# Patient Record
Sex: FEMALE | Race: BLACK OR AFRICAN AMERICAN | NOT HISPANIC OR LATINO | Employment: FULL TIME | URBAN - METROPOLITAN AREA
[De-identification: names, ages, dates, MRNs, and addresses within clinical notes are randomized per-mention and may not be internally consistent; named-entity substitution may affect disease eponyms.]

---

## 2017-01-11 RX ORDER — LEVOTHYROXINE SODIUM 137 UG/1
137 TABLET ORAL
Qty: 30 TABLET | Refills: 0 | Status: SHIPPED | OUTPATIENT
Start: 2017-01-11 | End: 2017-02-13 | Stop reason: SDUPTHER

## 2017-02-13 ENCOUNTER — OFFICE VISIT (OUTPATIENT)
Dept: FAMILY MEDICINE | Facility: CLINIC | Age: 57
End: 2017-02-13
Payer: COMMERCIAL

## 2017-02-13 VITALS
BODY MASS INDEX: 51.91 KG/M2 | WEIGHT: 293 LBS | HEART RATE: 80 BPM | HEIGHT: 63 IN | RESPIRATION RATE: 18 BRPM | SYSTOLIC BLOOD PRESSURE: 128 MMHG | TEMPERATURE: 97 F | OXYGEN SATURATION: 95 % | DIASTOLIC BLOOD PRESSURE: 84 MMHG

## 2017-02-13 DIAGNOSIS — Z78.0 POSTMENOPAUSAL: ICD-10-CM

## 2017-02-13 DIAGNOSIS — Z12.31 OTHER SCREENING MAMMOGRAM: ICD-10-CM

## 2017-02-13 DIAGNOSIS — E66.01 MORBID OBESITY WITH BMI OF 50.0-59.9, ADULT: ICD-10-CM

## 2017-02-13 DIAGNOSIS — M17.0 PRIMARY OSTEOARTHRITIS OF BOTH KNEES: ICD-10-CM

## 2017-02-13 DIAGNOSIS — Z00.00 ANNUAL PHYSICAL EXAM: Primary | ICD-10-CM

## 2017-02-13 DIAGNOSIS — E55.9 VITAMIN D DEFICIENCY: ICD-10-CM

## 2017-02-13 DIAGNOSIS — E03.9 HYPOTHYROIDISM, UNSPECIFIED TYPE: ICD-10-CM

## 2017-02-13 PROCEDURE — 99999 PR PBB SHADOW E&M-EST. PATIENT-LVL IV: CPT | Mod: PBBFAC,,, | Performed by: FAMILY MEDICINE

## 2017-02-13 PROCEDURE — 99396 PREV VISIT EST AGE 40-64: CPT | Mod: S$GLB,,, | Performed by: FAMILY MEDICINE

## 2017-02-13 RX ORDER — LEVOTHYROXINE SODIUM 137 UG/1
137 TABLET ORAL
Qty: 30 TABLET | Refills: 5 | Status: SHIPPED | OUTPATIENT
Start: 2017-02-13 | End: 2017-05-10 | Stop reason: DRUGHIGH

## 2017-02-13 RX ORDER — ERGOCALCIFEROL 1.25 MG/1
CAPSULE ORAL
Qty: 8 CAPSULE | Refills: 5 | Status: SHIPPED | OUTPATIENT
Start: 2017-02-13 | End: 2017-11-13

## 2017-02-13 RX ORDER — MELOXICAM 7.5 MG/1
7.5 TABLET ORAL DAILY PRN
Qty: 30 TABLET | Refills: 5 | Status: SHIPPED | OUTPATIENT
Start: 2017-02-13 | End: 2017-06-16

## 2017-02-13 NOTE — MR AVS SNAPSHOT
[facility]  [address] [facility] [address]  Phone: [phone]                  Amelia Mirza   2017 4:00 PM   Office Visit    Description:  Female : 1960   Provider:  Eve Green MD   Department:  [facility]           Reason for Visit     Annual Exam           Diagnoses this Visit        Comments    Annual physical exam    -  Primary     Primary osteoarthritis of both knees         Vitamin D deficiency         Hypothyroidism, unspecified type         Morbid obesity with BMI of 50.0-59.9, adult         Postmenopausal         Other screening mammogram                To Do List           Future Appointments        Provider Department Dept Phone    3/7/2017 8:00 AM [facility] BMD1 [facility] [phone]    3/7/2017 8:30 AM [facility] MAMMO1-SCR [facility] [phone]    3/7/2017 8:45 AM [facility], [facility] [phone]    3/7/2017 8:50 AM SPECIMEN, [facility] [phone]      Goals (5 Years of Data)     None      Follow-Up and Disposition     Return if symptoms worsen or fail to improve.       These Medications        Disp Refills Start End    meloxicam (MOBIC) 7.5 MG tablet 30 tablet 5 2017     Take 1 tablet (7.5 mg total) by mouth daily as needed for Pain. With food - Oral    Pharmacy: [facility]/pharmacy #[id number] - [place] - [address] [facility] Ph #: [phone]       levothyroxine (SYNTHROID) 137 MCG Tab tablet 30 tablet 2017     Take 1 tablet (137 mcg total) by mouth before breakfast. - Oral    Pharmacy: [facility]/pharmacy #[place] - [address] [facility] Ph #: [phone]       ergocalciferol (VITAMIN D2) 50,000 unit Cap 8 capsule 5 2017     Take 1 capsule every Monday and Friday    Pharmacy: [facility]/pharmacy #[id number] - [place] - [address] [facility] Ph #: [phone]         [facility]     [facility]  "Care Line - 24/7 Assistance  Registered nurses in the [facility] provide clinical advisement, health education, appointment booking, and other advisory services.  Call for this free service at [phone].             Medications           Message regarding Medications     Verify the changes and/or additions to your medication regime listed below are the same as discussed with your clinician today.  If any of these changes or additions are incorrect, please notify your healthcare provider.        CHANGE how you are taking these medications     Start Taking Instead of    ergocalciferol (VITAMIN D2) 50,000 unit Cap ergocalciferol (VITAMIN D2) 50,000 unit Cap    Dosage:  Take 1 capsule every Monday and Friday Dosage:  Take 1 capsule (50,000 Units total) by mouth as directed. Take 1 capsule every Monday and Friday    Reason for Change:  Reorder            Verify that the below list of medications is an accurate representation of the medications you are currently taking.  If none reported, the list may be blank. If incorrect, please contact your healthcare provider. Carry this list with you in case of emergency.           Current Medications     ergocalciferol (VITAMIN D2) 50,000 unit Cap Take 1 capsule every Monday and Friday    levothyroxine (SYNTHROID) 137 MCG Tab tablet Take 1 tablet (137 mcg total) by mouth before breakfast.    meloxicam (MOBIC) 7.5 MG tablet Take 1 tablet (7.5 mg total) by mouth daily as needed for Pain. With food           Clinical Reference Information           Your Vitals Were     BP Pulse Temp Resp    128/84 (BP Location: Left arm, Patient Position: Sitting, BP Method: Manual) 80 97.2 °F (36.2 °C) (Tympanic) 18    Height Weight SpO2 BMI    5' 3" (1.6 m) 136.6 kg (301 lb 2.4 oz) 95% 53.35 kg/m2      Blood Pressure          Most Recent Value    BP  128/84      Allergies as of 2/13/2017     No Known Allergies      Immunizations Administered on Date of Encounter - 2/13/2017     None "      Orders Placed During Today's Visit     Future Labs/Procedures Expected by Expires    CBC auto differential  2/13/2017 4/14/2018    Comprehensive metabolic panel  2/13/2017 4/14/2018    DXA Bone Density Spine And Hip_Axial Skeleton  2/13/2017 2/13/2018    Hepatitis C antibody  2/13/2017 4/14/2018    Lipid panel  2/13/2017 4/14/2018    Mammo Digital Screening Bilat with CAD  2/13/2017 4/15/2018    TSH  2/13/2017 4/14/2018    Urinalysis  2/13/2017 4/14/2018    Vitamin D  2/13/2017 4/14/2018      [facility] Sign-Up     Activating your [facility] account is as easy as 1-2-3!     1) Visit [email or web address], select Sign Up Now, enter this activation code and your date of birth, then select Next.  [email or web address]  Expires: 3/30/2017  4:55 PM      2) Create a username and password to use when you visit [facility] in the future and select a security question in case you lose your password and select Next.    3) Enter your e-mail address and click Sign Up!    Additional Information  If you have questions, please e-mail [email or web address] or call [phone] to talk to our [facility] staff. Remember, [facility] is NOT to be used for urgent needs. For medical emergencies, dial 911.         Instructions    Please call Dr. Green for your results/follow up recommendations.     Thanks.       Language Assistance Services     ATTENTION: Language assistance services are available, free of charge. Please call [phone].      ATENCIÓN: Si habla español, tiene a negro disposición servicios gratuitos de asistencia lingüística. Llame al [phone].     CHÚ Ý: N?u b?n nói Ti?ng Vi?t, có các d?ch v? h? tr? ngôn ng? mi?n phí dành cho b?n. G?i s? [phone].         [facility] complies with applicable Federal civil rights laws and does not discriminate on the basis of race, color, national origin, age, disability, or sex.

## 2017-02-13 NOTE — PROGRESS NOTES
CHIEF COMPLAINT: Annual wellness examination.     HISTORY OF PRESENT ILLNESS: Ms. Mirza comes in today non fasting and with taking medication and without acute problems for annual wellness examination.     END OF LIFE DECISION: She has no living will and does not desire life support.    Current Outpatient Prescriptions   Medication Sig    ergocalciferol (VITAMIN D2) 50,000 unit Cap Take 1 capsule (50,000 Units total) by mouth as directed. Take 1 capsule every Monday and Friday    levothyroxine (SYNTHROID) 137 MCG Tab tablet Take 1 tablet (137 mcg total) by mouth before breakfast.    meloxicam (MOBIC) 7.5 MG tablet Take 1 tablet (7.5 mg total) by mouth daily as needed for Pain. With food     SCREENINGS:  Cholesterol: December 31, 2014.  FFS/Colonoscopy: June 11, 2012 - polyps; repeat in 10 years.  Mammogram: December 31, 2014 - benign.  GYN Exam: December 9, 2014 - okay.  Dexa Scan: Never; will get at age 57.  Eye Exam: 2016 with Dr. Hill.  She wears glasses.   PPD: Negative in the past.  Immunizations: Td/Tdap - May 30, 2012.  Gardasil - N./A.  Zostavax - N./A.  Pneumovax - Never.  Seasonal Flu - N./A. Discussed. She declines.     ROS:  GENERAL: Denies fever, chills, fatigue or unusual weight change. Appetite normal. Weight 133.9 kg (295 lb 3.1 oz) at August 30, 2016 visit. Does not exercise due to knee pain. Monitors diet some times.  SKIN: Denies rashes, itching, changes in mole, color or texture of skin or easy bruising.  HEAD: Denies headaches or recent head trauma.  EYES: Denies change in vision, pain, diplopia, redness except watery discharge. Wears glasses.  EARS: Denies ear pain, discharge, vertigo or decreased hearing.  NOSE: Denies loss of smell, epistaxis except rhinitis and states uses Sinex, Mucinex with help.  MOUTH & THROAT: Denies hoarseness or change in voice. Denies excessive gum bleeding or mouth sores. Denies sore throat.  NODES: Denies swollen glands.  CHEST: Denies IYER, wheezing, cough, or  "sputum production.  CARDIOVASCULAR: Denies chest pain, PND, orthopnea or reduced exercise tolerance. Denies palpitations.  ABDOMEN: Denies diarrhea, constipation, nausea, vomiting, abdominal pain, or blood in stool.   URINARY: Denies flank pain, dysuria or hematuria.  GENITOURINARY: Denies flank pain, dysuria, frequency or hematuria. Performs monthly breast self exams.  ENDOCRINE: Denies diabetes, cholesterol problems. Reports takes Vitamin D 50,000 units on Monday, Friday for vitamin D deficiency and continues Synthroid 137 mcg daily for thyroid replacement.  HEME/LYMPH: Denies bleeding problems.  PERIPHERAL VASCULAR:Denies claudication or cyanosis.  MUSCULOSKELETAL: Denies joint stiffness, pain or swelling except reports chronic arthritic knee pain - sometimes helped with Mobic. Denies edema.   NEUROLOGIC: Denies history of seizures, tremors, paralysis, alteration of gait or coordination.   PSYCHIATRIC: Denies mood swings, depression, anxiety, homicidal or suicidal thoughts. Denies sleep problems.    PE:   VS:   Visit Vitals    /84 (BP Location: Left arm, Patient Position: Sitting, BP Method: Manual)    Pulse 80    Temp 97.2 °F (36.2 °C) (Tympanic)    Resp 18    Ht 5' 3" (1.6 m)    Wt (!) 136.6 kg (301 lb 2.4 oz)    SpO2 95%    BMI 53.35 kg/m2     APPEARANCE: Well nourished, well developed female, obese and pleasant, alert and oriented in no acute distress.  HEAD: Nontender. Full range of motion.  EYES: PERRL, conjunctiva pink, lids no edema. She wears glasses.  EARS: External canal patent, no swelling or redness. TM's shiny and clear.  NOSE: Mucosa and turbinates pink, not swollen. No discharge. Nontender sinuses.  THROAT: No pharyngeal erythema or exudate. No stridor.  NECK: Supple, no mass, thyroid not enlarged.  NODES: No cervical, axillary or inguinal lymph node enlargement.  CHEST: Normal respiratory effort. Lungs clear to auscultation.  CARDIOVASCULAR: Normal S1, S2. No rubs, murmurs or " gallops. PMI not displaced. No carotid bruit. Pedal pulses palpable bilaterally. No edema.  ABDOMEN: Bowel sounds present. Not distended. Soft. No tenderness, masses or organomegaly.  BREAST EXAM: Symmetrical, no external lesions, no discharge, no masses palpated.  PELVIC EXAM: No external lesions noted, no discharge, absent cervix and uterus, bimanual exam showed no adnexal masses or tenderness noted. Urethra and bladder  intact.  RECTAL EXAM: No external hemorrhoids or anal fissures. Heme-negative stool in the rectal vault.  MUSCULOSKELETAL: No joint deformities or stiffness. She is ambulatory without problems.  SKIN: No rashes or suspicious lesions, normal color and turgor.  NEUROLOGIC: Cranial Nerves: II-XII grossly intact. DTR's: Knees, Ankles 2+ and equal bilaterally. Gait & Posture: Normal gait and fine motion.  PSYCHIATRIC: Patient alert, oriented x 3. Mood/Affect normal without acute anxiety or depression noted. Judgment/insight good as she makes appropriate decisions on  today's examination.    ASSESSMENT:    ICD-10-CM ICD-9-CM    1. Annual physical exam Z00.00 V70.0 CBC auto differential      TSH      Comprehensive metabolic panel      Lipid panel      Urinalysis      Vitamin D      Hepatitis C antibody   2. Hypothyroidism, unspecified type E03.9 244.9    3. Vitamin D deficiency E55.9 268.9    4. Primary osteoarthritis of both knees M17.0 715.16    5. Morbid obesity with BMI of 50.0-59.9, adult E66.01 278.01     Z68.43 V85.43    6. Postmenopausal Z78.0 V49.81 DXA Bone Density Spine And Hip_Axial Skeleton   7. Other screening mammogram Z12.31 V76.12 Mammo Digital Screening Bilat with CAD        PLAN:  1. Age-appropriate counseling-appropriate low-sodium, low-cholesterol diet and exercise daily as tolerated, monthly breast self exam, annual wellness examination.  2. Labs: CMP, Vitamin D, TSH, CBC, Lipid panel. Patient advised to call for results/follow up recommendations.  3. Screening mammogram.  4.  Continue current medications (including take Mobic every day as directed).  5. Prescription refill - Synthroid 137 mcg daily, #30, 5 refills; Mobic 7.5 mg daily prn pain, #30, 5 refills; vitamin D 50,000 units every Monday and Friday of each week, #8, 5 refills.

## 2017-03-07 ENCOUNTER — TELEPHONE (OUTPATIENT)
Dept: FAMILY MEDICINE | Facility: CLINIC | Age: 57
End: 2017-03-07

## 2017-03-07 ENCOUNTER — NURSE TRIAGE (OUTPATIENT)
Dept: ADMINISTRATIVE | Facility: CLINIC | Age: 57
End: 2017-03-07

## 2017-03-07 RX ORDER — CYCLOBENZAPRINE HCL 10 MG
10 TABLET ORAL DAILY PRN
Qty: 30 TABLET | Refills: 0 | Status: SHIPPED | OUTPATIENT
Start: 2017-03-07 | End: 2017-04-10 | Stop reason: SDUPTHER

## 2017-03-07 NOTE — TELEPHONE ENCOUNTER
----- Message from Ashtyn Alonso sent at 3/7/2017 10:20 AM CST -----  Contact: pt  Pt states she has been having ioana horse since 3/5 and wants to be advised, pt can be reached at [phone]///thxMW

## 2017-03-07 NOTE — TELEPHONE ENCOUNTER
Pt c/o ioana horses in left leg since Sunday the 5th.   Tried rubbing it out, walking it out, but not going away.  Tried tylenol and on already on meloxicam. Wants to know if she can get a muscle relaxer or if there is anything else she can do or take for it.

## 2017-03-07 NOTE — TELEPHONE ENCOUNTER
"  Reason for Disposition   [1] SEVERE pain (e.g., excruciating, unable to do any normal activities) AND [2] not improved after 2 hours of pain medicine    Answer Assessment - Initial Assessment Questions  1. ONSET: "When did the pain start?"       Sunday   2. LOCATION: "Where is the pain located?"       Lower left leg/calf  3. PAIN: "How bad is the pain?"    (Scale 1-10; or mild, moderate, severe)    -  MILD (1-3): doesn't interfere with normal activities     -  MODERATE (4-7): interferes with normal activities (e.g., work or school) or awakens from sleep, limping     -  SEVERE (8-10): excruciating pain, unable to do any normal activities, unable to walk      Currently an 8   4. WORK OR EXERCISE: "Has there been any recent work or exercise that involved this part of the body?"       denied  5. CAUSE: "What do you think is causing the leg pain?"      Thinks she has charley horses but not sure  6. OTHER SYMPTOMS: "Do you have any other symptoms?" (e.g., chest pain, back pain, breathing difficulty, swelling, rash, fever, numbness, weakness)      None reported  7. PREGNANCY: "Is there any chance you are pregnant?" "When was your last menstrual period?"      N/a  Called 's office today for advice- told them she was on meloxicam and had been taking tylenol but not getting any relief. Has tried soaking with warm water. Stretching and standing but pain not being relieved.    Protocols used: ST LEG PAIN-A-ANAYELI    "

## 2017-03-10 ENCOUNTER — OFFICE VISIT (OUTPATIENT)
Dept: FAMILY MEDICINE | Facility: CLINIC | Age: 57
End: 2017-03-10
Payer: COMMERCIAL

## 2017-03-10 ENCOUNTER — LAB VISIT (OUTPATIENT)
Dept: LAB | Facility: HOSPITAL | Age: 57
End: 2017-03-10
Attending: FAMILY MEDICINE
Payer: COMMERCIAL

## 2017-03-10 VITALS
TEMPERATURE: 98 F | RESPIRATION RATE: 18 BRPM | HEIGHT: 62 IN | HEART RATE: 93 BPM | OXYGEN SATURATION: 98 % | BODY MASS INDEX: 53.92 KG/M2 | WEIGHT: 293 LBS | DIASTOLIC BLOOD PRESSURE: 82 MMHG | SYSTOLIC BLOOD PRESSURE: 140 MMHG

## 2017-03-10 DIAGNOSIS — R25.2 MUSCLE CRAMPING: Primary | ICD-10-CM

## 2017-03-10 DIAGNOSIS — M79.605 LEG PAIN, LEFT: ICD-10-CM

## 2017-03-10 DIAGNOSIS — R25.2 MUSCLE CRAMPING: ICD-10-CM

## 2017-03-10 LAB
ALBUMIN SERPL BCP-MCNC: 4.1 G/DL
ALP SERPL-CCNC: 69 U/L
ALT SERPL W/O P-5'-P-CCNC: 12 U/L
ANION GAP SERPL CALC-SCNC: 8 MMOL/L
AST SERPL-CCNC: 12 U/L
BILIRUB SERPL-MCNC: 0.5 MG/DL
BUN SERPL-MCNC: 18 MG/DL
CALCIUM SERPL-MCNC: 9.9 MG/DL
CHLORIDE SERPL-SCNC: 101 MMOL/L
CK SERPL-CCNC: 324 U/L
CO2 SERPL-SCNC: 29 MMOL/L
CREAT SERPL-MCNC: 1 MG/DL
ERYTHROCYTE [SEDIMENTATION RATE] IN BLOOD BY WESTERGREN METHOD: 35 MM/HR
EST. GFR  (AFRICAN AMERICAN): >60 ML/MIN/1.73 M^2
EST. GFR  (NON AFRICAN AMERICAN): >60 ML/MIN/1.73 M^2
GLUCOSE SERPL-MCNC: 83 MG/DL
MAGNESIUM SERPL-MCNC: 1.9 MG/DL
POTASSIUM SERPL-SCNC: 3.9 MMOL/L
PROT SERPL-MCNC: 8.4 G/DL
SODIUM SERPL-SCNC: 138 MMOL/L

## 2017-03-10 PROCEDURE — 1160F RVW MEDS BY RX/DR IN RCRD: CPT | Mod: S$GLB,,, | Performed by: FAMILY MEDICINE

## 2017-03-10 PROCEDURE — 82550 ASSAY OF CK (CPK): CPT

## 2017-03-10 PROCEDURE — 80053 COMPREHEN METABOLIC PANEL: CPT

## 2017-03-10 PROCEDURE — 83735 ASSAY OF MAGNESIUM: CPT

## 2017-03-10 PROCEDURE — 85651 RBC SED RATE NONAUTOMATED: CPT

## 2017-03-10 PROCEDURE — 99999 PR PBB SHADOW E&M-EST. PATIENT-LVL III: CPT | Mod: PBBFAC,,, | Performed by: FAMILY MEDICINE

## 2017-03-10 PROCEDURE — 99213 OFFICE O/P EST LOW 20 MIN: CPT | Mod: S$GLB,,, | Performed by: FAMILY MEDICINE

## 2017-03-10 PROCEDURE — 36415 COLL VENOUS BLD VENIPUNCTURE: CPT | Mod: PO

## 2017-03-10 RX ORDER — IBUPROFEN 800 MG/1
800 TABLET ORAL 3 TIMES DAILY
Qty: 60 TABLET | Refills: 1 | Status: SHIPPED | OUTPATIENT
Start: 2017-03-10 | End: 2017-06-16

## 2017-03-10 NOTE — MR AVS SNAPSHOT
[facility]  [address]  [place] [address]  Phone: [phone]                  Amelia Mirza   3/10/2017 3:15 PM   Office Visit    Description:  Female : 1960   Provider:  Elysia Hanks MD   Department:  [facility]           Reason for Visit     Hip Pain     Leg Pain           Diagnoses this Visit        Comments    Muscle cramping    -  Primary     Leg pain, left                To Do List           Future Appointments        Provider Department Dept Phone    2017 9:00 AM [facility] BMD1 [facility] [phone]    2017 10:30 AM [facility] MAMMO1-SCR [facility] [phone]    2017 10:50 AM LABORATORY, [facility] [phone]    2017 11:20 AM SPECIMEN, [facility] [phone]      Goals (5 Years of Data)     None       These Medications        Disp Refills Start End    ibuprofen (ADVIL,MOTRIN) 800 MG tablet 60 tablet 1 3/10/2017     Take 1 tablet (800 mg total) by mouth 3 (three) times daily. - Oral    Pharmacy: [facility]/pharmacy #[id number] - [place] - [address] [facility] Ph #: [phone]         [facility]     [facility] Care Line -  Assistance  Registered nurses in the [facility] provide clinical advisement, health education, appointment booking, and other advisory services.  Call for this free service at [phone].             Medications           Message regarding Medications     Verify the changes and/or additions to your medication regime listed below are the same as discussed with your clinician today.  If any of these changes or additions are incorrect, please notify your healthcare provider.        START taking these NEW medications        Refills    ibuprofen (ADVIL,MOTRIN) 800 MG tablet 1    Sig: Take 1 tablet (800 mg total) by mouth 3 (three) times daily.    Class: Normal    Route: Oral  "          Verify that the below list of medications is an accurate representation of the medications you are currently taking.  If none reported, the list may be blank. If incorrect, please contact your healthcare provider. Carry this list with you in case of emergency.           Current Medications     cyclobenzaprine (FLEXERIL) 10 MG tablet Take 1 tablet (10 mg total) by mouth daily as needed for Muscle spasms.    ergocalciferol (VITAMIN D2) 50,000 unit Cap Take 1 capsule every Monday and Friday    levothyroxine (SYNTHROID) 137 MCG Tab tablet Take 1 tablet (137 mcg total) by mouth before breakfast.    meloxicam (MOBIC) 7.5 MG tablet Take 1 tablet (7.5 mg total) by mouth daily as needed for Pain. With food    ibuprofen (ADVIL,MOTRIN) 800 MG tablet Take 1 tablet (800 mg total) by mouth 3 (three) times daily.           Clinical Reference Information           Your Vitals Were     BP Pulse Temp Resp Height Weight    140/82 93 98.4 °F (36.9 °C) (Tympanic) 18 5' 2" (1.575 m) 135.4 kg (298 lb 8.1 oz)    SpO2 BMI             98% 54.6 kg/m2         Blood Pressure          Most Recent Value    BP  (!)  140/82      Allergies as of 3/10/2017     No Known Allergies      Immunizations Administered on Date of Encounter - 3/10/2017     None      Orders Placed During Today's Visit     Future Labs/Procedures Expected by Expires    CK  3/10/2017 5/9/2018    Comprehensive metabolic panel  3/10/2017 5/9/2018    Magnesium  3/10/2017 5/9/2018    Sedimentation rate, manual  3/10/2017 5/9/2018      [facility] Sign-Up     Activating your [facility] account is as easy as 1-2-3!     1) Visit [email or web address], select Sign Up Now, enter this activation code and your date of birth, then select Next.  [email or web address]  Expires: 3/30/2017  4:55 PM      2) Create a username and password to use when you visit [facility] in the future and select a security question in case you lose your password and select Next.    3) Enter your e-mail address and click " Sign Up!    Additional Information  If you have questions, please e-mail [email or web address] or call [phone] to talk to our [facility] staff. Remember, [facility] is NOT to be used for urgent needs. For medical emergencies, dial 911.         Language Assistance Services     ATTENTION: Language assistance services are available, free of charge. Please call [phone].      ATENCIÓN: Si habla español, tiene a negro disposición servicios gratuitos de asistencia lingüística. Llame al [phone].     [facility] Ý: N?u b?n nói Ti?ng Vi?t, có các d?ch v? h? tr? ngôn ng? mi?n phí dành cho b?n. G?i s? [phone].         [facility] complies with applicable Federal civil rights laws and does not discriminate on the basis of race, color, national origin, age, disability, or sex.

## 2017-03-10 NOTE — PROGRESS NOTES
CHIEF COMPLAINT: This is a 57-year-old female complaining of cramping in left leg.    SUBJECTIVE: Patient complains of pain in left lower extremity for 6 days.  She describes the pain as cramping in quality and located in left posterior thigh which moves down into her calf.  She has tried walking it out, warm soaks and hot and cold compresses without relief.  She called March 7 and was prescribed muscle relaxant, which has not been effective.  When patient goes to sleep, cramping wakes her up.  Sometimes cramping is just in the thigh or calf and sometimes it's in both places at once.  Patient denies any history of injury or increased activity.  Pain is rated 9 out of 10 and causes her to limp.  She's been taking meloxicam for knee pain with no benefit in relieving the cramping.  Patient denies history of DVT.  She denies swelling, redness or warmth in left lower extremity.    ROS:  GENERAL: Patient denies fever, chills, night sweats.  Patient denies weight gain or loss. Patient denies anorexia, fatigue, weakness or swollen glands.  SKIN: Patient denies rash.  LUNGS: Patient denies cough, wheeze or hemoptysis.  CARDIOVASCULAR: Patient denies chest pain, shortness of breath, palpitations, syncope or lower extremity edema.  GI: Patient denies abdominal pain, nausea, vomiting, diarrhea, constipation, blood in stool or melena.  GENITOURINARY:  Patient denies dysuria, frequency, hematuria, nocturia, urgency or incontinence.  MUSCULOSKELETAL: Patient denies joint pain, swelling, redness or warmth.  NEUROLOGIC: Patient denies headache, vertigo,, numbness, tingling, weakness in limb.    OBJECTIVE:   GENERAL: Well-developed well-nourished morbidly obese female alert and oriented x3 in no acute distress.  Memory, judgment and cognition without deficit.  SKIN: Clear without rash.  Normal color and tone.  No signs of trauma.  HEENT: Eyes: Clear conjunctivae.  No scleral icterus.    NECK: Supple, normal range of motion.    LUNGS:  Clear to auscultation.  Normal respiratory effort.  CARDIOVASCULAR: Regular rhythm, normal S1, S2 without murmur, gallop or rub.  BACK: No CVA or spinal tenderness.  EXTREMITIES: Without cyanosis, clubbing or edema.  Distal pulses 2+ and equal.  Normal range of motion in left lower extremity.  No calf swelling, redness or warmth.  No joint effusion, erythema or warmth.  Negative Homans sign.  NEUROLOGIC:    Motor strength equal bilaterally.  Sensation normal to touch.  Deep tendon reflexes 2+ and equal.  Gait without abnormality.  No tremor.      ASSESSMENT:  1. Muscle cramping    2. Leg pain, left      PLAN:   1.  Check ESR, CMP, CPK, and magnesium level.  2.  Increase daily water intake.  3.  Discontinue meloxicam.  4.  Ibuprofen 800 mg 3 times daily with food.  5.  Vitamin B complex.  6.  Follow-up if no improvement or worsening symptoms.

## 2017-03-13 ENCOUNTER — TELEPHONE (OUTPATIENT)
Dept: FAMILY MEDICINE | Facility: CLINIC | Age: 57
End: 2017-03-13

## 2017-03-13 NOTE — TELEPHONE ENCOUNTER
The lab results were essentially normal except for slight elevation in muscle enzyme, which is of undetermined significance and unlikely to be the cause of pain.  Electrolytes including potassium, magnesium and calcium were all within normal range.  Has she tried taking vitamin B complex daily?  Is ibuprofen 800 mg helping at all?

## 2017-03-13 NOTE — TELEPHONE ENCOUNTER
----- Message from Jesus Torres sent at 3/13/2017  2:38 PM CDT -----  Contact: pt  She's calling in regards to her labs, please advise, [phone] (home)

## 2017-03-14 NOTE — TELEPHONE ENCOUNTER
Her options are: Physical therapy or consult with rheumatologist, which probably will take at least 6 weeks to get an appointment and/or pain medication.  Also, I would suggest trying B complex.

## 2017-03-14 NOTE — TELEPHONE ENCOUNTER
----- Message from Jesus Torres sent at 3/14/2017  2:00 PM CDT -----  Contact: pt  She's calling in regards to a missed call, please advise, [phone] (home)

## 2017-03-14 NOTE — TELEPHONE ENCOUNTER
Notified pt of her options  Prefers to go to rheumatologist   States she will call her insurance company to see what rheumatologist her insurance pays for externally and call us back

## 2017-04-11 RX ORDER — LEVOTHYROXINE SODIUM 137 UG/1
TABLET ORAL
Qty: 30 TABLET | Refills: 0 | Status: SHIPPED | OUTPATIENT
Start: 2017-04-11 | End: 2017-05-10 | Stop reason: DRUGHIGH

## 2017-04-11 RX ORDER — CYCLOBENZAPRINE HCL 10 MG
TABLET ORAL
Qty: 30 TABLET | Refills: 0 | Status: SHIPPED | OUTPATIENT
Start: 2017-04-11 | End: 2018-11-14

## 2017-04-17 ENCOUNTER — HOSPITAL ENCOUNTER (OUTPATIENT)
Dept: RADIOLOGY | Facility: HOSPITAL | Age: 57
Discharge: HOME | End: 2017-04-17
Attending: FAMILY MEDICINE
Payer: COMMERCIAL

## 2017-04-17 DIAGNOSIS — Z12.31 OTHER SCREENING MAMMOGRAM: ICD-10-CM

## 2017-04-17 PROCEDURE — 77067 SCR MAMMO BI INCL CAD: CPT | Mod: TC

## 2017-04-17 PROCEDURE — 77067 SCR MAMMO BI INCL CAD: CPT | Mod: 26,,, | Performed by: RADIOLOGY

## 2017-05-10 ENCOUNTER — TELEPHONE (OUTPATIENT)
Dept: FAMILY MEDICINE | Facility: CLINIC | Age: 57
End: 2017-05-10

## 2017-05-10 RX ORDER — LEVOTHYROXINE SODIUM 150 UG/1
150 TABLET ORAL
Qty: 30 TABLET | Refills: 2 | Status: SHIPPED | OUTPATIENT
Start: 2017-05-10 | End: 2017-11-13

## 2017-05-10 NOTE — TELEPHONE ENCOUNTER
----- Message from Joy Castillo sent at 5/9/2017  4:06 PM CDT -----  Contact: patient  Calling concerning  Her test results. Please call patient ASAP today @ [phone]. Thanks, Lynn

## 2017-05-10 NOTE — TELEPHONE ENCOUNTER
Advise pt results are as follows:  1 - mammogram benign; repeat in 1 yr.  2 - dexa scan okay; repeat in 5 yrs.  3 - low vit D; has she been taking vit D 50K units every Monday and Friday as prescribed?  4 - abnl thyroid; if she has been taking Synthroid 137 mcg every daily, needs to increase to Synthroid 150 mcg daily and see me in 6 weeks for thyroid recheck.  5 - borderline decreased kidney function and stable, chronic borderline anemia; stay well-hydrated and try to avoid taking NSAID's (including Mobic) as may cause further decline in kidney function.  Let's plan to recheck kidney function test at 6-week f/u w/me - schedule. Thanks for call.

## 2017-05-10 NOTE — TELEPHONE ENCOUNTER
Patient notified of result and states is still having tingling in toes, pain has gotten better. With pain in calves. Do she continue taking Tylenol 800 mg for pain? What about the tingling in toes?

## 2017-05-10 NOTE — TELEPHONE ENCOUNTER
----- Message from Giovanny Madrigal sent at 5/10/2017  4:08 PM CDT -----  Contact: pt  Pt is returning Nurse staff call regarding pt test results. Pt call back [phone] thanks

## 2017-05-11 NOTE — TELEPHONE ENCOUNTER
Patient states she is not taking any B-12 over the counter nor was she advise about an rheumatology. Please advise

## 2017-05-11 NOTE — TELEPHONE ENCOUNTER
Sorry, I was not aware of toe tingling but see she may have discussed with Dr. Hanks earlier this year.  Is she taking OTC B complex? Did she see rheumatologist?  Yes, can continue Tylenol.

## 2017-05-12 NOTE — TELEPHONE ENCOUNTER
Left a message to contact office.  On 03/13/17 Notified pt of her options Her options are: Physical therapy or consult with rheumatologist, which probably will take at least 6 weeks to get an appointment and/or pain medication. Also, I would suggest trying B complex.  Prefers to go to rheumatologist   States she will call her insurance company to see what rheumatologist her insurance pays for externally and call us back

## 2017-05-12 NOTE — TELEPHONE ENCOUNTER
----- Message from Caitlin Hopkins sent at 5/12/2017 12:50 PM CDT -----  Contact: pt  Returning missed call - please call again

## 2017-05-12 NOTE — TELEPHONE ENCOUNTER
Patient states will contact insurance to see who is covered on her insurance. Will call with information

## 2017-05-16 ENCOUNTER — PATIENT MESSAGE (OUTPATIENT)
Dept: FAMILY MEDICINE | Facility: CLINIC | Age: 57
End: 2017-05-16

## 2017-06-16 ENCOUNTER — OFFICE VISIT (OUTPATIENT)
Dept: PHYSICAL MEDICINE AND REHAB | Facility: CLINIC | Age: 57
End: 2017-06-16
Payer: COMMERCIAL

## 2017-06-16 VITALS
BODY MASS INDEX: 53.92 KG/M2 | WEIGHT: 293 LBS | HEART RATE: 60 BPM | DIASTOLIC BLOOD PRESSURE: 80 MMHG | SYSTOLIC BLOOD PRESSURE: 155 MMHG | RESPIRATION RATE: 14 BRPM | HEIGHT: 62 IN

## 2017-06-16 DIAGNOSIS — E66.01 MORBID OBESITY WITH BMI OF 50.0-59.9, ADULT: ICD-10-CM

## 2017-06-16 DIAGNOSIS — M17.0 PRIMARY OSTEOARTHRITIS OF BOTH KNEES: ICD-10-CM

## 2017-06-16 DIAGNOSIS — M47.26 OSTEOARTHRITIS OF SPINE WITH RADICULOPATHY, LUMBAR REGION: Primary | ICD-10-CM

## 2017-06-16 DIAGNOSIS — M51.36 DDD (DEGENERATIVE DISC DISEASE), LUMBAR: ICD-10-CM

## 2017-06-16 PROCEDURE — 99204 OFFICE O/P NEW MOD 45 MIN: CPT | Mod: S$GLB,,, | Performed by: PHYSICAL MEDICINE & REHABILITATION

## 2017-06-16 PROCEDURE — 99999 PR PBB SHADOW E&M-EST. PATIENT-LVL III: CPT | Mod: PBBFAC,,, | Performed by: PHYSICAL MEDICINE & REHABILITATION

## 2017-06-16 RX ORDER — NABUMETONE 500 MG/1
500 TABLET, FILM COATED ORAL 2 TIMES DAILY
Qty: 60 TABLET | Refills: 1 | Status: SHIPPED | OUTPATIENT
Start: 2017-06-16 | End: 2018-11-14 | Stop reason: ALTCHOICE

## 2017-06-16 RX ORDER — GABAPENTIN 300 MG/1
300 CAPSULE ORAL NIGHTLY
Qty: 30 CAPSULE | Refills: 1 | Status: SHIPPED | OUTPATIENT
Start: 2017-06-16 | End: 2018-01-18

## 2017-06-16 NOTE — PROGRESS NOTES
PM&[facility] NEW PATIENT HISTORY & PHYSICAL :    Referring Physician:    Chief Complaint   Patient presents with    Hip Pain     left, to the leg    Numbness     left foot/ toes    Knee Pain     bilateral     HPI: This is a 57 y.o.  female being seen in clinic today for evaluation of low back and knee achy over the past few months.  When first getting up in the morning or with prolonged or increased walking/standing, her symptoms increase.  She also complains of occasional shooting pain into her left lateral hip into her leg.  She has a persistent mild numbness in her left toes but denies weakness.  She hasn't consistently taking NSAIDs or flexeril for relief.     History obtained from patient    Functional History:  Walking: limited  Transfers: Independent  Assistive devices: No  Power mobility: No  Falls: None     Employment status:[employer]    Needs help with:  Nothing - all ADLS normal    Review of Systems:     General- denies lethargy, weight change, fever, chills  Head/neck- denies swallowing difficulties  ENT- denies hearing changes  Cardiovascular-denies chest pain  Pulmonary- denies shortness of breath  GI- denies constipation or bowel incontinence  - denies bladder incontinence  Skin- denies wounds or rashes  Musculoskeletal- denies weakness, +pain  Neurologic- +numbness and tingling  Psychiatric- denies depressive or psychotic features, denies anxiety  Lymphatic-denies swelling  Endocrine- denies hypoglycemic symptoms/DM history    Physical Examination:  General: Well developed, well nourished female, NAD, short stature, morbidly obese habitus     Spinal Examination: CERVICAL  Active ROM is within normal limits.  Inspection: No deformity of spinal alignment.    Spinal Examination: LUMBAR or THORACIC  Active ROM is limited at endranges  Inspection: No deformity of spinal alignment.  No palpable olisthesis.  Palpation: No vertebral tenderness to percussion.  ttp at gt bursas bilaterally, mild at si joints and  glut musculature, tight paraspinals   SLR Test (seated and supine):negative  bilaterally  Able to stand on heels and toes     Bilateral Upper and Lower Extremities:  Pulses are 2+ at radial,bilaterally.  Shoulder/Elbow/Wrist/Hand ROM   Hip/Knee/Ankle ROM wnl, +crepitus bilateral knees, ttp at med and lateral jt lines, mild edema at left sup knee  Bilateral Extremities show normal capillary refill.  No signs of cyanosis, rubor, edema, skin changes, or dysvascular changes of appendages.  Nails appear intact.    Neurological Exam:  Cranial Nerves:  II-XII grossly intact    Manual Muscle Testing: (Motor 5=normal)    RIGHT Lower extremity: Hip flexion 4/5, Hip Abduction4/5, Knee extension 5/5, Knee flexion 5/5, Ankle dorsiflexion 5/5, Extensor hallucis longus 5/5, Ankle plantarflexion 5/5  LEFT Lower extremity:  Hip flexion 4/5, Hip Abduction 4/5, Knee extension 5/5, Knee flexion 5/5, Ankle dorsiflexion 5/5, Extensor hallucis longus 5/5, Ankle plantarflexion 5/5    No focal atrophy is noted of either upper or lower extremity.    Bilateral Reflexes:hypo at patellar  No clonus at knee or ankle.    Sensation: tested to light touch  - intact in legs except dec at left lateral thigh to knee    Gait: Narrow base and good arm swing, mild limitation of knee ROM    Cerebellar:  tandem gait.     IMPRESSION/PLAN: This is a 57 y.o.  female with lumbar DJD/DDD with left leg radiculitis, mild knee DJD, morbid obesity:Body mass index is 54.14 kg/m².      1. Rx for PT-saleem--Core and lower ext strength, knee stabilization, stretch, ROM, modalities, light traction  2. Nabumetone 500mg BID for arthritis, gabapentin 300mg   3. Ice/heat modalities, Discussed about appropriate shoe wear, RICE for knees  4. Handouts on back care, knee care, exercises, diet/weight loss provided  5. Fu prn, if not improving will consider MRI and referral for KARISSA  6. xrays reviewed with patient and daughter    Claudette DANA Padilla.  Physical Medicine and  Rehab

## 2017-06-16 NOTE — PATIENT INSTRUCTIONS
Possible Causes of Low Back or Leg Pain    The symptoms in your back or leg may be due to pressure on a nerve. This pressure may be caused by a damaged disk or by abnormal bone growth. Either way, you may feel pain, burning, tingling, or numbness. If you have pressure on a nerve that connects to the sciatic nerve, pain may shoot down your leg.    Pressure from the disk  Constant wear and tear can weaken a disk over time and cause back pain. The disk can then be damaged by a sudden movement or injury. If its soft center begins to bulge, the disk may press on a nerve. Or the outside of the disk may tear, and the soft center may squeeze through and pinch a nerve.    Pressure from bone  As a disk wears out, the vertebrae right above and below the disk begin to touch. This can put pressure on a nerve. Often, abnormal bone (called bone spurs) grows where the vertebrae rub against each other. This can cause the foramen or the spinal canal to narrow (called stenosis) and press against a nerve.  Date Last Reviewed: 10/4/2015  © 1574-8468 [employer]. [address] [place] [address]. All rights reserved. This information is not intended as a substitute for professional medical care. Always follow your healthcare professional's instructions.        What is Arthritis?  Arthritis is a disease that affects the joints (the parts where bones meet and move). It can affect any joint in your body. There are many types of arthritis, including osteoarthritis and rheumatoid arthrtitis. If your symptoms are mild, medications may be enough to reduce pain and swelling. For more severe arthritis, surgery may be needed to improve the condition of the joint or replace the joint entirely.                  What causes arthritis?  Cartilage is a smooth substance that protects the ends of your bones and provides cushioning. When you have arthritis, this cartilage breaks down and can no longer protect your bones. The bones  rub against each other, causing pain and swelling. Over time, bone spurs (small pieces of rough or splintered bone) may develop, and the joint's range of motion can become limited.  Symptoms  Some of the more common symptoms of arthritis include:  · Joint pain and stiffness. Pain and stiffness get worse with long periods of rest or using a joint too long or too hard.  · Joints that have lost normal shape and motion.  · Tender, inflamed joints. They may look red and feel warm.  · Grinding or popping noise with joint movement.   · Feeling tired all the time.  Reducing symptoms  Following a healthy lifestyle by losing weight and exercising can help reduce symptoms of osteoarthritis. Medicines can be very helpful for arthritis.     Date Last Reviewed: 9/10/2015  © 7894-8605 [employer]. [address] [place] [address], [place] [address]. All rights reserved. This information is not intended as a substitute for professional medical care. Always follow your healthcare professional's instructions.        Arthritis: Exercise     Look for exercise classes for arthritis in your community.     Exercise is important to your overall health. It is especially important in people with arthritis. Regular exercise can:  · Keep your heart and blood vessels healthy  · Help with weight management, or weight loss  · Improve your mood  · Help prevent and manage health problems such as:  ¨ Diabetes  ¨ High blood pressure  ¨ High cholesterol  ¨ Depression  In people with arthritis, it offers all of those benefits and it can:  · Lessen pain and stiffness  · Strengthen muscles that support your joints  · Help you to be able to do the things you enjoy  Exercise and arthritis  Exercise is an important part of any arthritis treatment plan. A complete program consists of the following three types of exercises:  · Aerobic exercises for cardiovascular health and overall fitness.   · Strengthening exercises to build up muscles to help prevent  injury and keep joints stable.  · Range-of-motion exercises to keep muscles and joints flexible.  Getting started  Talk with your healthcare provider about what is safe for you. Make sure you:  · Learn how to do exercises properly and safely. Consider talking with a physical therapist or  used to working with people with arthritis.  · Start gradually and build. If you haven't been exercising, start slowly. Don't exercise too hard or too long.  · Create a routine. Set aside specific times for exercise every day.  · Warm up carefully. Take 5 to 10 minutes at the beginning and end of exercising to warm up and cool down. Just do the same exercises at a slower pace for 5 to 10 minutes.  · Work at a comfortable, smooth pace. Move your joints gently to prevent injury.  · Pay attention to your body. Don't exercise a painful or swollen joint; switch to another activity. Follow the 2-hour pain rule: You did too much if your joint or muscle pain lasts 2 hours or more after exercising, or is worse the next day. This doesn't mean you should stop exercising. Just do less.  Aerobic exercise  Aerobic exercise improves overall health and helps control weight. Choose those that don't add extra stress to your joints. For example, walking, swimming, or bicycling.  Most people should exercise for at least 30 minutes. most days of the week. You don't have to exercise all at once. Try exercising for 10 minutes, 3 times a day, for example.  Strengthening exercises  Strengthening your muscles help to protect your joints and prevent injuries. Try to do strengthening exercises 2 to 3 times a week:  · These exercises can be done with exercise or resistance bands (inexpensive exercise aids that add resistance), or with light weights. Some people use soup cans as weights.  · Isometric exercises are done by tightening the muscles without moving the joint. This may be a good way to strengthen the muscles around a stiff joint.  A physical  therapist or  can teach you how to do these exercises.  Range-of-motion (ROM) exercises  Range-of-motion (ROM) exercises allow you to move each of your joints in every way they are intended to move. You should do ROM exercises for each joint 2 to 3 times a day. This will help you maintain full use of all of your joints.  Sample ROM exercises  The following are just a sample of ROM exercises--one for your neck, shoulders, elbows, hips, knees, and ankles. To completely move each joint through its full range of motion, you will have to do a few exercises for each joint. A physical therapist or  can teach you how to do full ROM exercises for each joint.   Repeat each for these exercises 5 to 10 times. Make sure you move slowly:  1. Neck turns. Sit in a straight-backed chair. Look straight ahead. Slowly turn your head to the right, then return it to center. Repeat. Do the same thing, turning your head to the left. Repeat.  2. Shoulder raise. Lie on your back or sit in a chair. Raise one arm over your head, keeping your elbow straight. Keep the arm close to your ear. Return it slowly to your side. Repeat with your other arm.  3. Elbow stretch. Sit in a chair. If you are able, put both arms out to your sides to form a T. Slowly touch your shoulders with the tips of your fingers. Then return to the T-position. Repeat.  4. Hip stretch. Lie on your back with your legs straight and about 6 inches apart. With your foot flexed, slide your leg out to the side, then slide it back to the starting position. Repeat with your other leg.  5. Knee bend. Sit in a chair with your legs bent at the knees in front of you. Straighten one leg as much as you can, then bring it back to the floor. Repeat this 5 to 10 times. Then do the same thing with the other leg.   6. Ankle stretch. Sit with your feet flat on the floor. Lift your toes off of the floor while your heels stay down. Repeat. Then lift your heels off the floor while  your toes stay down. Repeat.  Other exercise  Many other exercise and activities benefit people with arthritis. It is most important to find exercise and activities that you enjoy. You might try:  · Yoga, including chair yoga, helps to keep your joints strong and flexible.   · Bryn Chi, an ancient type of exercise with slow, gentle movements  · Water exercise, including water walking  For more information on exercise for arthritis go to the Arth[employer] website: www.arthritis.org.  Date Last Reviewed: 2/14/2016 © 2000-2016 [employer]. [address] [place] [address]. All rights reserved. This information is not intended as a substitute for professional medical care. Always follow your healthcare professional's instructions.        Relieving Back Pain  Back pain is a common problem. You can strain back muscles by lifting too much weight or just by moving the wrong way. Back strain can be uncomfortable, even painful. And it can take weeks or months to improve. To help yourself feel better and prevent future back strains, try these tips.  Important Note: Do not give aspirin to children or teens without first discussing it with your healthcare provider.      ? Ice    Ice reduces muscle pain and swelling. It helps most during the first 24 to 48 hours after an injury.  · Wrap an ice pack or a bag of frozen peas in a thin towel. (Never place ice directly on your skin.)  · Place the ice where your back hurts the most.  · Dont ice for more than 20 minutes at a time.  · You can use ice several times a day.  ? Medicines  Over-the-counter pain relievers can include acetaminophen and anti-inflammatory medicines, which includes aspirin or ibuprofen. They can help ease discomfort. Some also reduce swelling.  · Tell your healthcare provider about any medicines you are already taking.  · Take medicines only as directed.  ? Heat  After the first 48 hours, heat can relax sore muscles and improve blood  flow.  · Try a warm bath or shower. Or use a heating pad set on low. To prevent a burn, keep a cloth between you and the heating pad.  · Dont use a heating pad for more than 15 minutes at a time. Never sleep on a heating pad.  Date Last Reviewed: 9/1/2015  © 8463-0538 [employer]. [address] [place] [address]. All rights reserved. This information is not intended as a substitute for professional medical care. Always follow your healthcare professional's instructions.        Back Safety: Poor Posture Hurts  An unhealthy spine often starts with bad habits. Poor movement patterns and posture problems are common causes of back pain. Disk, bone, nerve, and soft tissue problems can all be affected by poor posture. They can lead to pain, stiffness, and other symptoms.    Poor posture backfires  Poor posture can cause pain. Too much slouching puts increased pressure on the disks. An excessive lumbar curve can overload and inflame the vertebrae. As a result, the back muscles may tighten or spasm to splint and protect the spine. This adds to the pain you feel.    Proper posture: The key to safe movement  Your spine bears your weight throughout the day. This is true whether youre sleeping, standing, or bending. Certain positions strain your spine more than others. But by maintaining proper posture in all positions, you can reduce the stress on your spine.       To improve your standing posture, follow these steps:  · Breathe deeply.  · Relax your shoulders, hips, and ankles. · Think of the ears, shoulders, hips, and ankles as a series of dots. Now, adjust your body to connect the dots in a straight line.  · Tuck your buttocks in just a bit if you need to.      Date Last Reviewed: 10/28/2015  © 0177-1635 [employer]. [address] [place] [address]. All rights reserved. This information is not intended as a substitute for professional medical care. Always follow your  healthcare professional's instructions.        Caring for Your Back Throughout the Day  Take care of your back throughout the day. You will likely have fewer back problems if you do. Try to warm up before you move. Shift positions often. Also do your best to form healthy habits.    Warm up for the day  Do a few slow, catlike stretches before starting your day. This simple warmup can soften your disks, stretch your back muscles, and help prevent injuries.  Shift positions often  At work and at home, change positions often. This helps keep your body from getting stiff. Stand up or lean back while you sit. If you can, get up and move every 1/2 hour.  Form healthy habits  Here are some suggestions:   · Keep a healthy weight. When you weigh too much, your back is under excess strain. But losing just a few extra pounds can help a lot.  · Try not to overeat. Learn about serving sizes. The size of a serving depends on the food and the food group. Many foods list serving sizes on the labels.  · Handle minor aches with cold and heat. Apply cold the first 24 to 48 hours. Use heat after that. Always place a thin cloth between your skin and the source of cold or heat.  · Take medicines as directed. This helps keep pain under control. Always read labels, and call your healthcare provider or pharmacist if you have any questions.  Walk each day  A daily walk keeps your back and thigh muscles stretched and strong. This gives your back better support. Be sure to walk with your spines three curves aligned, by keeping your head, hips, and toes connected by a vertical line.   Date Last Reviewed: 10/18/2015  © 5091-9402 [employer]. [address] [place] [address], [place] [address]. All rights reserved. This information is not intended as a substitute for professional medical care. Always follow your healthcare professional's instructions.        Relieving Tension in Your Back  Being relaxed helps keep your mind healthy and your  back ready to move. Take short breaks often. Walk around. Stretch. Switch tasks. Also give the following a try.  Make time to relax. Start by setting aside 5 minutes daily.   Deep breathing    Deep breathing is a simple way to reduce stress. You can do it almost any time you need to relax.  · Inhale slowly through your nose. Let your lungs and stomach expand.  · Hold your breath for 2 to 3 seconds.  · Exhale slowly through your mouth until your lungs feel empty. Repeat 3 to 4 times.  Relieve tension  Muscle tension can create tender spots called trigger points. The tips below may help relieve muscle tension.  · Press the trigger point if you can reach it. If not, lie on a soft tennis ball, or ask a friend to press the spot. Use steady pressure for 10 to 15 seconds. Breathe deeply. Repeat a few times.  · Massage trigger points with ice for 2 to 5 minutes. Press lightly at first. Slowly increase firmness.  Date Last Reviewed: 10/18/2015  © 6786-6640 [employer]. [address] [place] [address]. All rights reserved. This information is not intended as a substitute for professional medical care. Always follow your healthcare professional's instructions.        Degenerative Disk Disease    Spinal disks are gel-filled cushions between the bones, or vertebrae, of the spine. The disks act like shock absorbers. Over time, the disks may break down. This is called degenerative disk disease.  This condition can affect the neck or back. It is one of the most common causes of low back pain. It is the leading cause of disability in people under age 45 in the [place]. The pain often remains localized to the lower back or neck. Muscle spasm is often present and adds to the pain.  Disk degeneration is a natural part of aging. But it is not painful for most people. It may also occur as a result of repeated minor injuries due to daily activities, sports, or accidents. It may lead to osteoarthritis of the  spine. Back pain related to disk disease may come and go. Or it may become chronic and last for months or years. The disk may bulge or rupture. This is called a slipped disk or herniated disk. That can put pressure on a nearby spinal nerve and cause neck or back pain that spreads down one arm or leg.  X-rays or an MRI may help to diagnose this condition. For acute pain, treatment includes anti-inflammatory medicines, muscle relaxants, rest, ice, or heat. Strong prescription pain medicines, called opioids, may be needed for short-term treatment if pain suddenly gets worse. Opioid medicines can be addictive. So they are not advised for long-term pain management. Other types of medicines are preferred. Surgery is generally not used to treat this condition unless there is a complication.    Home care  · For neck pain: Use a comfortable pillow that supports the head and keeps the spine in a neutral position. Your head should not be tilted forward or backward.  · For back pain: Avoid sitting for long periods of time. This puts more stress on the lower back than standing or walking. Starting a regular exercise program to strengthen the supporting muscles of the spine will make it easier to live with degenerative disk disease.  · Apply an ice pack over the injured area for no more than 15 to 20 minutes. Do this every 3 to 6 hours for the first 24 to 48 hours. To make an ice pack, put ice cubes in a plastic bag that seals at the top. Wrap the bag in a clean, thin towel or cloth. Never put ice or an ice pack directly on the skin. Keep using ice packs to ease pain and swelling as needed. After 48 hours, apply heat (warm shower or warm bath) for 20 minutes several times a day, or switch between ice and heat.   · You may use over-the-counter pain medicine to control pain, unless another pain medicine was prescribed. If you have chronic liver or kidney disease or ever had a stomach ulcer or GI bleeding, talk with your provider  before using these medicines.  Follow-up care  Follow up with your healthcare provider, or as directed.  If X-rays, a CT scan or an MRI were done, you will be notified of any new findings that may affect your care.  When to seek medical advice  Contact your healthcare provider right away if any of these occur:  · Increasing back pain  · Your foot drags when you walk, a condition called foot drop  · You have new weakness, numbness, or pain in one or both arms or legs  · Loss of bowel or bladder control  · Numbness or tingling in the buttock or groin area  Date Last Reviewed: 11/23/2015  © 5452-3166 [employer]. [address] [place] [address], [place] [address]. All rights reserved. This information is not intended as a substitute for professional medical care. Always follow your healthcare professional's instructions.        Exercises to Strengthen Your Lower Back  Strong lower back and abdominal muscles work together to support your spine. The exercises below will help strengthen the lower back. It is important that you begin exercising slowly and increase levels gradually.  Always begin any exercise program with stretching. If you feel pain while doing any of these exercises, stop and talk to your doctor about a more specific exercise program that better suits your condition.   Low back stretch  The point of stretching is to make you more flexible and increase your range of motion. Stretch only as much as you are able. Stretch slowly. Do not push your stretch to the limit. If at any point you feel pain while stretching, this is your (temporary) limit.  · Lie on your back with your knees bent and both feet on the ground.  · Slowly raise your left knee to your chest as you flatten your lower back against the floor. Hold for 5 seconds.  · Relax and repeat the exercise with your right knee.  · Do 10 of these exercises for each leg.  · Repeat hugging both knees to your chest at the same time.  Building lower back  strength  Start your exercise routine with 10 to 30 minutes a day, 1 to 3 times a day.  Initial exercises  Lying on your back:  1. Ankle pumps: Move your foot up and down, towards your head, and then away. Repeat 10 times with each foot.  2. Heel slides: Slowly bend your knee, drawing the heel of your foot towards you. Then slide your heel/foot from you, straightening your knee. Do not lift your foot off the floor (this is not a leg lift).  3. Abdominal contraction: Bend your knees and put your hands on your stomach. Tighten your stomach muscles. Hold for 5 seconds, then relax. Repeat 10 times.  4. Straight leg raise: Bend one leg at the knee and keep the other leg straight. Tighten your stomach muscles. Slowly lift your straight leg 6 to 12 inches off the floor and hold for up to 5 seconds. Repeat 10 times on each side.  Standin. Wall squats: Stand with your back against the wall. Move your feet about 12 inches away from the wall. Tighten your stomach muscles, and slowly bend your knees until they are at about a 45 degree angle. Do not go down too far. Hold about 5 seconds. Then slowly return to your starting position. Repeat 10 times.  2. Heel raises: Stand facing the wall. Slowly raise the heels of your feet up and down, while keeping your toes on the floor. If you have trouble balancing, you can touch the wall with your hands. Repeat 10 times.  More advanced exercises  When you feel comfortable enough, try these exercises.  1. Kneeling lumbar extension: Begin on your hands and knees. At the same time, raise and straighten your right arm and left leg until they are parallel to the ground. Hold for 2 seconds and come back slowly to a starting position. Repeat with left arm and right leg, alternating 10 times.  2. Prone lumbar extension: Lie face down, arms extended overhead, palms on the floor. At the same time, raise your right arm and left leg as high as comfortably possible. Hold for 10 seconds and slowly  return to start. Repeat with left arm and right leg, alternating 10 times. Gradually build up to 20 times. (Advanced: Repeat this exercise raising both arms and both legs a few inches off the floor at the same time. Hold for 5 seconds and release.)  3. Pelvic tilt: Lie on the floor on your back with your knees bent at 90 degrees. Your feet should be flat on the floor. Inhale, exhale, then slowly contract your abdominal muscles bringing your navel toward your spine. Let your pelvis rock back until your lower back is flat on the floor. Hold for 10 seconds while breathing smoothly.  4. Abdominal crunch: Perform a pelvic tilt (above) flattening your lower back against the floor. Holding the tension in your abdominal muscles, take another breath and raise your shoulder blades off the ground (this is not a full sit-up). Keep your head in line with your body (dont bend your neck forward). Hold for 2 seconds, then slowly lower.  Date Last Reviewed: 6/1/2016  © 1249-4882 [employer]. [address] [place] [address]. All rights reserved. This information is not intended as a substitute for professional medical care. Always follow your healthcare professional's instructions.        Back Exercises: Hip Rotator Stretch        To start, lie on your back with your knees bent and feet flat on the floor. Dont press your neck or lower back to the floor. Breathe deeply. You should feel comfortable and relaxed in this position.  · Rest your right ankle on your left knee.  · Place a towel behind your left thigh, and use it to pull the knee toward your chest. Feel the stretch in your buttocks.  · Hold for 30 to 60 seconds. Release.  · Repeat 2 times.  · Switch legs.   · If there is any pain other than stretch in the knee or buttock, stop and contact your healthcare provider.  For your safety, check with your healthcare provider before starting an exercise program.   Date Last Reviewed: 8/16/2015  © 5928-4341 [employer]. [address] [place] [address]. All rights reserved. This information is not intended as a substitute for professional medical care. Always follow your healthcare professional's instructions.        Back Exercises: Lower Back Rotation    To start, lie on your back with your knees bent and feet flat on the floor. Dont press your neck or lower back to the floor. Breathe deeply. You should feel comfortable and relaxed in this position.  · Drop both knees to one side. Turn your head to the other side. Keep your shoulders flat on the floor.  · Do not push through pain.  · Hold for 20 seconds.  · Slowly switch sides.  · Repeat 2 to 5 times.  Date Last Reviewed: 10/11/2015  © 0749-6206 [employer]. [address] [place] [address]. All rights reserved. This information is not intended as a substitute for professional medical care. Always follow your healthcare professional's instructions.        Back Exercises: Side Stretch      To start, sit in a chair with your feet flat on the floor. Shift your weight slightly forward to avoid rounding your back. Relax. Keep your ears, shoulders, and hips aligned:  · Stretch your right arm overhead.  · Slowly bend to the left. Dont twist your torso. Stay within your pain limits.  · Hold for 20 seconds. Return to starting position.  · Repeat 2 to 5 times. Then, switch to the other side.  Date Last Reviewed: 10/13/2015  © 0950-4623 [employer]. [address] [place] [address]. All rights reserved. This information is not intended as a substitute for professional medical care. Always follow your healthcare professional's instructions.        Lumbar Flexion (Flexibility)    1. Lie on your back on the floor, with your knees bent and your feet flat on the floor.  2. Gently pull your knees up toward your chest. Put your hands under your thighs to help pull your knees up.  3. Press your lower back down to the  floor. Hold for 20 seconds.  4. Lower your legs back down to the floor and relax.  5. Repeat 2 times, or as instructed.  Date Last Reviewed: 3/10/2016  © 7949-4277 [employer]. [address] [place] [address], [place] [address]. All rights reserved. This information is not intended as a substitute for professional medical care. Always follow your healthcare professional's instructions.        Weight Management: Exercise and Activity  Studies show that people who exercise are the most likely to lose weight and keep it off. Exercise burns calories. It helps build muscle to make your body stronger. Make exercise an important part of your weight-management plan.    Make activity part of your day  You may not think you have the time to exercise. But you can work activity into your daily life--you just need to be committed. Take 10 minutes out of your lunch hour to take a walk. Walk to the [employer] to get your paper instead of having it delivered. Make it a habit to take the stairs instead of the elevator. Park in a far away parking spot instead of the closest. Youll be surprised at how fast these little changes can make a difference.  Some people really cannot walk very far, and tire out quickly with exercise. Instead of becoming discouraged, resolve to do what you can do, and work to make that a regular frequent habit.   The benefits of exercise  Exercise offers many benefits including:   · Exercise increases your metabolism (the speed at which your body burns calories).  · Regular exercise can increase the amount of muscle in your body. Muscle burns calories faster than fat. The more muscle you have, the more calories you burn.  · Exercise gives you energy and curbs your appetite.  · Exercise decreases stress and helps you sleep better.  Make exercise fun  Exercise can be fun. Choose an activity you enjoy. You may even get a friend to do it with you:  · Take a resistance-training or aerobics class  · Join a team  sport  · Take a dance class  · Walk the dog  · Ride a bike  If you have health problems, be sure to ask your healthcare provider before you start an exercise program. Have a  help you develop a plan thats safe for you.   Date Last Reviewed: 2/4/2016  © 6167-4393 [employer]. [address] [place] [address], [place] [address]. All rights reserved. This information is not intended as a substitute for professional medical care. Always follow your healthcare professional's instructions.        Weight Management: Healthy Eating  Food is your bodys fuel. You cant live without it. The key is to give your body enough nutrients and energy without eating too much. Reading food labels can help you make healthy choices. Also, learn new eating habits to manage your weight.     All the values on the label are based on one serving. The serving size is the average portion. Remember to multiply the values on the label by the number of servings you eat.   Eat less fat  A gram of fat has almost 2.5 times the calories of a gram of protein or carbohydrates. Try to balance your food choices so that only 20% to 35% of your calories comes from total fat. This means an average of 2½ to 3½ grams of fat for each 100 calories you eat.  Eat more fiber  High-fiber foods are digested more slowly than low-fiber foods, so you feel full longer. Try to get at least 25 grams of fiber each day for a 2000 calorie diet. Foods high in fiber include:  · Vegetables and fruits  · Whole-grain or bran breads, pastas, and cereals  · Legumes (beans) and peas  As you begin to eat more fiber, be sure to drink plenty of water to keep your digestive system working smoothly.  Tips  Do's and don'ts include:   · Dont skip meals. This often leads to overeating later on. Its best to spread your eating throughout the day.  · Eat a variety of foods, not just a few favorites.  · If you find yourself eating when youre not hungry, ask yourself  why. Many of us eat when were bored, stressed, or just to be polite. Listen to your body. If youre not hungry, get busy doing something else instead of eating.  · Eat slower, shooting for 20 to 30 minutes for each meal. It takes 20 minutes for your stomach to tell your brain that its full. Slow eaters tend to eat less and are still satisfied, while fast eaters may tend to be overeaters.   · Pay attention to what you eat. Dont read or watch TV during your meal.  Date Last Reviewed: 1/31/2016 © 2000-2016 [employer]. [address] [place] [address], [place] [address]. All rights reserved. This information is not intended as a substitute for professional medical care. Always follow your healthcare professional's instructions.        Weight Management: Getting Started  Healthy bodies come in all shapes and sizes. Not all bodies are made to be thin. For some people, a healthy weight is higher than the average weight listed on weight charts. Your healthcare provider can help you decide on a healthy weight for you.    Reasons to lose weight  Losing weight can help with some health problems, such as high blood pressure, heart disease, diabetes, sleep apnea, and arthritis. You may also feel more energy.  Set your long-term goal  Your goal doesn't even have to be a specific weight. You may decide on a fitness goal (such as being able to walk 10 miles a week), or a health goal (such as lowering your blood pressure). Choose a goal that is measurable and reasonable, so you know when you've reached it. A goal of reaching a BMI of less than 25 is not always reasonable (or possible).   Make an action plan  Habits dont change overnight. Setting your goals too high can leave you feeling discouraged if you cant reach them. Be realistic. Choose one or two small changes you can make now. Set an action plan for how you are going to make these changes. When you can stick to this plan, keep making a few more small changes.  Taking small steps will help you stay on the path to success.  Track your progress  Write down your goals. Then, keep a daily record of your progress. Write down what you eat and how active you are. This record lets you look back on how much youve done. It may also help when youre feeling frustrated. Reward yourself for success. Even if you dont reach every goal, give yourself credit for what you do get done.  Get support  Encouragement from others can help make losing weight easier. Ask your family members and friends for support. They may even want to join you. Also look to your healthcare provider, registered dietitian, and  for help. Your local hospital can give you more information about nutrition, exercise, and weight loss.  Date Last Reviewed: 1/31/2016  © 9005-2633 [employer]. [address] [place] [address]. All rights reserved. This information is not intended as a substitute for professional medical care. Always follow your healthcare professional's instructions.        Weight Management: Overcoming Your Barriers  You may have many reasons why youre not ready to lose weight. You may not feel you have the time or the skills. You may be afraid of losing weight and gaining it back again. Well, you can lose weight. And you can keep the weight off, if you make changes slowly and stick with them. Remember that you may never find the perfect time to lose weight. Decide that the right time to be healthier is now.    Common barriers  Barrier 1: I dont want to deny myself.  Barrier Buster: You dont have to! Moderation is the key:  · Watch portion sizes and know when you're eating more than one serving.  · Plan to ask for a doggy bag when you eat out.  · Have just one.  · Choose lower-fat and lower-calorie versions of your favorites.  · Use a small plate instead of a normal-sized plate.   Barrier 2: I lost weight before but I gained it right back.  Barrier Buster:  Make this time different:  · List what worked and didnt work last time and what you can try this time.  · Choose changes that you are willing to stick with.  · Work exercise into your weight-loss plan.  · Be realistic about what is possible. Your plan has to fit into your life in a balanced way that works for you.   Barrier 3: I dont have the time to be active.  Barrier Buster: It takes just a few minutes a day!  · Be active with a pet or the kids.  · Block off activity time in your schedule.  · Borrow some time that you usually spend watching TV.  · You are too important not to take time to exercise--it is your life!   Feel good about yourself  Do you eat more because you feel bad about yourself, then feel even worse as you gain weight? This is a vicious cycle. Breaking this cycle is not easy. You may need group support or counseling. Always remember that you are a valuable person, no matter what size or shape you are.  Do you have a health problem? If so, dont use it as an excuse for not losing weight. Ask your healthcare provider or dietitian about methods to lose weight that are safe for you. For example, even if you have severe arthritis, it may be easier for you to exercise in a pool. Get advice from a .    Date Last Reviewed: 2/2/2016 © 2000-2016 [employer]. [address] [place] [address], [place] [address]. All rights reserved. This information is not intended as a substitute for professional medical care. Always follow your healthcare professional's instructions.

## 2017-11-13 ENCOUNTER — LAB VISIT (OUTPATIENT)
Dept: LAB | Facility: HOSPITAL | Age: 57
End: 2017-11-13
Attending: FAMILY MEDICINE
Payer: COMMERCIAL

## 2017-11-13 ENCOUNTER — OFFICE VISIT (OUTPATIENT)
Dept: FAMILY MEDICINE | Facility: CLINIC | Age: 57
End: 2017-11-13
Payer: COMMERCIAL

## 2017-11-13 VITALS
WEIGHT: 293 LBS | TEMPERATURE: 98 F | HEART RATE: 67 BPM | RESPIRATION RATE: 18 BRPM | DIASTOLIC BLOOD PRESSURE: 84 MMHG | BODY MASS INDEX: 53.92 KG/M2 | OXYGEN SATURATION: 98 % | SYSTOLIC BLOOD PRESSURE: 136 MMHG | HEIGHT: 62 IN

## 2017-11-13 DIAGNOSIS — E55.9 VITAMIN D DEFICIENCY: ICD-10-CM

## 2017-11-13 DIAGNOSIS — E03.9 HYPOTHYROIDISM, UNSPECIFIED TYPE: ICD-10-CM

## 2017-11-13 DIAGNOSIS — E55.9 VITAMIN D DEFICIENCY: Primary | ICD-10-CM

## 2017-11-13 LAB
ALBUMIN SERPL BCP-MCNC: 3.9 G/DL
ALP SERPL-CCNC: 79 U/L
ALT SERPL W/O P-5'-P-CCNC: 14 U/L
ANION GAP SERPL CALC-SCNC: 7 MMOL/L
AST SERPL-CCNC: 15 U/L
BASOPHILS # BLD AUTO: 0.05 K/UL
BASOPHILS NFR BLD: 0.8 %
BILIRUB SERPL-MCNC: 0.4 MG/DL
BUN SERPL-MCNC: 18 MG/DL
CALCIUM SERPL-MCNC: 9.5 MG/DL
CHLORIDE SERPL-SCNC: 105 MMOL/L
CO2 SERPL-SCNC: 29 MMOL/L
CREAT SERPL-MCNC: 1.2 MG/DL
DIFFERENTIAL METHOD: ABNORMAL
EOSINOPHIL # BLD AUTO: 0.1 K/UL
EOSINOPHIL NFR BLD: 2 %
ERYTHROCYTE [DISTWIDTH] IN BLOOD BY AUTOMATED COUNT: 17 %
EST. GFR  (AFRICAN AMERICAN): 58.8 ML/MIN/1.73 M^2
EST. GFR  (NON AFRICAN AMERICAN): 51 ML/MIN/1.73 M^2
GLUCOSE SERPL-MCNC: 92 MG/DL
HCT VFR BLD AUTO: 34.2 %
HGB BLD-MCNC: 10.7 G/DL
IMM GRANULOCYTES # BLD AUTO: 0.02 K/UL
IMM GRANULOCYTES NFR BLD AUTO: 0.3 %
LYMPHOCYTES # BLD AUTO: 2.2 K/UL
LYMPHOCYTES NFR BLD: 36.6 %
MCH RBC QN AUTO: 26.3 PG
MCHC RBC AUTO-ENTMCNC: 31.3 G/DL
MCV RBC AUTO: 84 FL
MONOCYTES # BLD AUTO: 0.5 K/UL
MONOCYTES NFR BLD: 8.7 %
NEUTROPHILS # BLD AUTO: 3.1 K/UL
NEUTROPHILS NFR BLD: 51.6 %
NRBC BLD-RTO: 0 /100 WBC
PLATELET # BLD AUTO: 268 K/UL
PMV BLD AUTO: 10.5 FL
POTASSIUM SERPL-SCNC: 4 MMOL/L
PROT SERPL-MCNC: 8.2 G/DL
RBC # BLD AUTO: 4.07 M/UL
SODIUM SERPL-SCNC: 141 MMOL/L
T4 FREE SERPL-MCNC: <0.4 NG/DL
TSH SERPL DL<=0.005 MIU/L-ACNC: 30.88 UIU/ML
WBC # BLD AUTO: 5.95 K/UL

## 2017-11-13 PROCEDURE — 36415 COLL VENOUS BLD VENIPUNCTURE: CPT | Mod: PO

## 2017-11-13 PROCEDURE — 85025 COMPLETE CBC W/AUTO DIFF WBC: CPT

## 2017-11-13 PROCEDURE — 99213 OFFICE O/P EST LOW 20 MIN: CPT | Mod: S$GLB,,, | Performed by: INTERNAL MEDICINE

## 2017-11-13 PROCEDURE — 84439 ASSAY OF FREE THYROXINE: CPT

## 2017-11-13 PROCEDURE — 99999 PR PBB SHADOW E&M-EST. PATIENT-LVL IV: CPT | Mod: PBBFAC,,, | Performed by: INTERNAL MEDICINE

## 2017-11-13 PROCEDURE — 84443 ASSAY THYROID STIM HORMONE: CPT

## 2017-11-13 PROCEDURE — 80053 COMPREHEN METABOLIC PANEL: CPT

## 2017-11-13 RX ORDER — IBUPROFEN 800 MG/1
TABLET ORAL
COMMUNITY
Start: 2017-10-14 | End: 2018-02-28 | Stop reason: SDUPTHER

## 2017-11-13 RX ORDER — LEVOTHYROXINE SODIUM 150 UG/1
150 TABLET ORAL DAILY
Qty: 30 TABLET | Refills: 1 | Status: SHIPPED | OUTPATIENT
Start: 2017-11-13 | End: 2018-01-18 | Stop reason: SDUPTHER

## 2017-11-13 RX ORDER — ERGOCALCIFEROL 1.25 MG/1
CAPSULE ORAL
Qty: 8 CAPSULE | Refills: 5 | Status: CANCELLED | OUTPATIENT
Start: 2017-11-13

## 2017-11-13 RX ORDER — LEVOTHYROXINE SODIUM 150 UG/1
150 TABLET ORAL
Qty: 30 TABLET | Refills: 2 | Status: CANCELLED | OUTPATIENT
Start: 2017-11-13

## 2017-11-13 RX ORDER — ERGOCALCIFEROL 1.25 MG/1
50000 CAPSULE ORAL
Qty: 4 CAPSULE | Refills: 1 | Status: SHIPPED | OUTPATIENT
Start: 2017-11-13 | End: 2018-02-28 | Stop reason: SDUPTHER

## 2017-11-13 NOTE — PROGRESS NOTES
Subjective:       Patient ID: Amelia Mirza is a 57 y.o. female.    Chief Complaint: Medication Refill  --------------needs refill thyroid med and vit d-------has been out of synthroid for 3 or more weeks-----------  HPI  Review of Systems   Constitutional: Positive for activity change. Negative for unexpected weight change.   HENT: Negative for hearing loss, rhinorrhea and trouble swallowing.    Eyes: Positive for visual disturbance. Negative for discharge.   Respiratory: Negative for chest tightness and wheezing.    Cardiovascular: Negative for chest pain and palpitations.   Gastrointestinal: Negative for blood in stool, constipation, diarrhea and vomiting.   Endocrine: Negative for polydipsia and polyuria.   Genitourinary: Negative for difficulty urinating, dysuria, hematuria and menstrual problem.   Musculoskeletal: Positive for arthralgias and neck pain. Negative for joint swelling.   Neurological: Positive for weakness. Negative for headaches.   Psychiatric/Behavioral: Positive for dysphoric mood. Negative for confusion.       Objective:      Physical Exam   Constitutional: She is oriented to person, place, and time. She appears well-developed and well-nourished. No distress.   HENT:   Head: Normocephalic and atraumatic.   Neck: Normal range of motion. Neck supple. Carotid bruit is not present.   Cardiovascular: Normal rate, regular rhythm, normal heart sounds and intact distal pulses.    Pulmonary/Chest: Effort normal and breath sounds normal. No respiratory distress. She has no wheezes. She has no rales. She exhibits no tenderness.   Abdominal: Soft. Bowel sounds are normal.   Musculoskeletal: Normal range of motion. She exhibits no edema or tenderness.   Neurological: She is alert and oriented to person, place, and time. Coordination normal.   Skin: Skin is warm and dry. No rash noted. She is not diaphoretic. No erythema. No pallor.   Psychiatric: She has a normal mood and affect. Her behavior is normal.  Judgment and thought content normal.   Nursing note and vitals reviewed.      Assessment:       1. Vitamin D deficiency    2. Hypothyroidism, unspecified type        Plan:         resume synthroid 150 mcg a day---------notes/labs reviewed.          Check cmp,cbc,tsh,t4---------------f/u with pcp so can f/u labs when on the medicine.

## 2018-01-18 ENCOUNTER — OFFICE VISIT (OUTPATIENT)
Dept: FAMILY MEDICINE | Facility: CLINIC | Age: 58
End: 2018-01-18
Payer: COMMERCIAL

## 2018-01-18 VITALS
BODY MASS INDEX: 53.92 KG/M2 | HEART RATE: 82 BPM | TEMPERATURE: 99 F | WEIGHT: 293 LBS | OXYGEN SATURATION: 99 % | RESPIRATION RATE: 18 BRPM | SYSTOLIC BLOOD PRESSURE: 128 MMHG | HEIGHT: 62 IN | DIASTOLIC BLOOD PRESSURE: 84 MMHG

## 2018-01-18 DIAGNOSIS — E03.9 HYPOTHYROIDISM, UNSPECIFIED TYPE: Primary | ICD-10-CM

## 2018-01-18 DIAGNOSIS — E55.9 VITAMIN D DEFICIENCY: ICD-10-CM

## 2018-01-18 DIAGNOSIS — E66.01 MORBID OBESITY WITH BMI OF 50.0-59.9, ADULT: ICD-10-CM

## 2018-01-18 DIAGNOSIS — M17.0 PRIMARY OSTEOARTHRITIS OF BOTH KNEES: ICD-10-CM

## 2018-01-18 PROCEDURE — 99999 PR PBB SHADOW E&M-EST. PATIENT-LVL III: CPT | Mod: PBBFAC,,, | Performed by: FAMILY MEDICINE

## 2018-01-18 PROCEDURE — 99213 OFFICE O/P EST LOW 20 MIN: CPT | Mod: S$GLB,,, | Performed by: FAMILY MEDICINE

## 2018-01-18 RX ORDER — LEVOTHYROXINE SODIUM 150 UG/1
150 TABLET ORAL DAILY
Qty: 30 TABLET | Refills: 1 | Status: SHIPPED | OUTPATIENT
Start: 2018-01-18 | End: 2018-02-28 | Stop reason: SDUPTHER

## 2018-01-18 NOTE — PROGRESS NOTES
Subjective:       Patient ID: Amelia Mirza is a 57 y.o. female.    Chief Complaint: Hypothyroidism; Follow-up; and Medication Refill    Ms. Mirza comes in today for hypothyroidism follow-up and for medication refill.  She currently takes Synthroid 150 mcg daily for thyroid replacement.  She has taken medication today.  She states she feels okay today except reports having arthritic knee pains.  Otherwise, she denies having fever, chills, fatigue, appetite change; shortness of breath, cough, wheezing; chest pain, palpitations, leg swelling; abdominal pain, nausea, vomiting, diarrhea, constipation; unusual urinary symptoms; hot or cold intolerance; back pain; headaches; anxiety, depression, homicidal or suicidal thoughts.    Current Outpatient Prescriptions:  cyclobenzaprine (FLEXERIL) 10 MG tablet, TAKE 1 TABLET (10 MG TOTAL) BY MOUTH DAILY AS NEEDED FOR MUSCLE SPASMS.  ergocalciferol (ERGOCALCIFEROL) 50,000 unit Cap, Take 1 capsule (50,000 Units total) by mouth every 7 days.  ibuprofen (ADVIL,MOTRIN) 800 MG tablet,   levothyroxine (SYNTHROID) 150 MCG tablet, Take 1 tablet (150 mcg total) by mouth once daily.  nabumetone (RELAFEN) 500 MG tablet, Take 1 tablet (500 mg total) by mouth 2 (two) times daily. Arthritis/inflammation      Labs:                    WBC                      5.95                11/13/2017                 HGB                      10.7 (L)            11/13/2017                 HCT                      34.2 (L)            11/13/2017                 PLT                      268                 11/13/2017                LDLCALC                  116.6               04/17/2017                         CHOL                     217 (H)             04/17/2017                 TRIG                     72                  04/17/2017                 HDL                      86 (H)              04/17/2017                 ALT                      14                  11/13/2017                 AST                       15                  11/13/2017                 NA                       141                 11/13/2017                 K                        4.0                 11/13/2017                 CL                       105                 11/13/2017                 CREATININE               1.2                 11/13/2017                 BUN                      18                  11/13/2017                 CO2                      29                  11/13/2017                 TSH                      30.884 (H)          11/13/2017                 INR                      1.0                 08/30/2016                 HGBA1C                   6.0                 05/09/2012        Vit D, 25-Hydroxy  17                  04/17/2017                   Medication Refill   Associated symptoms include arthralgias. Pertinent negatives include no abdominal pain, chest pain, chills, coughing, fatigue, fever, headaches, nausea or vomiting.     Review of Systems   Constitutional: Negative for activity change, appetite change, chills, fatigue and fever.        Weight 142 kg (313 lb 0.9 oz) at November 13, 2017 visit.   Respiratory: Negative for cough, shortness of breath and wheezing.    Cardiovascular: Negative for chest pain, palpitations and leg swelling.   Gastrointestinal: Negative for abdominal pain, constipation, diarrhea, nausea and vomiting.   Endocrine: Negative for cold intolerance and heat intolerance.        See history of present illness.   Genitourinary: Negative for difficulty urinating.   Musculoskeletal: Positive for arthralgias. Negative for back pain.   Neurological: Negative for headaches.   Psychiatric/Behavioral: Negative for dysphoric mood and suicidal ideas. The patient is not nervous/anxious.         Negative for homicidal ideas.       Objective:      Physical Exam   Constitutional: She is oriented to person, place, and time. She appears well-developed and well-nourished. No distress.   Pleasant.    Neck: Normal range of motion. Neck supple. No thyromegaly present.   Cardiovascular: Normal rate, regular rhythm, normal heart sounds and intact distal pulses.    No murmur heard.  Pulmonary/Chest: Effort normal and breath sounds normal. No respiratory distress. She has no wheezes.   Abdominal: Soft. Bowel sounds are normal. She exhibits no distension and no mass. There is no tenderness. There is no guarding.   Musculoskeletal: Normal range of motion. She exhibits no edema or tenderness.   She is ambulatory without problems.   Lymphadenopathy:     She has no cervical adenopathy.   Neurological: She is alert and oriented to person, place, and time. She displays normal reflexes.   Skin: She is not diaphoretic.   Psychiatric: She has a normal mood and affect. Her behavior is normal. Judgment and thought content normal.   Vitals reviewed.      Assessment:       1. Hypothyroidism, unspecified type    2. Vitamin D deficiency    3. Primary osteoarthritis of both knees    4. Morbid obesity with BMI of 50.0-59.9, adult        Plan:       1.  No labs today.   2.  Continue current medications, follow low sodium, low cholesterol, low carb diet, daily walks.  3.  Prescription refill - Synthroid 150 mcg daily, #30, 1 refill.  4.  Patient declines flu shot today.   5.  See me in February 2018 for fasting annual wellness examination.

## 2018-02-28 ENCOUNTER — OFFICE VISIT (OUTPATIENT)
Dept: FAMILY MEDICINE | Facility: CLINIC | Age: 58
End: 2018-02-28
Payer: COMMERCIAL

## 2018-02-28 ENCOUNTER — LAB VISIT (OUTPATIENT)
Dept: LAB | Facility: HOSPITAL | Age: 58
End: 2018-02-28
Attending: FAMILY MEDICINE
Payer: COMMERCIAL

## 2018-02-28 VITALS
TEMPERATURE: 99 F | RESPIRATION RATE: 18 BRPM | SYSTOLIC BLOOD PRESSURE: 138 MMHG | HEART RATE: 64 BPM | DIASTOLIC BLOOD PRESSURE: 88 MMHG | BODY MASS INDEX: 53.92 KG/M2 | OXYGEN SATURATION: 97 % | HEIGHT: 62 IN | WEIGHT: 293 LBS

## 2018-02-28 DIAGNOSIS — Z78.0 POSTMENOPAUSAL: ICD-10-CM

## 2018-02-28 DIAGNOSIS — E55.9 VITAMIN D DEFICIENCY: ICD-10-CM

## 2018-02-28 DIAGNOSIS — Z00.00 ANNUAL PHYSICAL EXAM: ICD-10-CM

## 2018-02-28 DIAGNOSIS — K63.5 BENIGN COLON POLYP: ICD-10-CM

## 2018-02-28 DIAGNOSIS — Z12.31 VISIT FOR SCREENING MAMMOGRAM: ICD-10-CM

## 2018-02-28 DIAGNOSIS — E66.01 MORBID OBESITY WITH BMI OF 50.0-59.9, ADULT: ICD-10-CM

## 2018-02-28 DIAGNOSIS — M17.0 PRIMARY OSTEOARTHRITIS OF BOTH KNEES: ICD-10-CM

## 2018-02-28 DIAGNOSIS — E03.9 HYPOTHYROIDISM, UNSPECIFIED TYPE: ICD-10-CM

## 2018-02-28 LAB
25(OH)D3+25(OH)D2 SERPL-MCNC: 20 NG/ML
ALBUMIN SERPL BCP-MCNC: 3.7 G/DL
ALP SERPL-CCNC: 82 U/L
ALT SERPL W/O P-5'-P-CCNC: 11 U/L
ANION GAP SERPL CALC-SCNC: 8 MMOL/L
AST SERPL-CCNC: 9 U/L
BASOPHILS # BLD AUTO: 0.03 K/UL
BASOPHILS NFR BLD: 0.5 %
BILIRUB SERPL-MCNC: 0.4 MG/DL
BILIRUB UR QL STRIP: NEGATIVE
BUN SERPL-MCNC: 17 MG/DL
CALCIUM SERPL-MCNC: 9.7 MG/DL
CHLORIDE SERPL-SCNC: 106 MMOL/L
CHOLEST SERPL-MCNC: 189 MG/DL
CHOLEST/HDLC SERPL: 2.7 {RATIO}
CLARITY UR REFRACT.AUTO: CLEAR
CO2 SERPL-SCNC: 26 MMOL/L
COLOR UR AUTO: YELLOW
CREAT SERPL-MCNC: 0.9 MG/DL
DIFFERENTIAL METHOD: ABNORMAL
EOSINOPHIL # BLD AUTO: 0.1 K/UL
EOSINOPHIL NFR BLD: 1.1 %
ERYTHROCYTE [DISTWIDTH] IN BLOOD BY AUTOMATED COUNT: 15.4 %
EST. GFR  (AFRICAN AMERICAN): >60 ML/MIN/1.73 M^2
EST. GFR  (NON AFRICAN AMERICAN): >60 ML/MIN/1.73 M^2
GLUCOSE SERPL-MCNC: 92 MG/DL
GLUCOSE UR QL STRIP: NEGATIVE
HCT VFR BLD AUTO: 34.9 %
HDLC SERPL-MCNC: 71 MG/DL
HDLC SERPL: 37.6 %
HGB BLD-MCNC: 11 G/DL
HGB UR QL STRIP: NEGATIVE
IMM GRANULOCYTES # BLD AUTO: 0.02 K/UL
IMM GRANULOCYTES NFR BLD AUTO: 0.4 %
KETONES UR QL STRIP: NEGATIVE
LDLC SERPL CALC-MCNC: 105.2 MG/DL
LEUKOCYTE ESTERASE UR QL STRIP: NEGATIVE
LYMPHOCYTES # BLD AUTO: 1.7 K/UL
LYMPHOCYTES NFR BLD: 30.4 %
MCH RBC QN AUTO: 26.3 PG
MCHC RBC AUTO-ENTMCNC: 31.5 G/DL
MCV RBC AUTO: 83 FL
MONOCYTES # BLD AUTO: 0.4 K/UL
MONOCYTES NFR BLD: 7 %
NEUTROPHILS # BLD AUTO: 3.4 K/UL
NEUTROPHILS NFR BLD: 60.6 %
NITRITE UR QL STRIP: NEGATIVE
NONHDLC SERPL-MCNC: 118 MG/DL
NRBC BLD-RTO: 0 /100 WBC
PH UR STRIP: 5 [PH] (ref 5–8)
PLATELET # BLD AUTO: 272 K/UL
PMV BLD AUTO: 10.4 FL
POTASSIUM SERPL-SCNC: 4.1 MMOL/L
PROT SERPL-MCNC: 7.9 G/DL
PROT UR QL STRIP: NEGATIVE
RBC # BLD AUTO: 4.19 M/UL
SODIUM SERPL-SCNC: 140 MMOL/L
SP GR UR STRIP: 1.02 (ref 1–1.03)
T4 FREE SERPL-MCNC: 0.9 NG/DL
TRIGL SERPL-MCNC: 64 MG/DL
TSH SERPL DL<=0.005 MIU/L-ACNC: 5.71 UIU/ML
URN SPEC COLLECT METH UR: NORMAL
UROBILINOGEN UR STRIP-ACNC: NEGATIVE EU/DL
WBC # BLD AUTO: 5.56 K/UL

## 2018-02-28 PROCEDURE — 80061 LIPID PANEL: CPT

## 2018-02-28 PROCEDURE — 99396 PREV VISIT EST AGE 40-64: CPT | Mod: S$GLB,,, | Performed by: FAMILY MEDICINE

## 2018-02-28 PROCEDURE — 84443 ASSAY THYROID STIM HORMONE: CPT

## 2018-02-28 PROCEDURE — 99999 PR PBB SHADOW E&M-EST. PATIENT-LVL IV: CPT | Mod: PBBFAC,,, | Performed by: FAMILY MEDICINE

## 2018-02-28 PROCEDURE — 84439 ASSAY OF FREE THYROXINE: CPT

## 2018-02-28 PROCEDURE — 85025 COMPLETE CBC W/AUTO DIFF WBC: CPT

## 2018-02-28 PROCEDURE — 80053 COMPREHEN METABOLIC PANEL: CPT

## 2018-02-28 PROCEDURE — 81003 URINALYSIS AUTO W/O SCOPE: CPT

## 2018-02-28 PROCEDURE — 36415 COLL VENOUS BLD VENIPUNCTURE: CPT | Mod: PO

## 2018-02-28 PROCEDURE — 82306 VITAMIN D 25 HYDROXY: CPT

## 2018-02-28 RX ORDER — IBUPROFEN 800 MG/1
800 TABLET ORAL 2 TIMES DAILY PRN
Qty: 60 TABLET | Refills: 2 | Status: SHIPPED | OUTPATIENT
Start: 2018-02-28 | End: 2019-02-18 | Stop reason: ALTCHOICE

## 2018-02-28 RX ORDER — ERGOCALCIFEROL 1.25 MG/1
50000 CAPSULE ORAL
Qty: 12 CAPSULE | Refills: 1 | Status: SHIPPED | OUTPATIENT
Start: 2018-02-28 | End: 2019-08-25 | Stop reason: SDUPTHER

## 2018-02-28 RX ORDER — LEVOTHYROXINE SODIUM 150 UG/1
150 TABLET ORAL DAILY
Qty: 90 TABLET | Refills: 1 | Status: SHIPPED | OUTPATIENT
Start: 2018-02-28 | End: 2018-05-27 | Stop reason: SDUPTHER

## 2018-02-28 NOTE — PROGRESS NOTES
CHIEF COMPLAINT: Annual wellness examination.     HISTORY OF PRESENT ILLNESS: Ms. Mirza comes in today fasting and without taking medication for annual wellness examination.     END OF LIFE DECISION: She has no living will and does not desire life support.    Current Outpatient Prescriptions   Medication Sig    cyclobenzaprine (FLEXERIL) 10 MG tablet TAKE 1 TABLET (10 MG TOTAL) BY MOUTH DAILY AS NEEDED FOR MUSCLE SPASMS.    ergocalciferol (ERGOCALCIFEROL) 50,000 unit Cap Take 1 capsule (50,000 Units total) by mouth every 7 days.    ibuprofen (ADVIL,MOTRIN) 800 MG tablet     levothyroxine (SYNTHROID) 150 MCG tablet Take 1 tablet (150 mcg total) by mouth once daily.    nabumetone (RELAFEN) 500 MG tablet Take 1 tablet (500 mg total) by mouth 2 (two) times daily. Arthritis/inflammation     SCREENINGS:  Cholesterol: April 17, 2017.  FFS/Colonoscopy: June 11, 2012 - polyps; repeat in 10 years.  Mammogram: April 17, 2017 - benign.  GYN Exam: February 13, 2017 - [place].  Dexa Scan: April 17, 2017 - [place]; repeat in 5 years.   Eye Exam: December 2017 with Dr. Hlil.  She wears glasses.   PPD: Negative in the past.  Immunizations: Td/Tdap - May 30, 2012.  Gardasil - N./A.  Zostavax - N./A.  Pneumovax - Never.  Seasonal Flu - N./A. Discussed. She declines.     ROS:  GENERAL: Denies fever, chills, fatigue or unusual weight change. Appetite normal. Weight 136.6 kg (301 lb 2.4 oz) at February 13, 2017 visit; 141.2 kg (311 lb 4.6 oz) at January 18, 2018 visit. Does not exercise due to knee pain. Monitors diet some times.  SKIN: Denies rashes, itching, changes in mole, color or texture of skin or easy bruising.  HEAD: Denies headaches or recent head trauma.  EYES: Denies change in vision, pain, diplopia, redness except watery discharge. Wears glasses.  EARS: Denies ear pain, discharge, vertigo or decreased hearing.  NOSE: Denies loss of smell, epistaxis or rhinitis.  MOUTH & THROAT: Denies hoarseness or change in voice. Denies  "excessive gum bleeding or mouth sores. Denies sore throat.  NODES: Denies swollen glands.  CHEST: Denies IYER, wheezing, cough, or sputum production.  CARDIOVASCULAR: Denies chest pain, PND, orthopnea or reduced exercise tolerance. Denies palpitations.  ABDOMEN: Denies diarrhea, constipation, nausea, vomiting, abdominal pain, or blood in stool.   URINARY: Denies flank pain, dysuria or hematuria.  GENITOURINARY: Denies flank pain, dysuria, frequency or hematuria. Performs monthly breast self exams.  ENDOCRINE: Denies diabetes, cholesterol problems. Reports takes Vitamin D 50,000 unitsweekly for vitamin D deficiency and continues Synthroid 150 mcg daily for thyroid replacement.  HEME/LYMPH: Denies bleeding problems.  PERIPHERAL VASCULAR:Denies claudication or cyanosis.  MUSCULOSKELETAL: Denies joint stiffness, pain or swelling except reports chronic arthritic knee pain - sometimes helped with Relafen or Ibuprofen and Flexeril but states physical therapy last year minimally helped. Saw Dr. Padilla, physiatrist, on June 16, 2017 for OA - back, knees and physical therapy advised which as stated above did not help with knees but helped with her back. Denies edema.   NEUROLOGIC: Denies history of seizures, tremors, paralysis, alteration of gait or coordination.   PSYCHIATRIC: Denies mood swings, depression, anxiety, homicidal or suicidal thoughts. Denies sleep problems.    PE:   VS: /88 (BP Location: Left arm, Patient Position: Sitting, BP Method: Large (Manual))   Pulse 64   Temp 98.8 °F (37.1 °C) (Oral)   Resp 18   Ht 5' 2" (1.575 m)   Wt (!) 139 kg (306 lb 7 oz)   SpO2 97%   BMI 56.05 kg/m²   APPEARANCE: Well nourished, well developed female, obese and pleasant, alert and oriented in no acute distress.  HEAD: Nontender. Full range of motion.  EYES: PERRL, conjunctiva pink, lids no edema. She wears glasses.  EARS: External canal patent, no swelling or redness. TM's shiny and clear.  NOSE: Mucosa and " turbinates pink, not swollen. No discharge. Nontender sinuses.  THROAT: No pharyngeal erythema or exudate. No stridor.  NECK: Supple, no mass, thyroid not enlarged.  NODES: No cervical, axillary or inguinal lymph node enlargement.  CHEST: Normal respiratory effort. Lungs clear to auscultation.  CARDIOVASCULAR: Normal S1, S2. No rubs, murmurs or gallops. PMI not displaced. No carotid bruit. Pedal pulses palpable bilaterally. No edema.  ABDOMEN: Bowel sounds present. Not distended. Soft. No tenderness, masses or organomegaly.  BREAST EXAM: Symmetrical, no external lesions, no discharge, no masses palpated.  PELVIC EXAM: No external lesions noted, no discharge, absent cervix and uterus, bimanual exam showed no adnexal masses or tenderness noted. Urethra and bladder  intact.  RECTAL EXAM: No external hemorrhoids or anal fissures. Heme-negative stool in the rectal vault.  MUSCULOSKELETAL: No joint deformities except slight bilateral knee stiffness without tenderness noted today. She is ambulatory without problems.  SKIN: No rashes or suspicious lesions, normal color and turgor.  NEUROLOGIC: Cranial Nerves: II-XII grossly intact. DTR's: Knees, Ankles 2+ and equal bilaterally. Gait & Posture: Normal gait and fine motion.  PSYCHIATRIC: Patient alert, oriented x 3. Mood/Affect normal without acute anxiety or depression noted. Judgment/insight good as she makes appropriate decisions on  today's examination.     ASSESSMENT:    ICD-10-CM ICD-9-CM    1. Annual physical exam Z00.00 V70.0 CBC auto differential      TSH      Urinalysis      Comprehensive metabolic panel      Lipid panel      Vitamin D   2. Hypothyroidism, unspecified type E03.9 244.9    3. Vitamin D deficiency E55.9 268.9    4. Primary osteoarthritis of both knees M17.0 715.16 Ambulatory referral to Orthopedics   5. Benign colon polyp K63.5 211.3    6. Morbid obesity with BMI of 50.0-59.9, adult E66.01 278.01     Z68.43 V85.43    7. Postmenopausal Z78.0 V49.81     8. Visit for screening mammogram Z12.31 V76.12 Mammo Digital Screening Bilat with CAD     PLAN:  1. Age-appropriate counseling-appropriate low-sodium, low-cholesterol, low carbohydrate diet and exercise daily, monthly breast self exam, annual wellness examination.   2. Patient advised to call for results/follow up recommendations.  3. Continue current medications.                          4. Prescription refills - Synthroid 150 mcg daily, #90, 1 refill; Ibuprofen 800 mg twice daily prn pain, #60, 2 refills; vitamin D 50,000 units weekly, #12, 1 refill.  4. Keep follow up with specialists.  5. Work excuse for today with return today.   6. Follow-up in about 1 year (around 2/28/2019) for physical.     Answers for HPI/ROS submitted by the patient on 2/27/2018   activity change: Yes  unexpected weight change: No  neck pain: Yes  hearing loss: No  rhinorrhea: No  trouble swallowing: No  eye discharge: No  visual disturbance: No  chest tightness: No  wheezing: No  chest pain: No  palpitations: No  blood in stool: No  constipation: No  vomiting: No  diarrhea: No  polydipsia: No  polyuria: No  difficulty urinating: No  hematuria: No  menstrual problem: No  dysuria: No  joint swelling: Yes  arthralgias: Yes  headaches: No  weakness: Yes  confusion: No  dysphoric mood: Yes

## 2018-03-02 DIAGNOSIS — M25.562 CHRONIC PAIN OF BOTH KNEES: Primary | ICD-10-CM

## 2018-03-02 DIAGNOSIS — G89.29 CHRONIC PAIN OF BOTH KNEES: Primary | ICD-10-CM

## 2018-03-02 DIAGNOSIS — M25.561 CHRONIC PAIN OF BOTH KNEES: Primary | ICD-10-CM

## 2018-03-05 ENCOUNTER — HOSPITAL ENCOUNTER (OUTPATIENT)
Dept: RADIOLOGY | Facility: HOSPITAL | Age: 58
Discharge: HOME | End: 2018-03-05
Attending: PHYSICIAN ASSISTANT
Payer: COMMERCIAL

## 2018-03-05 ENCOUNTER — OFFICE VISIT (OUTPATIENT)
Dept: ORTHOPEDICS | Facility: CLINIC | Age: 58
End: 2018-03-05
Payer: COMMERCIAL

## 2018-03-05 VITALS
RESPIRATION RATE: 12 BRPM | DIASTOLIC BLOOD PRESSURE: 85 MMHG | WEIGHT: 293 LBS | HEIGHT: 62 IN | SYSTOLIC BLOOD PRESSURE: 175 MMHG | BODY MASS INDEX: 53.92 KG/M2 | HEART RATE: 67 BPM

## 2018-03-05 DIAGNOSIS — G89.29 CHRONIC PAIN OF BOTH KNEES: ICD-10-CM

## 2018-03-05 DIAGNOSIS — M17.12 PRIMARY OSTEOARTHRITIS OF LEFT KNEE: Primary | ICD-10-CM

## 2018-03-05 DIAGNOSIS — M17.11 PRIMARY OSTEOARTHRITIS OF RIGHT KNEE: ICD-10-CM

## 2018-03-05 DIAGNOSIS — M25.561 CHRONIC PAIN OF BOTH KNEES: ICD-10-CM

## 2018-03-05 DIAGNOSIS — M25.562 CHRONIC PAIN OF BOTH KNEES: ICD-10-CM

## 2018-03-05 DIAGNOSIS — M76.51 PATELLAR TENDINITIS OF RIGHT KNEE: ICD-10-CM

## 2018-03-05 PROCEDURE — 99203 OFFICE O/P NEW LOW 30 MIN: CPT | Mod: S$GLB,,, | Performed by: PHYSICIAN ASSISTANT

## 2018-03-05 PROCEDURE — 73564 X-RAY EXAM KNEE 4 OR MORE: CPT | Mod: 26,50,, | Performed by: RADIOLOGY

## 2018-03-05 PROCEDURE — 73564 X-RAY EXAM KNEE 4 OR MORE: CPT | Mod: TC,50,FY,PO

## 2018-03-05 PROCEDURE — 99999 PR PBB SHADOW E&M-EST. PATIENT-LVL IV: CPT | Mod: PBBFAC,,, | Performed by: PHYSICIAN ASSISTANT

## 2018-03-05 NOTE — PATIENT INSTRUCTIONS
Quad Set for Leg and Knee    This exercise is designed to stretch and strengthen your knee. Before beginning, read through all the instructions. While exercising, breathe normally and use smooth movements. If you feel any pain, stop the exercise. If pain persists, call your healthcare provider.  1.  Sit on the floor with one leg straight, the other bent.  2.  Flex the foot of your straight leg by pointing your toes toward you. Press the back of your knee into the floor while tightening the muscle on the top of your thigh. Hold for ______ seconds. Then relax.  3.  Repeat ______ times. Do ______ sets a day.  Caution  · Dont arch your back.  · Dont hunch your shoulders.  Date Last Reviewed: 9/20/2015  © 6099-3242 [employer]. [address] [place] [address]. All rights reserved. This information is not intended as a substitute for professional medical care. Always follow your healthcare professional's instructions.        Straight Leg Raise    These instructions are for your right calf. Switch sides for your left calf.  1. Sit on the floor with your right leg straight in front of you. Bend your left knee up and put your left foot flat on the floor.  2. Flex your right foot and tighten the thigh muscles of your right leg. Raise your right leg 6 to 8 inches off the floor. Dont arch your back or hunch your shoulders.  3. Hold the right leg in the air for 10 seconds if you can. Then lower the leg slowly and steadily down to the floor. Relax.  4. Repeat 5 times.   5. Do this exercise 3 times a day, or as instructed.     Tip: You can also do this exercise with your toes turned out to strengthen the inner thigh muscles.   Date Last Reviewed: 3/10/2016  © 0967-0373 [employer]. [address] [place] [address]. All rights reserved. This information is not intended as a substitute for professional medical care. Always follow your healthcare professional's instructions.

## 2018-03-05 NOTE — LETTER
March 5, 2018      Eve Green MD  [address]  [place] [address]           [facility]  [address]  [place] [address]  Phone: [phone]  Fax: [phone]          Patient: Amelia Mirza   MR Number: [id number]   YOB: 1960   Date of Visit: 3/5/2018       Dear Dr. Eve Green:    Thank you for referring Amelia Mirza to me for evaluation. Attached you will find relevant portions of my assessment and plan of care.    If you have questions, please do not hesitate to call me. I look forward to following Amelia Mirza along with you.    Sincerely,    DENYS Moeosure  CC:  No Recipients    If you would like to receive this communication electronically, please contact [email or web address] or [phone] to request more information on [employer] access.    For providers and/or their staff who would like to refer a patient to [facility], please contact us through our one-stop-shop provider referral line, [facility], at [phone].    If you feel you have received this communication in error or would no longer like to receive these types of communications, please e-mail [email or web address]

## 2018-03-05 NOTE — PROGRESS NOTES
Subjective:      Patient ID: Amelia Mizra is a 58 y.o. female.    Chief Complaint: Pain of the Left Knee and Pain of the Right Knee      HPI: Amelia Mirza  is a 58 y.o. female who c/o Pain of the Left Knee and Pain of the Right Knee   for duration of about a year.  It has been progressively worsening over the last 6 months.  Level is 8 out of 10 in severity.  Quality is aching, throbbing, sharp, burning, stinging.  Alleviating factors include ice, heat, pull, ibuprofen.  Aggravating factors include squatting, going up and down stairs.  No associated swelling today.  She does have a history of sciatica.  She has been treated by Dr. Padilla in the past and done physical [facility].  She is a teacher.    Review of Systems   Constitution: Negative for fever.   Cardiovascular: Negative for chest pain.   Respiratory: Negative for cough and shortness of breath.    Skin: Negative for rash.   Musculoskeletal: Positive for joint pain, joint swelling and stiffness.   Gastrointestinal: Negative for heartburn.   Neurological: Negative for headaches and numbness.         Objective:        General    Nursing note and vitals reviewed.  Constitutional: She is oriented to person, place, and time. She appears well-developed and well-nourished.   HENT:   Head: Normocephalic and atraumatic.   Eyes: EOM are normal.   Cardiovascular: Normal rate and regular rhythm.    Pulmonary/Chest: Effort normal.   Abdominal: Soft.   Neurological: She is alert and oriented to person, place, and time.   Psychiatric: She has a normal mood and affect. Her behavior is normal.     General Musculoskeletal Exam   Gait: normal       Right Knee Exam     Inspection   Erythema: absent  Swelling: absent  Effusion: effusion  Deformity: deformity  Bruising: absent    Tenderness   The patient is tender to palpation of the condyle and patellar tendon.    Crepitus   The patient has crepitus of the patella.    Range of Motion   Extension: normal    Flexion: normal Right knee flexion: skin to skin.     Tests   Meniscus   Margo:  Medial - negative Lateral - negative  Ligament Examination Lachman: normal (-1 to 2mm) PCL-Posterior Drawer: normal (0 to 2mm)     MCL - Valgus: normal (0 to 2mm)  LCL - Varus: normal  Patella   Patellar Apprehension: negative  Passive Patellar Tilt: lateral tilt  Patellar Tracking: normal  Patellar Grind: positive    Other   Meniscal Cyst: absent  Popliteal (Baker's) Cyst: absent  Sensation: normal    Comments:  Comp soft, cap refill < 2 sec.    Left Knee Exam     Inspection   Erythema: absent  Swelling: absent  Effusion: absent  Deformity: deformity  Bruising: absent    Tenderness   The patient tender to palpation of the patellar tendon.    Crepitus   The patient has crepitus of the patella.    Range of Motion   Extension: normal   Flexion: normal Left knee flexion: skin to skin.     Tests   Meniscus   Margo:  Medial - negative Lateral - negative  Stability Lachman: normal (-1 to 2mm) PCL-Posterior Drawer: normal (0 to 2mm)  MCL - Valgus: normal (0 to 2mm)  LCL - Varus: normal (0 to 2mm)  Patella   Patellar Apprehension: negative  Passive Patellar Tilt: lateral tilt  Patellar Tracking: normal  Patellar Grind: positive    Other   Meniscal Cyst: absent  Popliteal (Baker's) Cyst: absent  Sensation: normal    Muscle Strength   Right Lower Extremity   Quadriceps:  5/5   Hamstrin/5   Left Lower Extremity   Quadriceps:  5/5   Hamstrin/5     Vascular Exam       Edema  Right Lower Leg: absent  Left Lower Leg: absent            Xray:   Bilateral knees from today images and report were reviewed today.  I agree with the radiologist's interpretation.    There is mild degenerative narrowing of the medial tibiofemoral joints and mild bilateral patellofemoral spurring.  No joint effusion noted.  No acute fracture or dislocation.    Assessment:       Encounter Diagnoses   Name Primary?    Primary osteoarthritis of left knee Yes     Primary osteoarthritis of right knee     Chronic pain of both knees     Patellar tendinitis of right knee           Plan:       Amelia was seen today for pain and pain.    Diagnoses and all orders for this visit:    Primary osteoarthritis of left knee  -     Ambulatory Referral to Physical/Occupational Therapy    Primary osteoarthritis of right knee  -     Ambulatory Referral to Physical/Occupational Therapy    Chronic pain of both knees  -     Ambulatory Referral to Physical/Occupational Therapy    Patellar tendinitis of right knee    Ms. Mirza comes in today to be evaluated for bilateral knee pain.  This is a new patient in problem.  We have discussed conservative treatment options.  I have recommended formal physical therapy for [facility] PT.  She would like to do this at [facility] since they are open late and on Saturdays.  I have also given her a home exercise program to include quad sets and straight leg raises.  We briefly discussed a steroid injection in the knees.  She would like to hold off on that for now.  She'll continue taking ibuprofen as prescribed.  I will see her back in the office in 6 weeks to further evaluate.  She verbalizes understanding and agrees.    Follow-up in about 6 weeks (around 4/16/2018).          The patient understands, chooses and consents to this plan and accepts all   the risks which include but are not limited to the risks mentioned above.     Disclaimer: This note was prepared using a voice recognition system and is likely to have sound alike errors within the text.

## 2018-05-07 ENCOUNTER — HOSPITAL ENCOUNTER (OUTPATIENT)
Dept: RADIOLOGY | Facility: HOSPITAL | Age: 58
Discharge: HOME | End: 2018-05-07
Attending: FAMILY MEDICINE
Payer: COMMERCIAL

## 2018-05-07 ENCOUNTER — OFFICE VISIT (OUTPATIENT)
Dept: ORTHOPEDICS | Facility: CLINIC | Age: 58
End: 2018-05-07
Payer: COMMERCIAL

## 2018-05-07 VITALS
WEIGHT: 293 LBS | RESPIRATION RATE: 12 BRPM | SYSTOLIC BLOOD PRESSURE: 142 MMHG | BODY MASS INDEX: 53.92 KG/M2 | HEART RATE: 61 BPM | DIASTOLIC BLOOD PRESSURE: 76 MMHG | HEIGHT: 62 IN

## 2018-05-07 VITALS — WEIGHT: 293 LBS | BODY MASS INDEX: 53.92 KG/M2 | HEIGHT: 62 IN

## 2018-05-07 DIAGNOSIS — M17.12 PRIMARY OSTEOARTHRITIS OF LEFT KNEE: Primary | ICD-10-CM

## 2018-05-07 DIAGNOSIS — M25.561 CHRONIC PAIN OF BOTH KNEES: ICD-10-CM

## 2018-05-07 DIAGNOSIS — G89.29 CHRONIC PAIN OF BOTH KNEES: ICD-10-CM

## 2018-05-07 DIAGNOSIS — M17.11 PRIMARY OSTEOARTHRITIS OF RIGHT KNEE: ICD-10-CM

## 2018-05-07 DIAGNOSIS — M76.51 PATELLAR TENDINITIS OF RIGHT KNEE: ICD-10-CM

## 2018-05-07 DIAGNOSIS — M25.562 CHRONIC PAIN OF BOTH KNEES: ICD-10-CM

## 2018-05-07 DIAGNOSIS — Z12.31 VISIT FOR SCREENING MAMMOGRAM: ICD-10-CM

## 2018-05-07 PROCEDURE — 99213 OFFICE O/P EST LOW 20 MIN: CPT | Mod: S$GLB,,, | Performed by: PHYSICIAN ASSISTANT

## 2018-05-07 PROCEDURE — 99999 PR PBB SHADOW E&M-EST. PATIENT-LVL III: CPT | Mod: PBBFAC,,, | Performed by: PHYSICIAN ASSISTANT

## 2018-05-07 PROCEDURE — 3008F BODY MASS INDEX DOCD: CPT | Mod: CPTII,S$GLB,, | Performed by: PHYSICIAN ASSISTANT

## 2018-05-07 PROCEDURE — 77067 SCR MAMMO BI INCL CAD: CPT | Mod: 26,,, | Performed by: RADIOLOGY

## 2018-05-07 PROCEDURE — 77067 SCR MAMMO BI INCL CAD: CPT | Mod: TC,PO

## 2018-05-07 NOTE — PROGRESS NOTES
Patient ID: Amelia Mirza is a 58 y.o. female.    Chief Complaint: Follow-up and Pain of the Left Knee and Follow-up and Pain of the Right Knee      HPI: Amelia Mirza  is a 58 y.o. female who c/o Follow-up and Pain of the Left Knee and Follow-up and Pain of the Right Knee   for duration of more than 1 year.  She has been doing physical therapy and making good progress as far as pain relief.  She still has more trouble with the left knee than the right.  At worst, pain level is 5 out of 10 in severity.  Quality is aching and throbbing.  Alleviating factors include her home exercise program as well as physical therapy.  It is also improved with rest.  Pain is worsened with prolonged weightbearing.        Objective:        General    Nursing note and vitals reviewed.  Constitutional: She is oriented to person, place, and time. She appears well-developed and well-nourished.   HENT:   Head: Normocephalic and atraumatic.   Eyes: EOM are normal.   Cardiovascular: Normal rate and regular rhythm.    Pulmonary/Chest: Effort normal.   Abdominal: Soft.   Neurological: She is alert and oriented to person, place, and time.   Psychiatric: She has a normal mood and affect. Her behavior is normal.     General Musculoskeletal Exam   Gait: normal       Right Knee Exam     Inspection   Erythema: absent  Swelling: absent  Effusion: effusion  Deformity: deformity  Bruising: absent    Tenderness   The patient is experiencing no tenderness.         Crepitus   The patient has crepitus of the patella.    Range of Motion   Extension: normal   Flexion: normal Right knee flexion: skin to skin.     Tests   Meniscus   Margo:  Medial - negative Lateral - negative  Ligament Examination Lachman: normal (-1 to 2mm) PCL-Posterior Drawer: normal (0 to 2mm)     MCL - Valgus: normal (0 to 2mm)  LCL - Varus: normal  Patella   Patellar Apprehension: negative  Passive Patellar Tilt: lateral tilt  Patellar Tracking: normal  Patellar Grind:  negative    Other   Meniscal Cyst: absent  Popliteal (Baker's) Cyst: absent  Sensation: normal    Comments:  Comp soft, cap refill < 2 sec.    Left Knee Exam     Inspection   Erythema: absent  Swelling: absent  Effusion: absent  Deformity: deformity  Bruising: absent    Crepitus   The patient has crepitus of the patella.    Range of Motion   Extension: normal   Flexion: normal Left knee flexion: skin to skin.     Tests   Meniscus   Margo:  Medial - negative Lateral - negative  Stability Lachman: normal (-1 to 2mm) PCL-Posterior Drawer: normal (0 to 2mm)  MCL - Valgus: normal (0 to 2mm)  LCL - Varus: normal (0 to 2mm)  Patella   Patellar Apprehension: negative  Passive Patellar Tilt: lateral tilt  Patellar Tracking: normal  Patellar Grind: positive (minimal discomfort today)    Other   Meniscal Cyst: absent  Popliteal (Baker's) Cyst: absent  Sensation: normal    Muscle Strength   Right Lower Extremity   Quadriceps:  5/5   Hamstrin/5   Left Lower Extremity   Quadriceps:  5/5   Hamstrin/5     Vascular Exam       Edema  Right Lower Leg: absent  Left Lower Leg: absent              Assessment:       Encounter Diagnoses   Name Primary?    Primary osteoarthritis of left knee Yes    Primary osteoarthritis of right knee     Chronic pain of both knees     Patellar tendinitis of right knee           Plan:       Amelia was seen today for follow-up, pain, follow-up and pain.    Diagnoses and all orders for this visit:    Primary osteoarthritis of left knee    Primary osteoarthritis of right knee    Chronic pain of both knees    Patellar tendinitis of right knee    Ms. Mirza is an established patient who comes in today to follow-up on the above established problems.  The knees are improved.  Functionally, she is doing better and happy with her progress.  We have again discussed risks and benefits of a steroid injection.  She still wishes to hold off on that.  She is taking a trip in the middle of .  She  would like to consider getting a steroid injection in the knees prior to her trip.  We will get her scheduled for that.  She will follow-up with me at that point.  If she has problems before then, she will notify the office.  She verbalizes understanding and agrees.  She will continue with her home exercise program.  Follow-up in about 1 month (around 6/7/2018).          The patient understands, chooses and consents to this plan and accepts all   the risks which include but are not limited to the risks mentioned above.     Disclaimer: This note was prepared using a voice recognition system and is likely to have sound alike errors within the text.

## 2018-05-27 RX ORDER — LEVOTHYROXINE SODIUM 175 UG/1
TABLET ORAL
Qty: 90 TABLET | Refills: 0 | Status: SHIPPED | OUTPATIENT
Start: 2018-05-27 | End: 2019-02-18 | Stop reason: DRUGHIGH

## 2018-06-07 ENCOUNTER — OFFICE VISIT (OUTPATIENT)
Dept: ORTHOPEDICS | Facility: CLINIC | Age: 58
End: 2018-06-07
Payer: COMMERCIAL

## 2018-06-07 VITALS — RESPIRATION RATE: 12 BRPM | SYSTOLIC BLOOD PRESSURE: 133 MMHG | DIASTOLIC BLOOD PRESSURE: 79 MMHG | HEART RATE: 64 BPM

## 2018-06-07 DIAGNOSIS — M25.562 CHRONIC PAIN OF BOTH KNEES: ICD-10-CM

## 2018-06-07 DIAGNOSIS — M25.561 CHRONIC PAIN OF BOTH KNEES: ICD-10-CM

## 2018-06-07 DIAGNOSIS — M17.12 PRIMARY OSTEOARTHRITIS OF LEFT KNEE: Primary | ICD-10-CM

## 2018-06-07 DIAGNOSIS — G89.29 CHRONIC PAIN OF BOTH KNEES: ICD-10-CM

## 2018-06-07 DIAGNOSIS — M17.11 PRIMARY OSTEOARTHRITIS OF RIGHT KNEE: ICD-10-CM

## 2018-06-07 PROCEDURE — 20610 DRAIN/INJ JOINT/BURSA W/O US: CPT | Mod: 50,S$GLB,, | Performed by: PHYSICIAN ASSISTANT

## 2018-06-07 PROCEDURE — 99214 OFFICE O/P EST MOD 30 MIN: CPT | Mod: 25,S$GLB,, | Performed by: PHYSICIAN ASSISTANT

## 2018-06-07 PROCEDURE — 99999 PR PBB SHADOW E&M-EST. PATIENT-LVL III: CPT | Mod: PBBFAC,,, | Performed by: PHYSICIAN ASSISTANT

## 2018-06-07 RX ORDER — METHYLPREDNISOLONE ACETATE 80 MG/ML
80 INJECTION, SUSPENSION INTRA-ARTICULAR; INTRALESIONAL; INTRAMUSCULAR; SOFT TISSUE ONCE
Status: COMPLETED | OUTPATIENT
Start: 2018-06-07 | End: 2018-06-07

## 2018-06-07 RX ADMIN — METHYLPREDNISOLONE ACETATE 80 MG: 80 INJECTION, SUSPENSION INTRA-ARTICULAR; INTRALESIONAL; INTRAMUSCULAR; SOFT TISSUE at 05:06

## 2018-06-07 NOTE — PROGRESS NOTES
Patient ID: Amelia Mirza is a 58 y.o. female.    Chief Complaint: Pain and Follow-up of the Left Knee and Pain and Follow-up of the Right Knee      HPI: Amelia Mirza  is a 58 y.o. female who c/o Pain and Follow-up of the Left Knee and Pain and Follow-up of the Right Knee   for duration of about a year.  I have been treating her for the last couple of months.  She has been working with physical therapy for strengthening and stretching of her bilateral knees.  She comes in today for routine follow-up.  Pain level 5/10 in severity.  She is about the same as what she was last month when I saw her.  Quality is intermittent aching pain.  The left knee is worse than the right.  It is improved with ice and rest.  It is worsened with prolonged weight-bearing.  She has a trip to [place] coming  next week.  She does to [place] next month.  The    Past Medical History:   Diagnosis Date    Benign colon polyp     Hypothyroidism     Mild anemia     Obesity     Postmenopausal     No history of abnormal pap smear    Vitamin D deficiency      Past Surgical History:   Procedure Laterality Date    bilateral tubal ligation      Partial hysterectomy with USO      for DUB     THYROIDECTOMY, PARTIAL       Family History   Problem Relation Age of Onset    Cancer Mother         Breast cancer    Diabetes Mother     Breast cancer Mother     Depression Daughter     Insulin resistance Daughter     Depression Maternal Aunt     Heart disease Maternal Grandmother         MI/CAD    Heart disease Maternal Grandfather         MI/CAD    Heart disease Paternal Grandfather         MI/CAD    Cancer Paternal Grandfather         Pancreas    No Known Problems Son     No Known Problems Son     Stroke Neg Hx     Hypertension Neg Hx     Lupus Neg Hx     Rheum arthritis Neg Hx      Social History     Social History    Marital status:      Spouse name: N/A    Number of children: 3    Years of education: N/A      Occupational History    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     Medication List with Changes/Refills   Current Medications    CYCLOBENZAPRINE (FLEXERIL) 10 MG TABLET    TAKE 1 TABLET (10 MG TOTAL) BY MOUTH DAILY AS NEEDED FOR MUSCLE SPASMS.    ERGOCALCIFEROL (ERGOCALCIFEROL) 50,000 UNIT CAP    Take 1 capsule (50,000 Units total) by mouth every 7 days.    IBUPROFEN (ADVIL,MOTRIN) 800 MG TABLET    Take 1 tablet (800 mg total) by mouth 2 (two) times daily as needed for Pain.    LEVOTHYROXINE (SYNTHROID, LEVOTHROID) 175 MCG TABLET    Take 1 pill every morning before breakfast    NABUMETONE (RELAFEN) 500 MG TABLET    Take 1 tablet (500 mg total) by mouth 2 (two) times daily. Arthritis/inflammation     Review of patient's allergies indicates:  No Known Allergies        Objective:        General    Nursing note and vitals reviewed.  Constitutional: She is oriented to person, place, and time. She appears well-developed and well-nourished.   HENT:   Head: Normocephalic and atraumatic.   Eyes: EOM are normal.   Cardiovascular: Normal rate and regular rhythm.    Pulmonary/Chest: Effort normal.   Abdominal: Soft.   Neurological: She is alert and oriented to person, place, and time.   Psychiatric: She has a normal mood and affect. Her behavior is normal.     General Musculoskeletal Exam   Gait: normal       Right Knee Exam     Inspection   Erythema: absent  Swelling: absent  Effusion: effusion  Deformity: deformity  Bruising: absent    Tenderness   The patient is tender to palpation of the condyle and medial joint line.    Crepitus   The  patient has crepitus of the patella.    Range of Motion   Extension: normal   Flexion: normal Right knee flexion: skin to skin.     Tests   Meniscus   Margo:  Medial - negative Lateral - negative  Ligament Examination Lachman: normal (-1 to 2mm) PCL-Posterior Drawer: normal (0 to 2mm)     MCL - Valgus: normal (0 to 2mm)  LCL - Varus: normal  Patella   Patellar Apprehension: negative  Passive Patellar Tilt: lateral tilt  Patellar Tracking: normal  Patellar Grind: negative    Other   Meniscal Cyst: absent  Popliteal (Baker's) Cyst: absent  Sensation: normal    Comments:  Comp soft, cap refill < 2 sec.    Left Knee Exam     Inspection   Erythema: absent  Swelling: absent  Effusion: absent  Deformity: deformity  Bruising: absent    Tenderness   The patient tender to palpation of the condyle, patellar tendon and medial joint line.    Crepitus   The patient has crepitus of the patella.    Range of Motion   Extension: normal   Flexion: normal Left knee flexion: skin to skin.     Tests   Meniscus   Margo:  Medial - negative Lateral - negative  Stability Lachman: normal (-1 to 2mm) PCL-Posterior Drawer: normal (0 to 2mm)  MCL - Valgus: normal (0 to 2mm)  LCL - Varus: normal (0 to 2mm)  Patella   Patellar Apprehension: negative  Passive Patellar Tilt: lateral tilt  Patellar Tracking: normal  Patellar Grind: positive (minimal discomfort today)    Other   Meniscal Cyst: absent  Popliteal (Baker's) Cyst: absent  Sensation: normal    Muscle Strength   Right Lower Extremity   Quadriceps:  5/5   Hamstrin/5   Left Lower Extremity   Quadriceps:  5/5   Hamstrin/5     Vascular Exam       Edema  Right Lower Leg: absent  Left Lower Leg: absent        Assessment:       Encounter Diagnoses   Name Primary?    Primary osteoarthritis of left knee Yes    Primary osteoarthritis of right knee     Chronic pain of both knees           Plan:       Amelia was seen today for pain, follow-up, pain and follow-up.    Diagnoses and  all orders for this visit:    Primary osteoarthritis of left knee  -     methylPREDNISolone acetate injection 80 mg; Inject 1 mL (80 mg total) into the articular space once.    Primary osteoarthritis of right knee  -     methylPREDNISolone acetate injection 80 mg; Inject 1 mL (80 mg total) into the articular space once.    Chronic pain of both knees  -     methylPREDNISolone acetate injection 80 mg; Inject 1 mL (80 mg total) into the articular space once.  -     methylPREDNISolone acetate injection 80 mg; Inject 1 mL (80 mg total) into the articular space once.    Ms. Mirza comes in today to follow-up on the above problems.  She will continue with physical therapy.  She will work on a home exercise program while on vacation.  We have also discussed risks and benefits of a steroid injection.  She wishes to proceed with that today.  I will see her back in the office on an as needed basis.  If she continues to have pain, would consider further conservative treatment at that time. She verbalizes understanding and agrees.  Follow-up if symptoms worsen or fail to improve.    Left Knee Injection Report:  After verbal consent was obtained for left knee injection, patient ID, site, and side were verified.  The  left  knee was sterilly prepped in the standard fashion.  A 22-gauge needle was introduced into left knee joint from an rosalba-lateral site without complication. The left knee was then injected with 20 mg lidocaine plain and 80 mg depomedrol.  A sterile bandaid was applied.  The patient was informed to apply an ice pack approximately 10min once arriving home and not to do anything strenuous for 24hours.  She was instructed to call if there were any problems. The patient was discharged in stable condition.    Right Knee Injection Report:  After verbal consent was obtained for right knee injection, patient ID, site, and side were verified.  The  right  knee was sterilly prepped in the standard fashion.  A 22-gauge  needle was introduced into right knee joint from an rosalba-lateral site without complication. The right knee was then injected with 20 mg lidocaine plain and 80 mg depomedrol.  A sterile bandaid was applied.  The patient was informed to apply an ice pack approximately 10min once arriving home and not to do anything strenuous for 24hours.  She was instructed to call if there were any problems. The patient was discharged in stable condition     The patient understands, chooses and consents to this plan and accepts all   the risks which include but are not limited to the risks mentioned above.     Disclaimer: This note was prepared using a voice recognition system and is likely to have sound alike errors within the text.

## 2018-06-08 ENCOUNTER — TELEPHONE (OUTPATIENT)
Dept: FAMILY MEDICINE | Facility: CLINIC | Age: 58
End: 2018-06-08

## 2018-06-08 ENCOUNTER — TELEPHONE (OUTPATIENT)
Dept: ORTHOPEDICS | Facility: CLINIC | Age: 58
End: 2018-06-08

## 2018-06-08 NOTE — TELEPHONE ENCOUNTER
----- Message from Don Munson sent at 6/8/2018  4:05 PM CDT -----  Contact: Pt  Please give pt a call at ..[phone] (home) [phone] (work) she is leaving out of town and has questions regarding her medication.

## 2018-06-08 NOTE — TELEPHONE ENCOUNTER
Called wk #. Unable to leave a message. Called Cell ph #. Left message. Nothing was prescribed yesterday at office visit. Refer to office visit note.

## 2018-06-08 NOTE — TELEPHONE ENCOUNTER
----- Message from Don Munson sent at 6/8/2018  4:13 PM CDT -----  Contact: Pt  Please give pt a call at ..[phone] (home) [phone] (work) regarding questions about her medication before leaving out of town.

## 2018-11-13 ENCOUNTER — PATIENT MESSAGE (OUTPATIENT)
Dept: ORTHOPEDICS | Facility: CLINIC | Age: 58
End: 2018-11-13

## 2018-11-13 ENCOUNTER — TELEPHONE (OUTPATIENT)
Dept: ORTHOPEDICS | Facility: CLINIC | Age: 58
End: 2018-11-13

## 2018-11-13 NOTE — TELEPHONE ENCOUNTER
Returned patients call she was inquiring about an injection that was given to her a couple months ago by Tammy GARRISON. Patient stated the shot worked but its wearing off now and she want the injection as second time..    Patient has scheduled an appointment  Through My Chart to see  Tammy GARRISON tomorrow (11/14/18).    I told patient to talk with Tammy about it at the appointment on tomorrow, but I cant say that Tammy will or will not give her an injection tomorrow.That decision will be left up to the provider.. Patient verbalized understanding      ----- Message from Kurt Bajwa sent at 11/13/2018  2:33 PM CST -----  Contact: pt   Pt would like a call back regarding appt scheduled for 11/14/2018 pt want injection insisted on message being sent '    Pt can be reached at  [phone]

## 2018-11-14 ENCOUNTER — OFFICE VISIT (OUTPATIENT)
Dept: ORTHOPEDICS | Facility: CLINIC | Age: 58
End: 2018-11-14
Payer: COMMERCIAL

## 2018-11-14 VITALS
SYSTOLIC BLOOD PRESSURE: 148 MMHG | HEIGHT: 62 IN | WEIGHT: 293 LBS | DIASTOLIC BLOOD PRESSURE: 88 MMHG | RESPIRATION RATE: 12 BRPM | BODY MASS INDEX: 53.92 KG/M2 | HEART RATE: 89 BPM

## 2018-11-14 DIAGNOSIS — E66.01 MORBID OBESITY WITH BMI OF 50.0-59.9, ADULT: ICD-10-CM

## 2018-11-14 DIAGNOSIS — G89.29 CHRONIC PAIN OF BOTH KNEES: ICD-10-CM

## 2018-11-14 DIAGNOSIS — M17.12 PRIMARY OSTEOARTHRITIS OF LEFT KNEE: Primary | ICD-10-CM

## 2018-11-14 DIAGNOSIS — M25.562 CHRONIC PAIN OF BOTH KNEES: ICD-10-CM

## 2018-11-14 DIAGNOSIS — M25.561 CHRONIC PAIN OF BOTH KNEES: ICD-10-CM

## 2018-11-14 DIAGNOSIS — M17.11 PRIMARY OSTEOARTHRITIS OF RIGHT KNEE: ICD-10-CM

## 2018-11-14 PROCEDURE — 20610 DRAIN/INJ JOINT/BURSA W/O US: CPT | Mod: 50,S$GLB,, | Performed by: PHYSICIAN ASSISTANT

## 2018-11-14 PROCEDURE — 3008F BODY MASS INDEX DOCD: CPT | Mod: CPTII,S$GLB,, | Performed by: PHYSICIAN ASSISTANT

## 2018-11-14 PROCEDURE — 99214 OFFICE O/P EST MOD 30 MIN: CPT | Mod: 25,S$GLB,, | Performed by: PHYSICIAN ASSISTANT

## 2018-11-14 PROCEDURE — 99999 PR PBB SHADOW E&M-EST. PATIENT-LVL III: CPT | Mod: PBBFAC,,, | Performed by: PHYSICIAN ASSISTANT

## 2018-11-14 RX ORDER — METHYLPREDNISOLONE ACETATE 80 MG/ML
80 INJECTION, SUSPENSION INTRA-ARTICULAR; INTRALESIONAL; INTRAMUSCULAR; SOFT TISSUE ONCE
Status: COMPLETED | OUTPATIENT
Start: 2018-11-14 | End: 2018-11-14

## 2018-11-14 RX ORDER — MELOXICAM 15 MG/1
15 TABLET ORAL DAILY
Qty: 30 TABLET | Refills: 1 | Status: SHIPPED | OUTPATIENT
Start: 2018-11-14 | End: 2019-08-16

## 2018-11-14 RX ADMIN — METHYLPREDNISOLONE ACETATE 80 MG: 80 INJECTION, SUSPENSION INTRA-ARTICULAR; INTRALESIONAL; INTRAMUSCULAR; SOFT TISSUE at 05:11

## 2018-11-14 NOTE — PROGRESS NOTES
Patient ID: Amelia Mirza is a 58 y.o. female.    Chief Complaint: Pain and Follow-up of the Left Knee and Pain and Follow-up of the Right Knee      HPI: Amelia Mirza  is a 58 y.o. female who c/o Pain and Follow-up of the Left Knee and Pain and Follow-up of the Right Knee   for duration of years, but worse over the last few months.  I last saw her in June and did steroid injections in both knees.  The steroid injection started wearing off in September.  She has gotten more active at work which she attributes to increasing her pain. Severity is 8/10.  Quality aching, throbbing, burning.  Alleviating factors include ice, warm water soaks, stimulation at therapy, and kineseo taping.  Aggravating factors include stairs.  She has failed prescription management with ibuprofen and Relafen.  She uses over-the-counter knee braces, but they slide down her leg.    Past Medical History:   Diagnosis Date    Benign colon polyp     Hypothyroidism     Mild anemia     Obesity     Postmenopausal     No history of abnormal pap smear    Vitamin D deficiency      Past Surgical History:   Procedure Laterality Date    bilateral tubal ligation      Partial hysterectomy with USO      for DUB     THYROIDECTOMY, PARTIAL       Family History   Problem Relation Age of Onset    Cancer Mother         Breast cancer    Diabetes Mother     Breast cancer Mother     Depression Daughter     Insulin resistance Daughter     Depression Maternal Aunt     Heart disease Maternal Grandmother         MI/CAD    Heart disease Maternal Grandfather         MI/CAD    Heart disease Paternal Grandfather         MI/CAD    Cancer Paternal Grandfather         Pancreas    No Known Problems Son     No Known Problems Son     Stroke Neg Hx     Hypertension Neg Hx     Lupus Neg Hx     Rheum arthritis Neg Hx      Social History     Socioeconomic History    Marital status:      Spouse name: Not on file    Number of children: 3     Years of education: Not on file    Highest education level: Not on file   Social Needs    Financial resource strain: Not on file    Food insecurity - worry: Not on file    Food insecurity - inability: Not on file    Transportation needs - medical: Not on file    Transportation needs - non-medical: Not on file   Occupational History    Occupation: Educator     Employer: [email or web address]     Employer: [employer]   Tobacco Use    Smoking status: Never Smoker    Smokeless tobacco: Never Used   Substance and Sexual Activity    Alcohol use: Yes     Comment: Occasionally    Drug use: No    Sexual activity: Yes     Partners: Male     Birth control/protection: Surgical, Condom     Comment: 1 partner   Other Topics Concern    Not on file   Social History Narrative    She wears seatbelt.        Medication List           Accurate as of 11/14/18  5:04 PM. If you have any questions, ask your nurse or doctor.               CONTINUE taking these medications    ergocalciferol 50,000 unit Cap  Commonly known as:  ERGOCALCIFEROL  Take 1 capsule (50,000 Units total) by mouth every 7 days.     ibuprofen 800 MG tablet  Commonly known as:  ADVIL,MOTRIN  Take 1 tablet (800 mg total) by mouth 2 (two) times daily as needed for Pain.     levothyroxine 175 MCG tablet  Commonly known as:  SYNTHROID, LEVOTHROID  Take 1 pill every morning before breakfast        STOP taking these medications    cyclobenzaprine 10 MG tablet  Commonly known as:  FLEXERIL  Stopped by:  Tammy Palm PA-C     nabumetone 500 MG tablet  Commonly known as:  RELAFEN  Stopped by:  Tammy Palm PA-C          Review of patient's allergies indicates:  No Known Allergies        Objective:        General    Nursing note and vitals reviewed.  Constitutional: She is oriented to person, place, and time. She appears well-developed and well-nourished.   HENT:   Head: Normocephalic and atraumatic.   Eyes: EOM are normal.   Cardiovascular:  Normal rate and regular rhythm.    Pulmonary/Chest: Effort normal.   Abdominal: Soft.   Neurological: She is alert and oriented to person, place, and time.   Psychiatric: She has a normal mood and affect. Her behavior is normal.     General Musculoskeletal Exam   Gait: normal       Right Knee Exam     Inspection   Erythema: absent  Swelling: absent  Effusion: absent  Deformity: absent  Bruising: absent    Tenderness   The patient is tender to palpation of the condyle and medial joint line.    Crepitus   The patient has crepitus of the patella.    Range of Motion   Extension: normal   Flexion: normal Right knee flexion: skin to skin.     Tests   Meniscus   Margo:  Medial - negative Lateral - negative  Ligament Examination Lachman: normal (-1 to 2mm) PCL-Posterior Drawer: normal (0 to 2mm)     MCL - Valgus: normal (0 to 2mm)  LCL - Varus: normal  Patella   Patellar apprehension: negative  Passive Patellar Tilt: lateral tilt  Patellar Tracking: normal  Patellar Grind: negative    Other   Meniscal Cyst: absent  Popliteal (Baker's) Cyst: absent  Sensation: normal    Comments:  Comp soft, cap refill < 2 sec.    Left Knee Exam     Inspection   Erythema: absent  Swelling: absent  Effusion: absent  Deformity: absent  Bruising: absent    Tenderness   The patient tender to palpation of the condyle, patellar tendon and medial joint line.    Crepitus   The patient has crepitus of the patella.    Range of Motion   Extension: normal   Flexion: normal Left knee flexion: skin to skin.     Tests   Meniscus   Margo:  Medial - negative Lateral - negative  Stability Lachman: normal (-1 to 2mm) PCL-Posterior Drawer: normal (0 to 2mm)  MCL - Valgus: normal (0 to 2mm)  LCL - Varus: normal (0 to 2mm)  Patella   Patellar apprehension: negative  Passive Patellar Tilt: lateral tilt  Patellar Tracking: normal  Patellar Grind: positive (minimal discomfort today)    Other   Meniscal Cyst: absent  Popliteal (Baker's) Cyst: absent  Sensation:  normal    Muscle Strength   Right Lower Extremity   Quadriceps:  5/5   Hamstrin/5   Left Lower Extremity   Quadriceps:  5/5   Hamstrin/5     Vascular Exam       Edema  Right Lower Leg: absent  Left Lower Leg: absent            Xray:   Bilateral knees from 3/5/18 images and report were reviewed today.  I agree with the radiologist's interpretation.  There is mild degenerative narrowing of the medial tibiofemoral joints and mild bilateral patellofemoral spurring.  No joint effusion noted.  No acute fracture or dislocation.    Assessment:       Encounter Diagnoses   Name Primary?    Primary osteoarthritis of left knee Yes    Primary osteoarthritis of right knee     Chronic pain of both knees     Morbid obesity with BMI of 50.0-59.9, adult           Plan:       Amelia was seen today for pain, follow-up, pain and follow-up.    Diagnoses and all orders for this visit:    Primary osteoarthritis of left knee  -     methylPREDNISolone acetate injection 80 mg  -     hylan g-f 20 48 mg/6 mL injection 48 mg  -     Prior Authorization Order    Primary osteoarthritis of right knee  -     methylPREDNISolone acetate injection 80 mg  -     Prior Authorization Order  -     hylan g-f 20 48 mg/6 mL injection 48 mg    Chronic pain of both knees  -     methylPREDNISolone acetate injection 80 mg  -     methylPREDNISolone acetate injection 80 mg  -     Prior Authorization Order    Morbid obesity with BMI of 50.0-59.9, adult        Amelia Mirza comes in today with the above problems.  We had a long discussion today regarding degenerative arthritis in the knees. The patient understands that arthritis is chronic and will worsen over time.  The patient also understands that arthritis may cause episodic flare-ups in pain. Management or if arthritis is achieved through a multi-modal approach including weight loss in obese individuals, activity modification, NSAIDs (topical vs oral) where appropriate, periodic intra-articular  steroid injections, viscosupplementation, physical therapy, knee bracing, ambulatory aids, as well as geniculate nerve blocks.  After discussion of risks and benefits of all of the above were discussed, the decision was made to move forward with intra-articular steroid injections today.  I have also submitted an authorization request for Synvisc-One in the bilateral knees.  She will continue with physical therapy.  I have also submitted a prescription for meloxicam to her pharmacy.  She will discontinue ibuprofen at this time.  If she she does not experience any relief while on meloxicam, I would plan on doing an it prescription anti-inflammatory cream through [facility] as she will then have failed 3 oral NSAIDs.  I will see her next month for Synvisc-One in if she has any problems before then, she will notify the office.  She verbalizes understanding and agrees.      No Follow-up on file.    Left Knee Injection Report:  After verbal consent was obtained for left knee injection, patient ID, site, and side were verified.  The  left  knee was sterilly prepped in the standard fashion.  A 22-gauge needle was introduced into left knee joint from an rosalba-lateral site without complication. The left knee was then injected with 20 mg lidocaine plain and 80 mg depomedrol.  A sterile bandaid was applied.  The patient was informed to apply an ice pack approximately 10min once arriving home and not to do anything strenuous for 24hours.  She was instructed to call if there were any problems. The patient was discharged in stable condition.    Right Knee Injection Report:  After verbal consent was obtained for right knee injection, patient ID, site, and side were verified.  The  right  knee was sterilly prepped in the standard fashion.  A 22-gauge needle was introduced into right knee joint from an rosalba-lateral site without complication. The right knee was then injected with 20 mg lidocaine plain and 80 mg  depomedrol.  A sterile bandaid was applied.  The patient was informed to apply an ice pack approximately 10min once arriving home and not to do anything strenuous for 24hours.  She was instructed to call if there were any problems. The patient was discharged in stable condition    The patient understands, chooses and consents to this plan and accepts all   the risks which include but are not limited to the risks mentioned above.     Disclaimer: This note was prepared using a voice recognition system and is likely to have sound alike errors within the text.

## 2018-12-19 ENCOUNTER — OFFICE VISIT (OUTPATIENT)
Dept: ORTHOPEDICS | Facility: CLINIC | Age: 58
End: 2018-12-19
Payer: COMMERCIAL

## 2018-12-19 VITALS — SYSTOLIC BLOOD PRESSURE: 132 MMHG | DIASTOLIC BLOOD PRESSURE: 83 MMHG | RESPIRATION RATE: 12 BRPM | HEART RATE: 66 BPM

## 2018-12-19 DIAGNOSIS — M17.12 PRIMARY OSTEOARTHRITIS OF LEFT KNEE: ICD-10-CM

## 2018-12-19 DIAGNOSIS — M17.11 PRIMARY OSTEOARTHRITIS OF RIGHT KNEE: Primary | ICD-10-CM

## 2018-12-19 PROCEDURE — 99499 UNLISTED E&M SERVICE: CPT | Mod: S$GLB,,, | Performed by: PHYSICIAN ASSISTANT

## 2018-12-19 PROCEDURE — 99999 PR PBB SHADOW E&M-EST. PATIENT-LVL III: CPT | Mod: PBBFAC,,, | Performed by: PHYSICIAN ASSISTANT

## 2018-12-19 PROCEDURE — 20610 DRAIN/INJ JOINT/BURSA W/O US: CPT | Mod: 50,S$GLB,, | Performed by: PHYSICIAN ASSISTANT

## 2018-12-19 NOTE — PROGRESS NOTES
Ms. Mirza comes in today for bilateral Synvisc-One injections. She tolerated her steroid injections last month well without any problems.  She has been working with physical therapy.  Pain is improving.  We have again discussed risks and benefits of the injections. She wishes to proceed.  I will recheck her approximately 2 months.  If she has problems before then, she will notify the office.  She verbalizes understanding and agrees.    Left KneeSynvisc One Injection Report:  After verbal consent was obtained for left knee injection, patient ID, site, and side were verified.  The  left  knee was sterilly prepped in the standard fashion.  A 22-gauge needle was introduced into the joint at the supero-lateral site without complication. The left knee was then injected with 20 mg of lidocaine.  The left knee was reprepped utilizing the same superolateral site.  After adequate time was given for anesthesia, an 18-gauge needle was inserted into the left knee utilizing the superolateral site without complication.  It was then injected with 1 prefilled syringe of Synvisc One.  A sterile bandaid was applied.  The patient tolerated the procedure well.  The patient was informed to apply an ice pack approximately 10min once arriving home and not to do anything strenuous for 24hours.  She was instructed to call if there were any problems. The patient was discharged in stable condition.    Right Knee Synvisc One Injection Report:  After verbal consent was obtained for right knee injection, patient ID, site, and side were verified.  The  right  knee was sterilly prepped in the standard fashion.  A 22-gauge needle was introduced into the joint at the supero-lateral site without complication. The right knee was then injected with 20 mg of lidocaine.  The right knee was reprepped utilizing the same superolateral site.  After adequate time was given for anesthesia, an 18-gauge needle was inserted into the right knee utilizing the  superolateral site without complication.  It was then injected with 1 prefilled syringe of Synvisc One.  A sterile bandaid was applied.  The patient tolerated the procedure well.  The patient was informed to apply an ice pack approximately 10min once arriving home and not to do anything strenuous for 24hours.  She was instructed to call if there were any problems. The patient was discharged in stable condition.

## 2019-02-15 DIAGNOSIS — M25.561 PAIN IN BOTH KNEES, UNSPECIFIED CHRONICITY: Primary | ICD-10-CM

## 2019-02-15 DIAGNOSIS — M25.562 PAIN IN BOTH KNEES, UNSPECIFIED CHRONICITY: Primary | ICD-10-CM

## 2019-02-18 ENCOUNTER — HOSPITAL ENCOUNTER (OUTPATIENT)
Dept: RADIOLOGY | Facility: HOSPITAL | Age: 59
Discharge: HOME | End: 2019-02-18
Attending: PHYSICIAN ASSISTANT
Payer: COMMERCIAL

## 2019-02-18 ENCOUNTER — OFFICE VISIT (OUTPATIENT)
Dept: ORTHOPEDICS | Facility: CLINIC | Age: 59
End: 2019-02-18
Payer: COMMERCIAL

## 2019-02-18 VITALS
WEIGHT: 293 LBS | SYSTOLIC BLOOD PRESSURE: 150 MMHG | RESPIRATION RATE: 12 BRPM | DIASTOLIC BLOOD PRESSURE: 101 MMHG | BODY MASS INDEX: 53.92 KG/M2 | HEART RATE: 65 BPM | HEIGHT: 62 IN

## 2019-02-18 DIAGNOSIS — M25.562 PAIN IN BOTH KNEES, UNSPECIFIED CHRONICITY: ICD-10-CM

## 2019-02-18 DIAGNOSIS — M17.11 PRIMARY OSTEOARTHRITIS OF RIGHT KNEE: Primary | ICD-10-CM

## 2019-02-18 DIAGNOSIS — M25.562 CHRONIC PAIN OF BOTH KNEES: ICD-10-CM

## 2019-02-18 DIAGNOSIS — M17.12 PRIMARY OSTEOARTHRITIS OF LEFT KNEE: ICD-10-CM

## 2019-02-18 DIAGNOSIS — M25.561 CHRONIC PAIN OF BOTH KNEES: ICD-10-CM

## 2019-02-18 DIAGNOSIS — G89.29 CHRONIC PAIN OF BOTH KNEES: ICD-10-CM

## 2019-02-18 DIAGNOSIS — M25.561 PAIN IN BOTH KNEES, UNSPECIFIED CHRONICITY: ICD-10-CM

## 2019-02-18 PROCEDURE — 3008F BODY MASS INDEX DOCD: CPT | Mod: CPTII,S$GLB,, | Performed by: PHYSICIAN ASSISTANT

## 2019-02-18 PROCEDURE — 73564 X-RAY EXAM KNEE 4 OR MORE: CPT | Mod: 26,RT,, | Performed by: RADIOLOGY

## 2019-02-18 PROCEDURE — 3008F PR BODY MASS INDEX (BMI) DOCUMENTED: ICD-10-PCS | Mod: CPTII,S$GLB,, | Performed by: PHYSICIAN ASSISTANT

## 2019-02-18 PROCEDURE — 99213 OFFICE O/P EST LOW 20 MIN: CPT | Mod: S$GLB,,, | Performed by: PHYSICIAN ASSISTANT

## 2019-02-18 PROCEDURE — 99999 PR PBB SHADOW E&M-EST. PATIENT-LVL III: CPT | Mod: PBBFAC,,, | Performed by: PHYSICIAN ASSISTANT

## 2019-02-18 PROCEDURE — 73564 PR  X-RAY KNEE 4+ VIEW: ICD-10-PCS | Mod: 26,RT,, | Performed by: RADIOLOGY

## 2019-02-18 PROCEDURE — 73564 X-RAY EXAM KNEE 4 OR MORE: CPT | Mod: 26,LT,, | Performed by: RADIOLOGY

## 2019-02-18 PROCEDURE — 99999 PR PBB SHADOW E&M-EST. PATIENT-LVL III: ICD-10-PCS | Mod: PBBFAC,,, | Performed by: PHYSICIAN ASSISTANT

## 2019-02-18 PROCEDURE — 99213 PR OFFICE/OUTPT VISIT, EST, LEVL III, 20-29 MIN: ICD-10-PCS | Mod: S$GLB,,, | Performed by: PHYSICIAN ASSISTANT

## 2019-02-18 PROCEDURE — 73564 X-RAY EXAM KNEE 4 OR MORE: CPT | Mod: TC,50

## 2019-02-18 RX ORDER — LEVOTHYROXINE SODIUM 150 MCG
150 TABLET ORAL
COMMUNITY
Start: 2018-12-29 | End: 2019-08-25 | Stop reason: DRUGHIGH

## 2019-02-18 NOTE — PROGRESS NOTES
Patient ID: Amelia Mirza is a 59 y.o. female.    Chief Complaint: Pain and Follow-up of the Right Knee and Pain and Follow-up of the Left Knee      HPI: Amelia Mirza  is a 59 y.o. female who c/o Pain and Follow-up of the Right Knee and Pain and Follow-up of the Left Knee   for duration of years.  I did corticosteroid injections in the bilateral knees back in November.  I followed that with Synvisc-One injections in both knees in December.  She comes in today for routine follow-up.  The left knee is actually feeling better.  She is still having pain and discomfort in the right knee.  Functionally, she is having pain worsened at the end of the day and he has even limping on occasion.  She has been doing physical therapy and been released to home exercise program.  She has been on a prescription of meloxicam.  Pain level presently is 3/10.  It is worsened with stairs and prolonged weight-bearing.  Pain is actually improved with walking on a flat surface as well as rest.  Quality is aching.  Majority of her symptoms in the right knee are lateral.    Past Medical History:   Diagnosis Date    Benign colon polyp     Hypothyroidism     Mild anemia     Obesity     Postmenopausal     No history of abnormal pap smear    Vitamin D deficiency      Past Surgical History:   Procedure Laterality Date    bilateral tubal ligation      Partial hysterectomy with USO      for DUB     THYROIDECTOMY, PARTIAL       Family History   Problem Relation Age of Onset    Cancer Mother         Breast cancer    Diabetes Mother     Breast cancer Mother     Depression Daughter     Insulin resistance Daughter     Depression Maternal Aunt     Heart disease Maternal Grandmother         MI/CAD    Heart disease Maternal Grandfather         MI/CAD    Heart disease Paternal Grandfather         MI/CAD    Cancer Paternal Grandfather         Pancreas    No Known Problems Son     No Known Problems Son     Stroke Neg Hx      Hypertension Neg Hx     Lupus Neg Hx     Rheum arthritis Neg Hx      Social History     Socioeconomic History    Marital status:      Spouse name: Not on file    Number of children: 3    Years of education: Not on file    Highest education level: Not on file   Social Needs    Financial resource strain: Not on file    Food insecurity - worry: Not on file    Food insecurity - inability: Not on file    Transportation needs - medical: Not on file    Transportation needs - non-medical: Not on file   Occupational History    Occupation: Educator     Employer: [employer]     Employer: [employer]   Tobacco Use    Smoking status: Never Smoker    Smokeless tobacco: Never Used   Substance and Sexual Activity    Alcohol use: Yes     Comment: Occasionally    Drug use: No    Sexual activity: Yes     Partners: Male     Birth control/protection: Surgical, Condom     Comment: 1 partner   Other Topics Concern    Not on file   Social History Narrative    She wears seatbelt.     Medication List with Changes/Refills   Current Medications    ERGOCALCIFEROL (ERGOCALCIFEROL) 50,000 UNIT CAP    Take 1 capsule (50,000 Units total) by mouth every 7 days.    MELOXICAM (MOBIC) 15 MG TABLET    Take 1 tablet (15 mg total) by mouth once daily. Take with food.  Discontinue if you develop GI side effects.    SYNTHROID 150 MCG TABLET       Discontinued Medications    IBUPROFEN (ADVIL,MOTRIN) 800 MG TABLET    Take 1 tablet (800 mg total) by mouth 2 (two) times daily as needed for Pain.    LEVOTHYROXINE (SYNTHROID, LEVOTHROID) 175 MCG TABLET    Take 1 pill every morning before breakfast     Review of patient's allergies indicates:  No Known Allergies        Objective:        General    Nursing note and vitals reviewed.  Constitutional: She is oriented to person, place, and time. She appears well-developed and well-nourished.   HENT:   Head: Normocephalic and atraumatic.   Eyes: EOM are normal.   Cardiovascular:  Normal rate and regular rhythm.    Pulmonary/Chest: Effort normal.   Abdominal: Soft.   Neurological: She is alert and oriented to person, place, and time.   Psychiatric: She has a normal mood and affect. Her behavior is normal.     General Musculoskeletal Exam   Gait: normal       Right Knee Exam     Inspection   Erythema: absent  Swelling: absent  Effusion: absent  Deformity: absent  Bruising: absent    Tenderness   The patient is tender to palpation of the condyle and medial joint line.    Crepitus   The patient has crepitus of the patella.    Range of Motion   Extension: normal   Flexion: normal Right knee flexion: skin to skin.     Tests   Meniscus   Margo:  Medial - negative Lateral - negative  Ligament Examination Lachman: normal (-1 to 2mm) PCL-Posterior Drawer: normal (0 to 2mm)     MCL - Valgus: normal (0 to 2mm)  LCL - Varus: normal  Patella   Patellar apprehension: negative  Passive Patellar Tilt: lateral tilt  Patellar Tracking: normal  Patellar Grind: positive    Other   Meniscal Cyst: absent  Popliteal (Baker's) Cyst: absent  Sensation: normal    Comments:  Comp soft, cap refill < 2 sec.    Left Knee Exam     Inspection   Erythema: absent  Swelling: absent  Effusion: absent  Deformity: absent  Bruising: absent    Tenderness   The patient is experiencing no tenderness.     Crepitus   The patient has crepitus of the patella.    Range of Motion   Extension: normal   Flexion: normal Left knee flexion: skin to skin.     Tests   Meniscus   Margo:  Medial - negative Lateral - negative  Stability Lachman: normal (-1 to 2mm) PCL-Posterior Drawer: normal (0 to 2mm)  MCL - Valgus: normal (0 to 2mm)  LCL - Varus: normal (0 to 2mm)  Patella   Patellar apprehension: negative  Passive Patellar Tilt: lateral tilt  Patellar Tracking: normal  Patellar Grind: positive (minimal discomfort today)    Other   Meniscal Cyst: absent  Popliteal (Baker's) Cyst: absent  Sensation: normal    Muscle Strength   Right Lower  Extremity   Quadriceps:  5/5   Hamstrin/5   Left Lower Extremity   Quadriceps:  5/5   Hamstrin/5     Vascular Exam       Edema  Right Lower Leg: absent  Left Lower Leg: absent            Xray:   Bilateral knees from today images and report were reviewed today.  I agree with the radiologist's interpretation.  There is mild bilateral patellofemoral and medial tibiofemoral degenerative change similar to prior.  No significant joint effusion on either side.  No acute fracture or malalignment.    Assessment:       Encounter Diagnoses   Name Primary?    Primary osteoarthritis of right knee Yes    Primary osteoarthritis of left knee     Chronic pain of both knees           Plan:       Amelia was seen today for pain, follow-up, pain and follow-up.    Diagnoses and all orders for this visit:    Primary osteoarthritis of right knee  -     Ambulatory referral to Pain Clinic    Primary osteoarthritis of left knee    Chronic pain of both knees        Amelia Mirza comes in today with the above problems.  Unfortunately, she has not had as good of her response the Synvisc-One injections as I had hoped.  At this point, I recommend referral to the Interventional Pain Management Department for consideration of a right geniculate nerve block. If her left knee starts hurting her, she may benefit from a geniculate nerve block on that side as well. I will plan to see her back in the office once I return from maternity leave.  If she has failed significant improvement, it may be worth trying a different brand of viscosupplementation.  She verbalizes understanding and agrees with the above plan.    Follow-up in about 4 months (around 2019).    The patient understands, chooses and consents to this plan and accepts all   the risks which include but are not limited to the risks mentioned above.     Disclaimer: This note was prepared using a voice recognition system and is likely to have sound alike errors within the  text.

## 2019-03-06 ENCOUNTER — OFFICE VISIT (OUTPATIENT)
Dept: PAIN MEDICINE | Facility: CLINIC | Age: 59
End: 2019-03-06
Payer: COMMERCIAL

## 2019-03-06 VITALS
SYSTOLIC BLOOD PRESSURE: 123 MMHG | BODY MASS INDEX: 53.92 KG/M2 | HEART RATE: 70 BPM | HEIGHT: 62 IN | RESPIRATION RATE: 16 BRPM | DIASTOLIC BLOOD PRESSURE: 71 MMHG | WEIGHT: 293 LBS

## 2019-03-06 DIAGNOSIS — M17.10 ARTHRITIS OF KNEE: Primary | ICD-10-CM

## 2019-03-06 DIAGNOSIS — M17.11 PRIMARY OSTEOARTHRITIS OF RIGHT KNEE: ICD-10-CM

## 2019-03-06 DIAGNOSIS — G89.29 CHRONIC PAIN OF BOTH KNEES: ICD-10-CM

## 2019-03-06 DIAGNOSIS — M25.562 CHRONIC PAIN OF BOTH KNEES: ICD-10-CM

## 2019-03-06 DIAGNOSIS — M17.12 PRIMARY OSTEOARTHRITIS OF LEFT KNEE: ICD-10-CM

## 2019-03-06 DIAGNOSIS — M25.561 CHRONIC PAIN OF BOTH KNEES: ICD-10-CM

## 2019-03-06 PROCEDURE — 3008F BODY MASS INDEX DOCD: CPT | Mod: CPTII,S$GLB,, | Performed by: PHYSICIAN ASSISTANT

## 2019-03-06 PROCEDURE — 99999 PR PBB SHADOW E&M-EST. PATIENT-LVL III: CPT | Mod: PBBFAC,,, | Performed by: PHYSICIAN ASSISTANT

## 2019-03-06 PROCEDURE — 3008F PR BODY MASS INDEX (BMI) DOCUMENTED: ICD-10-PCS | Mod: CPTII,S$GLB,, | Performed by: PHYSICIAN ASSISTANT

## 2019-03-06 PROCEDURE — 99999 PR PBB SHADOW E&M-EST. PATIENT-LVL III: ICD-10-PCS | Mod: PBBFAC,,, | Performed by: PHYSICIAN ASSISTANT

## 2019-03-06 PROCEDURE — 99204 PR OFFICE/OUTPT VISIT, NEW, LEVL IV, 45-59 MIN: ICD-10-PCS | Mod: S$GLB,,, | Performed by: PHYSICIAN ASSISTANT

## 2019-03-06 PROCEDURE — 99204 OFFICE O/P NEW MOD 45 MIN: CPT | Mod: S$GLB,,, | Performed by: PHYSICIAN ASSISTANT

## 2019-03-06 NOTE — PROGRESS NOTES
Subjective    Chief Pain Complaint:  Knee Pain (bilateral)        History of Present Illness:  This patient is a 59 y.o. female who presents today complaining of the above noted pain/s. The patient describes this pain as follows.    - duration of pain: 3 years   - timing: constant   - character: aching, sharp  - radiating, dermatomal: pain is nonradiating  - antecedent trauma, prior spinal surgery: no prior trauma, no prior spinal surgery   - other surgeries: none  - pertinent negatives: No fever, No chills, No weight loss, No bladder dysfunction, No bowel dysfunction, No saddle anesthesia  - pertinent positives: generalized nonspecific Lower Extremity weakness bilaterally    - medications, other therapies tried (physical therapy, injections):     >> NSAIDs, Tylenol, zanaflex, biofreeze topical, Mobic, ice/heating pad    >> Has previously undergone Physical Therapy    >> Injections:    - knee steroid & viscosupplementation with good relief for short term    _________________________________________________________________________________________________________________________________________________________________________________________________________________________      IMAGING / Labs / Studies (reviewed on 3/6/2019):    Results for orders placed during the hospital encounter of 02/18/19   X-ray Knee Ortho Bilateral with Flexion    Narrative TECHNIQUE:  AP standing of both knees, PA flexion standing views of both knees, and Merchant views of both knees were performed.  Lateral views of both knees were also performed.  COMPARISON:  03/05/2018.  FINDINGS:  There is mild bilateral patellofemoral and medial tibiofemoral degenerative change similar to prior.  No significant joint effusion on either side.  No acute fracture or malalignment.    Impression As above.       Results for orders placed during the hospital encounter of 02/12/14   X-Ray Lumbar Spine Complete 5 View    Narrative Comparison: None    Findings:  Body habitus limits the study.  For tumor body heights and alignment appear well-maintained.  There is uniform loss of diskal height at the L4 -- 5 L5 -- S1 levels.  Multilevel facet arthropathy.  Pedicles and SI joints appear intact.  No spondylolysis   or spondylolisthesis.    Impression 1.  Limited exam.  2.  Spondylosis without acute fracture or subluxation.        Results for orders placed during the hospital encounter of 10/22/15   X-Ray Cervical Spine AP And Lateral    Narrative Comparison: None  Technique: Four views were obtained of the cervical spine  Findings: There is straightening of the normal cervical lordosis which can be associated with muscle spasm.  Vertebral body heights are well maintained.  No spondylolisthesis demonstrated.  There is mild loss of disk height from C3-4 and C5-6 with associated degenerative endplate changes and osteophyte production.  Posterior elements appear intact without acute fractures or subluxations demonstrated.  Odontoid process appears intact.  Atlantoaxial articulations appear normal.  Prevertebral soft tissues are within normal limits.    Impression Degenerative changes as above.  No acute findings.     _________________________________________________________________________________________________________________________________________________________________________________________________________________________      Review of Systems:  CONSTITUTIONAL: patient denies any fever, chills, or weight loss  SKIN: patient denies any rash or itching  RESPIRATORY: patient reports dyspnea with exertion  GASTROINTESTINAL: patient denies having any diarrhea, constipation, or bowel incontinence  GENITOURINARY: patient reports urgency    MUSCULOSKELETAL:  - patient complains of the above noted pain/s (see chief pain complaint)    NEUROLOGICAL:   - pain as above  - strength in Lower extremities is decreased, BILATERALLY  - sensation in Lower extremities is  "abnormal, BILATERALLY  - patient denies any loss of bowel or bladder control      PSYCHIATRIC: patient denies any suicidal or homicidal ideations    _________________________________________________________________________________________________________________________________________________________________________________________________________________________    Objective    Physical Exam:  Vitals:  /71 (BP Location: Right arm, Patient Position: Sitting, BP Method: Medium (Automatic))   Pulse 70   Resp 16   Ht 5' 2" (1.575 m)   Wt (!) 141.1 kg (311 lb)   BMI 56.88 kg/m²   (reviewed on 3/6/2019)    General: alert and oriented, in no apparent distress.  Gait: normal gait.  Skin: no rashes, no discoloration, no obvious lesions  HEENT: normocephalic, atraumatic. Pupils equal and round.  Cardiovascular: no significant peripheral edema present.  Respiratory: without use of accessory muscles of respiration.    Musculoskeletal - Lumbar Spine:  - Pain on flexion of lumbar spine: Absent   - Pain on extension of lumbar spine: Absent           Musculoskeletal - Cervical Spine:  - Full ROM    Knees:  - ROM preserved   - Scars: Absent  - TTP: Present over medial/ lateral joint line bilaterally, R>L  - Pain with extension: Present, R>L  - Pain with flexion: Present  - Crepitus: Present bilaterally    Neuro - Lower Extremities:  - RLE Strength:     >> 5/5 strength with right hip flexion/ extension    >> 5/5 strength with right knee flexion/ extension    >> 5/5 strength in right ankle with plantar and dorsiflexion  - LLE Strength:     >> 5/5 strength with left hip flexion/ extension    >> 5/5 strength with knee flexion extension on the left     >> 5/5 strength in left ankle with plantar and dorsiflexion  - Extremity Reflexes: Brisk and symmetric throughout  - Sensory: Sensation to light touch intact bilaterally      Psych:  Mood and affect is " appropriate    _________________________________________________________________________________________________________________________________________________________________________________________________________________________      Assessment    Assessment:  Amelia Mirza is a 59 y.o. female who presents with    ICD-10-CM ICD-9-CM   1. Arthritis of knee M17.10 716.96   2. Primary osteoarthritis of left knee M17.12 715.16   3. Primary osteoarthritis of right knee M17.11 715.16   4. Chronic pain of both knees M25.561 719.46    M25.562 338.29    G89.29      She has chronic knee pain. She has failed viscosupplementation and IA steroid injections, along with other conservative treatments.  She was referred by orthopedics (Jen Palm PA-C).    Plan:  1. Interventional: Schedule bilateral genicular nerve block with local.  Procedure discussed with patient and also discussed possible RFA.  [employer] handout given.     2. Pharmacologic: Order 2.5% diclofenac, 2.5% gabapentin, 2.5% lidocaine, 2.25% prilocaine compound cream for potential topical pain relief. Patent will be contacted for [facility] regarding cost and payment.       3. Rehabilitative: Encouraged regular exercise. Consider formal PT.     4. Diagnostic: None for now.    5. Follow up: 3-4 weeks for injection follow-up.    - I discussed the risks, benefits, and alternatives to potential treatment options. All questions and concerns were fully addressed today in clinic. Dr. Veloz was consulted regarding the patient plan and agrees.

## 2019-03-06 NOTE — LETTER
March 6, 2019      Tammy Palm PA-C  [address]  [place] [address]           [facility]  [address] [facility] [address]  Phone: [phone]  Fax: [phone]          Patient: Amelia Mirza   MR Number: [id number]   YOB: 1960   Date of Visit: 3/6/2019       Dear Tammy Palm:    Thank you for referring Amelia Mirza to me for evaluation. Attached you will find relevant portions of my assessment and plan of care.    If you have questions, please do not hesitate to call me. I look forward to following Amelia Mirza along with you.    Sincerely,    Bernice Bolton PA-C    Enclosure  CC:  No Recipients    If you would like to receive this communication electronically, please contact [email or web address] or [phone] to request more information on [employer] Link access.    For providers and/or their staff who would like to refer a patient to [facility], please contact us through our one-stop-shop provider referral line, [facility], at [phone].    If you feel you have received this communication in error or would no longer like to receive these types of communications, please e-mail [email or web address]

## 2019-03-22 ENCOUNTER — TELEPHONE (OUTPATIENT)
Dept: PAIN MEDICINE | Facility: CLINIC | Age: 59
End: 2019-03-22

## 2019-03-22 ENCOUNTER — PATIENT MESSAGE (OUTPATIENT)
Dept: PAIN MEDICINE | Facility: CLINIC | Age: 59
End: 2019-03-22

## 2019-03-22 ENCOUNTER — TELEPHONE (OUTPATIENT)
Dept: PHYSICAL MEDICINE AND REHAB | Facility: CLINIC | Age: 59
End: 2019-03-22

## 2019-03-22 NOTE — TELEPHONE ENCOUNTER
----- Message from Juan C Priest sent at 3/22/2019 12:19 PM CDT -----  Contact: pt   Type:  Patient Returning Call    Who Called: Pt   Who Left Message for Patient:Che   Does the patient know what this is regarding?:maybe about her procedure   Would the patient rather a call back or a response via [facility]? Phone   Best Call Back Number:[phone]   Additional Information: pls leave a message when calling if no answer

## 2019-04-02 ENCOUNTER — PATIENT MESSAGE (OUTPATIENT)
Dept: PAIN MEDICINE | Facility: CLINIC | Age: 59
End: 2019-04-02

## 2019-04-05 ENCOUNTER — TELEPHONE (OUTPATIENT)
Dept: PAIN MEDICINE | Facility: CLINIC | Age: 59
End: 2019-04-05

## 2019-04-05 NOTE — TELEPHONE ENCOUNTER
Contacted patient; procedure scheduled with Dr. Manning. Instructions given verbally and also sent to patient via [facility].

## 2019-04-25 ENCOUNTER — PATIENT MESSAGE (OUTPATIENT)
Dept: PAIN MEDICINE | Facility: CLINIC | Age: 59
End: 2019-04-25

## 2019-06-20 ENCOUNTER — OFFICE VISIT (OUTPATIENT)
Dept: ORTHOPEDICS | Facility: CLINIC | Age: 59
End: 2019-06-20
Payer: COMMERCIAL

## 2019-06-20 VITALS
HEART RATE: 66 BPM | SYSTOLIC BLOOD PRESSURE: 141 MMHG | DIASTOLIC BLOOD PRESSURE: 89 MMHG | BODY MASS INDEX: 53.92 KG/M2 | WEIGHT: 293 LBS | HEIGHT: 62 IN

## 2019-06-20 DIAGNOSIS — M17.12 PRIMARY OSTEOARTHRITIS OF LEFT KNEE: ICD-10-CM

## 2019-06-20 DIAGNOSIS — M23.91 INTERNAL DERANGEMENT OF RIGHT KNEE: ICD-10-CM

## 2019-06-20 DIAGNOSIS — G89.29 CHRONIC PAIN OF RIGHT KNEE: ICD-10-CM

## 2019-06-20 DIAGNOSIS — M25.561 CHRONIC PAIN OF RIGHT KNEE: ICD-10-CM

## 2019-06-20 DIAGNOSIS — M17.11 PRIMARY OSTEOARTHRITIS OF RIGHT KNEE: Primary | ICD-10-CM

## 2019-06-20 DIAGNOSIS — E66.01 MORBID OBESITY WITH BMI OF 50.0-59.9, ADULT: ICD-10-CM

## 2019-06-20 PROCEDURE — 99214 PR OFFICE/OUTPT VISIT, EST, LEVL IV, 30-39 MIN: ICD-10-PCS | Mod: S$GLB,,, | Performed by: PHYSICIAN ASSISTANT

## 2019-06-20 PROCEDURE — 99999 PR PBB SHADOW E&M-EST. PATIENT-LVL III: ICD-10-PCS | Mod: PBBFAC,,, | Performed by: PHYSICIAN ASSISTANT

## 2019-06-20 PROCEDURE — 99999 PR PBB SHADOW E&M-EST. PATIENT-LVL III: CPT | Mod: PBBFAC,,, | Performed by: PHYSICIAN ASSISTANT

## 2019-06-20 PROCEDURE — 3008F PR BODY MASS INDEX (BMI) DOCUMENTED: ICD-10-PCS | Mod: CPTII,S$GLB,, | Performed by: PHYSICIAN ASSISTANT

## 2019-06-20 PROCEDURE — 3008F BODY MASS INDEX DOCD: CPT | Mod: CPTII,S$GLB,, | Performed by: PHYSICIAN ASSISTANT

## 2019-06-20 PROCEDURE — 99214 OFFICE O/P EST MOD 30 MIN: CPT | Mod: S$GLB,,, | Performed by: PHYSICIAN ASSISTANT

## 2019-06-20 RX ORDER — DICLOFENAC SODIUM 16.05 MG/ML
SOLUTION TOPICAL
COMMUNITY
Start: 2019-03-13 | End: 2019-08-16

## 2019-06-20 RX ORDER — DIAZEPAM 5 MG/1
TABLET ORAL
Qty: 2 TABLET | Refills: 0 | Status: SHIPPED | OUTPATIENT
Start: 2019-06-20 | End: 2019-08-16

## 2019-06-20 RX ORDER — LIDOCAINE AND PRILOCAINE 25; 25 MG/G; MG/G
CREAM TOPICAL
COMMUNITY
Start: 2019-05-24 | End: 2023-05-08

## 2019-06-20 NOTE — PROGRESS NOTES
Patient ID: Amelia Mirza is a 59 y.o. female.    Chief Complaint: Pain of the Left Knee and Pain of the Right Knee      HPI: Amelia Mirza  is a 59 y.o. female who c/o Pain of the Left Knee and Pain of the Right Knee   for duration of years.  I have done steroid injections as well as Synvisc-One injections. She has done aquatic therapy and oral NSAIDs.  She has not had any significant relief of symptoms.  Pain level today at worst is 6/10.  The left knee is actually doing pretty good.  It is the right knee that is still giving her a lot of trouble.  Quality is aching and sharp.  She points laterally and anteriorly is to wear the pain is located.  Alleviating factors include moist heat, appointments, medications.  Aggravating factors include prolonged weight-bearing as well as stairs.    Past Medical History:   Diagnosis Date    Benign colon polyp     Hypothyroidism     Mild anemia     Obesity     Postmenopausal     No history of abnormal pap smear    Vitamin D deficiency      Past Surgical History:   Procedure Laterality Date    bilateral tubal ligation      Partial hysterectomy with USO      for DUB     THYROIDECTOMY, PARTIAL       Family History   Problem Relation Age of Onset    Cancer Mother         Breast cancer    Diabetes Mother     Breast cancer Mother     Depression Daughter     Insulin resistance Daughter     Depression Maternal Aunt     Heart disease Maternal Grandmother         MI/CAD    Heart disease Maternal Grandfather         MI/CAD    Heart disease Paternal Grandfather         MI/CAD    Cancer Paternal Grandfather         Pancreas    No Known Problems Son     No Known Problems Son     Stroke Neg Hx     Hypertension Neg Hx     Lupus Neg Hx     Rheum arthritis Neg Hx      Social History     Socioeconomic History    Marital status:      Spouse name: Not on file    Number of children: 3    Years of education: Not on file    Highest education level: Not on  file   Occupational History    Occupation: Educator     Employer: [employer]     Employer: [employer]   Social Needs    Financial resource strain: Not on file    Food insecurity:     Worry: Not on file     Inability: Not on file    Transportation needs:     Medical: Not on file     Non-medical: Not on file   Tobacco Use    Smoking status: Never Smoker    Smokeless tobacco: Never Used   Substance and Sexual Activity    Alcohol use: Yes     Comment: Occasionally    Drug use: No    Sexual activity: Yes     Partners: Male     Birth control/protection: Surgical, Condom     Comment: 1 partner   Lifestyle    Physical activity:     Days per week: Not on file     Minutes per session: Not on file    Stress: Not on file   Relationships    Social connections:     Talks on phone: Not on file     Gets together: Not on file     Attends Anglican service: Not on file     Active member of club or organization: Not on file     Attends meetings of clubs or organizations: Not on file     Relationship status: Not on file   Other Topics Concern    Not on file   Social History Narrative    She wears seatbelt.     Medication List with Changes/Refills   New Medications    DIAZEPAM (VALIUM) 5 MG TABLET    1 PO one hr prior to MRI.  Repeat 30 min prior to MRI if needed   Current Medications    DICLOFENAC SODIUM 1.5 % DROP        ERGOCALCIFEROL (ERGOCALCIFEROL) 50,000 UNIT CAP    Take 1 capsule (50,000 Units total) by mouth every 7 days.    LIDOCAINE-PRILOCAINE (EMLA) CREAM        MELOXICAM (MOBIC) 15 MG TABLET    Take 1 tablet (15 mg total) by mouth once daily. Take with food.  Discontinue if you develop GI side effects.    SYNTHROID 150 MCG TABLET    Take 150 mcg by mouth before breakfast.      Review of patient's allergies indicates:  No Known Allergies        Objective:        General    Nursing note and vitals reviewed.  Constitutional: She is oriented to person, place, and time. She appears well-developed and  well-nourished.   HENT:   Head: Normocephalic and atraumatic.   Eyes: EOM are normal.   Cardiovascular: Normal rate and regular rhythm.    Pulmonary/Chest: Effort normal.   Abdominal: Soft.   Neurological: She is alert and oriented to person, place, and time.   Psychiatric: She has a normal mood and affect. Her behavior is normal.     General Musculoskeletal Exam   Gait: normal       Right Knee Exam     Inspection   Erythema: absent  Swelling: absent  Effusion: absent  Deformity: absent  Bruising: absent    Tenderness   The patient is tender to palpation of the condyle, lateral joint line and patellar tendon.    Crepitus   The patient has crepitus of the patella.    Range of Motion   Extension: normal   Flexion: normal Right knee flexion: skin to skin.     Tests   Meniscus   Margo:  Medial - negative Lateral - negative  Ligament Examination Lachman: normal (-1 to 2mm) PCL-Posterior Drawer: normal (0 to 2mm)     MCL - Valgus: normal (0 to 2mm)  LCL - Varus: normal  Patella   Patellar apprehension: negative  Passive Patellar Tilt: lateral tilt  Patellar Tracking: normal  Patellar Grind: positive    Other   Meniscal Cyst: absent  Popliteal (Baker's) Cyst: absent  Sensation: normal    Comments:  Comp soft, cap refill < 2 sec.    Left Knee Exam     Inspection   Erythema: absent  Swelling: absent  Effusion: absent  Deformity: absent  Bruising: absent    Tenderness   The patient is experiencing no tenderness.     Crepitus   The patient has crepitus of the patella.    Range of Motion   Extension: normal   Flexion: normal Left knee flexion: skin to skin.     Tests   Meniscus   Margo:  Medial - negative Lateral - negative  Stability Lachman: normal (-1 to 2mm) PCL-Posterior Drawer: normal (0 to 2mm)  MCL - Valgus: normal (0 to 2mm)  LCL - Varus: normal (0 to 2mm)  Patella   Patellar apprehension: negative  Passive Patellar Tilt: lateral tilt  Patellar Tracking: normal  Patellar Grind: positive (minimal discomfort  today)    Other   Meniscal Cyst: absent  Popliteal (Baker's) Cyst: absent  Sensation: normal    Muscle Strength   Right Lower Extremity   Quadriceps:  5/5   Hamstrin/5   Left Lower Extremity   Quadriceps:  5/5   Hamstrin/5     Vascular Exam       Edema  Right Lower Leg: absent  Left Lower Leg: absent              Assessment:       Encounter Diagnoses   Name Primary?    Primary osteoarthritis of right knee Yes    Chronic pain of right knee     Internal derangement of right knee     Primary osteoarthritis of left knee     Morbid obesity with BMI of 50.0-59.9, adult           Plan:       Amelia was seen today for pain and pain.    Diagnoses and all orders for this visit:    Primary osteoarthritis of right knee  -     MRI Knee Without Contrast Right; Future  -     diazePAM (VALIUM) 5 MG tablet; 1 PO one hr prior to MRI.  Repeat 30 min prior to MRI if needed    Chronic pain of right knee  -     MRI Knee Without Contrast Right; Future  -     diazePAM (VALIUM) 5 MG tablet; 1 PO one hr prior to MRI.  Repeat 30 min prior to MRI if needed    Internal derangement of right knee  -     MRI Knee Without Contrast Right; Future  -     diazePAM (VALIUM) 5 MG tablet; 1 PO one hr prior to MRI.  Repeat 30 min prior to MRI if needed    Primary osteoarthritis of left knee    Morbid obesity with BMI of 50.0-59.9, adult        Arzulma Mirza is an established pt here for follow-up of the above. The left knee is doing okay.  The right knee is giving her a lot of trouble.  I reviewed x-rays.  She has relatively well-maintained joint spaces. At this point, she was unable to get the geniculate nerve blocks due to insurance reasons.  We have discussed moving forward with an MRI of the right knee. She wishes to proceed with that.  I will see her back in the office after the MRI is completed.  She is claustrophobic.  I have given her a 1 time prescription for Valium to use immediately prior to the MRI.  She should not drive  herself to and from the MRI.  She verbalizes understanding and agrees.    Follow up for MRI results.          The patient understands, chooses and consents to this plan and accepts all   the risks which include but are not limited to the risks mentioned above.     Disclaimer: This note was prepared using a voice recognition system and is likely to have sound alike errors within the text.

## 2019-06-20 NOTE — LETTER
June 20, 2019      Eve Green MD  [address] [place] [address]           [facility]  [address]  [place] [address]  Phone: [phone]  Fax: [phone]          Patient: Amelia Mirza   MR Number: [id number]   YOB: 1960   Date of Visit: 6/20/2019       Dear Dr. Eve Green:    Thank you for referring Amelia Mirza to me for evaluation. Attached you will find relevant portions of my assessment and plan of care.    If you have questions, please do not hesitate to call me. I look forward to following Amelia Mirza along with you.    Sincerely,    DENYS Moeosure  CC:  No Recipients    If you would like to receive this communication electronically, please contact [email or web address] or [phone] to request more information on [employer] Link access.    For providers and/or their staff who would like to refer a patient to [facility], please contact us through our one-stop-shop provider referral line, [facility], at [phone].    If you feel you have received this communication in error or would no longer like to receive these types of communications, please e-mail [email or web address]

## 2019-08-16 ENCOUNTER — OFFICE VISIT (OUTPATIENT)
Dept: FAMILY MEDICINE | Facility: CLINIC | Age: 59
End: 2019-08-16
Payer: COMMERCIAL

## 2019-08-16 VITALS
HEIGHT: 60 IN | SYSTOLIC BLOOD PRESSURE: 142 MMHG | WEIGHT: 293 LBS | TEMPERATURE: 99 F | DIASTOLIC BLOOD PRESSURE: 80 MMHG | HEART RATE: 76 BPM | OXYGEN SATURATION: 96 % | BODY MASS INDEX: 57.52 KG/M2

## 2019-08-16 DIAGNOSIS — Z12.31 VISIT FOR SCREENING MAMMOGRAM: ICD-10-CM

## 2019-08-16 DIAGNOSIS — Z23 NEED FOR SHINGLES VACCINE: ICD-10-CM

## 2019-08-16 DIAGNOSIS — E03.9 HYPOTHYROIDISM, UNSPECIFIED TYPE: ICD-10-CM

## 2019-08-16 DIAGNOSIS — E66.01 MORBID OBESITY WITH BMI OF 60.0-69.9, ADULT: ICD-10-CM

## 2019-08-16 DIAGNOSIS — Z78.0 POSTMENOPAUSAL: ICD-10-CM

## 2019-08-16 DIAGNOSIS — K63.5 BENIGN COLON POLYP: ICD-10-CM

## 2019-08-16 DIAGNOSIS — M17.0 PRIMARY OSTEOARTHRITIS OF BOTH KNEES: ICD-10-CM

## 2019-08-16 DIAGNOSIS — E55.9 VITAMIN D DEFICIENCY: ICD-10-CM

## 2019-08-16 DIAGNOSIS — Z00.00 ANNUAL PHYSICAL EXAM: Primary | ICD-10-CM

## 2019-08-16 PROCEDURE — 99396 PREV VISIT EST AGE 40-64: CPT | Mod: S$GLB,,, | Performed by: FAMILY MEDICINE

## 2019-08-16 PROCEDURE — 99999 PR PBB SHADOW E&M-EST. PATIENT-LVL IV: ICD-10-PCS | Mod: PBBFAC,,, | Performed by: FAMILY MEDICINE

## 2019-08-16 PROCEDURE — 99396 PR PREVENTIVE VISIT,EST,40-64: ICD-10-PCS | Mod: S$GLB,,, | Performed by: FAMILY MEDICINE

## 2019-08-16 PROCEDURE — 99999 PR PBB SHADOW E&M-EST. PATIENT-LVL IV: CPT | Mod: PBBFAC,,, | Performed by: FAMILY MEDICINE

## 2019-08-16 NOTE — PROGRESS NOTES
CHIEF COMPLAINT: Annual wellness examination.     HISTORY OF PRESENT ILLNESS: Ms. Mirza comes in today not fasting and with taking medication for annual wellness examination.     END OF LIFE DECISION: She has no living will and does not desire life support.    Current Outpatient Medications   Medication Sig    ergocalciferol (ERGOCALCIFEROL) 50,000 unit Cap Take 1 capsule (50,000 Units total) by mouth every 7 days.    lidocaine-prilocaine (EMLA) cream     SYNTHROID 150 mcg tablet Take 150 mcg by mouth before breakfast.      SCREENINGS:  Cholesterol: February 28, 2018.  FFS/Colonoscopy: June 11, 2012 - polyps; repeat in 10 years.  Mammogram: May 7, 2018 - okay.   GYN Exam: February 28, 2018 - okay.  Dexa Scan: April 17, 2017 - okay; repeat in 5 years.   Eye Exam: 2018 with Dr. Hill.  She wears glasses.   PPD: Negative in the past.  Immunizations: Td/Tdap - May 30, 2012.  Gardasil - N./A.  Zostavax - N./A.  Shingrix - Never. Reports history of varicella not zoster. She desires.  Pneumovax - Never.  Seasonal Flu - N./A. Discussed. She declines.     ROS:  GENERAL: Denies fever, chills, fatigue or unusual weight change. Appetite normal. Weight 139 kg (306 lb 7 oz) at February 28, 2018 visit. Does not exercise due to knee pain. Monitors diet some times.  SKIN: Denies rashes, itching, changes in mole, color or texture of skin or easy bruising.  HEAD: Denies headaches or recent head trauma.  EYES: Denies change in vision, pain, diplopia, redness, drainage. Wears glasses.  EARS: Denies ear pain, discharge, vertigo or decreased hearing.  NOSE: Denies loss of smell, epistaxis or rhinitis.  MOUTH & THROAT: Denies hoarseness or change in voice. Denies excessive gum bleeding or mouth sores. Denies sore throat.  NODES: Denies swollen glands.  CHEST: Denies IYER, wheezing, cough, or sputum production.  CARDIOVASCULAR: Denies chest pain, PND, orthopnea or reduced exercise tolerance. Denies palpitations. 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  ABDOMEN: Denies diarrhea, constipation, nausea, vomiting, abdominal pain, or blood in stool.   URINARY: Denies flank pain, dysuria or hematuria.  GENITOURINARY: Denies flank pain, dysuria, frequency or hematuria. Performs monthly breast self exams.  ENDOCRINE: Denies diabetes, cholesterol problems. Reports takes Vitamin D 50,000 units weekly for vitamin D deficiency and continues Synthroid 150 mcg daily for thyroid replacement.  HEME/LYMPH: Denies bleeding problems.  PERIPHERAL VASCULAR:Denies claudication or cyanosis.  MUSCULOSKELETAL: Denies joint stiffness, pain or swelling except reports chronic arthritic knee pain. Reports has OA - back and states participated in physical therapy in the past with help and reports pain not as bad now. Denies edema.  Saw Tammy Palm PA-C with orthopedist on June 20, 2019 for chronic knee arthritic pain and states insurance did not approve specific injection for knee pain relief. Reports knee pain today at 6-7/10 pain scale.  NEUROLOGIC: Denies history of seizures, tremors, paralysis, alteration of gait or coordination. Reports occasional left foot tingling possibly due to back pain.  PSYCHIATRIC: Denies mood swings, depression, anxiety, homicidal or suicidal thoughts. Denies sleep problems.    PE:   VS: BP (!) 142/80 Comment: Patient in pain today.  Pulse 76   Temp 98.6 °F (37 °C) (Oral)   Ht 5' (1.524 m)   Wt (!) 146.6 kg (323 lb 1.4 oz)   SpO2 96%   BMI 63.10 kg/m²   APPEARANCE: Well nourished, well developed female, obese and pleasant, alert and oriented in no acute distress.  HEAD: Nontender. Full range of motion.  EYES: PERRL, conjunctiva pink, lids no edema. She wears glasses.  EARS: External canal patent, no swelling or redness. TM's shiny and clear.  NOSE: Mucosa and turbinates pink, not swollen. No discharge. Nontender sinuses.  THROAT: No pharyngeal erythema or  exudate. No stridor.  NECK: Supple, no mass, thyroid not enlarged.  NODES: No cervical, axillary or inguinal lymph node enlargement.  CHEST: Normal respiratory effort. Lungs clear to auscultation.  CARDIOVASCULAR: Normal S1, S2. No rubs, murmurs or gallops. PMI not displaced. No carotid bruit. Pedal pulses palpable bilaterally. No edema.  ABDOMEN: Bowel sounds present. Not distended. Soft. No tenderness, masses or organomegaly.  BREAST EXAM: Symmetrical, no external lesions, no discharge, no masses palpated.  PELVIC EXAM: No external lesions noted, no discharge, absent cervix and uterus, bimanual exam showed no adnexal masses or tenderness noted. Urethra and bladder  intact.  RECTAL EXAM: No external hemorrhoids or anal fissures. Heme-negative stool in the rectal vault.  MUSCULOSKELETAL: No joint deformities except slight bilateral knee stiffness without tenderness noted today. She is ambulatory without problems.  SKIN: No rashes or suspicious lesions, normal color and turgor.  NEUROLOGIC: Cranial Nerves: II-XII grossly intact. DTR's: Knees, Ankles 2+ and equal bilaterally. Gait & Posture: Normal gait and fine motion.  PSYCHIATRIC: Patient alert, oriented x 3. Mood/Affect normal without acute anxiety or depression noted. Judgment/insight good as she makes appropriate decisions on today's examination.     ASSESSMENT:    ICD-10-CM ICD-9-CM    1. Annual physical exam Z00.00 V70.0 CBC auto differential      TSH      Urinalysis      Comprehensive metabolic panel      Lipid panel      Vitamin D   2. Hypothyroidism, unspecified type E03.9 244.9    3. Vitamin D deficiency E55.9 268.9    4. Primary osteoarthritis of both knees M17.0 715.16    5. Benign colon polyp K63.5 211.3    6. Morbid obesity with BMI of 60.0-69.9, adult E66.01 278.01     Z68.44 V85.44    7. Postmenopausal Z78.0 V49.81    8. Visit for screening mammogram Z12.31 V76.12 Mammo Digital Screening Bilat   9. Need for shingles vaccine Z23 V04.89 Zoster  Recombinant Vaccine (when available)     PLAN:  1. Age-appropriate counseling-appropriate low-sodium, low-cholesterol, low carbohydrate diet and exercise daily, monthly breast self exam, annual wellness examination.   2. Patient advised to call for results.  3. Continue current medications.  4. Keep follow up with specialists.  5. Start Shingrix series when available.                          6. Follow up in about 1 year (around 8/17/2020) for physical.

## 2019-08-24 ENCOUNTER — LAB VISIT (OUTPATIENT)
Dept: LAB | Facility: HOSPITAL | Age: 59
End: 2019-08-24
Attending: FAMILY MEDICINE
Payer: COMMERCIAL

## 2019-08-24 ENCOUNTER — LAB VISIT (OUTPATIENT)
Dept: LAB | Facility: HOSPITAL | Age: 59
End: 2019-08-24
Payer: COMMERCIAL

## 2019-08-24 DIAGNOSIS — Z00.00 ANNUAL PHYSICAL EXAM: ICD-10-CM

## 2019-08-24 LAB
25(OH)D3+25(OH)D2 SERPL-MCNC: 19 NG/ML (ref 30–96)
ALBUMIN SERPL BCP-MCNC: 3.8 G/DL (ref 3.5–5.2)
ALP SERPL-CCNC: 79 U/L (ref 55–135)
ALT SERPL W/O P-5'-P-CCNC: 14 U/L (ref 10–44)
AMORPH CRY UR QL COMP ASSIST: ABNORMAL
ANION GAP SERPL CALC-SCNC: 8 MMOL/L (ref 8–16)
AST SERPL-CCNC: 12 U/L (ref 10–40)
BACTERIA #/AREA URNS AUTO: ABNORMAL /HPF
BASOPHILS # BLD AUTO: 0.04 K/UL (ref 0–0.2)
BASOPHILS NFR BLD: 0.7 % (ref 0–1.9)
BILIRUB SERPL-MCNC: 0.3 MG/DL (ref 0.1–1)
BILIRUB UR QL STRIP: NEGATIVE
BUN SERPL-MCNC: 16 MG/DL (ref 6–20)
CALCIUM SERPL-MCNC: 9.3 MG/DL (ref 8.7–10.5)
CHLORIDE SERPL-SCNC: 104 MMOL/L (ref 95–110)
CHOLEST SERPL-MCNC: 186 MG/DL (ref 120–199)
CHOLEST/HDLC SERPL: 2.8 {RATIO} (ref 2–5)
CLARITY UR REFRACT.AUTO: ABNORMAL
CO2 SERPL-SCNC: 26 MMOL/L (ref 23–29)
COLOR UR AUTO: YELLOW
CREAT SERPL-MCNC: 0.9 MG/DL (ref 0.5–1.4)
DIFFERENTIAL METHOD: ABNORMAL
EOSINOPHIL # BLD AUTO: 0.2 K/UL (ref 0–0.5)
EOSINOPHIL NFR BLD: 2.5 % (ref 0–8)
ERYTHROCYTE [DISTWIDTH] IN BLOOD BY AUTOMATED COUNT: 17.7 % (ref 11.5–14.5)
EST. GFR  (AFRICAN AMERICAN): >60 ML/MIN/1.73 M^2
EST. GFR  (NON AFRICAN AMERICAN): >60 ML/MIN/1.73 M^2
GLUCOSE SERPL-MCNC: 82 MG/DL (ref 70–110)
GLUCOSE UR QL STRIP: NEGATIVE
HCT VFR BLD AUTO: 37.6 % (ref 37–48.5)
HDLC SERPL-MCNC: 67 MG/DL (ref 40–75)
HDLC SERPL: 36 % (ref 20–50)
HGB BLD-MCNC: 11.1 G/DL (ref 12–16)
HGB UR QL STRIP: NEGATIVE
IMM GRANULOCYTES # BLD AUTO: 0.02 K/UL (ref 0–0.04)
IMM GRANULOCYTES NFR BLD AUTO: 0.3 % (ref 0–0.5)
KETONES UR QL STRIP: NEGATIVE
LDLC SERPL CALC-MCNC: 104.6 MG/DL (ref 63–159)
LEUKOCYTE ESTERASE UR QL STRIP: NEGATIVE
LYMPHOCYTES # BLD AUTO: 1.9 K/UL (ref 1–4.8)
LYMPHOCYTES NFR BLD: 31 % (ref 18–48)
MCH RBC QN AUTO: 25.3 PG (ref 27–31)
MCHC RBC AUTO-ENTMCNC: 29.5 G/DL (ref 32–36)
MCV RBC AUTO: 86 FL (ref 82–98)
MICROSCOPIC COMMENT: ABNORMAL
MONOCYTES # BLD AUTO: 0.5 K/UL (ref 0.3–1)
MONOCYTES NFR BLD: 8.4 % (ref 4–15)
NEUTROPHILS # BLD AUTO: 3.4 K/UL (ref 1.8–7.7)
NEUTROPHILS NFR BLD: 57.1 % (ref 38–73)
NITRITE UR QL STRIP: NEGATIVE
NONHDLC SERPL-MCNC: 119 MG/DL
NRBC BLD-RTO: 0 /100 WBC
PH UR STRIP: 5 [PH] (ref 5–8)
PLATELET # BLD AUTO: 240 K/UL (ref 150–350)
PMV BLD AUTO: 11.4 FL (ref 9.2–12.9)
POTASSIUM SERPL-SCNC: 4.1 MMOL/L (ref 3.5–5.1)
PROT SERPL-MCNC: 7.3 G/DL (ref 6–8.4)
PROT UR QL STRIP: NEGATIVE
RBC # BLD AUTO: 4.38 M/UL (ref 4–5.4)
SODIUM SERPL-SCNC: 138 MMOL/L (ref 136–145)
SP GR UR STRIP: 1.02 (ref 1–1.03)
T4 FREE SERPL-MCNC: 0.46 NG/DL (ref 0.71–1.51)
TRIGL SERPL-MCNC: 72 MG/DL (ref 30–150)
TSH SERPL DL<=0.005 MIU/L-ACNC: 16.77 UIU/ML (ref 0.4–4)
URN SPEC COLLECT METH UR: ABNORMAL
WBC # BLD AUTO: 5.97 K/UL (ref 3.9–12.7)

## 2019-08-24 PROCEDURE — 80053 COMPREHEN METABOLIC PANEL: CPT

## 2019-08-24 PROCEDURE — 36415 COLL VENOUS BLD VENIPUNCTURE: CPT

## 2019-08-24 PROCEDURE — 84443 ASSAY THYROID STIM HORMONE: CPT

## 2019-08-24 PROCEDURE — 84439 ASSAY OF FREE THYROXINE: CPT

## 2019-08-24 PROCEDURE — 82306 VITAMIN D 25 HYDROXY: CPT

## 2019-08-24 PROCEDURE — 81001 URINALYSIS AUTO W/SCOPE: CPT

## 2019-08-24 PROCEDURE — 80061 LIPID PANEL: CPT

## 2019-08-24 PROCEDURE — 85025 COMPLETE CBC W/AUTO DIFF WBC: CPT

## 2019-08-25 DIAGNOSIS — E55.9 VITAMIN D DEFICIENCY: ICD-10-CM

## 2019-08-25 DIAGNOSIS — E03.9 HYPOTHYROIDISM, UNSPECIFIED TYPE: Primary | ICD-10-CM

## 2019-08-25 RX ORDER — LEVOTHYROXINE SODIUM 200 UG/1
200 TABLET ORAL
Qty: 90 TABLET | Refills: 0 | Status: SHIPPED | OUTPATIENT
Start: 2019-08-25 | End: 2019-12-18 | Stop reason: SDUPTHER

## 2019-08-25 RX ORDER — ERGOCALCIFEROL 1.25 MG/1
50000 CAPSULE ORAL
Qty: 24 CAPSULE | Refills: 0 | Status: SHIPPED | OUTPATIENT
Start: 2019-08-26 | End: 2019-12-18 | Stop reason: SDUPTHER

## 2019-09-11 ENCOUNTER — HOSPITAL ENCOUNTER (OUTPATIENT)
Dept: RADIOLOGY | Facility: HOSPITAL | Age: 59
Discharge: HOME | End: 2019-09-11
Attending: FAMILY MEDICINE
Payer: COMMERCIAL

## 2019-09-11 VITALS — WEIGHT: 293 LBS | BODY MASS INDEX: 57.52 KG/M2 | HEIGHT: 60 IN

## 2019-09-11 DIAGNOSIS — Z12.31 VISIT FOR SCREENING MAMMOGRAM: ICD-10-CM

## 2019-09-11 PROCEDURE — 77067 SCR MAMMO BI INCL CAD: CPT | Mod: TC

## 2019-09-11 PROCEDURE — 77067 MAMMO DIGITAL SCREENING BILAT WITH CAD: ICD-10-PCS | Mod: 26,,, | Performed by: RADIOLOGY

## 2019-09-11 PROCEDURE — 77067 SCR MAMMO BI INCL CAD: CPT | Mod: 26,,, | Performed by: RADIOLOGY

## 2019-10-16 ENCOUNTER — TELEPHONE (OUTPATIENT)
Dept: FAMILY MEDICINE | Facility: CLINIC | Age: 59
End: 2019-10-16

## 2019-10-16 NOTE — TELEPHONE ENCOUNTER
Went to  over the weekend for cellulitis and started taking an antibiotic and pain medication to help. She started on Sunday and it is a 14 day prescription. She states it is still hurting and doesn't feel it is getting any better. Do you have any suggestions?

## 2019-10-16 NOTE — TELEPHONE ENCOUNTER
----- Message from Faye Alvarado sent at 10/16/2019 12:07 PM CDT -----  Contact: Pt  Pt is requesting call back in regards to finger still being infected after visit to urgent care this past weekend.        Pls call back at [phone]

## 2019-10-17 ENCOUNTER — OFFICE VISIT (OUTPATIENT)
Dept: FAMILY MEDICINE | Facility: CLINIC | Age: 59
End: 2019-10-17
Payer: COMMERCIAL

## 2019-10-17 VITALS
WEIGHT: 293 LBS | HEART RATE: 76 BPM | DIASTOLIC BLOOD PRESSURE: 90 MMHG | HEIGHT: 60 IN | BODY MASS INDEX: 57.52 KG/M2 | OXYGEN SATURATION: 98 % | TEMPERATURE: 98 F | SYSTOLIC BLOOD PRESSURE: 160 MMHG

## 2019-10-17 DIAGNOSIS — R03.0 ELEVATED BLOOD PRESSURE READING: ICD-10-CM

## 2019-10-17 DIAGNOSIS — L03.012 PARONYCHIA OF LEFT MIDDLE FINGER: Primary | ICD-10-CM

## 2019-10-17 PROCEDURE — 10060 PR DRAIN SKIN ABSCESS SIMPLE: ICD-10-PCS | Mod: S$GLB,,, | Performed by: FAMILY MEDICINE

## 2019-10-17 PROCEDURE — 99999 PR PBB SHADOW E&M-EST. PATIENT-LVL III: ICD-10-PCS | Mod: PBBFAC,,, | Performed by: FAMILY MEDICINE

## 2019-10-17 PROCEDURE — 3008F PR BODY MASS INDEX (BMI) DOCUMENTED: ICD-10-PCS | Mod: CPTII,S$GLB,, | Performed by: FAMILY MEDICINE

## 2019-10-17 PROCEDURE — 10060 I&D ABSCESS SIMPLE/SINGLE: CPT | Mod: S$GLB,,, | Performed by: FAMILY MEDICINE

## 2019-10-17 PROCEDURE — 99999 PR PBB SHADOW E&M-EST. PATIENT-LVL III: CPT | Mod: PBBFAC,,, | Performed by: FAMILY MEDICINE

## 2019-10-17 PROCEDURE — 99214 PR OFFICE/OUTPT VISIT, EST, LEVL IV, 30-39 MIN: ICD-10-PCS | Mod: 25,S$GLB,, | Performed by: FAMILY MEDICINE

## 2019-10-17 PROCEDURE — 99214 OFFICE O/P EST MOD 30 MIN: CPT | Mod: 25,S$GLB,, | Performed by: FAMILY MEDICINE

## 2019-10-17 PROCEDURE — 3008F BODY MASS INDEX DOCD: CPT | Mod: CPTII,S$GLB,, | Performed by: FAMILY MEDICINE

## 2019-10-17 RX ORDER — HYDROCODONE BITARTRATE AND ACETAMINOPHEN 5; 325 MG/1; MG/1
1 TABLET ORAL EVERY 6 HOURS PRN
Qty: 20 TABLET | Refills: 0 | Status: SHIPPED | OUTPATIENT
Start: 2019-10-17 | End: 2019-10-22

## 2019-10-17 NOTE — TELEPHONE ENCOUNTER
----- Message from Mariajose Jones sent at 10/17/2019 10:26 AM CDT -----  Contact: Patient   Type:  Patient Returning Call    Who Called: Patient   Who Left Message for Patient: Not sure no message was left  Does the patient know what this is regarding?: returning her call maybe because she said that she called on yesterday  Would the patient rather a call back or a response via [email or web address]? Call back   Best Call Back Number: Please call her at [phone]  Additional Information: n/a

## 2019-10-17 NOTE — PROGRESS NOTES
Amelia Mirza    Chief Complaint   Patient presents with    Recurrent Skin Infections       History of Present Illness:   Ms. Mirza comes in today for follow-up of cellulitis.  She states on October 13, 2019 she was evaluated at [facility] on [address] for pain and swelling of her left middle finger.  She states provider attempted to drain her finger infection but states nothing came out.  She states she was told she has cellulitis and prescribed Bactroban ointment, diclofenac, and Bactrim DS twice daily for 7 days, which she states she continues to take.  She states 4 days ago pain and swelling worsened.  She reports having constant, throbbing pain without drainage.     She also denies having associated fever, chills, fatigue, appetite changes; shortness of breath, cough, wheezing; chest pain, palpitations, leg swelling; abdominal pain, nausea, vomiting, diarrhea, constipation; unusual urinary symptoms; back pain; headache; anxiety, depression, homicidal or suicidal thoughts.  She denies having trauma to her nail or finger but states she receives nail care at a nail salon although not recently received care.  She states she is left handed.      She reports no history of hypertension.          Current Outpatient Medications   Medication Sig    ergocalciferol (ERGOCALCIFEROL) 50,000 unit Cap Take 1 capsule (50,000 Units total) by mouth twice a week.    levothyroxine (SYNTHROID) 200 MCG tablet Take 1 tablet (200 mcg total) by mouth before breakfast.    lidocaine-prilocaine (EMLA) cream      Review of Systems   Constitutional: Negative for appetite change, chills, fatigue and fever.   Respiratory: Negative for cough, shortness of breath and wheezing.    Cardiovascular: Negative for chest pain, palpitations and leg swelling.   Gastrointestinal: Negative for abdominal pain, constipation, diarrhea, nausea and vomiting.   Genitourinary: Negative for difficulty urinating.   Musculoskeletal: Positive for  myalgias. Negative for back pain.   Skin: Positive for wound.        See history of present illness.   Neurological: Negative for headaches.   Psychiatric/Behavioral: Negative for dysphoric mood and suicidal ideas. The patient is not nervous/anxious.         Negative for homicidal ideas.       Objective:  Physical Exam   Constitutional: She is oriented to person, place, and time. She appears well-developed and well-nourished. No distress.   Pleasant.   Cardiovascular: Normal rate, regular rhythm and normal heart sounds.   No murmur heard.  Pulmonary/Chest: Effort normal and breath sounds normal. No stridor. No respiratory distress. She has no wheezes.   Abdominal: Soft. Bowel sounds are normal. There is no tenderness.   Musculoskeletal: Normal range of motion. She exhibits edema and tenderness.   She is ambulatory without problems. Swelling, erythematous, hot and tender at area of left middle finger at side of nail adjacent to left index finger without drainage noted.   Neurological: She is alert and oriented to person, place, and time.   Skin: She is not diaphoretic.   Swelling, erythematous, hot and tender at area of left middle finger at side of nail adjacent to left index finger without drainage noted.   Psychiatric: She has a normal mood and affect. Her behavior is normal. Judgment and thought content normal.     Time-out process completed in noted on chart.  I and D of paronychia procedure explained to patient including indication, potential complications, and wound care.  Patient verbalized understanding of procedure and signed procedure consent.  Tip of her left middle finger was prepped with Betadine and small incision was made at the side of her finger adjacent to her nail as topical Pain Ease anesthetic was applied.  Purulent and bloody drainage oozed from the incision and hemostasis was obtained with application of pressure dressing and secured with external bandage.  Patient tolerated procedure without  problems.    ASSESSMENT:  1. Paronychia of left middle finger    2. Elevated blood pressure reading        PLAN:  Amelia was seen today for recurrent skin infections.    Diagnoses and all orders for this visit:    Paronychia of left middle finger  -     HYDROcodone-acetaminophen (NORCO) 5-325 mg per tablet; Take 1 tablet by mouth every 6 (six) hours as needed for Pain.    Elevated blood pressure reading    Complete Bactrim DS as prescribed.  Norco 5-325 mg every 6 hours prn acute pain, #20, 0 refill; medication precautions discussed with patient.  Wound care instructions given to patient.  Continue current medications, follow low sodium, low cholesterol, low carb diet, daily walks.  Advised patient elevated blood pressure likely due to acute pain today; however, encouraged patient to monitor blood pressure levels and if elevation persists, notify me.  Follow up if symptoms worsen or fail to improve.       English

## 2019-11-27 ENCOUNTER — OFFICE VISIT (OUTPATIENT)
Dept: FAMILY MEDICINE | Facility: CLINIC | Age: 59
End: 2019-11-27
Payer: COMMERCIAL

## 2019-11-27 ENCOUNTER — LAB VISIT (OUTPATIENT)
Dept: LAB | Facility: HOSPITAL | Age: 59
End: 2019-11-27
Attending: FAMILY MEDICINE
Payer: COMMERCIAL

## 2019-11-27 VITALS
TEMPERATURE: 99 F | DIASTOLIC BLOOD PRESSURE: 80 MMHG | HEART RATE: 88 BPM | BODY MASS INDEX: 57.52 KG/M2 | HEIGHT: 60 IN | SYSTOLIC BLOOD PRESSURE: 136 MMHG | OXYGEN SATURATION: 98 % | WEIGHT: 293 LBS

## 2019-11-27 DIAGNOSIS — Z78.0 POSTMENOPAUSAL: ICD-10-CM

## 2019-11-27 DIAGNOSIS — E66.01 MORBID OBESITY WITH BMI OF 60.0-69.9, ADULT: ICD-10-CM

## 2019-11-27 DIAGNOSIS — E55.9 VITAMIN D DEFICIENCY: ICD-10-CM

## 2019-11-27 DIAGNOSIS — E03.9 HYPOTHYROIDISM, UNSPECIFIED TYPE: Primary | ICD-10-CM

## 2019-11-27 DIAGNOSIS — E03.9 HYPOTHYROIDISM, UNSPECIFIED TYPE: ICD-10-CM

## 2019-11-27 LAB
25(OH)D3+25(OH)D2 SERPL-MCNC: 25 NG/ML (ref 30–96)
TSH SERPL DL<=0.005 MIU/L-ACNC: 0.92 UIU/ML (ref 0.4–4)

## 2019-11-27 PROCEDURE — 3008F PR BODY MASS INDEX (BMI) DOCUMENTED: ICD-10-PCS | Mod: CPTII,S$GLB,, | Performed by: FAMILY MEDICINE

## 2019-11-27 PROCEDURE — 99999 PR PBB SHADOW E&M-EST. PATIENT-LVL III: CPT | Mod: PBBFAC,,, | Performed by: FAMILY MEDICINE

## 2019-11-27 PROCEDURE — 36415 COLL VENOUS BLD VENIPUNCTURE: CPT | Mod: PO

## 2019-11-27 PROCEDURE — 82306 VITAMIN D 25 HYDROXY: CPT

## 2019-11-27 PROCEDURE — 84443 ASSAY THYROID STIM HORMONE: CPT

## 2019-11-27 PROCEDURE — 3008F BODY MASS INDEX DOCD: CPT | Mod: CPTII,S$GLB,, | Performed by: FAMILY MEDICINE

## 2019-11-27 PROCEDURE — 99214 PR OFFICE/OUTPT VISIT, EST, LEVL IV, 30-39 MIN: ICD-10-PCS | Mod: S$GLB,,, | Performed by: FAMILY MEDICINE

## 2019-11-27 PROCEDURE — 99214 OFFICE O/P EST MOD 30 MIN: CPT | Mod: S$GLB,,, | Performed by: FAMILY MEDICINE

## 2019-11-27 PROCEDURE — 99999 PR PBB SHADOW E&M-EST. PATIENT-LVL III: ICD-10-PCS | Mod: PBBFAC,,, | Performed by: FAMILY MEDICINE

## 2019-11-27 NOTE — PROGRESS NOTES
Amelia Mirza    Chief Complaint   Patient presents with    Hypothyroidism    Vitamin D Deficiency    Follow-up       History of Present Illness:   Ms. Mirza comes in today for 3-month hypothyroidism and vitamin D deficiency follow up.  She is fasting and has not taken medication today.  She states she does not exercise and does not monitor her diet.  On/about August 25, 2019, I advised her to increase Synthroid 150 mcg to 200 mcg daily and increase vit D 50,000 units once a week to twice a week for treatment of hypothyroidism and vitamin D deficiency respectively due to abnormal lab results. She reports no problems with taking dose changes.    She states she sweats at night. Otherwise, she denies having fever, chills, fatigue, appetite changes; shortness of breath, cough, wheezing; chest pain, palpitations, leg swelling; abdominal pain, nausea, vomiting, diarrhea, constipation; unusual urinary symptoms; cold intolerance; back pain; headache; anxiety, depression, homicidal or suicidal thoughts.     Labs:                   WBC                      5.97                08/24/2019                 HGB                      11.1 (L)            08/24/2019                 HCT                      37.6                08/24/2019                 PLT                      240                 08/24/2019                 CHOL                     186                 08/24/2019                 TRIG                     72                  08/24/2019                 HDL                      67                  08/24/2019                 ALT                      14                  08/24/2019                 AST                      12                  08/24/2019                 NA                       138                 08/24/2019                 K                        4.1                 08/24/2019                 CL                       104                 08/24/2019                 CREATININE               0.9                  08/24/2019                 BUN                      16                  08/24/2019                 CO2                      26                  08/24/2019                 TSH                      16.772 (H)          08/24/2019                 INR                      1.0                 08/30/2016                 HGBA1C                   6.0                 05/09/2012         Vit D, 25-Hydroxy     19             08/24/2019                     LDLCALC                  104.6               08/24/2019                Current Outpatient Medications   Medication Sig    ergocalciferol (ERGOCALCIFEROL) 50,000 unit Cap Take 1 capsule (50,000 Units total) by mouth twice a week.    levothyroxine (SYNTHROID) 200 MCG tablet Take 1 tablet (200 mcg total) by mouth before breakfast.    lidocaine-prilocaine (EMLA) cream        Review of Systems   Constitutional: Negative for activity change, appetite change, chills, fatigue and fever.        Weight 145.3 kg (320 lb 5.3 oz) at October 17, 2019 visit.   Respiratory: Negative for cough, shortness of breath and wheezing.    Cardiovascular: Negative for chest pain, palpitations and leg swelling.   Gastrointestinal: Negative for abdominal pain, constipation, diarrhea, nausea and vomiting.   Endocrine: Positive for heat intolerance. Negative for cold intolerance.        See history of present illness.   Genitourinary: Negative for difficulty urinating.   Musculoskeletal: Negative for back pain.   Neurological: Negative for headaches.   Psychiatric/Behavioral: Negative for dysphoric mood and suicidal ideas. The patient is not nervous/anxious.         Negative for homicidal ideas.       Objective:  Physical Exam   Constitutional: She is oriented to person, place, and time. She appears well-developed and well-nourished. No distress.   Pleasant.   Neck: Normal range of motion. Neck supple. No thyromegaly present.   Cardiovascular: Normal rate, regular rhythm, normal heart sounds and  intact distal pulses.   No murmur heard.  Pulmonary/Chest: Effort normal and breath sounds normal. No respiratory distress. She has no wheezes.   Abdominal: Soft. Bowel sounds are normal. She exhibits no distension and no mass. There is no tenderness. There is no guarding.   Musculoskeletal: Normal range of motion. She exhibits no edema or tenderness.   She is ambulatory without problems.   Lymphadenopathy:     She has no cervical adenopathy.   Neurological: She is alert and oriented to person, place, and time. She displays normal reflexes.   Skin: She is not diaphoretic.   Psychiatric: She has a normal mood and affect. Her behavior is normal. Judgment and thought content normal.   Vitals reviewed.      ASSESSMENT:  1. Hypothyroidism, unspecified type    2. Vitamin D deficiency    3. Postmenopausal    4. Morbid obesity with BMI of 60.0-69.9, adult        PLAN:  Amelia was seen today for hypothyroidism, vitamin d deficiency and follow-up.    Diagnoses and all orders for this visit:    Hypothyroidism, unspecified type  -     TSH; Future    Vitamin D deficiency  -     Vitamin D; Future    Postmenopausal    Morbid obesity with BMI of 60.0-69.9, adult       Patient advised to call for results.  Continue current medications, follow low sodium, low cholesterol, low carb diet, daily walks.  Patient declines flu shot today.   Follow up if symptoms worsen or fail to improve.

## 2019-12-18 DIAGNOSIS — E55.9 VITAMIN D DEFICIENCY: ICD-10-CM

## 2019-12-18 DIAGNOSIS — E03.9 HYPOTHYROIDISM, UNSPECIFIED TYPE: ICD-10-CM

## 2019-12-19 RX ORDER — LEVOTHYROXINE SODIUM 200 UG/1
200 TABLET ORAL
Qty: 90 TABLET | Refills: 1 | Status: SHIPPED | OUTPATIENT
Start: 2019-12-19 | End: 2020-06-22

## 2019-12-19 RX ORDER — ERGOCALCIFEROL 1.25 MG/1
50000 CAPSULE ORAL
Qty: 24 CAPSULE | Refills: 1 | Status: SHIPPED | OUTPATIENT
Start: 2019-12-19 | End: 2020-11-23 | Stop reason: SDUPTHER

## 2020-02-14 ENCOUNTER — OFFICE VISIT (OUTPATIENT)
Dept: FAMILY MEDICINE | Facility: CLINIC | Age: 60
End: 2020-02-14
Payer: COMMERCIAL

## 2020-02-14 VITALS
HEART RATE: 101 BPM | TEMPERATURE: 99 F | DIASTOLIC BLOOD PRESSURE: 86 MMHG | OXYGEN SATURATION: 97 % | SYSTOLIC BLOOD PRESSURE: 138 MMHG | BODY MASS INDEX: 57.52 KG/M2 | HEIGHT: 60 IN | WEIGHT: 293 LBS

## 2020-02-14 DIAGNOSIS — R10.30 LOWER ABDOMINAL PAIN: ICD-10-CM

## 2020-02-14 DIAGNOSIS — M54.50 RIGHT-SIDED LOW BACK PAIN WITHOUT SCIATICA, UNSPECIFIED CHRONICITY: ICD-10-CM

## 2020-02-14 DIAGNOSIS — N39.0 URINARY TRACT INFECTION WITHOUT HEMATURIA, SITE UNSPECIFIED: ICD-10-CM

## 2020-02-14 LAB
BILIRUB SERPL-MCNC: NEGATIVE MG/DL
BLOOD URINE, POC: NEGATIVE
COLOR, POC UA: YELLOW
GLUCOSE UR QL STRIP: NORMAL
KETONES UR QL STRIP: NEGATIVE
LEUKOCYTE ESTERASE URINE, POC: ABNORMAL
NITRITE, POC UA: NEGATIVE
PH, POC UA: 5
PROTEIN, POC: ABNORMAL
SPECIFIC GRAVITY, POC UA: 1.03
UROBILINOGEN, POC UA: NORMAL

## 2020-02-14 PROCEDURE — 81002 URINALYSIS NONAUTO W/O SCOPE: CPT | Mod: S$GLB,,, | Performed by: FAMILY MEDICINE

## 2020-02-14 PROCEDURE — 81002 POCT URINE DIPSTICK WITHOUT MICROSCOPE: ICD-10-PCS | Mod: S$GLB,,, | Performed by: FAMILY MEDICINE

## 2020-02-14 PROCEDURE — 99999 PR PBB SHADOW E&M-EST. PATIENT-LVL III: ICD-10-PCS | Mod: PBBFAC,,, | Performed by: FAMILY MEDICINE

## 2020-02-14 PROCEDURE — 99213 PR OFFICE/OUTPT VISIT, EST, LEVL III, 20-29 MIN: ICD-10-PCS | Mod: 25,S$GLB,, | Performed by: FAMILY MEDICINE

## 2020-02-14 PROCEDURE — 99999 PR PBB SHADOW E&M-EST. PATIENT-LVL III: CPT | Mod: PBBFAC,,, | Performed by: FAMILY MEDICINE

## 2020-02-14 PROCEDURE — 3008F BODY MASS INDEX DOCD: CPT | Mod: CPTII,S$GLB,, | Performed by: FAMILY MEDICINE

## 2020-02-14 PROCEDURE — 99213 OFFICE O/P EST LOW 20 MIN: CPT | Mod: 25,S$GLB,, | Performed by: FAMILY MEDICINE

## 2020-02-14 PROCEDURE — 3008F PR BODY MASS INDEX (BMI) DOCUMENTED: ICD-10-PCS | Mod: CPTII,S$GLB,, | Performed by: FAMILY MEDICINE

## 2020-02-14 RX ORDER — SULFAMETHOXAZOLE AND TRIMETHOPRIM 800; 160 MG/1; MG/1
1 TABLET ORAL 2 TIMES DAILY
Qty: 14 TABLET | Refills: 0 | Status: SHIPPED | OUTPATIENT
Start: 2020-02-14 | End: 2020-02-21

## 2020-02-14 RX ORDER — IBUPROFEN 800 MG/1
800 TABLET ORAL 2 TIMES DAILY PRN
Qty: 30 TABLET | Refills: 0 | Status: SHIPPED | OUTPATIENT
Start: 2020-02-14 | End: 2021-07-09 | Stop reason: SDUPTHER

## 2020-02-14 RX ORDER — DICLOFENAC SODIUM 16.05 MG/ML
SOLUTION TOPICAL DAILY PRN
COMMUNITY
Start: 2019-12-18 | End: 2023-03-09

## 2020-02-14 NOTE — PROGRESS NOTES
Amelia Mirza    Chief Complaint   Patient presents with    Abdominal Pain       History of Present Illness:   Ms. Mirza comes in today with complaint of having lower abdominal pain and right low back pain for 1-2 weeks.  She states pain occurs with rest and with movement and varies in intensity.  She states pain eases with applied pressure to the areas.  She reports pain at 5/10 on pain scale now.  She states she has been lifting and aunt, who has orthopedic issues.  She states she takes Tylenol with help for her symptoms.      Otherwise, she denies having associated fever, chills, fatigue, appetite changes; cough, wheezing; chest pain, palpitations, leg swelling; nausea, vomiting, diarrhea, constipation; unusual urinary symptoms; headaches; rashes; anxiety, depression, homicidal or suicidal thoughts.    She reports having hot flashes due to menopause and also reports having occasional shortness of breath with gaining weight.      She reports no history of GERD, peptic ulcer disease, or kidney disease.      Urine dipstick ordered by me today - cloudy appearance, specific gravity 1.030, pH 5, trace leukocytes and protein, otherwise okay.      Current Outpatient Medications   Medication Sig    diclofenac sodium 1.5 % Drop     ergocalciferol (ERGOCALCIFEROL) 50,000 unit Cap Take 1 capsule (50,000 Units total) by mouth twice a week.    levothyroxine (SYNTHROID) 200 MCG tablet Take 1 tablet (200 mcg total) by mouth before breakfast.    lidocaine-prilocaine (EMLA) cream        Review of Systems   Constitutional: Negative for activity change, appetite change, chills, fatigue and fever.   Respiratory: Negative for cough, shortness of breath and wheezing.    Cardiovascular: Negative for chest pain, palpitations and leg swelling.   Gastrointestinal: Positive for abdominal pain. Negative for constipation, diarrhea, nausea and vomiting.   Endocrine: Positive for heat intolerance. Negative for cold intolerance.         See history of present illness.   Genitourinary: Negative for difficulty urinating, dysuria, frequency and hematuria.   Musculoskeletal: Positive for back pain.   Neurological: Negative for headaches.   Psychiatric/Behavioral: Negative for dysphoric mood and suicidal ideas. The patient is not nervous/anxious.         Negative for homicidal ideas.       Objective:  Physical Exam   Constitutional: She is oriented to person, place, and time. She appears well-developed and well-nourished. No distress.   Pleasant.   Neck: Normal range of motion. Neck supple. No thyromegaly present.   Cardiovascular: Normal rate, regular rhythm, normal heart sounds and intact distal pulses.   No murmur heard.  Pulmonary/Chest: Effort normal and breath sounds normal. No respiratory distress. She has no wheezes.   Abdominal: Soft. Bowel sounds are normal. She exhibits no distension and no mass. There is tenderness. There is no rebound and no guarding.   Slightly tender at left lower abdominal quadrant without acute abdomen noted. No CVA tenderness noted.   Musculoskeletal: Normal range of motion. She exhibits tenderness. She exhibits no edema.   She is ambulatory without problems. Slightly tender at right lower back with full range of motion noted.   Lymphadenopathy:     She has no cervical adenopathy.   Neurological: She is alert and oriented to person, place, and time. She displays normal reflexes.   Skin: She is not diaphoretic.   Psychiatric: She has a normal mood and affect. Her behavior is normal. Judgment and thought content normal.   Vitals reviewed.      ASSESSMENT:  1. Urinary tract infection without hematuria, site unspecified    2. Lower abdominal pain    3. Right-sided low back pain without sciatica, unspecified chronicity        PLAN:  Amelia was seen today for abdominal pain.    Diagnoses and all orders for this visit:    Urinary tract infection without hematuria, site unspecified  -     sulfamethoxazole-trimethoprim  800-160mg (BACTRIM DS) 800-160 mg Tab; Take 1 tablet by mouth 2 (two) times daily. for 7 days    Lower abdominal pain  -     POCT URINE DIPSTICK WITHOUT MICROSCOPE  -     ibuprofen (ADVIL,MOTRIN) 800 MG tablet; Take 1 tablet (800 mg total) by mouth 2 (two) times daily as needed for Pain (food).    Right-sided low back pain without sciatica, unspecified chronicity  -     ibuprofen (ADVIL,MOTRIN) 800 MG tablet; Take 1 tablet (800 mg total) by mouth 2 (two) times daily as needed for Pain (food).    Continue current medications, follow low sodium, low cholesterol, low carb diet, daily walks.  Medications precautions discussed with patient.  Follow up if symptoms worsen or fail to improve.

## 2020-06-21 DIAGNOSIS — E03.9 HYPOTHYROIDISM, UNSPECIFIED TYPE: ICD-10-CM

## 2020-06-22 RX ORDER — LEVOTHYROXINE SODIUM 200 UG/1
200 TABLET ORAL
Qty: 30 TABLET | Refills: 1 | Status: SHIPPED | OUTPATIENT
Start: 2020-06-22 | End: 2020-11-03

## 2020-06-29 ENCOUNTER — PATIENT MESSAGE (OUTPATIENT)
Dept: FAMILY MEDICINE | Facility: CLINIC | Age: 60
End: 2020-06-29

## 2020-08-17 ENCOUNTER — OFFICE VISIT (OUTPATIENT)
Dept: FAMILY MEDICINE | Facility: CLINIC | Age: 60
End: 2020-08-17
Payer: COMMERCIAL

## 2020-08-17 ENCOUNTER — LAB VISIT (OUTPATIENT)
Dept: LAB | Facility: HOSPITAL | Age: 60
End: 2020-08-17
Payer: COMMERCIAL

## 2020-08-17 VITALS
HEART RATE: 80 BPM | OXYGEN SATURATION: 96 % | BODY MASS INDEX: 57.52 KG/M2 | DIASTOLIC BLOOD PRESSURE: 82 MMHG | SYSTOLIC BLOOD PRESSURE: 130 MMHG | HEIGHT: 60 IN | TEMPERATURE: 99 F | WEIGHT: 293 LBS | RESPIRATION RATE: 18 BRPM

## 2020-08-17 DIAGNOSIS — G47.00 INSOMNIA, UNSPECIFIED TYPE: ICD-10-CM

## 2020-08-17 DIAGNOSIS — E55.9 VITAMIN D DEFICIENCY: ICD-10-CM

## 2020-08-17 DIAGNOSIS — E03.9 HYPOTHYROIDISM, UNSPECIFIED TYPE: ICD-10-CM

## 2020-08-17 DIAGNOSIS — Z23 NEED FOR SHINGLES VACCINE: ICD-10-CM

## 2020-08-17 DIAGNOSIS — Z12.31 VISIT FOR SCREENING MAMMOGRAM: ICD-10-CM

## 2020-08-17 DIAGNOSIS — Z78.0 POSTMENOPAUSAL: ICD-10-CM

## 2020-08-17 DIAGNOSIS — Z00.00 ANNUAL PHYSICAL EXAM: ICD-10-CM

## 2020-08-17 DIAGNOSIS — Z00.00 ANNUAL PHYSICAL EXAM: Primary | ICD-10-CM

## 2020-08-17 DIAGNOSIS — M17.0 PRIMARY OSTEOARTHRITIS OF BOTH KNEES: ICD-10-CM

## 2020-08-17 DIAGNOSIS — E66.01 MORBID OBESITY WITH BMI OF 60.0-69.9, ADULT: ICD-10-CM

## 2020-08-17 DIAGNOSIS — K63.5 BENIGN COLON POLYP: ICD-10-CM

## 2020-08-17 LAB
ALBUMIN SERPL BCP-MCNC: 3.9 G/DL (ref 3.5–5.2)
ALP SERPL-CCNC: 85 U/L (ref 55–135)
ALT SERPL W/O P-5'-P-CCNC: 65 U/L (ref 10–44)
AMORPH CRY UR QL COMP ASSIST: ABNORMAL
ANION GAP SERPL CALC-SCNC: 9 MMOL/L (ref 8–16)
AST SERPL-CCNC: 31 U/L (ref 10–40)
BACTERIA #/AREA URNS AUTO: ABNORMAL /HPF
BASOPHILS # BLD AUTO: 0.04 K/UL (ref 0–0.2)
BASOPHILS NFR BLD: 0.7 % (ref 0–1.9)
BILIRUB SERPL-MCNC: 0.5 MG/DL (ref 0.1–1)
BILIRUB UR QL STRIP: NEGATIVE
BUN SERPL-MCNC: 14 MG/DL (ref 6–20)
CALCIUM SERPL-MCNC: 9.3 MG/DL (ref 8.7–10.5)
CHLORIDE SERPL-SCNC: 106 MMOL/L (ref 95–110)
CHOLEST SERPL-MCNC: 157 MG/DL (ref 120–199)
CHOLEST/HDLC SERPL: 2.6 {RATIO} (ref 2–5)
CLARITY UR REFRACT.AUTO: ABNORMAL
CO2 SERPL-SCNC: 26 MMOL/L (ref 23–29)
COLOR UR AUTO: YELLOW
CREAT SERPL-MCNC: 0.9 MG/DL (ref 0.5–1.4)
DIFFERENTIAL METHOD: ABNORMAL
EOSINOPHIL # BLD AUTO: 0.1 K/UL (ref 0–0.5)
EOSINOPHIL NFR BLD: 1.3 % (ref 0–8)
ERYTHROCYTE [DISTWIDTH] IN BLOOD BY AUTOMATED COUNT: 17.5 % (ref 11.5–14.5)
EST. GFR  (AFRICAN AMERICAN): >60 ML/MIN/1.73 M^2
EST. GFR  (NON AFRICAN AMERICAN): >60 ML/MIN/1.73 M^2
GLUCOSE SERPL-MCNC: 92 MG/DL (ref 70–110)
GLUCOSE UR QL STRIP: NEGATIVE
HCT VFR BLD AUTO: 38.6 % (ref 37–48.5)
HDLC SERPL-MCNC: 61 MG/DL (ref 40–75)
HDLC SERPL: 38.9 % (ref 20–50)
HGB BLD-MCNC: 11.3 G/DL (ref 12–16)
HGB UR QL STRIP: NEGATIVE
IMM GRANULOCYTES # BLD AUTO: 0.02 K/UL (ref 0–0.04)
IMM GRANULOCYTES NFR BLD AUTO: 0.3 % (ref 0–0.5)
KETONES UR QL STRIP: NEGATIVE
LDLC SERPL CALC-MCNC: 83 MG/DL (ref 63–159)
LEUKOCYTE ESTERASE UR QL STRIP: NEGATIVE
LYMPHOCYTES # BLD AUTO: 1.7 K/UL (ref 1–4.8)
LYMPHOCYTES NFR BLD: 27.4 % (ref 18–48)
MCH RBC QN AUTO: 24.7 PG (ref 27–31)
MCHC RBC AUTO-ENTMCNC: 29.3 G/DL (ref 32–36)
MCV RBC AUTO: 84 FL (ref 82–98)
MICROSCOPIC COMMENT: ABNORMAL
MONOCYTES # BLD AUTO: 0.6 K/UL (ref 0.3–1)
MONOCYTES NFR BLD: 9 % (ref 4–15)
NEUTROPHILS # BLD AUTO: 3.7 K/UL (ref 1.8–7.7)
NEUTROPHILS NFR BLD: 61.3 % (ref 38–73)
NITRITE UR QL STRIP: NEGATIVE
NONHDLC SERPL-MCNC: 96 MG/DL
NRBC BLD-RTO: 0 /100 WBC
PH UR STRIP: 6 [PH] (ref 5–8)
PLATELET # BLD AUTO: 288 K/UL (ref 150–350)
PMV BLD AUTO: 11.1 FL (ref 9.2–12.9)
POTASSIUM SERPL-SCNC: 4 MMOL/L (ref 3.5–5.1)
PROT SERPL-MCNC: 7.6 G/DL (ref 6–8.4)
PROT UR QL STRIP: NEGATIVE
RBC # BLD AUTO: 4.58 M/UL (ref 4–5.4)
SODIUM SERPL-SCNC: 141 MMOL/L (ref 136–145)
SP GR UR STRIP: 1.03 (ref 1–1.03)
TRIGL SERPL-MCNC: 65 MG/DL (ref 30–150)
TSH SERPL DL<=0.005 MIU/L-ACNC: 0.68 UIU/ML (ref 0.4–4)
URN SPEC COLLECT METH UR: ABNORMAL
WBC # BLD AUTO: 6.1 K/UL (ref 3.9–12.7)

## 2020-08-17 PROCEDURE — 90471 IMMUNIZATION ADMIN: CPT | Mod: S$GLB,,, | Performed by: FAMILY MEDICINE

## 2020-08-17 PROCEDURE — 84443 ASSAY THYROID STIM HORMONE: CPT

## 2020-08-17 PROCEDURE — 80061 LIPID PANEL: CPT

## 2020-08-17 PROCEDURE — 99999 PR PBB SHADOW E&M-EST. PATIENT-LVL V: ICD-10-PCS | Mod: PBBFAC,,, | Performed by: FAMILY MEDICINE

## 2020-08-17 PROCEDURE — 3008F BODY MASS INDEX DOCD: CPT | Mod: CPTII,S$GLB,, | Performed by: FAMILY MEDICINE

## 2020-08-17 PROCEDURE — 82306 VITAMIN D 25 HYDROXY: CPT

## 2020-08-17 PROCEDURE — 3008F PR BODY MASS INDEX (BMI) DOCUMENTED: ICD-10-PCS | Mod: CPTII,S$GLB,, | Performed by: FAMILY MEDICINE

## 2020-08-17 PROCEDURE — 85025 COMPLETE CBC W/AUTO DIFF WBC: CPT

## 2020-08-17 PROCEDURE — 90750 ZOSTER RECOMBINANT VACCINE: ICD-10-PCS | Mod: S$GLB,,, | Performed by: FAMILY MEDICINE

## 2020-08-17 PROCEDURE — 80053 COMPREHEN METABOLIC PANEL: CPT

## 2020-08-17 PROCEDURE — 90750 HZV VACC RECOMBINANT IM: CPT | Mod: S$GLB,,, | Performed by: FAMILY MEDICINE

## 2020-08-17 PROCEDURE — 81001 URINALYSIS AUTO W/SCOPE: CPT

## 2020-08-17 PROCEDURE — 99396 PR PREVENTIVE VISIT,EST,40-64: ICD-10-PCS | Mod: 25,S$GLB,, | Performed by: FAMILY MEDICINE

## 2020-08-17 PROCEDURE — 90471 ZOSTER RECOMBINANT VACCINE: ICD-10-PCS | Mod: S$GLB,,, | Performed by: FAMILY MEDICINE

## 2020-08-17 PROCEDURE — 86703 HIV-1/HIV-2 1 RESULT ANTBDY: CPT

## 2020-08-17 PROCEDURE — 99396 PREV VISIT EST AGE 40-64: CPT | Mod: 25,S$GLB,, | Performed by: FAMILY MEDICINE

## 2020-08-17 PROCEDURE — 99999 PR PBB SHADOW E&M-EST. PATIENT-LVL V: CPT | Mod: PBBFAC,,, | Performed by: FAMILY MEDICINE

## 2020-08-17 PROCEDURE — 36415 COLL VENOUS BLD VENIPUNCTURE: CPT | Mod: PO

## 2020-08-17 RX ORDER — TRAZODONE HYDROCHLORIDE 50 MG/1
50 TABLET ORAL NIGHTLY PRN
Qty: 30 TABLET | Refills: 5 | Status: SHIPPED | OUTPATIENT
Start: 2020-08-17 | End: 2020-11-23 | Stop reason: SDUPTHER

## 2020-08-17 NOTE — PROGRESS NOTES
CHIEF COMPLAINT: Annual wellness examination.     HISTORY OF PRESENT ILLNESS: Ms. Mirza comes in today fasting and with taking medication for annual wellness examination.     END OF LIFE DECISION: She has no living will and does not desire life support.    Current Outpatient Medications   Medication Sig    diclofenac sodium 1.5 % Drop     ergocalciferol (ERGOCALCIFEROL) 50,000 unit Cap Take 1 capsule (50,000 Units total) by mouth twice a week.    ibuprofen (ADVIL,MOTRIN) 800 MG tablet Take 1 tablet (800 mg total) by mouth 2 (two) times daily as needed for Pain (food).    levothyroxine (SYNTHROID) 200 MCG tablet TAKE 1 TABLET (200 MCG TOTAL) BY MOUTH BEFORE BREAKFAST.    lidocaine-prilocaine (EMLA) cream      SCREENINGS:  Cholesterol: August 24, 2019.  FFS/Colonoscopy: June 11, 2012 - polyps; repeat in 10 years.  Mammogram: September 11, 2019 - okay.   GYN Exam: August 16, 2019 - okay.  Dexa Scan: April 17, 2017 - okay; repeat in 5 years.   Eye Exam: 2019 with Dr. Hill.  She wears glasses.   HIVAb: Never. She desires.  PPD: Negative in the past.  Immunizations: Td/Tdap - May 30, 2012.  Gardasil - N./A.  Zostavax - N./A.  Shingrix - Never. Reports history of varicella not zoster. She desires.  Pneumovax - Never.  Seasonal Flu - N./A. Discussed. She declines.     ROS:  GENERAL: Denies fever, chills or unusual weight change. Appetite normal. Weight 143.3 kg (315 lb 14.7 oz) at February 14, 2020 visit. Exercises very little by walking. Monitors diet some times. Reports fatigue.  SKIN: Denies rashes, itching, changes in mole, color or texture of skin or easy bruising.  HEAD: Denies headaches or recent head trauma.  EYES: Denies change in vision, pain, diplopia, redness, drainage. Wears glasses.  EARS: Denies ear pain, discharge, vertigo or decreased hearing.  NOSE: Denies loss of smell, epistaxis or rhinitis.  MOUTH & THROAT: Denies hoarseness or change in voice. Denies excessive gum bleeding or mouth sores. Denies  sore throat.  NODES: Denies swollen glands.  CHEST: Denies IYER, wheezing, cough, or sputum production.  CARDIOVASCULAR: Denies chest pain, PND, orthopnea or reduced exercise tolerance. Denies palpitations. Reports no history of hypertension but feels blood pressure elevated today as did not sleep well.   ABDOMEN: Denies diarrhea, constipation, nausea, vomiting, abdominal pain, or blood in stool. Except reports occasional lower abdominal pain with holding bladder. Reports takes Tylenol with help.  URINARY: Denies flank pain, dysuria or hematuria.  GENITOURINARY: Denies flank pain, dysuria, frequency or hematuria. Performs monthly breast self exams.  ENDOCRINE: Denies diabetes, cholesterol problems. Reports takes Vitamin D 50,000 units twice weekly for vitamin D deficiency and continues Synthroid 200 mcg daily for thyroid replacement.  HEME/LYMPH: Denies bleeding problems.  PERIPHERAL VASCULAR:Denies claudication or cyanosis.  MUSCULOSKELETAL: Denies joint stiffness, pain or swelling except reports chronic arthritic knee pain. Reports has OA - back but much improved now and not today. Denies edema.    NEUROLOGIC: Denies history of seizures, tremors, paralysis, alteration of gait or coordination.   PSYCHIATRIC: Denies mood swings, depression, anxiety, homicidal or suicidal thoughts. Reports insomnia > 1 month or so (with caring for ill family members) and tried OTC medication without help.     PE:   VS: /82 Comment: Rechecked by Dr. Green.  Pulse 80   Temp 98.8 °F (37.1 °C) (Temporal)   Resp 18   Ht 5' (1.524 m)   Wt (!) 140.7 kg (310 lb 3 oz)   SpO2 96%   BMI 60.58 kg/m²   APPEARANCE: Well nourished, well developed female, obese and pleasant, alert and oriented in no acute distress.  HEAD: Nontender. Full range of motion.  EYES: PERRL, conjunctiva pink, lids no edema. She wears glasses.  EARS: External canal patent, no swelling or redness. TM's shiny and clear.  NOSE: Mucosa and turbinates pink, not  swollen. No discharge. Nontender sinuses.  THROAT: No pharyngeal erythema or exudate. No stridor.  NECK: Supple, no mass, thyroid not enlarged.  NODES: No cervical, axillary or inguinal lymph node enlargement.  CHEST: Normal respiratory effort. Lungs clear to auscultation.  CARDIOVASCULAR: Normal S1, S2. No rubs, murmurs or gallops. PMI not displaced. No carotid bruit. Pedal pulses palpable bilaterally. No edema.  ABDOMEN: Bowel sounds present. Not distended. Soft. No tenderness, masses or organomegaly.  BREAST EXAM: Symmetrical, no external lesions, no discharge, no masses palpated.  PELVIC EXAM: No external lesions noted, no discharge, absent cervix and uterus, bimanual exam showed no adnexal masses or tenderness noted. Urethra and bladder intact.  RECTAL EXAM: No external hemorrhoids or anal fissures. Heme-negative stool in the rectal vault.  MUSCULOSKELETAL: No joint deformities except slight bilateral knee stiffness without tenderness noted today. She is ambulatory without problems.  SKIN: No rashes or suspicious lesions, normal color and turgor.  NEUROLOGIC: Cranial Nerves: II-XII grossly intact. DTR's: Knees, Ankles 2+ and equal bilaterally. Gait & Posture: Normal gait and fine motion.  PSYCHIATRIC: Patient alert, oriented x 3. Mood/Affect normal without acute anxiety or depression noted. Judgment/insight good as she makes appropriate decisions on today's examination.        ASSESSMENT:    ICD-10-CM ICD-9-CM    1. Annual physical exam  Z00.00 V70.0 CBC auto differential      TSH      Urinalysis      Comprehensive metabolic panel      Lipid Panel      Vitamin D      HIV 1/2 Ag/Ab (4th Gen)   2. Vitamin D deficiency  E55.9 268.9    3. Hypothyroidism, unspecified type  E03.9 244.9    4. Primary osteoarthritis of both knees  M17.0 715.16    5. Benign colon polyp  K63.5 211.3    6. Insomnia, unspecified type  G47.00 780.52 traZODone (DESYREL) 50 MG tablet   7. Morbid obesity with BMI of 60.0-69.9, adult  E66.01  278.01     Z68.44 V85.44    8. Postmenopausal  Z78.0 V49.81    9. Visit for screening mammogram  Z12.31 V76.12 Mammo Digital Screening Bilat   10. Need for shingles vaccine  Z23 V04.89 Zoster Recombinant Vaccine       PLAN:  1. Age-appropriate counseling-appropriate low-sodium, low-cholesterol, low carbohydrate diet and exercise daily, monthly breast self exam, annual wellness examination.   2. Patient advised to call for results.  3. Continue current medications.  4. Add Trazodone 50 mg nightly prn insomnia; medication precautions discussed with patient.  5. Keep follow up with specialists.  6. Work excuse for today with return today.  7. Flu shot this fall.                                       8. Follow up in about 6 months (around 2/17/2021) for hypothyroidism follow up with Shingrix #2.    Answers for HPI/ROS submitted by the patient on 8/15/2020   activity change: Yes  unexpected weight change: No  neck pain: Yes  hearing loss: No  rhinorrhea: No  trouble swallowing: No  eye discharge: No  visual disturbance: No  chest tightness: No  wheezing: No  chest pain: No  palpitations: No  blood in stool: No  constipation: No  vomiting: No  diarrhea: No  polydipsia: No  polyuria: Yes  difficulty urinating: No  hematuria: No  menstrual problem: No  dysuria: No  joint swelling: No  arthralgias: Yes  headaches: No  weakness: No  confusion: No  dysphoric mood: Yes

## 2020-08-17 NOTE — PATIENT INSTRUCTIONS
Please call Dr. Green for your results.    Thanks.  
[facility] [phone]

## 2020-08-18 LAB
25(OH)D3+25(OH)D2 SERPL-MCNC: 22 NG/ML (ref 30–96)
HIV 1+2 AB+HIV1 P24 AG SERPL QL IA: NEGATIVE

## 2020-08-29 ENCOUNTER — LAB VISIT (OUTPATIENT)
Dept: PRIMARY CARE CLINIC | Facility: OTHER | Age: 60
End: 2020-08-29
Attending: INTERNAL MEDICINE
Payer: COMMERCIAL

## 2020-08-29 DIAGNOSIS — Z03.818 ENCOUNTER FOR OBSERVATION FOR SUSPECTED EXPOSURE TO OTHER BIOLOGICAL AGENTS RULED OUT: Primary | ICD-10-CM

## 2020-08-29 PROCEDURE — U0003 INFECTIOUS AGENT DETECTION BY NUCLEIC ACID (DNA OR RNA); SEVERE ACUTE RESPIRATORY SYNDROME CORONAVIRUS 2 (SARS-COV-2) (CORONAVIRUS DISEASE [COVID-19]), AMPLIFIED PROBE TECHNIQUE, MAKING USE OF HIGH THROUGHPUT TECHNOLOGIES AS DESCRIBED BY CMS-2020-01-R: HCPCS

## 2020-08-30 LAB — SARS-COV-2 RNA RESP QL NAA+PROBE: NOT DETECTED

## 2020-09-11 ENCOUNTER — HOSPITAL ENCOUNTER (OUTPATIENT)
Dept: RADIOLOGY | Facility: HOSPITAL | Age: 60
Discharge: HOME | End: 2020-09-11
Attending: FAMILY MEDICINE
Payer: COMMERCIAL

## 2020-09-11 DIAGNOSIS — Z12.31 VISIT FOR SCREENING MAMMOGRAM: ICD-10-CM

## 2020-09-11 PROCEDURE — 77067 SCR MAMMO BI INCL CAD: CPT | Mod: TC

## 2020-09-11 PROCEDURE — 77063 BREAST TOMOSYNTHESIS BI: CPT | Mod: 26,,, | Performed by: RADIOLOGY

## 2020-09-11 PROCEDURE — 77067 SCR MAMMO BI INCL CAD: CPT | Mod: 26,,, | Performed by: RADIOLOGY

## 2020-09-11 PROCEDURE — 77067 MAMMO DIGITAL SCREENING BILAT WITH TOMO: ICD-10-PCS | Mod: 26,,, | Performed by: RADIOLOGY

## 2020-09-11 PROCEDURE — 77063 MAMMO DIGITAL SCREENING BILAT WITH TOMO: ICD-10-PCS | Mod: 26,,, | Performed by: RADIOLOGY

## 2020-09-28 ENCOUNTER — PATIENT MESSAGE (OUTPATIENT)
Dept: FAMILY MEDICINE | Facility: CLINIC | Age: 60
End: 2020-09-28

## 2020-09-29 ENCOUNTER — OFFICE VISIT (OUTPATIENT)
Dept: FAMILY MEDICINE | Facility: CLINIC | Age: 60
End: 2020-09-29
Payer: COMMERCIAL

## 2020-09-29 VITALS
DIASTOLIC BLOOD PRESSURE: 80 MMHG | SYSTOLIC BLOOD PRESSURE: 138 MMHG | TEMPERATURE: 100 F | HEART RATE: 94 BPM | BODY MASS INDEX: 53.92 KG/M2 | HEIGHT: 62 IN | OXYGEN SATURATION: 97 % | WEIGHT: 293 LBS

## 2020-09-29 DIAGNOSIS — R10.32 CHRONIC LLQ PAIN: Primary | ICD-10-CM

## 2020-09-29 DIAGNOSIS — E66.01 MORBID OBESITY WITH BMI OF 60.0-69.9, ADULT: ICD-10-CM

## 2020-09-29 DIAGNOSIS — G89.29 CHRONIC LLQ PAIN: Primary | ICD-10-CM

## 2020-09-29 PROCEDURE — 3008F PR BODY MASS INDEX (BMI) DOCUMENTED: ICD-10-PCS | Mod: CPTII,S$GLB,, | Performed by: FAMILY MEDICINE

## 2020-09-29 PROCEDURE — 99999 PR PBB SHADOW E&M-EST. PATIENT-LVL IV: CPT | Mod: PBBFAC,,, | Performed by: FAMILY MEDICINE

## 2020-09-29 PROCEDURE — 99214 OFFICE O/P EST MOD 30 MIN: CPT | Mod: S$GLB,,, | Performed by: FAMILY MEDICINE

## 2020-09-29 PROCEDURE — 99999 PR PBB SHADOW E&M-EST. PATIENT-LVL IV: ICD-10-PCS | Mod: PBBFAC,,, | Performed by: FAMILY MEDICINE

## 2020-09-29 PROCEDURE — 3008F BODY MASS INDEX DOCD: CPT | Mod: CPTII,S$GLB,, | Performed by: FAMILY MEDICINE

## 2020-09-29 PROCEDURE — 99214 PR OFFICE/OUTPT VISIT, EST, LEVL IV, 30-39 MIN: ICD-10-PCS | Mod: S$GLB,,, | Performed by: FAMILY MEDICINE

## 2020-09-29 NOTE — PROGRESS NOTES
"CHIEF COMPLAINT: This is a 60-year-old female complaining of abdominal pain.    SUBJECTIVE: The patient complains of a 7-8 month history of left lower quadrant pain which she describes as a dull ache.  She rates the pain 7/10 on the pain scale.  Sometimes pain eases with pressure.  Patient was seen by her PCP 6 weeks ago with similar complaints.  Lab and urinalysis were done and she was treated for a bladder infection.  However, pain did not resolve.  Scar tissue was also considered as the cause of her pain.   Patient is status post partial hysterectomy with unilateral oophorectomy.  Pain awakens her at night.  At work, when she is on her feet, she feels like her left side "is going to fall out." Pain is not worse with eating. She usually has a bowel movement daily.  She seldom has constipation.  She denies blood in stool, melena, nausea or vomiting.  Patient reports that Tylenol provides some relief.  Last colonoscopy in 2012 was negative except for  hyperplastic polyp.      the patient's past medical history is significant for hypothyroidism, anemia and morbid obesity.  Her BMI is 56.29.    ROS:  GENERAL: Patient denies fever, chills, night sweats.  Patient denies weight gain or loss. Patient denies anorexia, fatigue, weakness or swollen glands.  SKIN: Patient denies rash.  HEENT: Patient denies sore throat, ear pain, hearing loss, nasal congestion, or runny nose. Patient denies visual disturbance, eye irritation or discharge.  LUNGS: Patient denies cough, wheeze or hemoptysis.  CARDIOVASCULAR: Patient denies chest pain, shortness of breath, palpitations, syncope or lower extremity edema.  GI: Patient denies nausea, vomiting, diarrhea, constipation, blood in stool or melena. positive for abdominal pain.  GENITOURINARY: Patient denies pelvic pain, vaginal discharge, itch or odor. Patient denies irregular vaginal bleeding.  Patient denies dysuria, frequency, hematuria, nocturia, urgency or " incontinence.  MUSCULOSKELETAL: Patient denies joint pain, swelling, redness or warmth.  NEUROLOGIC: Patient denies headache, vertigo, paresthesias, weakness in limb or abnormality of gait.  PSYCHIATRIC: Patient denies depression, anxiety or memory loss.    OBJECTIVE:   GENERAL: Well-developed well-nourished morbidly obese black female alert and oriented x3 in no acute distress.  Memory, judgment and cognition without deficit.  SKIN: Clear without rash.  Normal color and tone.  HEENT: Eyes: Clear conjunctivae.  No scleral icterus.    NECK: Supple, normal range of motion.  No lymphadenopathy.  No masses or enlarged thyroid.  No JVD.  Carotids 2+ and equal.  No bruits.  LUNGS: Clear to auscultation.  Normal respiratory effort.  CARDIOVASCULAR: Regular rhythm, normal S1, S2 without murmur, gallop or rub.  BACK: No CVA or spinal tenderness.  ABDOMEN:  Obese.  Active bowel sounds.  Soft, nontender with mild left lower quadrant tenderness.  No masses or organomegaly.  No rebound or guarding.  EXTREMITIES: Without cyanosis, clubbing or edema.  Distal pulses 2+ and equal.  Normal range of motion in all extremities.  No joint effusion, erythema or warmth.  NEUROLOGIC:  Gait without abnormality.  No tremor.    ASSESSMENT:  1. Chronic LLQ pain    2. Morbid obesity with BMI of 60.0-69.9, adult        PLAN:   1.  CT scan abdomen and pelvis.  2.  Tylenol XS 2 tablets every 4-6 hours as needed for pain.  3.  Follow up once results reviewed.    This note is generated with speech recognition software and is subject to transcription error and sound alike phrases that may be missed by proofreading.

## 2020-10-02 ENCOUNTER — TELEPHONE (OUTPATIENT)
Dept: RADIOLOGY | Facility: HOSPITAL | Age: 60
End: 2020-10-02

## 2020-10-06 ENCOUNTER — PATIENT MESSAGE (OUTPATIENT)
Dept: ADMINISTRATIVE | Facility: HOSPITAL | Age: 60
End: 2020-10-06

## 2020-11-03 DIAGNOSIS — E03.9 HYPOTHYROIDISM, UNSPECIFIED TYPE: ICD-10-CM

## 2020-11-03 RX ORDER — LEVOTHYROXINE SODIUM 200 UG/1
200 TABLET ORAL
Qty: 30 TABLET | Refills: 5 | Status: SHIPPED | OUTPATIENT
Start: 2020-11-03 | End: 2020-12-11 | Stop reason: SDUPTHER

## 2020-11-23 DIAGNOSIS — E55.9 VITAMIN D DEFICIENCY: ICD-10-CM

## 2020-11-23 DIAGNOSIS — G47.00 INSOMNIA, UNSPECIFIED TYPE: ICD-10-CM

## 2020-11-23 RX ORDER — ERGOCALCIFEROL 1.25 MG/1
50000 CAPSULE ORAL
Qty: 24 CAPSULE | Refills: 1 | Status: SHIPPED | OUTPATIENT
Start: 2020-11-23 | End: 2020-12-05

## 2020-11-23 RX ORDER — TRAZODONE HYDROCHLORIDE 50 MG/1
50 TABLET ORAL NIGHTLY PRN
Qty: 30 TABLET | Refills: 5 | Status: SHIPPED | OUTPATIENT
Start: 2020-11-23 | End: 2021-01-15 | Stop reason: SDUPTHER

## 2020-12-11 DIAGNOSIS — E03.9 HYPOTHYROIDISM, UNSPECIFIED TYPE: ICD-10-CM

## 2020-12-11 RX ORDER — LEVOTHYROXINE SODIUM 200 UG/1
200 TABLET ORAL
Qty: 30 TABLET | Refills: 5 | Status: SHIPPED | OUTPATIENT
Start: 2020-12-11 | End: 2021-02-17 | Stop reason: SDUPTHER

## 2021-01-15 DIAGNOSIS — E55.9 VITAMIN D DEFICIENCY: ICD-10-CM

## 2021-01-15 DIAGNOSIS — G47.00 INSOMNIA, UNSPECIFIED TYPE: ICD-10-CM

## 2021-01-17 RX ORDER — TRAZODONE HYDROCHLORIDE 50 MG/1
50 TABLET ORAL NIGHTLY PRN
Qty: 30 TABLET | Refills: 0 | Status: SHIPPED | OUTPATIENT
Start: 2021-01-17 | End: 2021-08-17 | Stop reason: SDUPTHER

## 2021-01-17 RX ORDER — ERGOCALCIFEROL 1.25 MG/1
50000 CAPSULE ORAL
Qty: 8 CAPSULE | Refills: 0 | Status: SHIPPED | OUTPATIENT
Start: 2021-01-18 | End: 2021-08-17 | Stop reason: SDUPTHER

## 2021-02-17 ENCOUNTER — OFFICE VISIT (OUTPATIENT)
Dept: FAMILY MEDICINE | Facility: CLINIC | Age: 61
End: 2021-02-17
Payer: COMMERCIAL

## 2021-02-17 VITALS
DIASTOLIC BLOOD PRESSURE: 82 MMHG | BODY MASS INDEX: 53.92 KG/M2 | HEART RATE: 63 BPM | TEMPERATURE: 97 F | RESPIRATION RATE: 16 BRPM | HEIGHT: 62 IN | SYSTOLIC BLOOD PRESSURE: 126 MMHG | OXYGEN SATURATION: 99 % | WEIGHT: 293 LBS

## 2021-02-17 DIAGNOSIS — E66.01 MORBID OBESITY WITH BMI OF 50.0-59.9, ADULT: ICD-10-CM

## 2021-02-17 DIAGNOSIS — Z78.0 POSTMENOPAUSAL: ICD-10-CM

## 2021-02-17 DIAGNOSIS — M17.0 PRIMARY OSTEOARTHRITIS OF BOTH KNEES: ICD-10-CM

## 2021-02-17 DIAGNOSIS — Z23 NEED FOR SHINGLES VACCINE: ICD-10-CM

## 2021-02-17 DIAGNOSIS — E55.9 VITAMIN D DEFICIENCY: ICD-10-CM

## 2021-02-17 DIAGNOSIS — R74.01 ELEVATED ALT MEASUREMENT: ICD-10-CM

## 2021-02-17 DIAGNOSIS — E03.9 HYPOTHYROIDISM, UNSPECIFIED TYPE: Primary | ICD-10-CM

## 2021-02-17 PROCEDURE — 99214 PR OFFICE/OUTPT VISIT, EST, LEVL IV, 30-39 MIN: ICD-10-PCS | Mod: 25,S$GLB,, | Performed by: FAMILY MEDICINE

## 2021-02-17 PROCEDURE — 3008F BODY MASS INDEX DOCD: CPT | Mod: CPTII,S$GLB,, | Performed by: FAMILY MEDICINE

## 2021-02-17 PROCEDURE — 90471 IMMUNIZATION ADMIN: CPT | Mod: S$GLB,,, | Performed by: FAMILY MEDICINE

## 2021-02-17 PROCEDURE — 90750 ZOSTER RECOMBINANT VACCINE: ICD-10-PCS | Mod: S$GLB,,, | Performed by: FAMILY MEDICINE

## 2021-02-17 PROCEDURE — 90750 HZV VACC RECOMBINANT IM: CPT | Mod: S$GLB,,, | Performed by: FAMILY MEDICINE

## 2021-02-17 PROCEDURE — 90471 ZOSTER RECOMBINANT VACCINE: ICD-10-PCS | Mod: S$GLB,,, | Performed by: FAMILY MEDICINE

## 2021-02-17 PROCEDURE — 99214 OFFICE O/P EST MOD 30 MIN: CPT | Mod: 25,S$GLB,, | Performed by: FAMILY MEDICINE

## 2021-02-17 PROCEDURE — 1126F PR PAIN SEVERITY QUANTIFIED, NO PAIN PRESENT: ICD-10-PCS | Mod: S$GLB,,, | Performed by: FAMILY MEDICINE

## 2021-02-17 PROCEDURE — 3008F PR BODY MASS INDEX (BMI) DOCUMENTED: ICD-10-PCS | Mod: CPTII,S$GLB,, | Performed by: FAMILY MEDICINE

## 2021-02-17 PROCEDURE — 99999 PR PBB SHADOW E&M-EST. PATIENT-LVL IV: ICD-10-PCS | Mod: PBBFAC,,, | Performed by: FAMILY MEDICINE

## 2021-02-17 PROCEDURE — 1126F AMNT PAIN NOTED NONE PRSNT: CPT | Mod: S$GLB,,, | Performed by: FAMILY MEDICINE

## 2021-02-17 PROCEDURE — 99999 PR PBB SHADOW E&M-EST. PATIENT-LVL IV: CPT | Mod: PBBFAC,,, | Performed by: FAMILY MEDICINE

## 2021-02-17 RX ORDER — LEVOTHYROXINE SODIUM 200 UG/1
200 TABLET ORAL
Qty: 90 TABLET | Refills: 1 | Status: SHIPPED | OUTPATIENT
Start: 2021-02-17 | End: 2021-08-17 | Stop reason: SDUPTHER

## 2021-03-10 ENCOUNTER — CLINICAL SUPPORT (OUTPATIENT)
Dept: OTHER | Facility: CLINIC | Age: 61
End: 2021-03-10
Payer: COMMERCIAL

## 2021-03-10 DIAGNOSIS — Z00.8 ENCOUNTER FOR OTHER GENERAL EXAMINATION: ICD-10-CM

## 2021-03-10 PROCEDURE — 82947 ASSAY GLUCOSE BLOOD QUANT: CPT | Mod: QW,S$GLB,, | Performed by: INTERNAL MEDICINE

## 2021-03-10 PROCEDURE — 80061 PR  LIPID PANEL: ICD-10-PCS | Mod: QW,S$GLB,, | Performed by: INTERNAL MEDICINE

## 2021-03-10 PROCEDURE — 80061 LIPID PANEL: CPT | Mod: QW,S$GLB,, | Performed by: INTERNAL MEDICINE

## 2021-03-10 PROCEDURE — 82947 PR  ASSAY QUANTITATIVE,BLOOD GLUCOSE: ICD-10-PCS | Mod: QW,S$GLB,, | Performed by: INTERNAL MEDICINE

## 2021-03-10 PROCEDURE — 99401 PR PREVENT COUNSEL,INDIV,15 MIN: ICD-10-PCS | Mod: 25,S$GLB,, | Performed by: INTERNAL MEDICINE

## 2021-03-10 PROCEDURE — 99401 PREV MED CNSL INDIV APPRX 15: CPT | Mod: 25,S$GLB,, | Performed by: INTERNAL MEDICINE

## 2021-03-12 VITALS — HEIGHT: 62 IN | BODY MASS INDEX: 54.88 KG/M2

## 2021-03-12 LAB
HDLC SERPL-MCNC: 76 MG/DL
POC CHOLESTEROL, LDL (DOCK): 99 MG/DL
POC CHOLESTEROL, TOTAL: 189 MG/DL
POC GLUCOSE, FASTING: 94 MG/DL (ref 60–110)
TRIGL SERPL-MCNC: 68 MG/DL

## 2021-04-28 ENCOUNTER — PATIENT MESSAGE (OUTPATIENT)
Dept: RESEARCH | Facility: HOSPITAL | Age: 61
End: 2021-04-28

## 2021-06-08 NOTE — PROGRESS NOTES
Stable. Continue aspirin, Crestor, niacin, lisinopril, metoprolol. No changes recommended. Swab collected by assigned

## 2021-06-24 ENCOUNTER — TELEPHONE (OUTPATIENT)
Dept: PSYCHIATRY | Facility: CLINIC | Age: 61
End: 2021-06-24

## 2021-07-09 ENCOUNTER — LAB VISIT (OUTPATIENT)
Dept: LAB | Facility: HOSPITAL | Age: 61
End: 2021-07-09
Payer: COMMERCIAL

## 2021-07-09 ENCOUNTER — OFFICE VISIT (OUTPATIENT)
Dept: FAMILY MEDICINE | Facility: CLINIC | Age: 61
End: 2021-07-09
Payer: COMMERCIAL

## 2021-07-09 ENCOUNTER — TELEPHONE (OUTPATIENT)
Dept: PAIN MEDICINE | Facility: CLINIC | Age: 61
End: 2021-07-09

## 2021-07-09 VITALS
BODY MASS INDEX: 53.92 KG/M2 | OXYGEN SATURATION: 98 % | WEIGHT: 293 LBS | HEART RATE: 98 BPM | TEMPERATURE: 98 F | SYSTOLIC BLOOD PRESSURE: 126 MMHG | HEIGHT: 62 IN | DIASTOLIC BLOOD PRESSURE: 78 MMHG

## 2021-07-09 DIAGNOSIS — G89.29 CHRONIC LOW BACK PAIN WITH SCIATICA, SCIATICA LATERALITY UNSPECIFIED, UNSPECIFIED BACK PAIN LATERALITY: ICD-10-CM

## 2021-07-09 DIAGNOSIS — M50.30 DDD (DEGENERATIVE DISC DISEASE), CERVICAL: ICD-10-CM

## 2021-07-09 DIAGNOSIS — M54.40 CHRONIC LOW BACK PAIN WITH SCIATICA, SCIATICA LATERALITY UNSPECIFIED, UNSPECIFIED BACK PAIN LATERALITY: Primary | ICD-10-CM

## 2021-07-09 DIAGNOSIS — M47.816 LUMBAR SPONDYLOSIS: ICD-10-CM

## 2021-07-09 DIAGNOSIS — M54.40 CHRONIC LOW BACK PAIN WITH SCIATICA, SCIATICA LATERALITY UNSPECIFIED, UNSPECIFIED BACK PAIN LATERALITY: ICD-10-CM

## 2021-07-09 DIAGNOSIS — E66.01 MORBID OBESITY WITH BMI OF 50.0-59.9, ADULT: ICD-10-CM

## 2021-07-09 DIAGNOSIS — G89.29 CHRONIC LOW BACK PAIN WITH SCIATICA, SCIATICA LATERALITY UNSPECIFIED, UNSPECIFIED BACK PAIN LATERALITY: Primary | ICD-10-CM

## 2021-07-09 LAB — RHEUMATOID FACT SERPL-ACNC: <10 IU/ML (ref 0–15)

## 2021-07-09 PROCEDURE — 3074F PR MOST RECENT SYSTOLIC BLOOD PRESSURE < 130 MM HG: ICD-10-PCS | Mod: CPTII,S$GLB,, | Performed by: FAMILY MEDICINE

## 2021-07-09 PROCEDURE — 3008F BODY MASS INDEX DOCD: CPT | Mod: CPTII,S$GLB,, | Performed by: FAMILY MEDICINE

## 2021-07-09 PROCEDURE — 86200 CCP ANTIBODY: CPT | Performed by: FAMILY MEDICINE

## 2021-07-09 PROCEDURE — 3078F PR MOST RECENT DIASTOLIC BLOOD PRESSURE < 80 MM HG: ICD-10-PCS | Mod: CPTII,S$GLB,, | Performed by: FAMILY MEDICINE

## 2021-07-09 PROCEDURE — 3008F PR BODY MASS INDEX (BMI) DOCUMENTED: ICD-10-PCS | Mod: CPTII,S$GLB,, | Performed by: FAMILY MEDICINE

## 2021-07-09 PROCEDURE — 99214 OFFICE O/P EST MOD 30 MIN: CPT | Mod: S$GLB,,, | Performed by: FAMILY MEDICINE

## 2021-07-09 PROCEDURE — 3078F DIAST BP <80 MM HG: CPT | Mod: CPTII,S$GLB,, | Performed by: FAMILY MEDICINE

## 2021-07-09 PROCEDURE — 86038 ANTINUCLEAR ANTIBODIES: CPT | Performed by: FAMILY MEDICINE

## 2021-07-09 PROCEDURE — 86140 C-REACTIVE PROTEIN: CPT | Performed by: FAMILY MEDICINE

## 2021-07-09 PROCEDURE — 85652 RBC SED RATE AUTOMATED: CPT | Performed by: FAMILY MEDICINE

## 2021-07-09 PROCEDURE — 99999 PR PBB SHADOW E&M-EST. PATIENT-LVL IV: CPT | Mod: PBBFAC,,, | Performed by: FAMILY MEDICINE

## 2021-07-09 PROCEDURE — 3074F SYST BP LT 130 MM HG: CPT | Mod: CPTII,S$GLB,, | Performed by: FAMILY MEDICINE

## 2021-07-09 PROCEDURE — 99214 PR OFFICE/OUTPT VISIT, EST, LEVL IV, 30-39 MIN: ICD-10-PCS | Mod: S$GLB,,, | Performed by: FAMILY MEDICINE

## 2021-07-09 PROCEDURE — 36415 COLL VENOUS BLD VENIPUNCTURE: CPT | Mod: PO | Performed by: FAMILY MEDICINE

## 2021-07-09 PROCEDURE — 99999 PR PBB SHADOW E&M-EST. PATIENT-LVL IV: ICD-10-PCS | Mod: PBBFAC,,, | Performed by: FAMILY MEDICINE

## 2021-07-09 PROCEDURE — 86431 RHEUMATOID FACTOR QUANT: CPT | Performed by: FAMILY MEDICINE

## 2021-07-09 RX ORDER — IBUPROFEN 800 MG/1
800 TABLET ORAL 2 TIMES DAILY PRN
Qty: 60 TABLET | Refills: 2 | Status: SHIPPED | OUTPATIENT
Start: 2021-07-09 | End: 2022-06-22 | Stop reason: SDUPTHER

## 2021-07-10 LAB
CCP AB SER IA-ACNC: 0.6 U/ML
CRP SERPL-MCNC: 7.8 MG/L (ref 0–8.2)
ERYTHROCYTE [SEDIMENTATION RATE] IN BLOOD BY WESTERGREN METHOD: 63 MM/HR (ref 0–36)

## 2021-07-12 ENCOUNTER — OFFICE VISIT (OUTPATIENT)
Dept: PAIN MEDICINE | Facility: CLINIC | Age: 61
End: 2021-07-12
Payer: COMMERCIAL

## 2021-07-12 ENCOUNTER — TELEPHONE (OUTPATIENT)
Dept: PAIN MEDICINE | Facility: CLINIC | Age: 61
End: 2021-07-12

## 2021-07-12 VITALS
WEIGHT: 293 LBS | SYSTOLIC BLOOD PRESSURE: 153 MMHG | BODY MASS INDEX: 55.81 KG/M2 | DIASTOLIC BLOOD PRESSURE: 87 MMHG | HEART RATE: 89 BPM

## 2021-07-12 DIAGNOSIS — M54.16 LUMBAR RADICULOPATHY, CHRONIC: Primary | ICD-10-CM

## 2021-07-12 DIAGNOSIS — M17.0 PRIMARY OSTEOARTHRITIS OF BOTH KNEES: ICD-10-CM

## 2021-07-12 DIAGNOSIS — M54.40 CHRONIC LOW BACK PAIN WITH SCIATICA, SCIATICA LATERALITY UNSPECIFIED, UNSPECIFIED BACK PAIN LATERALITY: ICD-10-CM

## 2021-07-12 DIAGNOSIS — M47.816 LUMBAR SPONDYLOSIS: ICD-10-CM

## 2021-07-12 DIAGNOSIS — G89.29 CHRONIC LOW BACK PAIN WITH SCIATICA, SCIATICA LATERALITY UNSPECIFIED, UNSPECIFIED BACK PAIN LATERALITY: ICD-10-CM

## 2021-07-12 PROCEDURE — 1125F AMNT PAIN NOTED PAIN PRSNT: CPT | Mod: S$GLB,,, | Performed by: ANESTHESIOLOGY

## 2021-07-12 PROCEDURE — 99204 OFFICE O/P NEW MOD 45 MIN: CPT | Mod: S$GLB,,, | Performed by: ANESTHESIOLOGY

## 2021-07-12 PROCEDURE — 99999 PR PBB SHADOW E&M-EST. PATIENT-LVL III: ICD-10-PCS | Mod: PBBFAC,,, | Performed by: ANESTHESIOLOGY

## 2021-07-12 PROCEDURE — 1125F PR PAIN SEVERITY QUANTIFIED, PAIN PRESENT: ICD-10-PCS | Mod: S$GLB,,, | Performed by: ANESTHESIOLOGY

## 2021-07-12 PROCEDURE — 3008F BODY MASS INDEX DOCD: CPT | Mod: CPTII,S$GLB,, | Performed by: ANESTHESIOLOGY

## 2021-07-12 PROCEDURE — 99999 PR PBB SHADOW E&M-EST. PATIENT-LVL III: CPT | Mod: PBBFAC,,, | Performed by: ANESTHESIOLOGY

## 2021-07-12 PROCEDURE — 99204 PR OFFICE/OUTPT VISIT, NEW, LEVL IV, 45-59 MIN: ICD-10-PCS | Mod: S$GLB,,, | Performed by: ANESTHESIOLOGY

## 2021-07-12 PROCEDURE — 3008F PR BODY MASS INDEX (BMI) DOCUMENTED: ICD-10-PCS | Mod: CPTII,S$GLB,, | Performed by: ANESTHESIOLOGY

## 2021-07-12 RX ORDER — GABAPENTIN 300 MG/1
CAPSULE ORAL
Qty: 126 CAPSULE | Refills: 0 | Status: SHIPPED | OUTPATIENT
Start: 2021-07-12 | End: 2022-10-28

## 2021-07-13 ENCOUNTER — TELEPHONE (OUTPATIENT)
Dept: PAIN MEDICINE | Facility: CLINIC | Age: 61
End: 2021-07-13

## 2021-07-13 LAB — ANA SER QL IF: NORMAL

## 2021-07-21 ENCOUNTER — CLINICAL SUPPORT (OUTPATIENT)
Dept: REHABILITATION | Facility: HOSPITAL | Age: 61
End: 2021-07-21
Attending: ANESTHESIOLOGY
Payer: COMMERCIAL

## 2021-07-21 DIAGNOSIS — R26.2 DIFFICULTY WALKING: ICD-10-CM

## 2021-07-21 DIAGNOSIS — M47.816 LUMBAR SPONDYLOSIS: ICD-10-CM

## 2021-07-21 DIAGNOSIS — M54.16 LUMBAR RADICULOPATHY, CHRONIC: ICD-10-CM

## 2021-07-21 DIAGNOSIS — G89.29 CHRONIC LOW BACK PAIN WITH SCIATICA, SCIATICA LATERALITY UNSPECIFIED, UNSPECIFIED BACK PAIN LATERALITY: ICD-10-CM

## 2021-07-21 DIAGNOSIS — M54.40 CHRONIC LOW BACK PAIN WITH SCIATICA, SCIATICA LATERALITY UNSPECIFIED, UNSPECIFIED BACK PAIN LATERALITY: ICD-10-CM

## 2021-07-21 PROCEDURE — 97110 THERAPEUTIC EXERCISES: CPT

## 2021-07-21 PROCEDURE — 97161 PT EVAL LOW COMPLEX 20 MIN: CPT

## 2021-07-28 ENCOUNTER — CLINICAL SUPPORT (OUTPATIENT)
Dept: REHABILITATION | Facility: HOSPITAL | Age: 61
End: 2021-07-28
Payer: COMMERCIAL

## 2021-07-28 DIAGNOSIS — R26.2 DIFFICULTY WALKING: ICD-10-CM

## 2021-07-28 PROCEDURE — 97110 THERAPEUTIC EXERCISES: CPT

## 2021-07-30 ENCOUNTER — CLINICAL SUPPORT (OUTPATIENT)
Dept: REHABILITATION | Facility: HOSPITAL | Age: 61
End: 2021-07-30
Payer: COMMERCIAL

## 2021-07-30 DIAGNOSIS — R26.2 DIFFICULTY WALKING: ICD-10-CM

## 2021-07-30 PROCEDURE — 97110 THERAPEUTIC EXERCISES: CPT

## 2021-08-03 ENCOUNTER — PATIENT MESSAGE (OUTPATIENT)
Dept: PAIN MEDICINE | Facility: HOSPITAL | Age: 61
End: 2021-08-03

## 2021-08-04 ENCOUNTER — CLINICAL SUPPORT (OUTPATIENT)
Dept: REHABILITATION | Facility: HOSPITAL | Age: 61
End: 2021-08-04
Payer: COMMERCIAL

## 2021-08-04 DIAGNOSIS — R26.2 DIFFICULTY WALKING: ICD-10-CM

## 2021-08-04 PROCEDURE — 97110 THERAPEUTIC EXERCISES: CPT

## 2021-08-13 ENCOUNTER — CLINICAL SUPPORT (OUTPATIENT)
Dept: REHABILITATION | Facility: HOSPITAL | Age: 61
End: 2021-08-13
Payer: COMMERCIAL

## 2021-08-13 DIAGNOSIS — R26.2 DIFFICULTY WALKING: ICD-10-CM

## 2021-08-13 PROCEDURE — 97110 THERAPEUTIC EXERCISES: CPT

## 2021-08-13 PROCEDURE — 97140 MANUAL THERAPY 1/> REGIONS: CPT

## 2021-08-17 ENCOUNTER — OFFICE VISIT (OUTPATIENT)
Dept: FAMILY MEDICINE | Facility: CLINIC | Age: 61
End: 2021-08-17
Payer: COMMERCIAL

## 2021-08-17 ENCOUNTER — LAB VISIT (OUTPATIENT)
Dept: LAB | Facility: HOSPITAL | Age: 61
End: 2021-08-17
Attending: FAMILY MEDICINE
Payer: COMMERCIAL

## 2021-08-17 VITALS
WEIGHT: 293 LBS | OXYGEN SATURATION: 96 % | HEIGHT: 62 IN | HEART RATE: 88 BPM | DIASTOLIC BLOOD PRESSURE: 80 MMHG | RESPIRATION RATE: 18 BRPM | SYSTOLIC BLOOD PRESSURE: 136 MMHG | BODY MASS INDEX: 53.92 KG/M2 | TEMPERATURE: 99 F

## 2021-08-17 DIAGNOSIS — E03.9 HYPOTHYROIDISM, UNSPECIFIED TYPE: ICD-10-CM

## 2021-08-17 DIAGNOSIS — E66.01 MORBID OBESITY WITH BMI OF 50.0-59.9, ADULT: ICD-10-CM

## 2021-08-17 DIAGNOSIS — Z00.00 ANNUAL PHYSICAL EXAM: ICD-10-CM

## 2021-08-17 DIAGNOSIS — E55.9 VITAMIN D DEFICIENCY: ICD-10-CM

## 2021-08-17 DIAGNOSIS — Z78.0 POSTMENOPAUSAL: ICD-10-CM

## 2021-08-17 DIAGNOSIS — Z12.31 VISIT FOR SCREENING MAMMOGRAM: ICD-10-CM

## 2021-08-17 DIAGNOSIS — M47.816 LUMBAR SPONDYLOSIS: ICD-10-CM

## 2021-08-17 DIAGNOSIS — G47.00 INSOMNIA, UNSPECIFIED TYPE: ICD-10-CM

## 2021-08-17 DIAGNOSIS — Z00.00 ANNUAL PHYSICAL EXAM: Primary | ICD-10-CM

## 2021-08-17 DIAGNOSIS — K63.5 BENIGN COLON POLYP: ICD-10-CM

## 2021-08-17 DIAGNOSIS — M17.0 PRIMARY OSTEOARTHRITIS OF BOTH KNEES: ICD-10-CM

## 2021-08-17 LAB
25(OH)D3+25(OH)D2 SERPL-MCNC: 29 NG/ML (ref 30–96)
ALBUMIN SERPL BCP-MCNC: 3.9 G/DL (ref 3.5–5.2)
ALP SERPL-CCNC: 75 U/L (ref 55–135)
ALT SERPL W/O P-5'-P-CCNC: 20 U/L (ref 10–44)
ANION GAP SERPL CALC-SCNC: 10 MMOL/L (ref 8–16)
AST SERPL-CCNC: 11 U/L (ref 10–40)
BASOPHILS # BLD AUTO: 0.03 K/UL (ref 0–0.2)
BASOPHILS NFR BLD: 0.5 % (ref 0–1.9)
BILIRUB SERPL-MCNC: 0.6 MG/DL (ref 0.1–1)
BILIRUB UR QL STRIP: NEGATIVE
BUN SERPL-MCNC: 17 MG/DL (ref 6–20)
CALCIUM SERPL-MCNC: 10.4 MG/DL (ref 8.7–10.5)
CHLORIDE SERPL-SCNC: 105 MMOL/L (ref 95–110)
CHOLEST SERPL-MCNC: 166 MG/DL (ref 120–199)
CHOLEST/HDLC SERPL: 2.7 {RATIO} (ref 2–5)
CLARITY UR REFRACT.AUTO: CLEAR
CO2 SERPL-SCNC: 26 MMOL/L (ref 23–29)
COLOR UR AUTO: YELLOW
CREAT SERPL-MCNC: 0.9 MG/DL (ref 0.5–1.4)
DIFFERENTIAL METHOD: ABNORMAL
EOSINOPHIL # BLD AUTO: 0.1 K/UL (ref 0–0.5)
EOSINOPHIL NFR BLD: 1.4 % (ref 0–8)
ERYTHROCYTE [DISTWIDTH] IN BLOOD BY AUTOMATED COUNT: 15.5 % (ref 11.5–14.5)
EST. GFR  (AFRICAN AMERICAN): >60 ML/MIN/1.73 M^2
EST. GFR  (NON AFRICAN AMERICAN): >60 ML/MIN/1.73 M^2
GLUCOSE SERPL-MCNC: 84 MG/DL (ref 70–110)
GLUCOSE UR QL STRIP: NEGATIVE
HCT VFR BLD AUTO: 38.3 % (ref 37–48.5)
HDLC SERPL-MCNC: 62 MG/DL (ref 40–75)
HDLC SERPL: 37.3 % (ref 20–50)
HGB BLD-MCNC: 12 G/DL (ref 12–16)
HGB UR QL STRIP: NEGATIVE
IMM GRANULOCYTES # BLD AUTO: 0.02 K/UL (ref 0–0.04)
IMM GRANULOCYTES NFR BLD AUTO: 0.3 % (ref 0–0.5)
KETONES UR QL STRIP: NEGATIVE
LDLC SERPL CALC-MCNC: 90.2 MG/DL (ref 63–159)
LEUKOCYTE ESTERASE UR QL STRIP: NEGATIVE
LYMPHOCYTES # BLD AUTO: 1.9 K/UL (ref 1–4.8)
LYMPHOCYTES NFR BLD: 28.9 % (ref 18–48)
MCH RBC QN AUTO: 26.4 PG (ref 27–31)
MCHC RBC AUTO-ENTMCNC: 31.3 G/DL (ref 32–36)
MCV RBC AUTO: 84 FL (ref 82–98)
MONOCYTES # BLD AUTO: 0.5 K/UL (ref 0.3–1)
MONOCYTES NFR BLD: 8.1 % (ref 4–15)
NEUTROPHILS # BLD AUTO: 3.9 K/UL (ref 1.8–7.7)
NEUTROPHILS NFR BLD: 60.8 % (ref 38–73)
NITRITE UR QL STRIP: NEGATIVE
NONHDLC SERPL-MCNC: 104 MG/DL
NRBC BLD-RTO: 0 /100 WBC
PH UR STRIP: 5 [PH] (ref 5–8)
PLATELET # BLD AUTO: 266 K/UL (ref 150–450)
PMV BLD AUTO: 10.6 FL (ref 9.2–12.9)
POTASSIUM SERPL-SCNC: 3.8 MMOL/L (ref 3.5–5.1)
PROT SERPL-MCNC: 8.1 G/DL (ref 6–8.4)
PROT UR QL STRIP: NEGATIVE
RBC # BLD AUTO: 4.54 M/UL (ref 4–5.4)
SODIUM SERPL-SCNC: 141 MMOL/L (ref 136–145)
SP GR UR STRIP: 1.02 (ref 1–1.03)
T4 FREE SERPL-MCNC: 1.34 NG/DL (ref 0.71–1.51)
TRIGL SERPL-MCNC: 69 MG/DL (ref 30–150)
TSH SERPL DL<=0.005 MIU/L-ACNC: 0.03 UIU/ML (ref 0.4–4)
URN SPEC COLLECT METH UR: NORMAL
WBC # BLD AUTO: 6.4 K/UL (ref 3.9–12.7)

## 2021-08-17 PROCEDURE — 80053 COMPREHEN METABOLIC PANEL: CPT | Performed by: FAMILY MEDICINE

## 2021-08-17 PROCEDURE — 80061 LIPID PANEL: CPT | Performed by: FAMILY MEDICINE

## 2021-08-17 PROCEDURE — 81003 URINALYSIS AUTO W/O SCOPE: CPT | Performed by: FAMILY MEDICINE

## 2021-08-17 PROCEDURE — 3008F PR BODY MASS INDEX (BMI) DOCUMENTED: ICD-10-PCS | Mod: CPTII,S$GLB,, | Performed by: FAMILY MEDICINE

## 2021-08-17 PROCEDURE — 99999 PR PBB SHADOW E&M-EST. PATIENT-LVL V: CPT | Mod: PBBFAC,,, | Performed by: FAMILY MEDICINE

## 2021-08-17 PROCEDURE — 99396 PREV VISIT EST AGE 40-64: CPT | Mod: S$GLB,,, | Performed by: FAMILY MEDICINE

## 2021-08-17 PROCEDURE — 84443 ASSAY THYROID STIM HORMONE: CPT | Performed by: FAMILY MEDICINE

## 2021-08-17 PROCEDURE — 3075F SYST BP GE 130 - 139MM HG: CPT | Mod: CPTII,S$GLB,, | Performed by: FAMILY MEDICINE

## 2021-08-17 PROCEDURE — 3008F BODY MASS INDEX DOCD: CPT | Mod: CPTII,S$GLB,, | Performed by: FAMILY MEDICINE

## 2021-08-17 PROCEDURE — 36415 COLL VENOUS BLD VENIPUNCTURE: CPT | Mod: PO | Performed by: FAMILY MEDICINE

## 2021-08-17 PROCEDURE — 3079F DIAST BP 80-89 MM HG: CPT | Mod: CPTII,S$GLB,, | Performed by: FAMILY MEDICINE

## 2021-08-17 PROCEDURE — 84439 ASSAY OF FREE THYROXINE: CPT | Performed by: FAMILY MEDICINE

## 2021-08-17 PROCEDURE — 85025 COMPLETE CBC W/AUTO DIFF WBC: CPT | Performed by: FAMILY MEDICINE

## 2021-08-17 PROCEDURE — 82306 VITAMIN D 25 HYDROXY: CPT | Performed by: FAMILY MEDICINE

## 2021-08-17 PROCEDURE — 3075F PR MOST RECENT SYSTOLIC BLOOD PRESS GE 130-139MM HG: ICD-10-PCS | Mod: CPTII,S$GLB,, | Performed by: FAMILY MEDICINE

## 2021-08-17 PROCEDURE — 3079F PR MOST RECENT DIASTOLIC BLOOD PRESSURE 80-89 MM HG: ICD-10-PCS | Mod: CPTII,S$GLB,, | Performed by: FAMILY MEDICINE

## 2021-08-17 PROCEDURE — 99396 PR PREVENTIVE VISIT,EST,40-64: ICD-10-PCS | Mod: S$GLB,,, | Performed by: FAMILY MEDICINE

## 2021-08-17 PROCEDURE — 99999 PR PBB SHADOW E&M-EST. PATIENT-LVL V: ICD-10-PCS | Mod: PBBFAC,,, | Performed by: FAMILY MEDICINE

## 2021-08-17 RX ORDER — ERGOCALCIFEROL 1.25 MG/1
50000 CAPSULE ORAL
Qty: 25 CAPSULE | Refills: 3 | Status: SHIPPED | OUTPATIENT
Start: 2021-08-19 | End: 2023-01-20

## 2021-08-17 RX ORDER — TRAZODONE HYDROCHLORIDE 50 MG/1
50 TABLET ORAL NIGHTLY PRN
Qty: 90 TABLET | Refills: 3 | Status: SHIPPED | OUTPATIENT
Start: 2021-08-17 | End: 2022-06-22 | Stop reason: SDUPTHER

## 2021-08-17 RX ORDER — LEVOTHYROXINE SODIUM 200 UG/1
200 TABLET ORAL
Qty: 90 TABLET | Refills: 3 | Status: SHIPPED | OUTPATIENT
Start: 2021-08-17 | End: 2021-08-29

## 2021-08-18 ENCOUNTER — CLINICAL SUPPORT (OUTPATIENT)
Dept: REHABILITATION | Facility: HOSPITAL | Age: 61
End: 2021-08-18
Payer: COMMERCIAL

## 2021-08-18 DIAGNOSIS — R26.2 DIFFICULTY WALKING: ICD-10-CM

## 2021-08-18 PROCEDURE — 97140 MANUAL THERAPY 1/> REGIONS: CPT

## 2021-08-18 PROCEDURE — 97110 THERAPEUTIC EXERCISES: CPT

## 2021-08-20 ENCOUNTER — CLINICAL SUPPORT (OUTPATIENT)
Dept: REHABILITATION | Facility: HOSPITAL | Age: 61
End: 2021-08-20
Payer: COMMERCIAL

## 2021-08-20 DIAGNOSIS — R26.2 DIFFICULTY WALKING: ICD-10-CM

## 2021-08-20 PROCEDURE — 97110 THERAPEUTIC EXERCISES: CPT

## 2021-08-24 ENCOUNTER — CLINICAL SUPPORT (OUTPATIENT)
Dept: REHABILITATION | Facility: HOSPITAL | Age: 61
End: 2021-08-24
Payer: COMMERCIAL

## 2021-08-24 DIAGNOSIS — R26.2 DIFFICULTY WALKING: ICD-10-CM

## 2021-08-24 PROCEDURE — 97110 THERAPEUTIC EXERCISES: CPT

## 2021-08-24 PROCEDURE — 97140 MANUAL THERAPY 1/> REGIONS: CPT

## 2021-08-26 ENCOUNTER — CLINICAL SUPPORT (OUTPATIENT)
Dept: REHABILITATION | Facility: HOSPITAL | Age: 61
End: 2021-08-26
Payer: COMMERCIAL

## 2021-08-26 DIAGNOSIS — R26.2 DIFFICULTY WALKING: ICD-10-CM

## 2021-08-26 PROCEDURE — 97140 MANUAL THERAPY 1/> REGIONS: CPT

## 2021-08-26 PROCEDURE — 97110 THERAPEUTIC EXERCISES: CPT

## 2021-08-29 DIAGNOSIS — E03.9 HYPOTHYROIDISM, UNSPECIFIED TYPE: ICD-10-CM

## 2021-08-29 RX ORDER — LEVOTHYROXINE SODIUM 175 UG/1
TABLET ORAL
Qty: 90 TABLET | Refills: 1 | Status: SHIPPED | OUTPATIENT
Start: 2021-08-29 | End: 2022-02-27 | Stop reason: SDUPTHER

## 2021-09-12 ENCOUNTER — PATIENT MESSAGE (OUTPATIENT)
Dept: PAIN MEDICINE | Facility: CLINIC | Age: 61
End: 2021-09-12

## 2021-09-12 ENCOUNTER — TELEPHONE (OUTPATIENT)
Dept: PAIN MEDICINE | Facility: CLINIC | Age: 61
End: 2021-09-12

## 2021-09-13 ENCOUNTER — HOSPITAL ENCOUNTER (OUTPATIENT)
Dept: RADIOLOGY | Facility: HOSPITAL | Age: 61
Discharge: HOME | End: 2021-09-13
Attending: FAMILY MEDICINE
Payer: COMMERCIAL

## 2021-09-13 ENCOUNTER — DOCUMENTATION ONLY (OUTPATIENT)
Dept: REHABILITATION | Facility: HOSPITAL | Age: 61
End: 2021-09-13

## 2021-09-13 DIAGNOSIS — Z12.31 VISIT FOR SCREENING MAMMOGRAM: ICD-10-CM

## 2021-09-13 PROCEDURE — 77067 SCR MAMMO BI INCL CAD: CPT | Mod: TC

## 2021-09-13 PROCEDURE — 77063 MAMMO DIGITAL SCREENING BILAT WITH TOMO: ICD-10-PCS | Mod: 26,,, | Performed by: RADIOLOGY

## 2021-09-13 PROCEDURE — 77067 MAMMO DIGITAL SCREENING BILAT WITH TOMO: ICD-10-PCS | Mod: 26,,, | Performed by: RADIOLOGY

## 2021-09-13 PROCEDURE — 77063 BREAST TOMOSYNTHESIS BI: CPT | Mod: 26,,, | Performed by: RADIOLOGY

## 2021-09-13 PROCEDURE — 77067 SCR MAMMO BI INCL CAD: CPT | Mod: 26,,, | Performed by: RADIOLOGY

## 2021-09-17 ENCOUNTER — CLINICAL SUPPORT (OUTPATIENT)
Dept: REHABILITATION | Facility: HOSPITAL | Age: 61
End: 2021-09-17
Payer: COMMERCIAL

## 2021-09-17 DIAGNOSIS — R26.2 DIFFICULTY WALKING: ICD-10-CM

## 2021-09-17 PROCEDURE — 97110 THERAPEUTIC EXERCISES: CPT

## 2021-09-24 ENCOUNTER — CLINICAL SUPPORT (OUTPATIENT)
Dept: REHABILITATION | Facility: HOSPITAL | Age: 61
End: 2021-09-24
Payer: COMMERCIAL

## 2021-09-24 DIAGNOSIS — R26.2 DIFFICULTY WALKING: ICD-10-CM

## 2021-09-24 PROCEDURE — 97110 THERAPEUTIC EXERCISES: CPT

## 2021-09-24 PROCEDURE — 97140 MANUAL THERAPY 1/> REGIONS: CPT

## 2021-10-04 ENCOUNTER — HOSPITAL ENCOUNTER (OUTPATIENT)
Facility: HOSPITAL | Age: 61
Discharge: HOME | End: 2021-10-04
Attending: ANESTHESIOLOGY | Admitting: ANESTHESIOLOGY
Payer: COMMERCIAL

## 2021-10-04 ENCOUNTER — PATIENT MESSAGE (OUTPATIENT)
Dept: ADMINISTRATIVE | Facility: HOSPITAL | Age: 61
End: 2021-10-04

## 2021-10-04 VITALS
HEIGHT: 62 IN | SYSTOLIC BLOOD PRESSURE: 135 MMHG | BODY MASS INDEX: 53.92 KG/M2 | OXYGEN SATURATION: 100 % | RESPIRATION RATE: 18 BRPM | WEIGHT: 293 LBS | DIASTOLIC BLOOD PRESSURE: 83 MMHG | TEMPERATURE: 98 F | HEART RATE: 98 BPM

## 2021-10-04 DIAGNOSIS — M54.16 LUMBAR RADICULOPATHY, CHRONIC: ICD-10-CM

## 2021-10-04 PROCEDURE — 25000003 PHARM REV CODE 250: Performed by: ANESTHESIOLOGY

## 2021-10-04 PROCEDURE — 63600175 PHARM REV CODE 636 W HCPCS: Performed by: ANESTHESIOLOGY

## 2021-10-04 PROCEDURE — 62323 PR INJ LUMBAR/SACRAL, W/IMAGING GUIDANCE: ICD-10-PCS | Mod: ,,, | Performed by: ANESTHESIOLOGY

## 2021-10-04 PROCEDURE — 62323 NJX INTERLAMINAR LMBR/SAC: CPT | Performed by: ANESTHESIOLOGY

## 2021-10-04 PROCEDURE — 25500020 PHARM REV CODE 255: Performed by: ANESTHESIOLOGY

## 2021-10-04 PROCEDURE — 62323 NJX INTERLAMINAR LMBR/SAC: CPT | Mod: ,,, | Performed by: ANESTHESIOLOGY

## 2021-10-04 RX ORDER — METHYLPREDNISOLONE ACETATE 40 MG/ML
INJECTION, SUSPENSION INTRA-ARTICULAR; INTRALESIONAL; INTRAMUSCULAR; SOFT TISSUE
Status: DISCONTINUED | OUTPATIENT
Start: 2021-10-04 | End: 2021-10-04 | Stop reason: HOSPADM

## 2021-10-04 RX ORDER — BUPIVACAINE HYDROCHLORIDE 2.5 MG/ML
INJECTION, SOLUTION EPIDURAL; INFILTRATION; INTRACAUDAL
Status: DISCONTINUED | OUTPATIENT
Start: 2021-10-04 | End: 2021-10-04 | Stop reason: HOSPADM

## 2021-10-04 RX ORDER — INDOMETHACIN 25 MG/1
CAPSULE ORAL
Status: DISCONTINUED | OUTPATIENT
Start: 2021-10-04 | End: 2021-10-04 | Stop reason: HOSPADM

## 2021-10-17 PROBLEM — G47.00 INSOMNIA: Status: ACTIVE | Noted: 2021-10-17

## 2021-10-20 ENCOUNTER — TELEPHONE (OUTPATIENT)
Dept: PAIN MEDICINE | Facility: CLINIC | Age: 61
End: 2021-10-20

## 2021-10-20 ENCOUNTER — CLINICAL SUPPORT (OUTPATIENT)
Dept: REHABILITATION | Facility: HOSPITAL | Age: 61
End: 2021-10-20
Payer: COMMERCIAL

## 2021-10-20 DIAGNOSIS — R26.2 DIFFICULTY WALKING: ICD-10-CM

## 2021-10-20 PROCEDURE — 97110 THERAPEUTIC EXERCISES: CPT

## 2021-10-26 ENCOUNTER — PATIENT OUTREACH (OUTPATIENT)
Dept: ADMINISTRATIVE | Facility: OTHER | Age: 61
End: 2021-10-26
Payer: COMMERCIAL

## 2021-10-27 ENCOUNTER — TELEPHONE (OUTPATIENT)
Dept: PAIN MEDICINE | Facility: CLINIC | Age: 61
End: 2021-10-27
Payer: COMMERCIAL

## 2021-10-28 ENCOUNTER — OFFICE VISIT (OUTPATIENT)
Dept: PAIN MEDICINE | Facility: CLINIC | Age: 61
End: 2021-10-28
Payer: COMMERCIAL

## 2021-10-28 ENCOUNTER — CLINICAL SUPPORT (OUTPATIENT)
Dept: REHABILITATION | Facility: HOSPITAL | Age: 61
End: 2021-10-28
Payer: COMMERCIAL

## 2021-10-28 VITALS
HEIGHT: 62 IN | SYSTOLIC BLOOD PRESSURE: 146 MMHG | WEIGHT: 293 LBS | DIASTOLIC BLOOD PRESSURE: 92 MMHG | HEART RATE: 65 BPM | BODY MASS INDEX: 53.92 KG/M2

## 2021-10-28 DIAGNOSIS — M54.16 LUMBAR RADICULOPATHY, CHRONIC: Primary | ICD-10-CM

## 2021-10-28 DIAGNOSIS — M17.0 PRIMARY OSTEOARTHRITIS OF BOTH KNEES: ICD-10-CM

## 2021-10-28 DIAGNOSIS — M47.816 LUMBAR SPONDYLOSIS: ICD-10-CM

## 2021-10-28 DIAGNOSIS — R26.2 DIFFICULTY WALKING: ICD-10-CM

## 2021-10-28 PROCEDURE — 3077F SYST BP >= 140 MM HG: CPT | Mod: CPTII,S$GLB,, | Performed by: PHYSICIAN ASSISTANT

## 2021-10-28 PROCEDURE — 3080F DIAST BP >= 90 MM HG: CPT | Mod: CPTII,S$GLB,, | Performed by: PHYSICIAN ASSISTANT

## 2021-10-28 PROCEDURE — 1159F PR MEDICATION LIST DOCUMENTED IN MEDICAL RECORD: ICD-10-PCS | Mod: CPTII,S$GLB,, | Performed by: PHYSICIAN ASSISTANT

## 2021-10-28 PROCEDURE — 3008F BODY MASS INDEX DOCD: CPT | Mod: CPTII,S$GLB,, | Performed by: PHYSICIAN ASSISTANT

## 2021-10-28 PROCEDURE — 3008F PR BODY MASS INDEX (BMI) DOCUMENTED: ICD-10-PCS | Mod: CPTII,S$GLB,, | Performed by: PHYSICIAN ASSISTANT

## 2021-10-28 PROCEDURE — 99999 PR PBB SHADOW E&M-EST. PATIENT-LVL IV: CPT | Mod: PBBFAC,,, | Performed by: PHYSICIAN ASSISTANT

## 2021-10-28 PROCEDURE — 97110 THERAPEUTIC EXERCISES: CPT

## 2021-10-28 PROCEDURE — 1160F PR REVIEW ALL MEDS BY PRESCRIBER/CLIN PHARMACIST DOCUMENTED: ICD-10-PCS | Mod: CPTII,S$GLB,, | Performed by: PHYSICIAN ASSISTANT

## 2021-10-28 PROCEDURE — 99999 PR PBB SHADOW E&M-EST. PATIENT-LVL IV: ICD-10-PCS | Mod: PBBFAC,,, | Performed by: PHYSICIAN ASSISTANT

## 2021-10-28 PROCEDURE — 99214 PR OFFICE/OUTPT VISIT, EST, LEVL IV, 30-39 MIN: ICD-10-PCS | Mod: S$GLB,,, | Performed by: PHYSICIAN ASSISTANT

## 2021-10-28 PROCEDURE — 1160F RVW MEDS BY RX/DR IN RCRD: CPT | Mod: CPTII,S$GLB,, | Performed by: PHYSICIAN ASSISTANT

## 2021-10-28 PROCEDURE — 3077F PR MOST RECENT SYSTOLIC BLOOD PRESSURE >= 140 MM HG: ICD-10-PCS | Mod: CPTII,S$GLB,, | Performed by: PHYSICIAN ASSISTANT

## 2021-10-28 PROCEDURE — 99214 OFFICE O/P EST MOD 30 MIN: CPT | Mod: S$GLB,,, | Performed by: PHYSICIAN ASSISTANT

## 2021-10-28 PROCEDURE — 1159F MED LIST DOCD IN RCRD: CPT | Mod: CPTII,S$GLB,, | Performed by: PHYSICIAN ASSISTANT

## 2021-10-28 PROCEDURE — 3080F PR MOST RECENT DIASTOLIC BLOOD PRESSURE >= 90 MM HG: ICD-10-PCS | Mod: CPTII,S$GLB,, | Performed by: PHYSICIAN ASSISTANT

## 2021-11-13 ENCOUNTER — IMMUNIZATION (OUTPATIENT)
Dept: PRIMARY CARE CLINIC | Facility: CLINIC | Age: 61
End: 2021-11-13
Payer: COMMERCIAL

## 2021-11-13 DIAGNOSIS — Z23 NEED FOR VACCINATION: Primary | ICD-10-CM

## 2021-11-13 PROCEDURE — 0064A COVID-19, MRNA, LNP-S, PF, 100 MCG/0.25 ML DOSE VACCINE (MODERNA BOOSTER): CPT | Mod: CV19,PBBFAC | Performed by: FAMILY MEDICINE

## 2021-11-23 ENCOUNTER — OFFICE VISIT (OUTPATIENT)
Dept: OBSTETRICS AND GYNECOLOGY | Facility: CLINIC | Age: 61
End: 2021-11-23
Payer: COMMERCIAL

## 2021-11-23 VITALS — BODY MASS INDEX: 55.2 KG/M2 | DIASTOLIC BLOOD PRESSURE: 86 MMHG | WEIGHT: 293 LBS | SYSTOLIC BLOOD PRESSURE: 130 MMHG

## 2021-11-23 DIAGNOSIS — Z01.419 WELL WOMAN EXAM WITH ROUTINE GYNECOLOGICAL EXAM: Primary | ICD-10-CM

## 2021-11-23 PROCEDURE — 99386 PR PREVENTIVE VISIT,NEW,40-64: ICD-10-PCS | Mod: S$GLB,,, | Performed by: NURSE PRACTITIONER

## 2021-11-23 PROCEDURE — 99999 PR PBB SHADOW E&M-EST. PATIENT-LVL III: ICD-10-PCS | Mod: PBBFAC,,, | Performed by: NURSE PRACTITIONER

## 2021-11-23 PROCEDURE — 99386 PREV VISIT NEW AGE 40-64: CPT | Mod: S$GLB,,, | Performed by: NURSE PRACTITIONER

## 2021-11-23 PROCEDURE — 99999 PR PBB SHADOW E&M-EST. PATIENT-LVL III: CPT | Mod: PBBFAC,,, | Performed by: NURSE PRACTITIONER

## 2021-12-29 ENCOUNTER — TELEPHONE (OUTPATIENT)
Dept: FAMILY MEDICINE | Facility: CLINIC | Age: 61
End: 2021-12-29
Payer: COMMERCIAL

## 2022-02-25 ENCOUNTER — OFFICE VISIT (OUTPATIENT)
Dept: FAMILY MEDICINE | Facility: CLINIC | Age: 62
End: 2022-02-25
Payer: COMMERCIAL

## 2022-02-25 ENCOUNTER — LAB VISIT (OUTPATIENT)
Dept: LAB | Facility: HOSPITAL | Age: 62
End: 2022-02-25
Attending: FAMILY MEDICINE
Payer: COMMERCIAL

## 2022-02-25 VITALS
DIASTOLIC BLOOD PRESSURE: 84 MMHG | TEMPERATURE: 98 F | BODY MASS INDEX: 53.92 KG/M2 | HEIGHT: 62 IN | WEIGHT: 293 LBS | HEART RATE: 75 BPM | OXYGEN SATURATION: 99 % | SYSTOLIC BLOOD PRESSURE: 122 MMHG

## 2022-02-25 DIAGNOSIS — E66.01 MORBID OBESITY WITH BMI OF 50.0-59.9, ADULT: ICD-10-CM

## 2022-02-25 DIAGNOSIS — E55.9 VITAMIN D DEFICIENCY: ICD-10-CM

## 2022-02-25 DIAGNOSIS — E03.9 HYPOTHYROIDISM, UNSPECIFIED TYPE: ICD-10-CM

## 2022-02-25 DIAGNOSIS — M17.0 PRIMARY OSTEOARTHRITIS OF BOTH KNEES: ICD-10-CM

## 2022-02-25 DIAGNOSIS — M47.816 LUMBAR SPONDYLOSIS: ICD-10-CM

## 2022-02-25 DIAGNOSIS — E03.9 HYPOTHYROIDISM, UNSPECIFIED TYPE: Primary | ICD-10-CM

## 2022-02-25 LAB
T4 FREE SERPL-MCNC: 0.64 NG/DL (ref 0.71–1.51)
TSH SERPL DL<=0.005 MIU/L-ACNC: 8.4 UIU/ML (ref 0.4–4)

## 2022-02-25 PROCEDURE — 3074F PR MOST RECENT SYSTOLIC BLOOD PRESSURE < 130 MM HG: ICD-10-PCS | Mod: CPTII,S$GLB,, | Performed by: FAMILY MEDICINE

## 2022-02-25 PROCEDURE — 1160F PR REVIEW ALL MEDS BY PRESCRIBER/CLIN PHARMACIST DOCUMENTED: ICD-10-PCS | Mod: CPTII,S$GLB,, | Performed by: FAMILY MEDICINE

## 2022-02-25 PROCEDURE — 3074F SYST BP LT 130 MM HG: CPT | Mod: CPTII,S$GLB,, | Performed by: FAMILY MEDICINE

## 2022-02-25 PROCEDURE — 3079F PR MOST RECENT DIASTOLIC BLOOD PRESSURE 80-89 MM HG: ICD-10-PCS | Mod: CPTII,S$GLB,, | Performed by: FAMILY MEDICINE

## 2022-02-25 PROCEDURE — 84439 ASSAY OF FREE THYROXINE: CPT | Performed by: FAMILY MEDICINE

## 2022-02-25 PROCEDURE — 1159F MED LIST DOCD IN RCRD: CPT | Mod: CPTII,S$GLB,, | Performed by: FAMILY MEDICINE

## 2022-02-25 PROCEDURE — 3079F DIAST BP 80-89 MM HG: CPT | Mod: CPTII,S$GLB,, | Performed by: FAMILY MEDICINE

## 2022-02-25 PROCEDURE — 99214 OFFICE O/P EST MOD 30 MIN: CPT | Mod: S$GLB,,, | Performed by: FAMILY MEDICINE

## 2022-02-25 PROCEDURE — 36415 COLL VENOUS BLD VENIPUNCTURE: CPT | Mod: PO | Performed by: FAMILY MEDICINE

## 2022-02-25 PROCEDURE — 99999 PR PBB SHADOW E&M-EST. PATIENT-LVL IV: ICD-10-PCS | Mod: PBBFAC,,, | Performed by: FAMILY MEDICINE

## 2022-02-25 PROCEDURE — 1160F RVW MEDS BY RX/DR IN RCRD: CPT | Mod: CPTII,S$GLB,, | Performed by: FAMILY MEDICINE

## 2022-02-25 PROCEDURE — 99214 PR OFFICE/OUTPT VISIT, EST, LEVL IV, 30-39 MIN: ICD-10-PCS | Mod: S$GLB,,, | Performed by: FAMILY MEDICINE

## 2022-02-25 PROCEDURE — 3008F PR BODY MASS INDEX (BMI) DOCUMENTED: ICD-10-PCS | Mod: CPTII,S$GLB,, | Performed by: FAMILY MEDICINE

## 2022-02-25 PROCEDURE — 3008F BODY MASS INDEX DOCD: CPT | Mod: CPTII,S$GLB,, | Performed by: FAMILY MEDICINE

## 2022-02-25 PROCEDURE — 99999 PR PBB SHADOW E&M-EST. PATIENT-LVL IV: CPT | Mod: PBBFAC,,, | Performed by: FAMILY MEDICINE

## 2022-02-25 PROCEDURE — 1159F PR MEDICATION LIST DOCUMENTED IN MEDICAL RECORD: ICD-10-PCS | Mod: CPTII,S$GLB,, | Performed by: FAMILY MEDICINE

## 2022-02-25 PROCEDURE — 84443 ASSAY THYROID STIM HORMONE: CPT | Performed by: FAMILY MEDICINE

## 2022-02-25 NOTE — PROGRESS NOTES
Amelia Mirza    Chief Complaint   Patient presents with    Hypothyroidism       History of Present Illness:   Ms. Mirza comes in today for hypothyroidism follow-up.  She is not fasting and has not taken medications today.  On August 29, 2021 I recommended she decrease Synthroid 200 mcg daily to 175 mcg daily based on TSH results.  She reports no problems with taking adjusted dose.  However, she reports feeling fatigued and sluggish.  She states she tries to monitor her diet.      She states she had been receiving physical therapy for back and knee pain until 2 weeks ago as went out of town and was feeling better.    She reports having pain across her shoulder blade.  She states she cares for her 70-year old aunt.    She saw MELI Bolton with physiatry on October 28, 2021 for lumbar spondylosis, bilateral osteoarthritis of knees.  She states she had [facility] therapy last year with help for back pain.    Otherwise, she denies having fever, chills, appetite changes; shortness of breath, cough, wheezing; chest pain, palpitations, leg swelling; abdominal pain, nausea, vomiting, diarrhea, constipation; unusual urinary symptoms; polydipsia, polyphagia, polyuria, hot or cold intolerance; headache; anxiety, depression, homicidal or suicidal thoughts.          Labs:                     WBC                      6.40                08/17/2021                 HGB                      12.0                08/17/2021                 HCT                      38.3                08/17/2021                 PLT                      266                 08/17/2021                 CHOL                     166                 08/17/2021                 TRIG                     69                  08/17/2021                 HDL                      62                  08/17/2021                 ALT                      20                  08/17/2021                 AST                      11                  08/17/2021                  NA                       141                 08/17/2021                 K                        3.8                 08/17/2021                 CL                       105                 08/17/2021                 CREATININE               0.9                 08/17/2021                 BUN                      17                  08/17/2021                 CO2                      26                  08/17/2021                 TSH                      0.025 (L)           08/17/2021                 INR                      1.0                 08/30/2016                 HGBA1C                   6.0                 05/09/2012              LDLCALC                  90.2                08/17/2021          Vit D, 25-Hydroxy             29 Low          08/17/2021       SEDRATE                  63 (H)              07/09/2021                Current Outpatient Medications   Medication Sig    diclofenac sodium 1.5 % Drop     ergocalciferol (ERGOCALCIFEROL) 50,000 unit Cap Take 1 capsule (50,000 Units total) by mouth twice a week.    gabapentin (NEURONTIN) 300 MG capsule Take 1 capsule (300 mg total) by mouth every evening for 1 day, THEN 1 capsule (300 mg total) 2 (two) times daily for 1 day, THEN 1 capsule (300 mg total) 3 (three) times daily for 1 day, THEN 2 capsules (600 mg total) 2 (two) times daily for 3 days, THEN 2 capsules (600 mg total) 3 (three) times daily for 18 days.    ibuprofen (ADVIL,MOTRIN) 800 MG tablet Take 1 tablet (800 mg total) by mouth 2 (two) times daily as needed for Pain (food).    levothyroxine (SYNTHROID, LEVOTHROID) 175 MCG tablet Take 1 pill by mouth every morning before breakfast    lidocaine-prilocaine (EMLA) cream     traZODone (DESYREL) 50 MG tablet Take 1 tablet (50 mg total) by mouth nightly as needed for Insomnia.     Review of Systems   Constitutional: Positive for fatigue. Negative for activity change, appetite change, chills and fever.        Weight  137.7 kg (303 lb 9.2  oz) at August 17, 2021 visit.   Respiratory: Negative for cough, shortness of breath and wheezing.    Cardiovascular: Negative for chest pain, palpitations and leg swelling.   Gastrointestinal: Negative for abdominal pain, constipation, diarrhea, nausea and vomiting.   Endocrine: Negative for cold intolerance, heat intolerance, polydipsia, polyphagia and polyuria.        See history of present illness.   Genitourinary: Negative for difficulty urinating.   Musculoskeletal: Positive for arthralgias and back pain.   Neurological: Negative for headaches.   Psychiatric/Behavioral: Negative for dysphoric mood and suicidal ideas. The patient is not nervous/anxious.         Negative for homicidal ideas.       Objective:  Physical Exam  Vitals reviewed.   Constitutional:       General: She is not in acute distress.     Appearance: Normal appearance. She is well-developed. She is obese. She is not ill-appearing, toxic-appearing or diaphoretic.      Comments: Pleasant.   Neck:      Thyroid: No thyromegaly.   Cardiovascular:      Rate and Rhythm: Normal rate and regular rhythm.      Heart sounds: Normal heart sounds. No murmur heard.  Pulmonary:      Effort: Pulmonary effort is normal. No respiratory distress.      Breath sounds: Normal breath sounds. No wheezing.   Abdominal:      General: Bowel sounds are normal. There is no distension.      Palpations: Abdomen is soft. There is no mass.      Tenderness: There is no abdominal tenderness. There is no guarding.   Musculoskeletal:         General: No swelling or tenderness. Normal range of motion.      Cervical back: Normal range of motion and neck supple. No tenderness.      Comments: She is ambulatory without problems. Non tender low back with full range of motion noted.   Lymphadenopathy:      Cervical: No cervical adenopathy.   Neurological:      General: No focal deficit present.      Mental Status: She is alert and oriented to person, place, and time.      Deep Tendon  Reflexes: Reflexes normal.   Psychiatric:         Mood and Affect: Mood normal.         Behavior: Behavior normal.         Thought Content: Thought content normal.         Judgment: Judgment normal.         ASSESSMENT:  1. Hypothyroidism, unspecified type    2. Vitamin D deficiency    3. Primary osteoarthritis of both knees    4. Lumbar spondylosis    5. Morbid obesity with BMI of 50.0-59.9, adult        PLAN:  Amelia was seen today for hypothyroidism.    Diagnoses and all orders for this visit:    Hypothyroidism, unspecified type  -     TSH; Future    Vitamin D deficiency    Primary osteoarthritis of both knees    Lumbar spondylosis  -     Ambulatory referral/consult to Pain Clinic; Future    Morbid obesity with BMI of 50.0-59.9, adult       Patient advised to call for results.  Continue current medications, follow low sodium, low cholesterol, low carb diet, daily walks.  Keep follow up with specialists.  Patient declines flu shot today.  Follow up in about 6 months (around 8/17/2022) for physical.

## 2022-02-27 DIAGNOSIS — E03.9 HYPOTHYROIDISM, UNSPECIFIED TYPE: ICD-10-CM

## 2022-02-27 RX ORDER — LEVOTHYROXINE SODIUM 200 UG/1
TABLET ORAL
Qty: 90 TABLET | Refills: 0 | Status: SHIPPED | OUTPATIENT
Start: 2022-02-27 | End: 2023-01-15 | Stop reason: SDUPTHER

## 2022-03-08 ENCOUNTER — PATIENT MESSAGE (OUTPATIENT)
Dept: FAMILY MEDICINE | Facility: CLINIC | Age: 62
End: 2022-03-08

## 2022-03-08 ENCOUNTER — OFFICE VISIT (OUTPATIENT)
Dept: FAMILY MEDICINE | Facility: CLINIC | Age: 62
End: 2022-03-08
Payer: COMMERCIAL

## 2022-03-08 ENCOUNTER — PATIENT MESSAGE (OUTPATIENT)
Dept: ADMINISTRATIVE | Facility: OTHER | Age: 62
End: 2022-03-08
Payer: COMMERCIAL

## 2022-03-08 VITALS
HEIGHT: 62 IN | TEMPERATURE: 98 F | SYSTOLIC BLOOD PRESSURE: 130 MMHG | HEART RATE: 96 BPM | OXYGEN SATURATION: 98 % | BODY MASS INDEX: 53.92 KG/M2 | WEIGHT: 293 LBS | DIASTOLIC BLOOD PRESSURE: 80 MMHG

## 2022-03-08 DIAGNOSIS — J30.2 SEASONAL ALLERGIC RHINITIS, UNSPECIFIED TRIGGER: ICD-10-CM

## 2022-03-08 DIAGNOSIS — K52.9 AGE (ACUTE GASTROENTERITIS): ICD-10-CM

## 2022-03-08 DIAGNOSIS — E66.01 MORBID OBESITY WITH BMI OF 50.0-59.9, ADULT: ICD-10-CM

## 2022-03-08 PROCEDURE — 99214 OFFICE O/P EST MOD 30 MIN: CPT | Mod: S$GLB,,, | Performed by: FAMILY MEDICINE

## 2022-03-08 PROCEDURE — 3008F PR BODY MASS INDEX (BMI) DOCUMENTED: ICD-10-PCS | Mod: CPTII,S$GLB,, | Performed by: FAMILY MEDICINE

## 2022-03-08 PROCEDURE — 99214 PR OFFICE/OUTPT VISIT, EST, LEVL IV, 30-39 MIN: ICD-10-PCS | Mod: S$GLB,,, | Performed by: FAMILY MEDICINE

## 2022-03-08 PROCEDURE — 99999 PR PBB SHADOW E&M-EST. PATIENT-LVL IV: CPT | Mod: PBBFAC,,, | Performed by: FAMILY MEDICINE

## 2022-03-08 PROCEDURE — 3079F DIAST BP 80-89 MM HG: CPT | Mod: CPTII,S$GLB,, | Performed by: FAMILY MEDICINE

## 2022-03-08 PROCEDURE — 3008F BODY MASS INDEX DOCD: CPT | Mod: CPTII,S$GLB,, | Performed by: FAMILY MEDICINE

## 2022-03-08 PROCEDURE — 1159F MED LIST DOCD IN RCRD: CPT | Mod: CPTII,S$GLB,, | Performed by: FAMILY MEDICINE

## 2022-03-08 PROCEDURE — 1160F RVW MEDS BY RX/DR IN RCRD: CPT | Mod: CPTII,S$GLB,, | Performed by: FAMILY MEDICINE

## 2022-03-08 PROCEDURE — 1160F PR REVIEW ALL MEDS BY PRESCRIBER/CLIN PHARMACIST DOCUMENTED: ICD-10-PCS | Mod: CPTII,S$GLB,, | Performed by: FAMILY MEDICINE

## 2022-03-08 PROCEDURE — 99999 PR PBB SHADOW E&M-EST. PATIENT-LVL IV: ICD-10-PCS | Mod: PBBFAC,,, | Performed by: FAMILY MEDICINE

## 2022-03-08 PROCEDURE — 1159F PR MEDICATION LIST DOCUMENTED IN MEDICAL RECORD: ICD-10-PCS | Mod: CPTII,S$GLB,, | Performed by: FAMILY MEDICINE

## 2022-03-08 PROCEDURE — 3075F PR MOST RECENT SYSTOLIC BLOOD PRESS GE 130-139MM HG: ICD-10-PCS | Mod: CPTII,S$GLB,, | Performed by: FAMILY MEDICINE

## 2022-03-08 PROCEDURE — 3075F SYST BP GE 130 - 139MM HG: CPT | Mod: CPTII,S$GLB,, | Performed by: FAMILY MEDICINE

## 2022-03-08 PROCEDURE — 3079F PR MOST RECENT DIASTOLIC BLOOD PRESSURE 80-89 MM HG: ICD-10-PCS | Mod: CPTII,S$GLB,, | Performed by: FAMILY MEDICINE

## 2022-03-08 RX ORDER — FLUTICASONE PROPIONATE 50 MCG
2 SPRAY, SUSPENSION (ML) NASAL DAILY PRN
Qty: 16 G | Refills: 2 | Status: SHIPPED | OUTPATIENT
Start: 2022-03-08 | End: 2023-05-08

## 2022-03-08 NOTE — PROGRESS NOTES
Amelia Mirza    Chief Complaint   Patient presents with    Diarrhea       History of Present Illness:   Ms. Mirza comes in today with complaint of having non-bloody diarrhea with abdominal pain and weakness and chills for 2 days.  She states she took Imodium last night with help as she states she feels less weak this morning.  She states she ate ice cream on Sunday and has not eaten much since Monday morning.  She reports fever of 100.1 on last night.      She reports having nasal congestion runny, runny with sneezing.  She reports having slight upper back.  She states she took Tylenol for headache for few weeks without help.    She denies exposure to known ill contacts.  She states she has not taken new medications.  She reports no ingestion new foods except ice cream noted above.    Otherwise, She denies having appetite changes; shortness of breath, cough, wheezing; chest pain, palpitations, leg swelling; nausea, vomiting, constipation; unusual urinary symptoms; polydipsia, polyphagia, polyuria, hot or cold intolerance; other sinus symptoms; anxiety, depression, homicidal or suicidal thoughts.          Current Outpatient Medications   Medication Sig    diclofenac sodium 1.5 % Drop     ergocalciferol (ERGOCALCIFEROL) 50,000 unit Cap Take 1 capsule (50,000 Units total) by mouth twice a week.    gabapentin (NEURONTIN) 300 MG capsule Take 1 capsule (300 mg total) by mouth every evening for 1 day, THEN 1 capsule (300 mg total) 2 (two) times daily for 1 day, THEN 1 capsule (300 mg total) 3 (three) times daily for 1 day, THEN 2 capsules (600 mg total) 2 (two) times daily for 3 days, THEN 2 capsules (600 mg total) 3 (three) times daily for 18 days.    ibuprofen (ADVIL,MOTRIN) 800 MG tablet Take 1 tablet (800 mg total) by mouth 2 (two) times daily as needed for Pain (food).    levothyroxine (SYNTHROID) 200 MCG tablet Take 1 pill by mouth every morning before breakfast    lidocaine-prilocaine (EMLA) cream      traZODone (DESYREL) 50 MG tablet Take 1 tablet (50 mg total) by mouth nightly as needed for Insomnia.       Review of Systems   Constitutional: Positive for chills, fatigue and fever. Negative for appetite change.   HENT: Positive for congestion and rhinorrhea. Negative for postnasal drip, sinus pressure, sinus pain, sneezing and sore throat.    Eyes: Positive for discharge. Negative for pain, redness and itching.   Respiratory: Negative for cough, shortness of breath and wheezing.    Cardiovascular: Negative for chest pain, palpitations and leg swelling.   Gastrointestinal: Positive for abdominal pain and diarrhea. Negative for constipation, nausea and vomiting.   Endocrine: Negative for cold intolerance, heat intolerance, polydipsia, polyphagia and polyuria.   Genitourinary: Negative for difficulty urinating, dysuria, frequency and hematuria.   Musculoskeletal: Positive for back pain. Negative for myalgias.   Neurological: Positive for headaches.   Psychiatric/Behavioral: Negative for dysphoric mood and suicidal ideas. The patient is not nervous/anxious.         Negative for homicidal ideas.       Objective:  Physical Exam  Vitals reviewed.   Constitutional:       General: She is not in acute distress.     Appearance: Normal appearance. She is not ill-appearing, toxic-appearing or diaphoretic.      Comments: Pleasant.   HENT:      Head: Normocephalic and atraumatic.      Comments: Slightly tender over maxillary sinuses.     Right Ear: Tympanic membrane, ear canal and external ear normal. There is no impacted cerumen.      Left Ear: Tympanic membrane, ear canal and external ear normal. There is no impacted cerumen.      Nose: Congestion and rhinorrhea present.      Mouth/Throat:      Mouth: Mucous membranes are moist.      Pharynx: Oropharynx is clear. No oropharyngeal exudate or posterior oropharyngeal erythema.   Eyes:      General:         Right eye: No discharge.         Left eye: No discharge.       Extraocular Movements: Extraocular movements intact.      Conjunctiva/sclera: Conjunctivae normal.      Pupils: Pupils are equal, round, and reactive to light.      Comments: She wears glasses.   Cardiovascular:      Rate and Rhythm: Normal rate and regular rhythm.      Pulses: Normal pulses.      Heart sounds: No murmur heard.  Pulmonary:      Effort: Pulmonary effort is normal. No respiratory distress.      Breath sounds: Normal breath sounds. No wheezing.   Abdominal:      General: Bowel sounds are normal. There is no distension.      Palpations: Abdomen is soft. There is no mass.      Tenderness: There is no abdominal tenderness. There is no right CVA tenderness, left CVA tenderness, guarding or rebound.   Musculoskeletal:         General: Tenderness present. No swelling. Normal range of motion.      Cervical back: Normal range of motion and neck supple. No tenderness.      Comments: She is ambulatory without problems. Slightly tender at right mid lateral back.   Lymphadenopathy:      Cervical: No cervical adenopathy.   Skin:     General: Skin is warm.   Neurological:      General: No focal deficit present.      Mental Status: She is alert and oriented to person, place, and time.   Psychiatric:         Mood and Affect: Mood normal.         Behavior: Behavior normal.         Thought Content: Thought content normal.         Judgment: Judgment normal.         ASSESSMENT:  1. AGE (acute gastroenteritis)    2. Seasonal allergic rhinitis, unspecified trigger    3. Morbid obesity with BMI of 50.0-59.9, adult        PLAN:  Amelia was seen today for diarrhea.    Diagnoses and all orders for this visit:    AGE (acute gastroenteritis)    Seasonal allergic rhinitis, unspecified trigger  -     fluticasone propionate (FLONASE) 50 mcg/actuation nasal spray; 2 sprays (100 mcg total) by Each Nostril route daily as needed for Rhinitis or Allergies.    Morbid obesity with BMI of 50.0-59.9, adult       Continue current  medications, follow low sodium, low cholesterol, low carb diet, daily walks.  Try OTC Imodium.  Diet: Liquid to BRAT to Regular as tolerated.  Add Flonase as directed for allergies; medication precautions discussed with patient.  Work excuse for today with return tomorrow.  Follow up if symptoms worsen or fail to improve.

## 2022-03-30 ENCOUNTER — PATIENT MESSAGE (OUTPATIENT)
Dept: PAIN MEDICINE | Facility: CLINIC | Age: 62
End: 2022-03-30

## 2022-03-30 ENCOUNTER — TELEPHONE (OUTPATIENT)
Dept: PAIN MEDICINE | Facility: CLINIC | Age: 62
End: 2022-03-30

## 2022-03-30 ENCOUNTER — OFFICE VISIT (OUTPATIENT)
Dept: PAIN MEDICINE | Facility: CLINIC | Age: 62
End: 2022-03-30
Payer: COMMERCIAL

## 2022-03-30 DIAGNOSIS — M79.18 MYOFASCIAL PAIN: ICD-10-CM

## 2022-03-30 DIAGNOSIS — M17.0 PRIMARY OSTEOARTHRITIS OF BOTH KNEES: ICD-10-CM

## 2022-03-30 DIAGNOSIS — M47.816 LUMBAR SPONDYLOSIS: ICD-10-CM

## 2022-03-30 DIAGNOSIS — M54.16 LUMBAR RADICULOPATHY, CHRONIC: Primary | ICD-10-CM

## 2022-03-30 PROCEDURE — 99214 PR OFFICE/OUTPT VISIT, EST, LEVL IV, 30-39 MIN: ICD-10-PCS | Mod: 95,,, | Performed by: ANESTHESIOLOGY

## 2022-03-30 PROCEDURE — 1160F PR REVIEW ALL MEDS BY PRESCRIBER/CLIN PHARMACIST DOCUMENTED: ICD-10-PCS | Mod: CPTII,95,, | Performed by: ANESTHESIOLOGY

## 2022-03-30 PROCEDURE — 1159F MED LIST DOCD IN RCRD: CPT | Mod: CPTII,95,, | Performed by: ANESTHESIOLOGY

## 2022-03-30 PROCEDURE — 1159F PR MEDICATION LIST DOCUMENTED IN MEDICAL RECORD: ICD-10-PCS | Mod: CPTII,95,, | Performed by: ANESTHESIOLOGY

## 2022-03-30 PROCEDURE — 1160F RVW MEDS BY RX/DR IN RCRD: CPT | Mod: CPTII,95,, | Performed by: ANESTHESIOLOGY

## 2022-03-30 PROCEDURE — 99214 OFFICE O/P EST MOD 30 MIN: CPT | Mod: 95,,, | Performed by: ANESTHESIOLOGY

## 2022-03-30 RX ORDER — TIZANIDINE 2 MG/1
2 TABLET ORAL 2 TIMES DAILY PRN
Qty: 60 TABLET | Refills: 1 | Status: SHIPPED | OUTPATIENT
Start: 2022-03-30 | End: 2022-04-29

## 2022-03-30 NOTE — PATIENT INSTRUCTIONS
General Neck and Back Pain    Both neck and back pain are usually caused by injury to the muscles or ligaments of the spine. Sometimes the disks that separate each bone of the spine may cause pain by pressing on a nearby nerve. Back and neck pain may appear after a sudden twisting or bending force (such as in a car accident), or sometimes after a simple awkward movement. In either case, muscle spasm is often present and adds to the pain.  Acute neck and back pain usually gets better in 1 to 2 weeks. Pain related to disk disease, arthritis in the spinal joints or spinal stenosis (narrowing of the spinal canal) can become chronic and last for months or years.  Back and neck pain are common problems. Most people feel better in 1 or 2 weeks, and most of the rest in 1 to 2 months. Most people can remain active.  People experience and describe pain differently.  Pain can be sharp, stabbing, shooting, aching, cramping, or burning  Movement, standing, bending, lifting, sitting, or walking may worsen the pain  Pain can be localized to one spot or area, or it can be more generalized  Pain can spread or radiate upwards, downwards, to the front, or go down your arms  Muscle spasm may occur.  Most of the time mechanical problems with the muscles or spine cause the pain. it is usually caused by an injury, whether known or not, to the muscles or ligaments. While illnesses can cause back pain, it is usually not caused by a serious illness. Pain is usually related to physical activity, whether sports, exercise, work, or normal activity. Sometimes it can occur without an identifiable cause. This can happen simply by stretching or moving wrong, without noting pain at the time. Other causes include:  Overexertion, lifting, pushing, pulling incorrectly or too aggressively.  Sudden twisting, bending or stretching from an accident (car or fall), or accidental movement.  Poor posture  Poor conditioning, lack of regular exercise  Spinal  disc disease or arthritis  Stress  Pregnancy, or illness like appendicitis, bladder or kidney infection, pelvic infections   Home care  For neck pain: Use a comfortable pillow that supports the head and keeps the spine in a neutral position. The position of the head should not be tilted forward or backward.  When in bed, try to find a position of comfort. A firm mattress is best. Try lying flat on your back with pillows under your knees. You can also try lying on your side with your knees bent up towards your chest and a pillow between your knees.  At first, do not try to stretch out the sore spots. If there is a strain, it is not like the good soreness you get after exercising without an injury. In this case, stretching may make it worse.  Avoid prolonged sitting, long car rides or travel. This puts more stress on the lower back than standing or walking.  During the first 24 to 72 hours after an injury, apply an ice pack to the painful area for 20 minutes and then remove it for 20 minutes over a period of 60 to 90 minutes or several times a day.   You can alternate ice and heat therapies. Talk with your healthcare provider about the best treatment for your back or neck pain. As a safety precaution, do not use a heating pad at bedtime. Sleeping with a heating pad can lead to skin burns or tissue damage.  Therapeutic massage can help relax the back and neck muscles without stretching them.  Be aware of safe lifting methods and do not lift anything over 15 pounds until all the pain is gone.  Medications  Talk to your healthcare provider before using medicine, especially if you have other medical problems or are taking other medicines.  You may use over-the-counter medicine to control pain, unless another pain medicine was prescribed. If you have chronic conditions like diabetes, liver or kidney disease, stomach ulcers,  gastrointestinal bleeding, or are taking blood thinner medicines.  Be careful if you are given pain  medicines, narcotics, or medicine for muscle spasm. They can cause drowsiness, and can affect your coordination, reflexes, and judgment. Do not drive or operate heavy machinery.  Follow-up care  Follow up with your healthcare provider, or as advised. Physical therapy or further tests may be needed.  If X-rays were taken, you will be notified of any new findings that may affect your care.  Call 911  Seek emergency medical care if any of the following occur:  Trouble breathing  Confusion  Very drowsy or trouble awakening  Fainting or loss of consciousness  Rapid or very slow heart rate  Loss of bowel or bladder control  When to seek medical advice  Call your healthcare provider right away if any of these occur:  Pain becomes worse or spreads into your arms or legs  Weakness, numbness or pain in one or both arms or legs  Numbness in the groin area  Difficulty walking  Fever of 100.4ºF (38ºC) or higher, or as directed by your healthcare provider  Date Last Reviewed: 7/1/2016  © 7092-4539 [employer]. [address] [place] [address]. All rights reserved. This information is not intended as a substitute for professional medical care. Always follow your healthcare professional's instructions.       Understanding Lumbar Radiculopathy    Lumbar radiculopathy is irritation or inflammation of a nerve root in the low back. It causes symptoms that spread out from the back down one or both legs. To understand this condition, it helps to understand the parts of the spine:  Vertebrae. These are bones that stack to form the spine. The lumbar spine contains the 5 bottom vertebrae.  Disks. These are soft pads of tissue between the vertebrae. They act as shock absorbers for the spine.  Spinal canal. This is a tunnel formed within the stacked vertebrae. In the lumbar spine, nerves run through this canal.  Nerves. These branch off and leave the spinal canal, traveling out to parts of the body. As they leave the  spinal canal, nerves pass through openings between the vertebrae. The nerve root is the part of the nerve that is closest to the spinal canal.  Sciatic nerve. This is a large nerve formed from several nerve roots in the low back. This nerve extends down the back of the leg to the foot.  With lumbar radiculopathy, nerve roots in the low back become irritated. This leads to pain and symptoms. The sciatic nerve is commonly involved, so the condition is often called sciatica.  What causes lumbar radiculopathy?  Aging, injury, poor posture, extra body weight, and other issues can lead to problems in the low back. These problems may then irritate nerve roots. They include:  Damage to a disk in the lumbar spine. The damaged disk may then press on nearby nerve roots.  Degeneration from wear and tear, and aging. This can lead to narrowing (stenosis) of the openings between the vertebrae. The narrowed openings press on nerve roots as they leave the spinal canal.  Unstable spine. This is when a vertebra slips forward. It can then press on a nerve root.  Other, less common things can put pressure on nerves in the low back. These include diabetes, infection, or a tumor.  Symptoms of lumbar radiculopathy  These include:  Pain in the low back  Pain, numbness, tingling, or weakness that travels into the buttocks, hip, groin, or leg  Muscle spasms  Treatment for lumbar radiculopathy  In most cases, your healthcare provider will first try treatments that help relieve symptoms. These may include:  Prescription and over-the-counter pain medicines. These help relieve pain, swelling, and irritation.  Limits on positions and activities that increase pain. But lying in bed or avoiding all movement is only recommended for a short period of time.  Physical therapy, including exercises and stretches. This helps decrease pain and increase movement and function.  Steroid shots into the lower back. This may help relieve symptoms for a  time.  Weight-loss program. If you are overweight, losing extra pounds may help relieve symptoms.  In some cases, you may need surgery to fix the underlying problem. This depends on the cause, the symptoms, and how long the pain has lasted.  Possible complications  Over time, an irritated and inflamed nerve may become damaged. This may lead to long-lasting (permanent) numbness or weakness in your legs and feet. If symptoms change suddenly or get worse, be sure to let your healthcare provider know.  When to call your healthcare provider  Call your healthcare provider right away if you have any of these:  New pain or pain that gets worse  New or increasing weakness, tingling, or numbness in your leg or foot  Problems controlling your bladder or bowel   Date Last Reviewed: 3/10/2016  © 1305-9429 [employer]. [address] [place] [address], [place] [address]. All rights reserved. This information is not intended as a substitute for professional medical care. Always follow your healthcare professional's instructions.       Exercises to Strengthen Your Lower Back  Strong lower back and abdominal muscles work together to support your spine. The exercises below will help strengthen the lower back. It is important that you begin exercising slowly and increase levels gradually.  Always begin any exercise program with stretching. If you feel pain while doing any of these exercises, stop and talk to your doctor about a more specific exercise program that better suits your condition.   Low back stretch  The point of stretching is to make you more flexible and increase your range of motion. Stretch only as much as you are able. Stretch slowly. Do not push your stretch to the limit. If at any point you feel pain while stretching, this is your (temporary) limit.  Lie on your back with your knees bent and both feet on the ground.  Slowly raise your left knee to your chest as you flatten your lower back against the floor. Hold  for 5 seconds.  Relax and repeat the exercise with your right knee.  Do 10 of these exercises for each leg.  Repeat hugging both knees to your chest at the same time.  Building lower back strength  Start your exercise routine with 10 to 30 minutes a day, 1 to 3 times a day.  Initial exercises  Lying on your back:  Ankle pumps: Move your foot up and down, towards your head, and then away. Repeat 10 times with each foot.  Heel slides: Slowly bend your knee, drawing the heel of your foot towards you. Then slide your heel/foot from you, straightening your knee. Do not lift your foot off the floor (this is not a leg lift).  Abdominal contraction: Bend your knees and put your hands on your stomach. Tighten your stomach muscles. Hold for 5 seconds, then relax. Repeat 10 times.  Straight leg raise: Bend one leg at the knee and keep the other leg straight. Tighten your stomach muscles. Slowly lift your straight leg 6 to 12 inches off the floor and hold for up to 5 seconds. Repeat 10 times on each side.  Standing:  Wall squats: Stand with your back against the wall. Move your feet about 12 inches away from the wall. Tighten your stomach muscles, and slowly bend your knees until they are at about a 45 degree angle. Do not go down too far. Hold about 5 seconds. Then slowly return to your starting position. Repeat 10 times.  Heel raises: Stand facing the wall. Slowly raise the heels of your feet up and down, while keeping your toes on the floor. If you have trouble balancing, you can touch the wall with your hands. Repeat 10 times.  More advanced exercises  When you feel comfortable enough, try these exercises.  Kneeling lumbar extension: Begin on your hands and knees. At the same time, raise and straighten your right arm and left leg until they are parallel to the ground. Hold for 2 seconds and come back slowly to a starting position. Repeat with left arm and right leg, alternating 10 times.  Prone lumbar extension: Lie face  down, arms extended overhead, palms on the floor. At the same time, raise your right arm and left leg as high as comfortably possible. Hold for 10 seconds and slowly return to start. Repeat with left arm and right leg, alternating 10 times. Gradually build up to 20 times. (Advanced: Repeat this exercise raising both arms and both legs a few inches off the floor at the same time. Hold for 5 seconds and release.)  Pelvic tilt: Lie on the floor on your back with your knees bent at 90 degrees. Your feet should be flat on the floor. Inhale, exhale, then slowly contract your abdominal muscles bringing your navel toward your spine. Let your pelvis rock back until your lower back is flat on the floor. Hold for 10 seconds while breathing smoothly.  Abdominal crunch: Perform a pelvic tilt (above) flattening your lower back against the floor. Holding the tension in your abdominal muscles, take another breath and raise your shoulder blades off the ground (this is not a full sit-up). Keep your head in line with your body (dont bend your neck forward). Hold for 2 seconds, then slowly lower.  Date Last Reviewed: 6/1/2016  © 9633-9041 [employer]. [address] [place] [address], [place] [address]. All rights reserved. This information is not intended as a substitute for professional medical care. Always follow your healthcare professional's instructions.

## 2022-03-30 NOTE — PROGRESS NOTES
"Established Patient Chronic Pain Note (Follow-up Visit)    The patient location is: home  The chief complaint leading to consultation is: LBP  Visit type: Virtual visit with synchronous audio and video  Total time spent with patient: 15m  Each patient to whom he or she provides medical services by telemedicine is: (1) informed of the relationship between the physician and patient and the respective role of any other health care provider with respect to management of the patient; and (2) notified that he or she may decline to receive medical services by telemedicine and may withdraw from such care at any time.    Referring Physician: Eve Green MD    PCP: Eve Green MD    Chief Complaint:   LBP     SUBJECTIVE:  Interval history 03/31/2022  Patient presents for 5 month follow-up.  She reports return of lower back pain which radiates into the buttocks and down the posterior aspects of bilateral lower extremities and L5-S1 distribution.  Patient reports the symptoms are identical to prior lumbar epidural steroid injection.  Patient is interested in pursuing repeat intervention.  Patient also reports myofascial pain along thoracic paraspinous musculature.  Patient reports pain is interrupting with her sleep.  She denies new onset lower extremity weakness, bowel or bladder incontinence or saddle anesthesia.    Interval History (10/28/2021):  Amelia Mirza presents today for follow-up visit.  S/p L5/S1 IL KARISSA on 10/4/21 with 90% pain relief.  Pain was basically resolved after, but then she had a fall which Increased knee pain.  She hasnt seen Orthopedics in years.  Patient reports pain as "0/10 today.  Overall, she has greatly improved since procedure.     Initial HPI (7/12/21) - Dr. Adler:  Amelia Mirza is a 61 y.o.   female with past medical history significant for hypoTH, morbid obesity, osteoarthritis (knees), lumbar spondylosis who presents to the clinic for the evaluation of lower back " and left leg pain . The pain started  1 year prior ago  without preceding accident or trauma and symptoms have been worsening.The pain is located in a bandlike distribution at the level of the iliac crest with radiation down the left lower extremity along the lateral and posterior aspects in L4-5 distribution.  The pain is described as constant, aching and sharp and is rated as 7/10. The pain is rated with a score of  7/10 on the BEST day and a score of 10/10 on the WORST day.  Symptoms interfere with daily activity, sleeping and work.   Patient does work from home but currently is off her summer break and is disheartened by her inability to participate in outdoor activities or time with her family secondary to her pain. The pain is exacerbated by Sitting, Standing, Walking and Getting out of bed/chair.    Patient is able to ambulate approximately 3 blocks before requiring rest.The pain is mitigated by laying down and rest.     Pt saw MELI Mar for bilateral knee pain 3/2019 with topical compound cream prescribed and diagnostic genicular procedure discussed but not performed.     Pt saw Dr. Padilla (2017) for lower back pain with radiculopathy with recommendation for PT, NSAID analgesic prescribed.     Patient denies urinary incontinence, bowel incontinence, significant motor weakness and loss of sensations.    Pain Disability Index Review:  Last 3 PDI Scores 3/6/2019   Pain Disability Index (PDI) 31       Non-Pharmacologic Treatments:  Physical Therapy/Home Exercise: yes  Ice/Heat:yes  TENS: no  Acupuncture: no  Massage: no  Chiropractic: no    Other: no      Pain Medications:  - Adjuvant Medications: Advil,Motrin ( Ibuprofen), Mobic (Meloxicam), Zanaflex ( Tizanidine) and NSAIDs, Tylenol, Biofreeze, EMLA cream    Pain Procedures:   Intra-articular steroid and visco-supplementation in bilateral knees - in other dept  - L5/S1 IL KARISSA on 10/4/21 with 90% pain relief         Review of Systems:   GENERAL:  No  weight loss, malaise or fevers.  HEENT:   No recent changes in vision or hearing  NECK:  Negative for lumps, no difficulty with swallowing.  RESPIRATORY:  Negative for cough, wheezing or shortness of breath, patient denies any recent URI.  CARDIOVASCULAR:  Negative for chest pain, leg swelling or palpitations.  GI:  Negative for abdominal discomfort, blood in stools or black stools or change in bowel habits.  MUSCULOSKELETAL:  See HPI.  SKIN:  Negative for lesions, rash, and itching.  PSYCH:  No mood disorder or recent psychosocial stressors.  Patients sleep is not disturbed secondary to pain.  HEMATOLOGY/LYMPHOLOGY:  Negative for prolonged bleeding, bruising easily or swollen nodes.  Patient is not currently taking any anti-coagulants  NEURO:   No history of headaches, syncope, paralysis, seizures or tremors.  All other reviewed and negative other than HPI.    OBJECTIVE:    Physical Exam:  There were no vitals filed for this visit. There is no height or weight on file to calculate BMI.   (reviewed on 3/30/2022)    GENERAL: Well appearing, in no acute distress, alert and oriented x3.  PSYCH:  Mood and affect appropriate.  SKIN: Skin color, texture, turgor normal, no rashes or lesions.  HEAD/FACE:  Normocephalic, atraumatic. Cranial nerves grossly intact.  NECK: No pain to palpation over the cervical paraspinous muscles. Spurling Negative. No pain with neck flexion, extension, or lateral flexion.   PULM: No evidence of respiratory difficulty, symmetric chest rise.  GI:  Soft and non-tender.  BACK: Straight leg raising in the sitting and supine positions is negative to radicular pain. No pain to palpation over the facet joints of the lumbar spine or spinous processes. Normal range of motion without pain reproduction.  EXTREMITIES: Peripheral joint ROM is full and pain free without obvious instability or laxity in all four extremities- with exception of knees.  No deformities, edema, or skin discoloration. Good  capillary refill.  MUSCULOSKELETAL: Shoulder, hip, and knee provocative maneuvers are negative.  There is no pain with palpation over the sacroiliac joints bilaterally.  FABERs test is negative.  FAER test is negative.   Bilateral upper and lower extremity strength is normal and symmetric.  No atrophy or tone abnormalities are noted. Decreased sensation LLE: L4-5 distribution to knee.  Pain with extension of knee. TTP diffusely over right knee patella.  NEURO: BUE & BLE coordination and muscle stretch reflexes are physiologic and symmetric.  Plantar response are downgoing. No clonus.   GAIT: normal.      Imaging (Reviewed on 3/30/2022):   X-Ray Lumbar Spine Complete 5 View    Narrative  Comparison: None    Findings:    Body habitus limits the study.  For tumor body heights and alignment appear well-maintained.  There is uniform loss of diskal height at the L4 -- 5 L5 -- S1 levels.  Multilevel facet arthropathy.  Pedicles and SI joints appear intact.  No spondylolysis  or spondylolisthesis.    X-Ray Cervical Spine AP And Lateral    Narrative  Comparison: None    Technique: Four views were obtained of the cervical spine    Findings: There is straightening of the normal cervical lordosis which can be associated with muscle spasm.  Vertebral body heights are well maintained.  No spondylolisthesis demonstrated.  There is mild loss of disk height from C3-4 and C5-6 with associated degenerative endplate changes and osteophyte production.  Posterior elements appear intact without acute fractures or subluxations demonstrated.  Odontoid process appears intact.  Atlantoaxial articulations appear normal.  Prevertebral soft tissues are within normal limits.        ASSESSMENT: 62 y.o. year old female with lower back and left lower extremity pain, consistent with     1. Lumbar radiculopathy, chronic  IR KARISSA Lumbar w/ Img    Case Request-RAD/Other Procedure Area: Lumbar L5/S1 IL KARISSA with RN IV sedation   2. Primary osteoarthritis  of both knees     3. Lumbar spondylosis     4. Myofascial pain           PLAN:   1. Interventional:  Schedule for repeat L5/S1 interlaminar epidural steroid injection as she obtained greater than 4 months relief with prior injection.  We discussed the procedure, plan and potential risk in detail.  Patient has elected to pursue this procedure.  - Anticoagulation use: None.     2. Pharmacologic:     - Continue gabapentin 300mg. Original plan was titration up to gabapentin 600mg TID, but patient is just taking PRN.  Can restart titration if pain returns or worsens.     -We have discussed temporarily starting an anti spasmodic, tizanidine 4 mg nightly to see if this helps with myofascial pain.  We have discussed potential deleterious side effects associated with this medication including  dizziness, drowsiness, dry mouth or tingling sensation in the upper or lower extremities.     3. Rehabilitative:-We discussed continuing physical therapy to help manage the patient/s painful condition. The patient was counseled that muscle strengthening will improve the long term prognosis in regards to pain and may also help increase range of motion and mobility.     4. Diagnostic:  Reviewed pertinent imaging with the patient and answered any questions.    5. Consult/ Referral: Follow-up PRN with Orthopedics if needed.    6. Follow up: 4-6 weeks    - This condition does not require this patient to take time off of work, and the primary goal of our Pain Management services is to improve the patient's functional capacity.   - I discussed the risks, benefits, and alternatives to potential treatment options. All questions and concerns were fully addressed today in clinic.       Josemanuel Adler MD    Disclaimer:  This note was prepared using voice recognition system and is likely to have sound alike errors that may have been overlooked even after proof reading.  Please call me with any questions.

## 2022-04-05 ENCOUNTER — TELEPHONE (OUTPATIENT)
Dept: PAIN MEDICINE | Facility: CLINIC | Age: 62
End: 2022-04-05
Payer: COMMERCIAL

## 2022-04-05 NOTE — TELEPHONE ENCOUNTER
Reached out to [employer] to schedule appointment with from the work queue. Patient has an inj coming up.    Yefri Reddy  Medical Assistant

## 2022-04-06 ENCOUNTER — HOSPITAL ENCOUNTER (OUTPATIENT)
Dept: PREADMISSION TESTING | Facility: HOSPITAL | Age: 62
Discharge: HOME | End: 2022-04-06
Attending: ANESTHESIOLOGY
Payer: COMMERCIAL

## 2022-04-06 NOTE — CARE UPDATE
"MS. Amelia Mirza is a 61 y/o morbidly obese AAF with a PMHx of Hypothyroidism, Anemia, Vitamin D deficiency, OA, DDD, lumbar spondylosis, and chronic lumbar radiculopathy  who presents to the Pre-admit department today for evaluation prior to undergoing an L5/S1 KARISSA with Dr. Adler.     Patient seen and examined, ambulatory, and per report is in usual state of health with the only complaint being low back pain, rates 7/10 at worst, with radiation to RLE.     Patient denies any dizziness/lightheadedness, headaches, sore throat, dysphagia, congestion, fever, chills, CP, SOB, cough, abdomainl pain, N/V/D, dysuria, extremity swelling, abnormal bleeding, and all other symptoms at this time.     Patient reports she is able to climb 1 flight of stairs (lives in an apartment and climbs 19 steps multiple times a day), and is not affected by CP or SOB.  Patient reports that pain is interfering with ADLs.  Aggravated by prolonged sitting/standing.  Alleviated by hot showers, medications, PT/OT.     Patient denies any prior h/o complications related to sedation and anesthesia, with no h/o airway compromise noted or reported.  Full ROM noted to neck.  Based on the Modified Mallampati classification for difficult laryngoscopy and intubation patient is a Class III, which improves to a Class II with phonation.     Given current BMI of 54.56, if sedation is given, I would recommend that respiratory be present at the bedside for procedure.  I will defer the ordering, and subsequent management of sedation to Dr. Adler.  Ok for patient to proceed with procedure at the [facility] location.      VS; /72- Patient denies any h/o HTN but reports her PCP is "monitoring her closely". She was advised to purchase an at home monitor and record BP BID and keep a log for her PCP to review.  She reports she has a f/u with Dr. Green in May to discuss further.   Pulse 58  RA SaO2 99%  RR 20  Temp 98.1      "

## 2022-04-07 ENCOUNTER — TELEPHONE (OUTPATIENT)
Dept: PAIN MEDICINE | Facility: CLINIC | Age: 62
End: 2022-04-07
Payer: COMMERCIAL

## 2022-04-07 ENCOUNTER — PATIENT MESSAGE (OUTPATIENT)
Dept: PAIN MEDICINE | Facility: CLINIC | Age: 62
End: 2022-04-07
Payer: COMMERCIAL

## 2022-04-07 NOTE — TELEPHONE ENCOUNTER
----- Message from Chandni Greenberg MA sent at 4/7/2022 11:22 AM CDT -----  Contact: Amelia    ----- Message -----  From: Robert Mello  Sent: 4/7/2022  11:19 AM CDT  To: Vitor Abernathy Staff    Pt is calling to schedule a procedure. Please call her back at [phone].            Thanks  DD

## 2022-04-07 NOTE — TELEPHONE ENCOUNTER
Contacted Pt to schedule procedure with Dr. Adler 4/22/2022, verbalized instructions as well as sent over via [employer] with scheduling ticket. All questions answered at this time.

## 2022-04-19 NOTE — PRE-PROCEDURE INSTRUCTIONS
Spoke with patient regarding procedure scheduled on 4.22     Requesting earlier Arrival time of 0830. verbalized understanding

## 2022-04-19 NOTE — PRE-PROCEDURE INSTRUCTIONS
Spoke with patient regarding procedure scheduled on 4.22     Arrival time 1200     Has patient been sick with fever or on antibiotics within the last 7 days? No     Does the patient have any open wounds, sores or rashes? No     Does the patient have any recent fractures? no     Has patient received a vaccination within the last 7 days? No     Received the COVID vaccination? yes     Has the patient stopped all medications as directed? Na     Does patient have a pacemaker and or defibrillator? no     Does the patient have a ride to and from procedure and someone reliable to remain with patient? april     Is the patient diabetic? no     Does the patient have sleep apnea? Or use O2 at home? No and no      Is the patient receiving sedation? yes     Is the patient instructed to remain NPO beginning at midnight the night before their procedure? yes     Procedure location confirmed with patient? Yes     Covid- Denies signs/symptoms. Instructed to notify PAT/MD if any changes.

## 2022-04-22 ENCOUNTER — HOSPITAL ENCOUNTER (OUTPATIENT)
Facility: HOSPITAL | Age: 62
Discharge: HOME | End: 2022-04-22
Attending: ANESTHESIOLOGY | Admitting: ANESTHESIOLOGY
Payer: COMMERCIAL

## 2022-04-22 VITALS
RESPIRATION RATE: 18 BRPM | WEIGHT: 293 LBS | TEMPERATURE: 98 F | HEIGHT: 62 IN | SYSTOLIC BLOOD PRESSURE: 137 MMHG | HEART RATE: 59 BPM | OXYGEN SATURATION: 100 % | DIASTOLIC BLOOD PRESSURE: 69 MMHG | BODY MASS INDEX: 53.92 KG/M2

## 2022-04-22 DIAGNOSIS — R00.1 BRADYCARDIA: ICD-10-CM

## 2022-04-22 DIAGNOSIS — M54.16 LUMBAR RADICULOPATHY, CHRONIC: ICD-10-CM

## 2022-04-22 PROCEDURE — 25500020 PHARM REV CODE 255: Performed by: ANESTHESIOLOGY

## 2022-04-22 PROCEDURE — 63600175 PHARM REV CODE 636 W HCPCS: Performed by: ANESTHESIOLOGY

## 2022-04-22 PROCEDURE — 93010 EKG 12-LEAD: ICD-10-PCS | Mod: ,,, | Performed by: INTERNAL MEDICINE

## 2022-04-22 PROCEDURE — 62323 NJX INTERLAMINAR LMBR/SAC: CPT | Performed by: ANESTHESIOLOGY

## 2022-04-22 PROCEDURE — 93005 ELECTROCARDIOGRAM TRACING: CPT | Mod: 59

## 2022-04-22 PROCEDURE — 62323 NJX INTERLAMINAR LMBR/SAC: CPT | Mod: ,,, | Performed by: ANESTHESIOLOGY

## 2022-04-22 PROCEDURE — 62323 PR INJ LUMBAR/SACRAL, W/IMAGING GUIDANCE: ICD-10-PCS | Mod: ,,, | Performed by: ANESTHESIOLOGY

## 2022-04-22 PROCEDURE — 93010 ELECTROCARDIOGRAM REPORT: CPT | Mod: ,,, | Performed by: INTERNAL MEDICINE

## 2022-04-22 PROCEDURE — 25000003 PHARM REV CODE 250: Performed by: ANESTHESIOLOGY

## 2022-04-22 RX ORDER — FENTANYL CITRATE 50 UG/ML
INJECTION, SOLUTION INTRAMUSCULAR; INTRAVENOUS
Status: DISCONTINUED | OUTPATIENT
Start: 2022-04-22 | End: 2022-04-22 | Stop reason: HOSPADM

## 2022-04-22 RX ORDER — BUPIVACAINE HYDROCHLORIDE 2.5 MG/ML
INJECTION, SOLUTION EPIDURAL; INFILTRATION; INTRACAUDAL
Status: DISCONTINUED | OUTPATIENT
Start: 2022-04-22 | End: 2022-04-22 | Stop reason: HOSPADM

## 2022-04-22 RX ORDER — MIDAZOLAM HYDROCHLORIDE 1 MG/ML
INJECTION, SOLUTION INTRAMUSCULAR; INTRAVENOUS
Status: DISCONTINUED | OUTPATIENT
Start: 2022-04-22 | End: 2022-04-22 | Stop reason: HOSPADM

## 2022-04-22 RX ORDER — METHYLPREDNISOLONE ACETATE 80 MG/ML
INJECTION, SUSPENSION INTRA-ARTICULAR; INTRALESIONAL; INTRAMUSCULAR; SOFT TISSUE
Status: DISCONTINUED | OUTPATIENT
Start: 2022-04-22 | End: 2022-04-22 | Stop reason: HOSPADM

## 2022-04-22 NOTE — DISCHARGE SUMMARY
[facility] - Pain Mgmt 1st Fl  Discharge Note  Short Stay    Procedure(s) (LRB):  Lumbar L5/S1 IL KARISSA with RN IV sedation (N/A)    OUTCOME: Patient tolerated treatment/procedure well without complication and is now ready for discharge.    DISPOSITION: Home or Self Care    FINAL DIAGNOSIS:  <principal problem not specified>    FOLLOWUP: In clinic    DISCHARGE INSTRUCTIONS:  No discharge procedures on file.     TIME SPENT ON DISCHARGE: 15 minutes

## 2022-04-22 NOTE — OP NOTE
Lumbar Interlaminar Epidural Steroid Injection under Fluoroscopic Guidance.   Time-out taken to identify patient and procedure prior to starting the procedure.     04/22/2022    PROCEDURE: Interlaminar epidural steroid injection under fluoroscopic guidance.     Pre-Op diagnosis: Lumbar radiculopathy, chronic [M54.16]    Post-Op diagnosis: Lumbar radiculopathy, chronic [M54.16]    PHYSICIAN: Josemanuel Adler MD    ASSISTANTS: None     ESTIMATED BLOOD LOSS: none.     COMPLICATIONS: none.     SPECIMENS: none    TECHNIQUE: With the patient laying in a prone position, the area was prepped and draped in the usual sterile fashion using ChloraPrep and a fenestrated drape. 1% lidocaine was given using a 27-gauge needle by raising a wheal and going down to the hub of the needle over the L5/S1 interlaminar space.  The interlaminar space was then approached from a R paramedian approach with a 6 inch 18-gauge Touhy needle was introduced under fluoroscopic guidance in the AP and Lateral view. Once the Ligamentum flavum was encountered loss of resistance to saline was used to enter the epidural space. With positive loss of resistance and negative CSF or Blood, 3mL contrast dye Omnipaque (300mg/ml) was injected to confirm placement and there was no vascular runoff. Then 1ml 80mg/ml Depomedrol + 4 mL 0.25% Bupivicaine was injected slowly. Displacement of the radio opaque contrast after injection of the medication confirmed that the medication went into the area of the epidural space.  The patient tolerated the procedure well.     Conscious sedation provided by M.D    The patient was monitored with continuous pulse oximetry, EKG, and intermittent blood pressure monitors.  The patient was hemodynamically stable throughout the entire process was responsive to voice, and breathing spontaneously.  Supplemental O2 was provided at 2L/min via nasal cannula.  Patient was comfortable for the duration of the procedure. (See nurse documentation  and case log for sedation time)    There was a total of 1mg IV Midazolam and 25mcg Fentanyl titrated for the procedure      The patient was monitored after the procedure.   They were given post-procedure and discharge instructions to follow at home.  The patient was discharged in a stable condition.

## 2022-04-22 NOTE — DISCHARGE INSTRUCTIONS

## 2022-05-30 ENCOUNTER — OFFICE VISIT (OUTPATIENT)
Dept: FAMILY MEDICINE | Facility: CLINIC | Age: 62
End: 2022-05-30
Payer: COMMERCIAL

## 2022-05-30 ENCOUNTER — LAB VISIT (OUTPATIENT)
Dept: LAB | Facility: HOSPITAL | Age: 62
End: 2022-05-30
Attending: FAMILY MEDICINE
Payer: COMMERCIAL

## 2022-05-30 VITALS
WEIGHT: 293 LBS | DIASTOLIC BLOOD PRESSURE: 80 MMHG | HEART RATE: 89 BPM | BODY MASS INDEX: 53.92 KG/M2 | HEIGHT: 62 IN | OXYGEN SATURATION: 97 % | TEMPERATURE: 98 F | SYSTOLIC BLOOD PRESSURE: 132 MMHG

## 2022-05-30 DIAGNOSIS — M47.816 LUMBAR SPONDYLOSIS: ICD-10-CM

## 2022-05-30 DIAGNOSIS — M17.0 PRIMARY OSTEOARTHRITIS OF BOTH KNEES: ICD-10-CM

## 2022-05-30 DIAGNOSIS — E66.01 MORBID OBESITY WITH BMI OF 50.0-59.9, ADULT: ICD-10-CM

## 2022-05-30 DIAGNOSIS — K63.5 BENIGN COLON POLYP: ICD-10-CM

## 2022-05-30 DIAGNOSIS — M79.18 MYOFASCIAL PAIN: ICD-10-CM

## 2022-05-30 DIAGNOSIS — E03.9 HYPOTHYROIDISM, UNSPECIFIED TYPE: ICD-10-CM

## 2022-05-30 DIAGNOSIS — E03.9 HYPOTHYROIDISM, UNSPECIFIED TYPE: Primary | ICD-10-CM

## 2022-05-30 LAB
T4 FREE SERPL-MCNC: 0.86 NG/DL (ref 0.71–1.51)
TSH SERPL DL<=0.005 MIU/L-ACNC: 6.33 UIU/ML (ref 0.4–4)

## 2022-05-30 PROCEDURE — 84443 ASSAY THYROID STIM HORMONE: CPT | Performed by: FAMILY MEDICINE

## 2022-05-30 PROCEDURE — 1160F PR REVIEW ALL MEDS BY PRESCRIBER/CLIN PHARMACIST DOCUMENTED: ICD-10-PCS | Mod: CPTII,S$GLB,, | Performed by: FAMILY MEDICINE

## 2022-05-30 PROCEDURE — 3008F PR BODY MASS INDEX (BMI) DOCUMENTED: ICD-10-PCS | Mod: CPTII,S$GLB,, | Performed by: FAMILY MEDICINE

## 2022-05-30 PROCEDURE — 99999 PR PBB SHADOW E&M-EST. PATIENT-LVL V: ICD-10-PCS | Mod: PBBFAC,,, | Performed by: FAMILY MEDICINE

## 2022-05-30 PROCEDURE — 99214 OFFICE O/P EST MOD 30 MIN: CPT | Mod: S$GLB,,, | Performed by: FAMILY MEDICINE

## 2022-05-30 PROCEDURE — 3079F DIAST BP 80-89 MM HG: CPT | Mod: CPTII,S$GLB,, | Performed by: FAMILY MEDICINE

## 2022-05-30 PROCEDURE — 99214 PR OFFICE/OUTPT VISIT, EST, LEVL IV, 30-39 MIN: ICD-10-PCS | Mod: S$GLB,,, | Performed by: FAMILY MEDICINE

## 2022-05-30 PROCEDURE — 99999 PR PBB SHADOW E&M-EST. PATIENT-LVL V: CPT | Mod: PBBFAC,,, | Performed by: FAMILY MEDICINE

## 2022-05-30 PROCEDURE — 3079F PR MOST RECENT DIASTOLIC BLOOD PRESSURE 80-89 MM HG: ICD-10-PCS | Mod: CPTII,S$GLB,, | Performed by: FAMILY MEDICINE

## 2022-05-30 PROCEDURE — 3075F SYST BP GE 130 - 139MM HG: CPT | Mod: CPTII,S$GLB,, | Performed by: FAMILY MEDICINE

## 2022-05-30 PROCEDURE — 3008F BODY MASS INDEX DOCD: CPT | Mod: CPTII,S$GLB,, | Performed by: FAMILY MEDICINE

## 2022-05-30 PROCEDURE — 1160F RVW MEDS BY RX/DR IN RCRD: CPT | Mod: CPTII,S$GLB,, | Performed by: FAMILY MEDICINE

## 2022-05-30 PROCEDURE — 84439 ASSAY OF FREE THYROXINE: CPT | Performed by: FAMILY MEDICINE

## 2022-05-30 PROCEDURE — 1159F MED LIST DOCD IN RCRD: CPT | Mod: CPTII,S$GLB,, | Performed by: FAMILY MEDICINE

## 2022-05-30 PROCEDURE — 3075F PR MOST RECENT SYSTOLIC BLOOD PRESS GE 130-139MM HG: ICD-10-PCS | Mod: CPTII,S$GLB,, | Performed by: FAMILY MEDICINE

## 2022-05-30 PROCEDURE — 36415 COLL VENOUS BLD VENIPUNCTURE: CPT | Mod: PO | Performed by: FAMILY MEDICINE

## 2022-05-30 PROCEDURE — 1159F PR MEDICATION LIST DOCUMENTED IN MEDICAL RECORD: ICD-10-PCS | Mod: CPTII,S$GLB,, | Performed by: FAMILY MEDICINE

## 2022-05-30 NOTE — PROGRESS NOTES
Amelia Mirza    Chief Complaint   Patient presents with    Follow-up    Hypothyroidism       History of Present Illness:   Amelia Mirza is a 62 y.o. female who presents today for hypothyroidism follow-up. She is not fasting with taking medication today.     On February 27, 2022, I recommended she increase Levothyroxine 175 MCG  to levothyroxine 200 MCG daily based on TSH result.  She reports no problems with taking increased dose and desires to continue medication.    She reports fatigue with working a lot.  She reports taking Trazodone with help for insomnia but states she wakes up tired if she takes it too late.     She states she exercises little by walking twice a week for 20-30 minutes each time. She states she performs home blood pressure checks with levels ranging 187-197/; she reports no history of hypertension and admits she does not have the appropriate size cuff at home to perform blood pressure checks..    She reports having body pains which wakes her up at night. She reports receiving physical therapy with some help but states she stopped due to expense. She reports she recently received epidural steroid injection with help for back pain but does not help with body pains. She reports having pain in hands due typing. She reports not often  taking Gabapentin as she states it makes her feel drowsy but does help for pain. She reports having pain today. She states she desires better quality of life.    Otherwise, she denies having fever, chills, fatigue, appetite changes; shortness of breath, cough, wheezing; chest pain, palpitations,  leg swelling; other sinus symptom; abdominal pain, nausea, vomiting, diarrhea, constipation; unusual urinary symptoms; polydipsia, polyphagia, polyuria, hot or cold intolerance; headache; anxiety, depression, homicidal or suicidal thoughts.     She saw Dr. Josemanuel Adler, Pain medicine, on March 30, 2022 for Chronic Lumbar radiculopathy, Primary osteoarthritis  of both knees, Lumbar spondylosis, and Myofascial pain with 4-6 week follow-up advised.    She saw Reji Green, Physical Therapy, on July 27, 2022 for  Pain in right knee, Pain in left knee, and lumbar region Radiculopathy.    She saw NP BERTHA Saul, on November 23, 2021 for well woman exam.    Labs:                     WBC                      6.40                08/17/2021                 HGB                      12.0                08/17/2021                 HCT                      38.3                08/17/2021                 PLT                      266                 08/17/2021                 CHOL                     166                 08/17/2021                 TRIG                     69                  08/17/2021                 HDL                      62                  08/17/2021                 ALT                      20                  08/17/2021                 AST                      11                  08/17/2021                 NA                       141                 08/17/2021                 K                        3.8                 08/17/2021                 CL                       105                 08/17/2021                 CREATININE               0.9                 08/17/2021                 BUN                      17                  08/17/2021                 CO2                      26                  08/17/2021                 TSH                      8.404 (H)           02/25/2022                 INR                      1.0                 08/30/2016                 HGBA1C                   6.0                 05/09/2012              LDLCALC                  90.2                08/17/2021                FFS/Colonoscopy: June 11, 2012 - polyps; repeat in 10 years.      Current Outpatient Medications   Medication Sig    diclofenac sodium 1.5 % Drop daily as needed.    ergocalciferol (ERGOCALCIFEROL) 50,000 unit Cap Take 1 capsule (50,000 Units  total) by mouth twice a week.    fluticasone propionate (FLONASE) 50 mcg/actuation nasal spray 2 sprays (100 mcg total) by Each Nostril route daily as needed for Rhinitis or Allergies.    gabapentin (NEURONTIN) 300 MG capsule Take 1 capsule (300 mg total) by mouth every evening for 1 day, THEN 1 capsule (300 mg total) 2 (two) times daily for 1 day, THEN 1 capsule (300 mg total) 3 (three) times daily for 1 day, THEN 2 capsules (600 mg total) 2 (two) times daily for 3 days, THEN 2 capsules (600 mg total) 3 (three) times daily for 18 days.    ibuprofen (ADVIL,MOTRIN) 800 MG tablet Take 1 tablet (800 mg total) by mouth 2 (two) times daily as needed for Pain (food).    levothyroxine (SYNTHROID) 200 MCG tablet Take 1 pill by mouth every morning before breakfast    lidocaine-prilocaine (EMLA) cream as needed.    traZODone (DESYREL) 50 MG tablet Take 1 tablet (50 mg total) by mouth nightly as needed for Insomnia.       Review of Systems   Constitutional: Positive for fatigue. Negative for activity change, appetite change, chills and fever.        Weight 135.3 kg (298 lb 4.5 oz) at March 8, 2022 visit.   Respiratory: Negative for cough, shortness of breath and wheezing.    Cardiovascular: Negative for chest pain, palpitations and leg swelling.        See history of present illness.   Gastrointestinal: Negative for abdominal pain, constipation, diarrhea, nausea and vomiting.   Endocrine: Negative for cold intolerance, heat intolerance, polydipsia, polyphagia and polyuria.        See history of present illness.   Genitourinary: Negative for difficulty urinating.   Musculoskeletal: Positive for arthralgias, back pain and myalgias.        See history of present illness.   Neurological: Negative for headaches.   Psychiatric/Behavioral: Positive for sleep disturbance. Negative for dysphoric mood and suicidal ideas. The patient is not nervous/anxious.         Negative for homicidal ideas. See history of present illness.        Objective:  Physical Exam  Vitals reviewed.   Constitutional:       General: She is not in acute distress.     Appearance: Normal appearance. She is well-developed. She is obese. She is not ill-appearing, toxic-appearing or diaphoretic.      Comments: Pleasant.   Neck:      Thyroid: No thyromegaly.   Cardiovascular:      Rate and Rhythm: Normal rate and regular rhythm.      Heart sounds: Normal heart sounds. No murmur heard.  Pulmonary:      Effort: Pulmonary effort is normal. No respiratory distress.      Breath sounds: Normal breath sounds. No wheezing.   Abdominal:      General: Bowel sounds are normal. There is no distension.      Palpations: Abdomen is soft. There is no mass.      Tenderness: There is no abdominal tenderness. There is no guarding.   Musculoskeletal:         General: No swelling or tenderness. Normal range of motion.      Cervical back: Normal range of motion and neck supple. No tenderness.      Comments: She is ambulatory without problems. Non tender low back with full range of motion noted.   Lymphadenopathy:      Cervical: No cervical adenopathy.   Neurological:      General: No focal deficit present.      Mental Status: She is alert and oriented to person, place, and time.      Deep Tendon Reflexes: Reflexes normal.   Psychiatric:         Mood and Affect: Mood normal.         Behavior: Behavior normal.         Thought Content: Thought content normal.         Judgment: Judgment normal.         ASSESSMENT:  1. Hypothyroidism, unspecified type    2. Lumbar spondylosis    3. Primary osteoarthritis of both knees    4. Myofascial pain    5. Benign colon polyp    6. Morbid obesity with BMI of 50.0-59.9, adult        PLAN:  Amelia was seen today for follow-up and hypothyroidism.    Diagnoses and all orders for this visit:    Hypothyroidism, unspecified type  -     TSH; Future    Lumbar spondylosis    Primary osteoarthritis of both knees    Myofascial pain    Benign colon polyp  -      Ambulatory referral/consult to Endo Procedure ; Future    Morbid obesity with BMI of 50.0-59.9, adult       Patient advised to call for results.  Continue current medications (including take Gabapentin until she can speak with pain management for replacement medication), follow low sodium, low cholesterol, low carb diet, daily walks.  Keep follow up with specialists.  Flu shot this fall.  Follow up if symptoms worsen or fail to improve. But, keep 8/29/2022 scheduled physical w/me.    Scribe Attestation:   I, Tono Newsome, am scribing for, and in the presence of, Dr. Green. I performed the above scribed service and the documentation accurately describes the services I performed. I attest to the accuracy of the note.    I, Dr. Green, reviewed documentation as scribed above. I personally performed the services described in this documentation.  I agree that the record reflects my personal performance and is accurate and complete. . 05/30/2022

## 2022-06-14 ENCOUNTER — PATIENT MESSAGE (OUTPATIENT)
Dept: PAIN MEDICINE | Facility: CLINIC | Age: 62
End: 2022-06-14
Payer: COMMERCIAL

## 2022-06-14 DIAGNOSIS — G89.29 CHRONIC LOW BACK PAIN WITH SCIATICA, SCIATICA LATERALITY UNSPECIFIED, UNSPECIFIED BACK PAIN LATERALITY: ICD-10-CM

## 2022-06-14 DIAGNOSIS — M54.16 LUMBAR RADICULOPATHY, CHRONIC: Primary | ICD-10-CM

## 2022-06-14 DIAGNOSIS — M54.40 CHRONIC LOW BACK PAIN WITH SCIATICA, SCIATICA LATERALITY UNSPECIFIED, UNSPECIFIED BACK PAIN LATERALITY: ICD-10-CM

## 2022-06-14 DIAGNOSIS — M17.0 PRIMARY OSTEOARTHRITIS OF BOTH KNEES: ICD-10-CM

## 2022-06-20 ENCOUNTER — HOSPITAL ENCOUNTER (OUTPATIENT)
Dept: PREADMISSION TESTING | Facility: HOSPITAL | Age: 62
Discharge: HOME | End: 2022-06-20
Attending: FAMILY MEDICINE
Payer: COMMERCIAL

## 2022-06-20 DIAGNOSIS — K63.5 BENIGN COLON POLYP: Primary | ICD-10-CM

## 2022-08-26 ENCOUNTER — ANESTHESIA EVENT (OUTPATIENT)
Dept: ENDOSCOPY | Facility: HOSPITAL | Age: 62
End: 2022-08-26
Payer: COMMERCIAL

## 2022-08-26 ENCOUNTER — HOSPITAL ENCOUNTER (OUTPATIENT)
Facility: HOSPITAL | Age: 62
Discharge: HOME | End: 2022-08-26
Attending: COLON & RECTAL SURGERY | Admitting: COLON & RECTAL SURGERY
Payer: COMMERCIAL

## 2022-08-26 ENCOUNTER — ANESTHESIA (OUTPATIENT)
Dept: ENDOSCOPY | Facility: HOSPITAL | Age: 62
End: 2022-08-26
Payer: COMMERCIAL

## 2022-08-26 VITALS
HEIGHT: 62 IN | HEART RATE: 63 BPM | RESPIRATION RATE: 20 BRPM | SYSTOLIC BLOOD PRESSURE: 131 MMHG | BODY MASS INDEX: 53.92 KG/M2 | TEMPERATURE: 98 F | WEIGHT: 293 LBS | DIASTOLIC BLOOD PRESSURE: 60 MMHG | OXYGEN SATURATION: 100 %

## 2022-08-26 DIAGNOSIS — Z12.11 SCREENING FOR COLON CANCER: ICD-10-CM

## 2022-08-26 PROCEDURE — 88342 CHG IMMUNOCYTOCHEMISTRY: ICD-10-PCS | Mod: 26,,, | Performed by: PATHOLOGY

## 2022-08-26 PROCEDURE — 25000003 PHARM REV CODE 250: Performed by: NURSE ANESTHETIST, CERTIFIED REGISTERED

## 2022-08-26 PROCEDURE — 45380 PR COLONOSCOPY,BIOPSY: ICD-10-PCS | Mod: 33,,, | Performed by: COLON & RECTAL SURGERY

## 2022-08-26 PROCEDURE — 88305 TISSUE EXAM BY PATHOLOGIST: ICD-10-PCS | Mod: 26,,, | Performed by: PATHOLOGY

## 2022-08-26 PROCEDURE — 45380 COLONOSCOPY AND BIOPSY: CPT | Mod: 33,,, | Performed by: COLON & RECTAL SURGERY

## 2022-08-26 PROCEDURE — 63600175 PHARM REV CODE 636 W HCPCS: Performed by: NURSE ANESTHETIST, CERTIFIED REGISTERED

## 2022-08-26 PROCEDURE — 88305 TISSUE EXAM BY PATHOLOGIST: CPT | Mod: 26,,, | Performed by: PATHOLOGY

## 2022-08-26 PROCEDURE — 37000009 HC ANESTHESIA EA ADD 15 MINS: Performed by: COLON & RECTAL SURGERY

## 2022-08-26 PROCEDURE — 88341 IMHCHEM/IMCYTCHM EA ADD ANTB: CPT | Mod: 26,,, | Performed by: PATHOLOGY

## 2022-08-26 PROCEDURE — 88342 IMHCHEM/IMCYTCHM 1ST ANTB: CPT | Performed by: PATHOLOGY

## 2022-08-26 PROCEDURE — 88342 IMHCHEM/IMCYTCHM 1ST ANTB: CPT | Mod: 26,,, | Performed by: PATHOLOGY

## 2022-08-26 PROCEDURE — 37000008 HC ANESTHESIA 1ST 15 MINUTES: Performed by: COLON & RECTAL SURGERY

## 2022-08-26 PROCEDURE — 88305 TISSUE EXAM BY PATHOLOGIST: CPT | Performed by: PATHOLOGY

## 2022-08-26 PROCEDURE — 45380 COLONOSCOPY AND BIOPSY: CPT | Mod: PT | Performed by: COLON & RECTAL SURGERY

## 2022-08-26 PROCEDURE — 27201012 HC FORCEPS, HOT/COLD, DISP: Performed by: COLON & RECTAL SURGERY

## 2022-08-26 PROCEDURE — 88341 PR IHC OR ICC EACH ADD'L SINGLE ANTIBODY  STAINPR: ICD-10-PCS | Mod: 26,,, | Performed by: PATHOLOGY

## 2022-08-26 PROCEDURE — 88341 IMHCHEM/IMCYTCHM EA ADD ANTB: CPT | Mod: 59 | Performed by: PATHOLOGY

## 2022-08-26 RX ORDER — PROPOFOL 10 MG/ML
VIAL (ML) INTRAVENOUS
Status: DISCONTINUED | OUTPATIENT
Start: 2022-08-26 | End: 2022-08-26

## 2022-08-26 RX ORDER — SODIUM CHLORIDE, SODIUM LACTATE, POTASSIUM CHLORIDE, CALCIUM CHLORIDE 600; 310; 30; 20 MG/100ML; MG/100ML; MG/100ML; MG/100ML
INJECTION, SOLUTION INTRAVENOUS CONTINUOUS PRN
Status: DISCONTINUED | OUTPATIENT
Start: 2022-08-26 | End: 2022-08-26

## 2022-08-26 RX ORDER — LIDOCAINE HYDROCHLORIDE 10 MG/ML
INJECTION, SOLUTION EPIDURAL; INFILTRATION; INTRACAUDAL; PERINEURAL
Status: DISCONTINUED | OUTPATIENT
Start: 2022-08-26 | End: 2022-08-26

## 2022-08-26 RX ORDER — SODIUM CHLORIDE, SODIUM LACTATE, POTASSIUM CHLORIDE, CALCIUM CHLORIDE 600; 310; 30; 20 MG/100ML; MG/100ML; MG/100ML; MG/100ML
INJECTION, SOLUTION INTRAVENOUS CONTINUOUS
Status: DISCONTINUED | OUTPATIENT
Start: 2022-08-26 | End: 2022-08-26 | Stop reason: HOSPADM

## 2022-08-26 RX ADMIN — PROPOFOL 50 MG: 10 INJECTION, EMULSION INTRAVENOUS at 11:08

## 2022-08-26 RX ADMIN — PROPOFOL 100 MG: 10 INJECTION, EMULSION INTRAVENOUS at 11:08

## 2022-08-26 RX ADMIN — PROPOFOL 40 MG: 10 INJECTION, EMULSION INTRAVENOUS at 11:08

## 2022-08-26 RX ADMIN — LIDOCAINE HYDROCHLORIDE 50 MG: 10 INJECTION, SOLUTION EPIDURAL; INFILTRATION; INTRACAUDAL; PERINEURAL at 11:08

## 2022-08-26 RX ADMIN — SODIUM CHLORIDE, SODIUM LACTATE, POTASSIUM CHLORIDE, AND CALCIUM CHLORIDE: .6; .31; .03; .02 INJECTION, SOLUTION INTRAVENOUS at 11:08

## 2022-08-26 RX ADMIN — PROPOFOL 20 MG: 10 INJECTION, EMULSION INTRAVENOUS at 11:08

## 2022-08-26 NOTE — PLAN OF CARE
Dr abdi came to bedside to discuss findings. Vital signs stable, no patient c/o nausea/vomitting, no abdominal pain, no gi bleeding. Patient to be discharged from unit.

## 2022-08-26 NOTE — [employer] PATIENT INSTRUCTIONS
Discharge Summary/Instructions after an Endoscopic Procedure  Patient Name: Amelia Mirza  Patient MRN: [id number]  Patient YOB: 1960 Friday, August 26, 2022 Taj Marshall MD  Dear patient,  As a result of recent federal legislation (The Federal Cures Act), you may   receive lab or pathology results from your procedure in your [facility]   account before your physician is able to contact you. Your physician or   their representative will relay the results to you with their   recommendations at their soonest availability.  Thank you,  RESTRICTIONS:  During your procedure today, you received medications for sedation.  These   medications may affect your judgment, balance and coordination.  Therefore,   for 24 hours, you have the following restrictions:   - DO NOT drive a car, operate machinery, make legal/financial decisions,   sign important papers or drink alcohol.    ACTIVITY:  Today: no heavy lifting, straining or running due to procedural   sedation/anesthesia.  The following day: return to full activity including work.  DIET:  Eat and drink normally unless instructed otherwise.     TREATMENT FOR COMMON SIDE EFFECTS:  - Mild abdominal pain, nausea, belching, bloating or excessive gas:  rest,   eat lightly and use a heating pad.  - Sore Throat: treat with throat lozenges and/or gargle with warm salt   water.  - Because air was used during the procedure, expelling large amounts of air   from your rectum or belching is normal.  - If a bowel prep was taken, you may not have a bowel movement for 1-3 days.    This is normal.  SYMPTOMS TO WATCH FOR AND REPORT TO YOUR PHYSICIAN:  1. Abdominal pain or bloating, other than gas cramps.  2. Chest pain.  3. Back pain.  4. Signs of infection such as: chills or fever occurring within 24 hours   after the procedure.  5. Rectal bleeding, which would show as bright red, maroon, or black stools.   (A tablespoon of blood from the rectum is not serious, especially  if   hemorrhoids are present.)  6. Vomiting.  7. Weakness or dizziness.  GO DIRECTLY TO THE NEAREST EMERGENCY ROOM IF YOU HAVE ANY OF THE FOLLOWING:      Difficulty breathing              Chills and/or fever over 101 F   Persistent vomiting and/or vomiting blood   Severe abdominal pain   Severe chest pain   Black, tarry stools   Bleeding- more than one tablespoon   Any other symptom or condition that you feel may need urgent attention  Your doctor recommends these additional instructions:  If any biopsies were taken, your doctors clinic will contact you in 1 to 2   weeks with any results.  - Discharge patient to home.   - High fiber diet.   - Continue present medications.   - Await pathology results.   - Repeat colonoscopy in 10 years for screening purposes.   - Return to primary care physician PRN.  For questions, problems or results please call your physician Taj Marshall MD at Work:  [phone]  If you have any questions about the above instructions, call the GI   department at [phone] or call the endoscopy unit at [phone]   from 7am until 3 pm.  [facility] [place], EMERGENCY ROOM PHONE NUMBER:   [phone]  IF A COMPLICATION OR EMERGENCY SITUATION ARISES AND YOU ARE UNABLE TO REACH   YOUR PHYSICIAN - GO DIRECTLY TO THE EMERGENCY ROOM.  I have read or have had read to me these discharge instructions for my   procedure and have received a written copy.  I understand these   instructions and will follow-up with my physician if I have any questions.     __________________________________       _____________________________________  Nurse Signature                                          Patient/Designated   Responsible Party Signature  MD Taj Sheets MD  8/26/2022 11:59:12 AM  This report has been verified and signed electronically.  Dear patient,  As a result of recent federal legislation (The Federal Cures Act), you may   receive lab or  pathology results from your procedure in your [email or web address]   account before your physician is able to contact you. Your physician or   their representative will relay the results to you with their   recommendations at their soonest availability.  Thank you,  [employer]

## 2022-08-26 NOTE — ANESTHESIA PREPROCEDURE EVALUATION
08/26/2022  Amelia Mirza is a 62 y.o., female.      Pre-op Assessment    I have reviewed the Patient Summary Reports.     I have reviewed the Nursing Notes. I have reviewed the NPO Status.   I have reviewed the Medications.     Review of Systems  Anesthesia Hx:  No problems with previous Anesthesia  Denies Family Hx of Anesthesia complications.   Denies Personal Hx of Anesthesia complications.   Social:  Non-Smoker    Hematology/Oncology:  Hematology Normal   Oncology Normal     EENT/Dental:EENT/Dental Normal   Cardiovascular:   Exercise tolerance: good ECG has been reviewed.    Pulmonary:  Pulmonary Normal    Renal/:  Renal/ Normal     Hepatic/GI:  Hepatic/GI Normal    Musculoskeletal:   Arthritis     Neurological:   Neuromuscular Disease,    Endocrine:   Hypothyroidism  Morbid Obesity / BMI > 40  Dermatological:  Skin Normal    Psych:  Psychiatric Normal           Physical Exam  General: Well nourished, Cooperative, Alert and Oriented    Airway:  Mallampati: II   Mouth Opening: Normal  TM Distance: Normal  Tongue: Normal  Neck ROM: Normal ROM    Dental:  Intact  Pt denies loose or removable dentition  Heart:  Rate: Normal  Rhythm: Regular Rhythm        Anesthesia Plan  Type of Anesthesia, risks & benefits discussed:    Anesthesia Type: MAC  Intra-op Monitoring Plan: Standard ASA Monitors  Post Op Pain Control Plan: multimodal analgesia  Induction:  IV  Informed Consent: Informed consent signed with the Patient and all parties understand the risks and agree with anesthesia plan.  All questions answered.   ASA Score: 3  Day of Surgery Review of History & Physical: H&P Update referred to the surgeon/provider.I have interviewed and examined the patient. I have reviewed the patient's H&P dated: There are no significant changes.     Ready For Surgery From Anesthesia Perspective.     .

## 2022-08-26 NOTE — BRIEF OP NOTE
[facility] ([facility])  Brief Operative Note     SUMMARY     Surgery Date: 8/26/2022     Surgeon(s) and Role:     * Taj Marshall MD - Primary    Assisting Surgeon: None    Pre-op Diagnosis:  Benign colon polyp [K63.5]    Post-op Diagnosis:  Post-Op Diagnosis Codes:     * Benign colon polyp [K63.5]    Procedure(s) (LRB):  COLONOSCOPY (N/A)    Anesthesia: Monitor Anesthesia Care    Description of the findings of the procedure: One likely hyperplastic polyp removed; diverticulosis and hemorrhoids    Estimated Blood Loss: * No values recorded between 8/26/2022 12:00 AM and 8/26/2022 11:59 AM *         Specimens:   Specimen (24h ago, onward)            None          Discharge Note    SUMMARY     Admit Date: 8/26/2022    Discharge Date and Time: 8/26/2022 11:59 AM    Hospital Course Patient was seen in the preoperative area by both myself and anesthesia. All consents were verified and all questions appropriately answered. All risks, benefits and alternatives explained to patient. Patient proceeded to endoscopy suite for colonoscopy and was discharged home postoperative once cleared by anesthesia.    Final Diagnosis: Post-Op Diagnosis Codes:     * Benign colon polyp [K63.5]    Disposition: Home or Self Care    Follow Up/Patient Instructions: See Provation report    Medications:  Reconciled Home Medications:      Medication List      CONTINUE taking these medications    diclofenac sodium 1.5 % Drop  daily as needed.     ergocalciferol 50,000 unit Cap  Commonly known as: ERGOCALCIFEROL  Take 1 capsule (50,000 Units total) by mouth twice a week.     fluticasone propionate 50 mcg/actuation nasal spray  Commonly known as: FLONASE  2 sprays (100 mcg total) by Each Nostril route daily as needed for Rhinitis or Allergies.     gabapentin 300 MG capsule  Commonly known as: NEURONTIN  Take 1 capsule (300 mg total) by mouth every evening for 1 day, THEN 1 capsule (300 mg total) 2 (two) times daily for 1 day, THEN 1  capsule (300 mg total) 3 (three) times daily for 1 day, THEN 2 capsules (600 mg total) 2 (two) times daily for 3 days, THEN 2 capsules (600 mg total) 3 (three) times daily for 18 days.  Start taking on: July 12, 2021     ibuprofen 800 MG tablet  Commonly known as: ADVIL,MOTRIN  Take 1 tablet (800 mg total) by mouth 2 (two) times daily as needed for Pain (food).     levothyroxine 200 MCG tablet  Commonly known as: SYNTHROID  Take 1 pill by mouth every morning before breakfast     LIDOcaine-prilocaine cream  Commonly known as: EMLA  as needed.     traZODone 50 MG tablet  Commonly known as: DESYREL  Take 1 tablet (50 mg total) by mouth nightly as needed for Insomnia.          Discharge Procedure Orders   Diet general     Call MD for:  temperature >100.4     Call MD for:  persistent nausea and vomiting     Call MD for:  severe uncontrolled pain     Call MD for:  difficulty breathing, headache or visual disturbances     Call MD for:  redness, tenderness, or signs of infection (pain, swelling, redness, odor or green/yellow discharge around incision site)     Call MD for:  hives     Call MD for:  persistent dizziness or light-headedness     Call MD for:  extreme fatigue     Activity as tolerated      Follow-up Information     Eve Green MD Follow up.    Specialty: Family Medicine  Why: As needed  Contact information:  [address] [address] [phone]

## 2022-08-26 NOTE — ANESTHESIA POSTPROCEDURE EVALUATION
Anesthesia Post Evaluation    Patient: Amelia Mriza    Procedure(s) Performed: Procedure(s) (LRB):  COLONOSCOPY (N/A)    Final Anesthesia Type: MAC      Patient location during evaluation: GI PACU  Patient participation: Yes- Able to Participate  Level of consciousness: awake and alert  Post-procedure vital signs: reviewed and stable  Pain management: adequate  Airway patency: patent    PONV status at discharge: No PONV  Anesthetic complications: no      Cardiovascular status: blood pressure returned to baseline and hemodynamically stable  Respiratory status: unassisted, spontaneous ventilation and room air  Hydration status: euvolemic  Follow-up not needed.          Vitals Value Taken Time   /60 08/26/22 1219   Temp  08/26/22 1341   Pulse 63 08/26/22 1219   Resp 20 08/26/22 1219   SpO2 100 % 08/26/22 1219         Event Time   Out of Recovery 12:39:13         Pain/Oleg Score: Oleg Score: 10 (8/26/2022 12:19 PM)

## 2022-08-26 NOTE — H&P
[facility] ([facility])  Colon and Rectal Surgery  History & Physical    Patient Name: Amelia Mirza  MRN: [id number]  Admission Date: 8/26/2022  Attending Physician: Taj Marshall MD  Primary Care Provider: Eve Green MD    Patient information was obtained from patient and medical records.    Subjective:     Chief Complaint/Reason for Admission: Here for Colonoscopy    History of Present Illness:  Patient is a 62 y.o. female presents for colonoscopy. Last colonoscopy in 2012 with only hyperplastic polyps removed. No hematochezia, melena or change in bowel habits. No personal or fam hx of CRC, adenomas or IBD.    No current facility-administered medications on file prior to encounter.     Current Outpatient Medications on File Prior to Encounter   Medication Sig    diclofenac sodium 1.5 % Drop daily as needed.    ergocalciferol (ERGOCALCIFEROL) 50,000 unit Cap Take 1 capsule (50,000 Units total) by mouth twice a week.    fluticasone propionate (FLONASE) 50 mcg/actuation nasal spray 2 sprays (100 mcg total) by Each Nostril route daily as needed for Rhinitis or Allergies.    gabapentin (NEURONTIN) 300 MG capsule Take 1 capsule (300 mg total) by mouth every evening for 1 day, THEN 1 capsule (300 mg total) 2 (two) times daily for 1 day, THEN 1 capsule (300 mg total) 3 (three) times daily for 1 day, THEN 2 capsules (600 mg total) 2 (two) times daily for 3 days, THEN 2 capsules (600 mg total) 3 (three) times daily for 18 days.    levothyroxine (SYNTHROID) 200 MCG tablet Take 1 pill by mouth every morning before breakfast    lidocaine-prilocaine (EMLA) cream as needed.       Review of patient's allergies indicates:  No Known Allergies    Past Medical History:   Diagnosis Date    Arthritis     Benign colon polyp     Hypothyroidism     Mild anemia     Obesity     Postmenopausal     No history of abnormal pap smear    Vitamin D deficiency      Past Surgical History:   Procedure Laterality Date     bilateral tubal ligation      EPIDURAL STEROID INJECTION N/A 10/4/2021    Procedure: Lumbar L5/S1 IL KARISSA WOULD LIKE LATEST POSSIBLE TIME;  Surgeon: Josemanuel Adler MD;  Location: [facility];  Service: Pain Management;  Laterality: N/A;    EPIDURAL STEROID INJECTION N/A 4/22/2022    Procedure: Lumbar L5/S1 IL KARISSA with RN IV sedation;  Surgeon: Josemanuel Adler MD;  Location: [facility];  Service: Pain Management;  Laterality: N/A;    OOPHORECTOMY      Partial hysterectomy with USO      for DUB     THYROIDECTOMY, PARTIAL       Family History     Problem Relation (Age of Onset)    Breast cancer Mother (42)    Cancer Mother, Paternal Grandfather    Colon cancer Father    Depression Daughter, Maternal Aunt    Diabetes Mother, Maternal Aunt    Heart disease Maternal Grandmother, Maternal Grandfather, Paternal Grandfather    Insulin resistance Daughter    No Known Problems Son, Son    Rheum arthritis Maternal Aunt        Tobacco Use    Smoking status: Never Smoker    Smokeless tobacco: Never Used   Substance and Sexual Activity    Alcohol use: Yes     Comment: Occasionally    Drug use: No    Sexual activity: Yes     Partners: Male     Birth control/protection: Surgical     Comment: 1 partner     Review of Systems   Constitutional: Negative for activity change, appetite change, chills, fatigue, fever and unexpected weight change.   HENT: Negative for congestion, ear pain, sore throat and trouble swallowing.    Eyes: Negative for pain, redness and itching.   Respiratory: Negative for cough, shortness of breath and wheezing.    Cardiovascular: Negative for chest pain, palpitations and leg swelling.   Gastrointestinal: Negative for abdominal distention, abdominal pain, anal bleeding, blood in stool, constipation, diarrhea, nausea, rectal pain and vomiting.   Endocrine: Negative for cold intolerance, heat intolerance and polyuria.   Genitourinary: Negative for dysuria, flank pain, frequency and hematuria.  "  Musculoskeletal: Negative for gait problem, joint swelling and neck pain.   Skin: Negative for color change, rash and wound.   Allergic/Immunologic: Negative for environmental allergies and immunocompromised state.   Neurological: Negative for dizziness, speech difficulty, weakness and numbness.   Psychiatric/Behavioral: Negative for agitation, confusion and hallucinations.     Objective:     /74 (BP Location: Left arm, Patient Position: Lying)   Pulse 67   Temp 98.2 °F (36.8 °C) (Temporal)   Resp 18   Ht 5' 2" (1.575 m)   Wt 134.3 kg (296 lb)   SpO2 97% Comment: room air  Breastfeeding No   BMI 54.14 kg/m²     Physical Exam  Constitutional:       Appearance: She is well-developed.   HENT:      Head: Normocephalic and atraumatic.   Eyes:      Conjunctiva/sclera: Conjunctivae normal.   Neck:      Thyroid: No thyromegaly.   Cardiovascular:      Rate and Rhythm: Normal rate and regular rhythm.   Pulmonary:      Effort: Pulmonary effort is normal. No respiratory distress.   Abdominal:      General: There is no distension.      Palpations: Abdomen is soft. There is no mass.      Tenderness: There is no abdominal tenderness.   Musculoskeletal:         General: No tenderness. Normal range of motion.      Cervical back: Normal range of motion.   Skin:     General: Skin is warm and dry.      Capillary Refill: Capillary refill takes less than 2 seconds.      Findings: No rash.   Neurological:      Mental Status: She is alert and oriented to person, place, and time.           Assessment/Plan:     Patient is a 62 y.o. female who presents for colonoscopy     - Ok to proceed to endoscopy suite for colonoscopy  - Consent obtained. All risks, benefits and alternatives fully explained to patient, including but not limited to bleeding, infection, perforation, and missed polyps. All questions appropriately answered to patient's satisfaction. Consent signed and placed on chart.    There are no hospital problems to " display for this patient.    VTE Risk Mitigation (From admission, onward)    None          Taj Marshall MD  Colon and Rectal Surgery  [facility] ([facility])

## 2022-08-26 NOTE — TRANSFER OF CARE
"Anesthesia Transfer of Care Note    Patient: Amelia Mirza    Procedure(s) Performed: Procedure(s) (LRB):  COLONOSCOPY (N/A)    Patient location: GI    Anesthesia Type: MAC    Transport from OR: Transported from OR on room air with adequate spontaneous ventilation    Post pain: adequate analgesia    Post assessment: no apparent anesthetic complications and tolerated procedure well    Post vital signs: stable    Level of consciousness: awake    Nausea/Vomiting: no nausea/vomiting    Complications: none    Transfer of care protocol was followed      Last vitals:   Visit Vitals  /74 (BP Location: Left arm, Patient Position: Lying)   Pulse 67   Temp 36.8 °C (98.2 °F) (Temporal)   Resp 18   Ht 5' 2" (1.575 m)   Wt 134.3 kg (296 lb)   SpO2 97%   Breastfeeding No   BMI 54.14 kg/m²     "

## 2022-09-02 LAB
FINAL PATHOLOGIC DIAGNOSIS: NORMAL
GROSS: NORMAL
Lab: NORMAL
MICROSCOPIC EXAM: NORMAL

## 2022-09-22 ENCOUNTER — TELEPHONE (OUTPATIENT)
Dept: PAIN MEDICINE | Facility: CLINIC | Age: 62
End: 2022-09-22
Payer: COMMERCIAL

## 2022-09-22 ENCOUNTER — TELEPHONE (OUTPATIENT)
Dept: FAMILY MEDICINE | Facility: CLINIC | Age: 62
End: 2022-09-22
Payer: COMMERCIAL

## 2022-09-22 DIAGNOSIS — Z12.31 OTHER SCREENING MAMMOGRAM: ICD-10-CM

## 2022-09-22 DIAGNOSIS — E03.9 HYPOTHYROIDISM, UNSPECIFIED TYPE: Primary | ICD-10-CM

## 2022-09-22 NOTE — TELEPHONE ENCOUNTER
Pt is calling for mammo order please advise     ----- Message from Mariajose Jnoes sent at 9/22/2022 11:58 AM CDT -----  Contact: Amelia  Type:  Mammogram    Caller is requesting to schedule their annual mammogram appointment.  Order is not listed in EPIC.  Please enter order and contact patient to schedule.  Name of Caller: Amelia  Where would they like the mammogram performed? [place] location  Would the patient rather a call back or a response via [facility]? Call back  Best Call Back Number Please call her at [phone]  Additional Information:

## 2022-09-22 NOTE — TELEPHONE ENCOUNTER
I have put the following orders and/or medications to this note. Also, need thyroid level checked.  Please advise pt and assist.    Orders Placed This Encounter   Procedures    Mammo Digital Screening Bilat w/ Keyur     Standing Status:   Future     Standing Expiration Date:   9/22/2024     Order Specific Question:   May the Radiologist modify the order per protocol to meet the clinical needs of the patient?     Answer:   Yes     Order Specific Question:   Release to patient     Answer:   Immediate    TSH     Standing Status:   Future     Standing Expiration Date:   11/21/2023

## 2022-09-22 NOTE — TELEPHONE ENCOUNTER
Called patient and scheduled a virtual with Dr. Vitor agustin 10/13/22 at 10:20am.  Patient verbalized understanding and accepted appt.

## 2022-09-23 ENCOUNTER — HOSPITAL ENCOUNTER (OUTPATIENT)
Dept: RADIOLOGY | Facility: HOSPITAL | Age: 62
Discharge: HOME | End: 2022-09-23
Attending: FAMILY MEDICINE
Payer: COMMERCIAL

## 2022-09-23 VITALS — BODY MASS INDEX: 53.92 KG/M2 | HEIGHT: 62 IN | WEIGHT: 293 LBS

## 2022-09-23 DIAGNOSIS — Z12.31 OTHER SCREENING MAMMOGRAM: ICD-10-CM

## 2022-09-23 PROCEDURE — 77063 BREAST TOMOSYNTHESIS BI: CPT | Mod: 26,,, | Performed by: RADIOLOGY

## 2022-09-23 PROCEDURE — 77067 SCR MAMMO BI INCL CAD: CPT | Mod: 26,,, | Performed by: RADIOLOGY

## 2022-09-23 PROCEDURE — 77067 SCR MAMMO BI INCL CAD: CPT | Mod: TC

## 2022-09-23 PROCEDURE — 77067 MAMMO DIGITAL SCREENING BILAT WITH TOMO: ICD-10-PCS | Mod: 26,,, | Performed by: RADIOLOGY

## 2022-09-23 PROCEDURE — 77063 MAMMO DIGITAL SCREENING BILAT WITH TOMO: ICD-10-PCS | Mod: 26,,, | Performed by: RADIOLOGY

## 2022-09-23 PROCEDURE — 77063 BREAST TOMOSYNTHESIS BI: CPT | Mod: TC

## 2022-10-10 ENCOUNTER — PATIENT MESSAGE (OUTPATIENT)
Dept: RESEARCH | Facility: HOSPITAL | Age: 62
End: 2022-10-10
Payer: COMMERCIAL

## 2022-10-12 NOTE — PROGRESS NOTES
Established Patient Chronic Pain Note (Follow-up Visit)    The patient location is: home  The chief complaint leading to consultation is: LBP  Visit type: Virtual visit with synchronous audio and video  Total time spent with patient: 15m  Each patient to whom he or she provides medical services by telemedicine is: (1) informed of the relationship between the physician and patient and the respective role of any other health care provider with respect to management of the patient; and (2) notified that he or she may decline to receive medical services by telemedicine and may withdraw from such care at any time.    Referring Physician: Eve Green MD    PCP: Eve Green MD    Chief Complaint:   LBP     SUBJECTIVE:  Interval history 10/13/2022  Patient presents status post L5-S1 interlaminar epidural steroid injection with right paramedian approach 04/22/2022.  Patient reports 65-70% relief in lower back and leg pain following her last epidural steroid injection.  Patient reports pain has been insidiously worsening over the last month.  Pain today is rated an 8/10.  Patient again reports pain in the lower back which radiates down the posterior aspects of bilateral lower extremities and L5-S1 distribution to the feet.  Patient reports pain has now interfered with her sleep.  Patient is interested in pursuing repeat injection.  Patient reports she is actively been participating in traditional as well as aquatic physical therapy which has been helping with strength and range of motion.      Interval history 03/31/2022  Patient presents for 5 month follow-up.  She reports return of lower back pain which radiates into the buttocks and down the posterior aspects of bilateral lower extremities and L5-S1 distribution.  Patient reports the symptoms are identical to prior lumbar epidural steroid injection.  Patient is interested in pursuing repeat intervention.  Patient also reports myofascial pain along thoracic paraspinous  "musculature.  Patient reports pain is interrupting with her sleep.  She denies new onset lower extremity weakness, bowel or bladder incontinence or saddle anesthesia.    Interval History (10/28/2021):  Amelia Mirza presents today for follow-up visit.  S/p L5/S1 IL KARISSA on 10/4/21 with 90% pain relief.  Pain was basically resolved after, but then she had a fall which Increased knee pain.  She hasnt seen Orthopedics in years.  Patient reports pain as "0/10 today.  Overall, she has greatly improved since procedure.     Initial HPI (7/12/21) - Dr. Adler:  Amelia Mirza is a 61 y.o.   female with past medical history significant for hypoTH, morbid obesity, osteoarthritis (knees), lumbar spondylosis who presents to the clinic for the evaluation of lower back and left leg pain . The pain started  1 year prior ago  without preceding accident or trauma and symptoms have been worsening.The pain is located in a bandlike distribution at the level of the iliac crest with radiation down the left lower extremity along the lateral and posterior aspects in L4-5 distribution.  The pain is described as constant, aching and sharp and is rated as 7/10. The pain is rated with a score of  7/10 on the BEST day and a score of 10/10 on the WORST day.  Symptoms interfere with daily activity, sleeping and work.   Patient does work from home but currently is off her summer break and is disheartened by her inability to participate in outdoor activities or time with her family secondary to her pain. The pain is exacerbated by Sitting, Standing, Walking and Getting out of bed/chair.    Patient is able to ambulate approximately 3 blocks before requiring rest.The pain is mitigated by laying down and rest.     Pt saw MLEI Mar for bilateral knee pain 3/2019 with topical compound cream prescribed and diagnostic genicular procedure discussed but not performed.     Pt saw Dr. Padilla (2017) for lower back pain with " radiculopathy with recommendation for PT, NSAID analgesic prescribed.     Patient denies urinary incontinence, bowel incontinence, significant motor weakness and loss of sensations.    Pain Disability Index Review:  Last 3 PDI Scores 3/6/2019   Pain Disability Index (PDI) 31       Non-Pharmacologic Treatments:  Physical Therapy/Home Exercise: yes  Ice/Heat:yes  TENS: no  Acupuncture: no  Massage: no  Chiropractic: no    Other: no      Pain Medications:  - Adjuvant Medications: Advil,Motrin ( Ibuprofen), Mobic (Meloxicam), Zanaflex ( Tizanidine) and NSAIDs, Tylenol, Biofreeze, EMLA cream    Pain Procedures:   Intra-articular steroid and visco-supplementation in bilateral knees - in other dept  -10/4/21: L5/S1 IL KARISSA on  with 90% pain relief   -04/22/2022:  L5-S1 interlaminar epidural steroid injection with right paramedian approach    Review of Systems:   GENERAL:  No weight loss, malaise or fevers.  HEENT:   No recent changes in vision or hearing  NECK:  Negative for lumps, no difficulty with swallowing.  RESPIRATORY:  Negative for cough, wheezing or shortness of breath, patient denies any recent URI.  CARDIOVASCULAR:  Negative for chest pain, leg swelling or palpitations.  GI:  Negative for abdominal discomfort, blood in stools or black stools or change in bowel habits.  MUSCULOSKELETAL:  See HPI.  SKIN:  Negative for lesions, rash, and itching.  PSYCH:  No mood disorder or recent psychosocial stressors.  Patients sleep is not disturbed secondary to pain.  HEMATOLOGY/LYMPHOLOGY:  Negative for prolonged bleeding, bruising easily or swollen nodes.  Patient is not currently taking any anti-coagulants  NEURO:   No history of headaches, syncope, paralysis, seizures or tremors.  All other reviewed and negative other than HPI.    OBJECTIVE:    Physical Exam:  There were no vitals filed for this visit. There is no height or weight on file to calculate BMI.   (reviewed on 10/13/2022)  Last clinic visit:  GENERAL: Well  appearing, in no acute distress, alert and oriented x3.  PSYCH:  Mood and affect appropriate.  SKIN: Skin color, texture, turgor normal, no rashes or lesions.  HEAD/FACE:  Normocephalic, atraumatic. Cranial nerves grossly intact.  NECK: No pain to palpation over the cervical paraspinous muscles. Spurling Negative. No pain with neck flexion, extension, or lateral flexion.   PULM: No evidence of respiratory difficulty, symmetric chest rise.  GI:  Soft and non-tender.  BACK: Straight leg raising in the sitting and supine positions is negative to radicular pain. No pain to palpation over the facet joints of the lumbar spine or spinous processes. Normal range of motion without pain reproduction.  EXTREMITIES: Peripheral joint ROM is full and pain free without obvious instability or laxity in all four extremities- with exception of knees.  No deformities, edema, or skin discoloration. Good capillary refill.  MUSCULOSKELETAL: Shoulder, hip, and knee provocative maneuvers are negative.  There is no pain with palpation over the sacroiliac joints bilaterally.  FABERs test is negative.  FAER test is negative.   Bilateral upper and lower extremity strength is normal and symmetric.  No atrophy or tone abnormalities are noted. Decreased sensation LLE: L4-5 distribution to knee.  Pain with extension of knee. TTP diffusely over right knee patella.  NEURO: BUE & BLE coordination and muscle stretch reflexes are physiologic and symmetric.  Plantar response are downgoing. No clonus.   GAIT: normal.      Imaging (Reviewed on 10/13/2022):   X-Ray Lumbar Spine Complete 5 View    Narrative  Comparison: None    Findings:    Body habitus limits the study.  For tumor body heights and alignment appear well-maintained.  There is uniform loss of diskal height at the L4 -- 5 L5 -- S1 levels.  Multilevel facet arthropathy.  Pedicles and SI joints appear intact.  No spondylolysis  or spondylolisthesis.    X-Ray Cervical Spine AP And  Lateral    Narrative  Comparison: None    Technique: Four views were obtained of the cervical spine    Findings: There is straightening of the normal cervical lordosis which can be associated with muscle spasm.  Vertebral body heights are well maintained.  No spondylolisthesis demonstrated.  There is mild loss of disk height from C3-4 and C5-6 with associated degenerative endplate changes and osteophyte production.  Posterior elements appear intact without acute fractures or subluxations demonstrated.  Odontoid process appears intact.  Atlantoaxial articulations appear normal.  Prevertebral soft tissues are within normal limits.        ASSESSMENT: 62 y.o. year old female with lower back and left lower extremity pain, consistent with     1. Lumbar radiculopathy, chronic        2. Primary osteoarthritis of both knees        3. Lumbar spondylosis        4. Myofascial pain        5. Dorsalgia, unspecified  MRI Lumbar Spine Without Contrast              PLAN:   1. Interventional: We discussed considering L5-S1 interlaminar epidural steroid injection versus transforaminal approach.  We will 1st review lumbar MRI before proceeding with interventional treatment.  We discussed the procedure, plan and potential risk in detail.      - Anticoagulation use: None.     2. Pharmacologic:     - Continue gabapentin 300mg. Original plan was titration up to gabapentin 600mg TID, but patient is just taking PRN.  Can restart titration if pain returns or worsens.     -We have discussed continuing anti spasmodic, tizanidine 4 mg nightly as this helps with myofascial pain.  We have discussed potential deleterious side effects associated with this medication including  dizziness, drowsiness, dry mouth or tingling sensation in the upper or lower extremities.     3. Rehabilitative:  -We discussed continuing physical therapy to help manage the patient/s painful condition. The patient was counseled that muscle strengthening will improve the long  term prognosis in regards to pain and may also help increase range of motion and mobility.     4. Diagnostic:  Reviewed pertinent imaging with the patient and answered any questions.  Lumbar MRI to better evaluate pain and weakness    5. Consult/ Referral: Follow-up PRN with Orthopedics if needed.    6. Follow up: 4-6 weeks    - This condition does not require this patient to take time off of work, and the primary goal of our Pain Management services is to improve the patient's functional capacity.   - I discussed the risks, benefits, and alternatives to potential treatment options. All questions and concerns were fully addressed today in clinic.       Josemanuel Adler MD    Disclaimer:  This note was prepared using voice recognition system and is likely to have sound alike errors that may have been overlooked even after proof reading.  Please call me with any questions.

## 2022-10-13 ENCOUNTER — TELEPHONE (OUTPATIENT)
Dept: PAIN MEDICINE | Facility: CLINIC | Age: 62
End: 2022-10-13

## 2022-10-13 ENCOUNTER — OFFICE VISIT (OUTPATIENT)
Dept: PAIN MEDICINE | Facility: CLINIC | Age: 62
End: 2022-10-13
Payer: COMMERCIAL

## 2022-10-13 DIAGNOSIS — M47.816 LUMBAR SPONDYLOSIS: ICD-10-CM

## 2022-10-13 DIAGNOSIS — M17.0 PRIMARY OSTEOARTHRITIS OF BOTH KNEES: ICD-10-CM

## 2022-10-13 DIAGNOSIS — M54.9 DORSALGIA, UNSPECIFIED: ICD-10-CM

## 2022-10-13 DIAGNOSIS — M79.18 MYOFASCIAL PAIN: ICD-10-CM

## 2022-10-13 DIAGNOSIS — M54.16 LUMBAR RADICULOPATHY, CHRONIC: Primary | ICD-10-CM

## 2022-10-13 PROCEDURE — 1159F MED LIST DOCD IN RCRD: CPT | Mod: CPTII,95,, | Performed by: ANESTHESIOLOGY

## 2022-10-13 PROCEDURE — 1159F PR MEDICATION LIST DOCUMENTED IN MEDICAL RECORD: ICD-10-PCS | Mod: CPTII,95,, | Performed by: ANESTHESIOLOGY

## 2022-10-13 PROCEDURE — 99214 PR OFFICE/OUTPT VISIT, EST, LEVL IV, 30-39 MIN: ICD-10-PCS | Mod: 95,,, | Performed by: ANESTHESIOLOGY

## 2022-10-13 PROCEDURE — 1160F PR REVIEW ALL MEDS BY PRESCRIBER/CLIN PHARMACIST DOCUMENTED: ICD-10-PCS | Mod: CPTII,95,, | Performed by: ANESTHESIOLOGY

## 2022-10-13 PROCEDURE — 1160F RVW MEDS BY RX/DR IN RCRD: CPT | Mod: CPTII,95,, | Performed by: ANESTHESIOLOGY

## 2022-10-13 PROCEDURE — 99214 OFFICE O/P EST MOD 30 MIN: CPT | Mod: 95,,, | Performed by: ANESTHESIOLOGY

## 2022-10-13 NOTE — TELEPHONE ENCOUNTER
Attempt to reach patient to schedule MRI appointment with a 4 week f/u. The patient did not answer. Left voice message on patients voice box to call back at earliest convenience to schedule apt.     Yefri Reddy  Medical Assistant

## 2022-10-13 NOTE — TELEPHONE ENCOUNTER
Reached patient to schedule MRI appointment and  a 4 week f/u. Appt made.  All questions answered.     Yefri Reddy  Medical

## 2022-10-13 NOTE — TELEPHONE ENCOUNTER
Attempt to reach patient to schedule a 4 week f/u appointment. The patient did not answer. Left voice message on patients voice box to call back at earliest convenience to schedule apt.     Yefri Reddy  Medical Assistant

## 2022-10-14 ENCOUNTER — TELEPHONE (OUTPATIENT)
Dept: PAIN MEDICINE | Facility: CLINIC | Age: 62
End: 2022-10-14
Payer: COMMERCIAL

## 2022-10-14 NOTE — TELEPHONE ENCOUNTER
----- Message from Yefri Reddy MA sent at 10/13/2022  2:51 PM CDT -----  Contact: Amelia  Need to schedule the pt for a 4 w f/u after MRI.      ----- Message -----  From: Shaila Parra  Sent: 10/13/2022   2:47 PM CDT  To: Vitor Abernathy Staff    Type:  Sooner Apoointment Request    Caller is requesting a sooner appointment.  Caller declined first available appointment listed below.  Caller will not accept being placed on the waitlist and is requesting a message be sent to doctor.  Name of Caller: Amelia   When is the first available appointment? 12/12/2022  Symptoms: n/a  Would the patient rather a call back or a response via [facility]? call  Best Call Back Number: [phone] (home)   Additional Information:  The patient want to be seen in 4 weeks after the MRI on the 10.20.2022 please.

## 2022-10-14 NOTE — TELEPHONE ENCOUNTER
Reached out to patient to schedule appointment from messages. Apt has been made.   Pt understand. All questions answered.     Yefri Reddy  Medical Assistant

## 2022-10-20 ENCOUNTER — HOSPITAL ENCOUNTER (OUTPATIENT)
Dept: RADIOLOGY | Facility: HOSPITAL | Age: 62
Discharge: HOME | End: 2022-10-20
Attending: ANESTHESIOLOGY
Payer: COMMERCIAL

## 2022-10-20 DIAGNOSIS — M54.9 DORSALGIA, UNSPECIFIED: ICD-10-CM

## 2022-10-20 PROCEDURE — 72148 MRI LUMBAR SPINE W/O DYE: CPT | Mod: 26,,, | Performed by: RADIOLOGY

## 2022-10-20 PROCEDURE — 72148 MRI LUMBAR SPINE W/O DYE: CPT | Mod: TC

## 2022-10-20 PROCEDURE — 72148 MRI LUMBAR SPINE WITHOUT CONTRAST: ICD-10-PCS | Mod: 26,,, | Performed by: RADIOLOGY

## 2022-10-28 ENCOUNTER — OFFICE VISIT (OUTPATIENT)
Dept: PAIN MEDICINE | Facility: CLINIC | Age: 62
End: 2022-10-28
Payer: COMMERCIAL

## 2022-10-28 ENCOUNTER — PATIENT MESSAGE (OUTPATIENT)
Dept: PAIN MEDICINE | Facility: CLINIC | Age: 62
End: 2022-10-28
Payer: COMMERCIAL

## 2022-10-28 DIAGNOSIS — M54.16 LUMBAR RADICULOPATHY, CHRONIC: Primary | ICD-10-CM

## 2022-10-28 PROCEDURE — 99214 PR OFFICE/OUTPT VISIT, EST, LEVL IV, 30-39 MIN: ICD-10-PCS | Mod: 95,,, | Performed by: PHYSICIAN ASSISTANT

## 2022-10-28 PROCEDURE — 99214 OFFICE O/P EST MOD 30 MIN: CPT | Mod: 95,,, | Performed by: PHYSICIAN ASSISTANT

## 2022-10-28 PROCEDURE — 1159F PR MEDICATION LIST DOCUMENTED IN MEDICAL RECORD: ICD-10-PCS | Mod: CPTII,95,, | Performed by: PHYSICIAN ASSISTANT

## 2022-10-28 PROCEDURE — 1160F PR REVIEW ALL MEDS BY PRESCRIBER/CLIN PHARMACIST DOCUMENTED: ICD-10-PCS | Mod: CPTII,95,, | Performed by: PHYSICIAN ASSISTANT

## 2022-10-28 PROCEDURE — 1159F MED LIST DOCD IN RCRD: CPT | Mod: CPTII,95,, | Performed by: PHYSICIAN ASSISTANT

## 2022-10-28 PROCEDURE — 1160F RVW MEDS BY RX/DR IN RCRD: CPT | Mod: CPTII,95,, | Performed by: PHYSICIAN ASSISTANT

## 2022-10-28 RX ORDER — GABAPENTIN 300 MG/1
300 CAPSULE ORAL NIGHTLY
Qty: 30 CAPSULE | Refills: 2 | Status: SHIPPED | OUTPATIENT
Start: 2022-10-28 | End: 2023-05-08

## 2022-10-28 RX ORDER — TIZANIDINE 4 MG/1
2-4 TABLET ORAL 2 TIMES DAILY PRN
Qty: 60 TABLET | Refills: 2 | Status: SHIPPED | OUTPATIENT
Start: 2022-10-28 | End: 2022-11-27

## 2022-10-28 NOTE — PROGRESS NOTES
Established Patient Chronic Pain Note (Follow-up Visit)    The patient location is: home  The chief complaint leading to consultation is: LBP  Visit type: Virtual visit with synchronous audio and video  Total time spent with patient: 15m  Each patient to whom he or she provides medical services by telemedicine is: (1) informed of the relationship between the physician and patient and the respective role of any other health care provider with respect to management of the patient; and (2) notified that he or she may decline to receive medical services by telemedicine and may withdraw from such care at any time.    Referring Physician: Eve Green MD    PCP: Eve Green MD    Chief Complaint:   LBP     SUBJECTIVE:  Interval History (10/28/2022):  Amelia Mirza presents today via telemed for follow-up visit for MRI review.  Patient was last seen on 10/13/2022. She reports continued lumbosacral pain in a band-like distribution with radiation into her bilateral lower extremities left > right. She reports her symptoms previously were worse in her right LE but now she favors the other leg to compensate. She reports pain radiates primarily posteriorly along L5/S1 distribution with occasional radiation into her anterior thighs. She reports improvement in her radicular symptoms with physical therapy. Patient reports pain as 7/10 today and daily which is constant in nature and interferes with daily activity.    MRI lumbar spine 10/13/2022  FINDINGS:  At T11-T12, circumferential disc bulge results in mild central canal narrowing.     At T12-L1, L1-L2, L2-L3, normal.     At L3-L4, minor disc desiccation without narrowing.  Mild facet joint osteoarthrosis is present bilaterally.     At L4-L5, moderate bilateral facet joint osteoarthrosis allows 4 mm anterolisthesis of L4 on L5 and combines with disc bulge to result in mild central canal narrowing and moderate bilateral neural foramen narrowing.     At L5-S1, moderate loss  of disc space height and circumferential disc bulge combined with minor facet joint osteoarthrosis to result in minor central canal narrowing and mild right and severe left neural foramen narrowing.     Lumbosacral alignment is normal.  Vertebral bodies show mild diffuse red marrow recruitment, with otherwise normal bone marrow signal.  Conus terminates normally at L2.  Paraspinal soft tissues are unremarkable.     Impression:     1. Severe left L5-S1 neural foramen narrowing.  Correlation for left L5 radiculopathy is requested.  2. Grade 1 anterolisthesis of L4 on L5 due to facet joint osteoarthrosis resulting in moderate bilateral neural foramen narrowing and mild central canal narrowing when combined with degenerative disc disease.     Interval history 10/13/2022  Patient presents status post L5-S1 interlaminar epidural steroid injection with right paramedian approach 04/22/2022.  Patient reports 65-70% relief in lower back and leg pain following her last epidural steroid injection.  Patient reports pain has been insidiously worsening over the last month.  Pain today is rated an 8/10.  Patient again reports pain in the lower back which radiates down the posterior aspects of bilateral lower extremities and L5-S1 distribution to the feet.  Patient reports pain has now interfered with her sleep.  Patient is interested in pursuing repeat injection.  Patient reports she is actively been participating in traditional as well as aquatic physical therapy which has been helping with strength and range of motion.      Interval history 03/31/2022  Patient presents for 5 month follow-up.  She reports return of lower back pain which radiates into the buttocks and down the posterior aspects of bilateral lower extremities and L5-S1 distribution.  Patient reports the symptoms are identical to prior lumbar epidural steroid injection.  Patient is interested in pursuing repeat intervention.  Patient also reports myofascial pain  "along thoracic paraspinous musculature.  Patient reports pain is interrupting with her sleep.  She denies new onset lower extremity weakness, bowel or bladder incontinence or saddle anesthesia.    Interval History (10/28/2021):  Amelia Mirza presents today for follow-up visit.  S/p L5/S1 IL KARISSA on 10/4/21 with 90% pain relief.  Pain was basically resolved after, but then she had a fall which Increased knee pain.  She hasnt seen Orthopedics in years.  Patient reports pain as "0/10 today.  Overall, she has greatly improved since procedure.     Initial HPI (7/12/21) - Dr. Adler:  Amelia Mirza is a 61 y.o.   female with past medical history significant for hypoTH, morbid obesity, osteoarthritis (knees), lumbar spondylosis who presents to the clinic for the evaluation of lower back and left leg pain . The pain started  1 year prior ago  without preceding accident or trauma and symptoms have been worsening.The pain is located in a bandlike distribution at the level of the iliac crest with radiation down the left lower extremity along the lateral and posterior aspects in L4-5 distribution.  The pain is described as constant, aching and sharp and is rated as 7/10. The pain is rated with a score of  7/10 on the BEST day and a score of 10/10 on the WORST day.  Symptoms interfere with daily activity, sleeping and work.   Patient does work from home but currently is off her summer break and is disheartened by her inability to participate in outdoor activities or time with her family secondary to her pain. The pain is exacerbated by Sitting, Standing, Walking and Getting out of bed/chair.    Patient is able to ambulate approximately 3 blocks before requiring rest.The pain is mitigated by laying down and rest.     Pt saw MELI Mar for bilateral knee pain 3/2019 with topical compound cream prescribed and diagnostic genicular procedure discussed but not performed.     Pt saw Dr. Padilla (2017) for " lower back pain with radiculopathy with recommendation for PT, NSAID analgesic prescribed.     Patient denies urinary incontinence, bowel incontinence, significant motor weakness and loss of sensations.    Pain Disability Index Review:  Last 3 PDI Scores 3/6/2019   Pain Disability Index (PDI) 31       Non-Pharmacologic Treatments:  Physical Therapy/Home Exercise: yes  Ice/Heat:yes  TENS: no  Acupuncture: no  Massage: no  Chiropractic: no    Other: no      Pain Medications:  - Adjuvant Medications: Advil,Motrin ( Ibuprofen), Mobic (Meloxicam), Zanaflex ( Tizanidine) and NSAIDs, Tylenol, Biofreeze, EMLA cream    Pain Procedures:   Intra-articular steroid and visco-supplementation in bilateral knees - in other dept  -10/4/21: L5/S1 IL KARISSA on  with 90% pain relief   -04/22/2022:  L5-S1 interlaminar epidural steroid injection with right paramedian approach    Review of Systems:   GENERAL:  No weight loss, malaise or fevers.  HEENT:   No recent changes in vision or hearing  NECK:  Negative for lumps, no difficulty with swallowing.  RESPIRATORY:  Negative for cough, wheezing or shortness of breath, patient denies any recent URI.  CARDIOVASCULAR:  Negative for chest pain, leg swelling or palpitations.  GI:  Negative for abdominal discomfort, blood in stools or black stools or change in bowel habits.  MUSCULOSKELETAL:  See HPI.  SKIN:  Negative for lesions, rash, and itching.  PSYCH:  No mood disorder or recent psychosocial stressors.  Patients sleep is not disturbed secondary to pain.  HEMATOLOGY/LYMPHOLOGY:  Negative for prolonged bleeding, bruising easily or swollen nodes.  Patient is not currently taking any anti-coagulants  NEURO:   No history of headaches, syncope, paralysis, seizures or tremors.  All other reviewed and negative other than HPI.    OBJECTIVE:    Physical Exam:  Telemedicine Exam  There were no vitals filed for this visit.  There is no height or weight on file to calculate BMI.   (reviewed on 10/28/2022)      GENERAL: Well appearing, in no acute distress, alert and oriented x3.  Cooperative.  PSYCH:  Mood and affect appropriate.  SKIN: Skin color & texture with no obvious abnormalities.    HEAD/FACE:  Normocephalic, atraumatic.    PULM:  No difficulty breathing. No nasal flaring. No obvious wheezing.  EXTREMITIES: No obvious deformities. Moving all extremities well, appears to have symmetric strength throughout.  MUSCULOSKELETAL: No obvious atrophy abnormalities are noted.   NEURO: No obvious neurologic deficit.   GAIT: sitting.     Physical Exam: last in clinic visit:      There were no vitals filed for this visit. There is no height or weight on file to calculate BMI.   (reviewed on 10/28/2022)  Last clinic visit:  GENERAL: Well appearing, in no acute distress, alert and oriented x3.  PSYCH:  Mood and affect appropriate.  SKIN: Skin color, texture, turgor normal, no rashes or lesions.  HEAD/FACE:  Normocephalic, atraumatic. Cranial nerves grossly intact.  NECK: No pain to palpation over the cervical paraspinous muscles. Spurling Negative. No pain with neck flexion, extension, or lateral flexion.   PULM: No evidence of respiratory difficulty, symmetric chest rise.  GI:  Soft and non-tender.  BACK: Straight leg raising in the sitting and supine positions is negative to radicular pain. No pain to palpation over the facet joints of the lumbar spine or spinous processes. Normal range of motion without pain reproduction.  EXTREMITIES: Peripheral joint ROM is full and pain free without obvious instability or laxity in all four extremities- with exception of knees.  No deformities, edema, or skin discoloration. Good capillary refill.  MUSCULOSKELETAL: Shoulder, hip, and knee provocative maneuvers are negative.  There is no pain with palpation over the sacroiliac joints bilaterally.  FABERs test is negative.  FAER test is negative.   Bilateral upper and lower extremity strength is normal and symmetric.  No atrophy or tone  abnormalities are noted. Decreased sensation LLE: L4-5 distribution to knee.  Pain with extension of knee. TTP diffusely over right knee patella.  NEURO: BUE & BLE coordination and muscle stretch reflexes are physiologic and symmetric.  Plantar response are downgoing. No clonus.   GAIT: normal.      Imaging (Reviewed on 10/28/2022):     MRI lumbar spine 10/13/2022  FINDINGS:  At T11-T12, circumferential disc bulge results in mild central canal narrowing.     At T12-L1, L1-L2, L2-L3, normal.     At L3-L4, minor disc desiccation without narrowing.  Mild facet joint osteoarthrosis is present bilaterally.     At L4-L5, moderate bilateral facet joint osteoarthrosis allows 4 mm anterolisthesis of L4 on L5 and combines with disc bulge to result in mild central canal narrowing and moderate bilateral neural foramen narrowing.     At L5-S1, moderate loss of disc space height and circumferential disc bulge combined with minor facet joint osteoarthrosis to result in minor central canal narrowing and mild right and severe left neural foramen narrowing.     Lumbosacral alignment is normal.  Vertebral bodies show mild diffuse red marrow recruitment, with otherwise normal bone marrow signal.  Conus terminates normally at L2.  Paraspinal soft tissues are unremarkable.     Impression:     1. Severe left L5-S1 neural foramen narrowing.  Correlation for left L5 radiculopathy is requested.  2. Grade 1 anterolisthesis of L4 on L5 due to facet joint osteoarthrosis resulting in moderate bilateral neural foramen narrowing and mild central canal narrowing when combined with degenerative disc disease.  X-Ray Lumbar Spine Complete 5 View    Narrative  Comparison: None    Findings:    Body habitus limits the study.  For tumor body heights and alignment appear well-maintained.  There is uniform loss of diskal height at the L4 -- 5 L5 -- S1 levels.  Multilevel facet arthropathy.  Pedicles and SI joints appear intact.  No spondylolysis  or  spondylolisthesis.    X-Ray Cervical Spine AP And Lateral    Narrative  Comparison: None    Technique: Four views were obtained of the cervical spine    Findings: There is straightening of the normal cervical lordosis which can be associated with muscle spasm.  Vertebral body heights are well maintained.  No spondylolisthesis demonstrated.  There is mild loss of disk height from C3-4 and C5-6 with associated degenerative endplate changes and osteophyte production.  Posterior elements appear intact without acute fractures or subluxations demonstrated.  Odontoid process appears intact.  Atlantoaxial articulations appear normal.  Prevertebral soft tissues are within normal limits.        ASSESSMENT: 62 y.o. year old female with lower back and left lower extremity pain, consistent with     1. Lumbar radiculopathy, chronic  gabapentin (NEURONTIN) 300 MG capsule    tiZANidine (ZANAFLEX) 4 MG tablet    IR Epidural Transfor 1st Vert Lumbar Lucio    Case Request-RAD/Other Procedure Area: Bilateral L5/S1 TF KARISSA RN IV Sedation                PLAN:   1. Interventional: Schedule bilateral L5/S1 TF KARISSA for radicular symptoms. We discussed the procedure, plan and potential risk in detail.      - Anticoagulation use: None.     2. Pharmacologic:     - Continue gabapentin 300mg. Original plan was titration up to gabapentin 600mg TID, but patient is just taking PRN.  Can restart titration if pain returns or worsens.     -We have discussed continuing anti spasmodic, tizanidine 4 mg nightly as this helps with myofascial pain.  We have discussed potential deleterious side effects associated with this medication including  dizziness, drowsiness, dry mouth or tingling sensation in the upper or lower extremities.     3. Rehabilitative:  -We discussed continuing physical therapy to help manage the patient/s painful condition. The patient was counseled that muscle strengthening will improve the long term prognosis in regards to pain and may  also help increase range of motion and mobility.     4. Diagnostic:  Reviewed MRI lumbar spine with the patient and answered any questions.      5. Consult/ Referral: Follow-up PRN with Orthopedics if needed.    6. Follow up: 4-6 weeks after injection    - This condition does not require this patient to take time off of work, and the primary goal of our Pain Management services is to improve the patient's functional capacity.   - I discussed the risks, benefits, and alternatives to potential treatment options. All questions and concerns were fully addressed today in clinic.       Sharmaine Huber PA-C    Disclaimer:  This note was prepared using voice recognition system and is likely to have sound alike errors that may have been overlooked even after proof reading.  Please call me with any questions.

## 2022-11-11 ENCOUNTER — HOSPITAL ENCOUNTER (OUTPATIENT)
Dept: PREADMISSION TESTING | Facility: HOSPITAL | Age: 62
Discharge: HOME | End: 2022-11-11
Attending: ANESTHESIOLOGY
Payer: COMMERCIAL

## 2022-11-11 VITALS — WEIGHT: 293 LBS | BODY MASS INDEX: 53.92 KG/M2 | HEIGHT: 62 IN

## 2022-11-11 NOTE — CARE UPDATE
Ms. Amelia Mirza is a 61 y/o morbidly obese AAF with a PMHx of Arthritis, DDD, Hypothyroidism, and Vitamin D deficiency who presents to the Pre-admit department today for evaluation prior to undergoing an L5/S1 KARISSA with Dr. Adler on 11/18.    Patient seen and examined, ambulatory, and per her report is in her usual state of health with her only complaint being low back pain, which she rates 8/10 at worst, associated with radiation to bilateral buttocks, and RLE.    Patient denies any dizziness/lightheadedness, headaches, sore throat, dysphagia, congestion, fever, chills, CP, SOB, cough, abdominal pain, N/V/D, dysuria, extremity swelling, abnormal bleeding, and all other symptoms at this time.     Patient reports she is able to climb 1 flight of stairs, and is not affected by CP or SOB.  Patient reports that pain is interfering with ADLs.  Aggravated by prolonged sitting/standing.  Alleviated by physical therapy, and Zanaflex.    Patient denies any prior h/o complications related to sedation and anesthesia, with no h/o airway compromise noted or reported.  Full ROM noted to neck.  Based on the Modified Mallampati classification for difficult laryngoscopy and intubation patient is a Class II.    Given current BMI of 54.50 if sedation is given, I would recommend that respiratory be present at the bedside for procedure.  I will defer the ordering, and subsequent management of sedation to Dr. Adler. Ok for patient to proceed with procedure at the [place] location.      VS; /88, HR 69, RR 18, RA SaO2 98%, Temp 98.2.

## 2022-11-14 NOTE — PRE-PROCEDURE INSTRUCTIONS
Spoke with patient regarding procedure scheduled on 11.18    Arrival time 1145    Has patient been sick with fever or on antibiotics within the last 7 days? No    Does the patient have any open wounds, sores or rashes? No    Does the patient have any recent fractures? no    Has patient received a vaccination within the last 7 days? No    Received the COVID vaccination? yes    Has the patient stopped all medications as directed? NA    Does patient have a pacemaker and or defibrillator? no    Does the patient have a ride to and from procedure and someone reliable to remain with patient? april    Is the patient diabetic? no    Does the patient have sleep apnea? Or use O2 at home? No and no     Is the patient receiving sedation? Yes cleared by pat np on 11.13    Is the patient instructed to remain NPO beginning at midnight the night before their procedure? yes    Procedure location confirmed with patient? Yes    Covid- Denies signs/symptoms. Instructed to notify PAT/MD if any changes.

## 2022-11-18 ENCOUNTER — HOSPITAL ENCOUNTER (OUTPATIENT)
Facility: HOSPITAL | Age: 62
Discharge: HOME | End: 2022-11-18
Attending: ANESTHESIOLOGY | Admitting: ANESTHESIOLOGY
Payer: COMMERCIAL

## 2022-11-18 DIAGNOSIS — M54.16 LUMBAR RADICULOPATHY: ICD-10-CM

## 2022-11-18 PROCEDURE — 64483 PR EPIDURAL INJ, ANES/STEROID, TRANSFORAMINAL, LUMB/SACR, SNGL LEVL: ICD-10-PCS | Mod: 50,,, | Performed by: ANESTHESIOLOGY

## 2022-11-18 PROCEDURE — 25000003 PHARM REV CODE 250: Performed by: ANESTHESIOLOGY

## 2022-11-18 PROCEDURE — 63600175 PHARM REV CODE 636 W HCPCS: Performed by: ANESTHESIOLOGY

## 2022-11-18 PROCEDURE — 64483 NJX AA&/STRD TFRM EPI L/S 1: CPT | Mod: 50 | Performed by: ANESTHESIOLOGY

## 2022-11-18 PROCEDURE — 25500020 PHARM REV CODE 255: Performed by: ANESTHESIOLOGY

## 2022-11-18 PROCEDURE — 64483 NJX AA&/STRD TFRM EPI L/S 1: CPT | Mod: 50,,, | Performed by: ANESTHESIOLOGY

## 2022-11-18 RX ORDER — BUPIVACAINE HYDROCHLORIDE 2.5 MG/ML
INJECTION, SOLUTION EPIDURAL; INFILTRATION; INTRACAUDAL
Status: DISCONTINUED | OUTPATIENT
Start: 2022-11-18 | End: 2022-11-18 | Stop reason: HOSPADM

## 2022-11-18 RX ORDER — INDOMETHACIN 25 MG/1
CAPSULE ORAL
Status: DISCONTINUED | OUTPATIENT
Start: 2022-11-18 | End: 2022-11-18 | Stop reason: HOSPADM

## 2022-11-18 RX ORDER — FENTANYL CITRATE 50 UG/ML
INJECTION, SOLUTION INTRAMUSCULAR; INTRAVENOUS
Status: DISCONTINUED | OUTPATIENT
Start: 2022-11-18 | End: 2022-11-18 | Stop reason: HOSPADM

## 2022-11-18 RX ORDER — DEXAMETHASONE SODIUM PHOSPHATE 10 MG/ML
INJECTION INTRAMUSCULAR; INTRAVENOUS
Status: DISCONTINUED | OUTPATIENT
Start: 2022-11-18 | End: 2022-11-18 | Stop reason: HOSPADM

## 2022-11-18 RX ORDER — MIDAZOLAM HYDROCHLORIDE 1 MG/ML
INJECTION, SOLUTION INTRAMUSCULAR; INTRAVENOUS
Status: DISCONTINUED | OUTPATIENT
Start: 2022-11-18 | End: 2022-11-18 | Stop reason: HOSPADM

## 2022-11-18 NOTE — DISCHARGE SUMMARY
Discharge Note  Short Stay      SUMMARY     Admit Date: 11/18/2022    Attending Physician: Josemanuel Adler MD        Discharge Physician: Josemanuel Adler MD        Discharge Date: 11/18/2022 11:34 AM    Procedure(s) (LRB):  Bilateral L5/S1 TF KARISSA RN IV Sedation (Bilateral)    Final Diagnosis: Lumbar radiculopathy, chronic [M54.16]    Disposition: Home or self care    Patient Instructions:   Current Discharge Medication List        CONTINUE these medications which have NOT CHANGED    Details   diclofenac sodium 1.5 % Drop daily as needed.      ergocalciferol (ERGOCALCIFEROL) 50,000 unit Cap Take 1 capsule (50,000 Units total) by mouth twice a week.  Qty: 25 capsule, Refills: 3    Associated Diagnoses: Vitamin D deficiency      fluticasone propionate (FLONASE) 50 mcg/actuation nasal spray 2 sprays (100 mcg total) by Each Nostril route daily as needed for Rhinitis or Allergies.  Qty: 16 g, Refills: 2    Associated Diagnoses: Seasonal allergic rhinitis, unspecified trigger      gabapentin (NEURONTIN) 300 MG capsule Take 1 capsule (300 mg total) by mouth every evening.  Qty: 30 capsule, Refills: 2    Associated Diagnoses: Lumbar radiculopathy, chronic      ibuprofen (ADVIL,MOTRIN) 800 MG tablet Take 1 tablet (800 mg total) by mouth 2 (two) times daily as needed for Pain (food).  Qty: 60 tablet, Refills: 2    Associated Diagnoses: Chronic low back pain with sciatica, sciatica laterality unspecified, unspecified back pain laterality; Lumbar spondylosis; DDD (degenerative disc disease), cervical      levothyroxine (SYNTHROID) 200 MCG tablet Take 1 pill by mouth every morning before breakfast  Qty: 90 tablet, Refills: 0    Associated Diagnoses: Hypothyroidism, unspecified type      lidocaine-prilocaine (EMLA) cream as needed.      tiZANidine (ZANAFLEX) 4 MG tablet Take 0.5-1 tablets (2-4 mg total) by mouth 2 (two) times daily as needed (muscle spasms). May cause drowsiness.  Qty: 60 tablet, Refills: 2    Associated Diagnoses:  Lumbar radiculopathy, chronic      traZODone (DESYREL) 50 MG tablet Take 1 tablet (50 mg total) by mouth nightly as needed for Insomnia.  Qty: 90 tablet, Refills: 1    Associated Diagnoses: Insomnia, unspecified type                 Discharge Diagnosis: Lumbar radiculopathy, chronic [M54.16]  Condition on Discharge: Stable with no complications to procedure   Diet on Discharge: Same as before.  Activity: as per instruction sheet.  Discharge to: Home with a responsible adult.  Follow up: 2-4 weeks       Please call the office at [phone] if you experience any weakness or loss of sensation, fever > 101.5, pain uncontrolled with oral medications, persistent nausea/vomiting/or diarrhea, redness or drainage from the incisions, or any other worrisome concerns. If physician on call was not reached or could not communicate with our office for any reason please go to the nearest emergency department

## 2022-11-18 NOTE — OP NOTE
Amelia Mirza  62 y.o. female      Vitals:    11/18/22 1130   BP: 125/62   Pulse: (!) 47   Resp: 18   Temp:      Procedure Date: 11/18/22      INFORMED CONSENT: The procedure, risks, benefits and options were discussed with patient. There are no contraindications to the procedure. The patient expressed understanding and agreed to proceed. The personnel performing the procedure was discussed. I verify that I personally obtained consent prior to the start of the procedure and the signed consent can be found on the patient's chart.       Anesthesia:   Conscious sedation provided by M.D    The patient was monitored with continuous pulse oximetry, EKG, and intermittent blood pressure monitors.  The patient was hemodynamically stable throughout the entire process was responsive to voice, and breathing spontaneously.  Supplemental O2 was provided at 2L/min via nasal cannula.  Patient was comfortable for the duration of the procedure. (See nurse documentation and case log for sedation time)    There was a total of 1mg IV Midazolam and 50mcg Fentanyl titrated for the procedure    Pre Procedure diagnosis: Lumbar radiculopathy, chronic [M54.16]  Post-Procedure diagnosis: SAME     Complications: None    Specimens: None      DESCRIPTION OF PROCEDURE: The patient was brought to the procedure room. IV access was obtained prior to the procedure. The patient was positioned prone on the fluoroscopy table. Continuous hemodynamic monitoring was initiated including blood pressure, EKG, and pulse oximetry. . The skin was prepped with chlorhexidine and draped in a sterile fashion. Skin anesthesia was achieved using a total of 10mL of lidocaine, 5mL over each respective injection site.     The  L5/S1 transforaminal spaces were identified with fluoroscopy in the  AP, oblique, and lateral views.  A 22 gauge spinal quinke needle was then advanced into the area of the trans foraminal spaces bilateral with confirmation of proper needle  position using AP, oblique, and lateral fluoroscopic views. Once the needle tip was in the area of the transforaminal space, and there was no blood, CSF or paraesthesias,  1.5 mL of Omnipaque 300mg/ml was injected on bilateral for a total of 3mL.  Fluoroscopic imaging in the AP and lateral views revealed a clear outline of the spinal nerve with proximal spread of agent through the neural foramen into the epidural space. A total combination of 2 mL of Bupivicaine 0.25% and 10 mg dexamethasone was injected on each side for a total of 6 mL of injected medications with displacement of the contrast dye confirming that the medication went into the area of the transforaminal spaces bilateral. A sterile dressing was applied.   Patient tolerated the procedure well.    Patient was taken back to the recovery room for further observation.     The patient was discharged to home in stable condition

## 2022-11-18 NOTE — DISCHARGE INSTRUCTIONS

## 2022-11-18 NOTE — PLAN OF CARE
Patient d/c home in stable condition via wheelchair with ride. Verbalized understanding of d/c instructions. Patient voiced no complaints. Patient stood at side of bed, walked steps with no new motor deficits. Neuro intact.

## 2022-11-21 VITALS
HEART RATE: 51 BPM | SYSTOLIC BLOOD PRESSURE: 108 MMHG | DIASTOLIC BLOOD PRESSURE: 66 MMHG | RESPIRATION RATE: 18 BRPM | HEIGHT: 62 IN | TEMPERATURE: 98 F | OXYGEN SATURATION: 98 % | WEIGHT: 293 LBS | BODY MASS INDEX: 53.92 KG/M2

## 2022-11-28 ENCOUNTER — TELEPHONE (OUTPATIENT)
Dept: FAMILY MEDICINE | Facility: CLINIC | Age: 62
End: 2022-11-28
Payer: COMMERCIAL

## 2022-11-28 ENCOUNTER — PATIENT MESSAGE (OUTPATIENT)
Dept: FAMILY MEDICINE | Facility: CLINIC | Age: 62
End: 2022-11-28
Payer: COMMERCIAL

## 2022-11-28 DIAGNOSIS — E03.9 HYPOTHYROIDISM, UNSPECIFIED TYPE: Primary | ICD-10-CM

## 2022-11-28 NOTE — TELEPHONE ENCOUNTER
I have an earache. It hurts on and off. Today is unbearable. I get relief when I apply pressure behind my lower ear.  I also have pain in my lower abdomen DAILY. I mentioned the pain in a  prior visit and if I remember correctly, you said it could be scar tissue. This is very annoying and again relief  comes with applying pressure, but the pain in more intense.    Per portal message

## 2022-11-29 RX ORDER — LEVOTHYROXINE SODIUM 25 UG/1
25 TABLET ORAL
Qty: 90 TABLET | Refills: 0 | Status: SHIPPED | OUTPATIENT
Start: 2022-11-29 | End: 2023-01-15 | Stop reason: SDUPTHER

## 2022-11-29 NOTE — TELEPHONE ENCOUNTER
Advise pt keep 11/30/22 appt w/Dr. Wallace.     Increase Synthroid 200 mcg daily to 200 + 25 mcg daily based on TSH lab. Keep 1/13/23 appt w/me.    Thanks.

## 2022-12-15 NOTE — PROGRESS NOTES
Established Patient Chronic Pain Note (Follow-up Visit)    The patient location is: car  The chief complaint leading to consultation is: LBP  Visit type: Virtual visit with synchronous audio and video  Total time spent with patient: 15m  Each patient to whom he or she provides medical services by telemedicine is: (1) informed of the relationship between the physician and patient and the respective role of any other health care provider with respect to management of the patient; and (2) notified that he or she may decline to receive medical services by telemedicine and may withdraw from such care at any time.    Referring Physician: Eve Green MD    PCP: Eve Green MD    Chief Complaint:   LBP     SUBJECTIVE:  Interval History (12/16/2022): Amelia Mirza presents today for follow-up visit.  she underwent Bilateral  L5/S1 TF KARISSA on 11/18/22.  The patient reports that she is/was better following the procedure.  she reports 65-70% pain relief.  The changes lasted 4 weeks so far.  The changes have continued through this visit.  She reports this injection was equally effective c/t IL KARISSA in April, but neither were as effective as the initial IL KARISSA she had in October of 2021. She recently restarted physical therapy, which does offer improvement and relief in her symptoms. Patient reports pain as 2/10 today. She reports increased mobility since the injection, she is able to stand and walk longer distances.    Interval History (10/28/2022):  Amelia Mirza presents today via telemed for follow-up visit for MRI review.  Patient was last seen on 10/13/2022. She reports continued lumbosacral pain in a band-like distribution with radiation into her bilateral lower extremities left > right. She reports her symptoms previously were worse in her right LE but now she favors the other leg to compensate. She reports pain radiates primarily posteriorly along L5/S1 distribution with occasional radiation into her anterior  thighs. She reports improvement in her radicular symptoms with physical therapy. Patient reports pain as 7/10 today and daily which is constant in nature and interferes with daily activity.    MRI lumbar spine 10/13/2022  FINDINGS:  At T11-T12, circumferential disc bulge results in mild central canal narrowing.     At T12-L1, L1-L2, L2-L3, normal.     At L3-L4, minor disc desiccation without narrowing.  Mild facet joint osteoarthrosis is present bilaterally.     At L4-L5, moderate bilateral facet joint osteoarthrosis allows 4 mm anterolisthesis of L4 on L5 and combines with disc bulge to result in mild central canal narrowing and moderate bilateral neural foramen narrowing.     At L5-S1, moderate loss of disc space height and circumferential disc bulge combined with minor facet joint osteoarthrosis to result in minor central canal narrowing and mild right and severe left neural foramen narrowing.     Lumbosacral alignment is normal.  Vertebral bodies show mild diffuse red marrow recruitment, with otherwise normal bone marrow signal.  Conus terminates normally at L2.  Paraspinal soft tissues are unremarkable.     Impression:     1. Severe left L5-S1 neural foramen narrowing.  Correlation for left L5 radiculopathy is requested.  2. Grade 1 anterolisthesis of L4 on L5 due to facet joint osteoarthrosis resulting in moderate bilateral neural foramen narrowing and mild central canal narrowing when combined with degenerative disc disease.     Interval history 10/13/2022  Patient presents status post L5-S1 interlaminar epidural steroid injection with right paramedian approach 04/22/2022.  Patient reports 65-70% relief in lower back and leg pain following her last epidural steroid injection.  Patient reports pain has been insidiously worsening over the last month.  Pain today is rated an 8/10.  Patient again reports pain in the lower back which radiates down the posterior aspects of bilateral lower extremities and L5-S1  "distribution to the feet.  Patient reports pain has now interfered with her sleep.  Patient is interested in pursuing repeat injection.  Patient reports she is actively been participating in traditional as well as aquatic physical therapy which has been helping with strength and range of motion.      Interval history 03/31/2022  Patient presents for 5 month follow-up.  She reports return of lower back pain which radiates into the buttocks and down the posterior aspects of bilateral lower extremities and L5-S1 distribution.  Patient reports the symptoms are identical to prior lumbar epidural steroid injection.  Patient is interested in pursuing repeat intervention.  Patient also reports myofascial pain along thoracic paraspinous musculature.  Patient reports pain is interrupting with her sleep.  She denies new onset lower extremity weakness, bowel or bladder incontinence or saddle anesthesia.    Interval History (10/28/2021):  Amelia Mirza presents today for follow-up visit.  S/p L5/S1 IL KARISSA on 10/4/21 with 90% pain relief.  Pain was basically resolved after, but then she had a fall which Increased knee pain.  She hasnt seen Orthopedics in years.  Patient reports pain as "0/10 today.  Overall, she has greatly improved since procedure.     Initial HPI (7/12/21) - Dr. Adler:  Amelia Mirza is a 61 y.o.   female with past medical history significant for hypoTH, morbid obesity, osteoarthritis (knees), lumbar spondylosis who presents to the clinic for the evaluation of lower back and left leg pain . The pain started  1 year prior ago  without preceding accident or trauma and symptoms have been worsening.The pain is located in a bandlike distribution at the level of the iliac crest with radiation down the left lower extremity along the lateral and posterior aspects in L4-5 distribution.  The pain is described as constant, aching and sharp and is rated as 7/10. The pain is rated with a score of  " 7/10 on the BEST day and a score of 10/10 on the WORST day.  Symptoms interfere with daily activity, sleeping and work.   Patient does work from home but currently is off her summer break and is disheartened by her inability to participate in outdoor activities or time with her family secondary to her pain. The pain is exacerbated by Sitting, Standing, Walking and Getting out of bed/chair.    Patient is able to ambulate approximately 3 blocks before requiring rest.The pain is mitigated by laying down and rest.     Pt saw MELI Mar for bilateral knee pain 3/2019 with topical compound cream prescribed and diagnostic genicular procedure discussed but not performed.     Pt saw Dr. Padilla (2017) for lower back pain with radiculopathy with recommendation for PT, NSAID analgesic prescribed.     Patient denies urinary incontinence, bowel incontinence, significant motor weakness and loss of sensations.    Pain Disability Index Review:  Last 3 PDI Scores 3/6/2019   Pain Disability Index (PDI) 31       Non-Pharmacologic Treatments:  Physical Therapy/Home Exercise: yes  Ice/Heat:yes  TENS: no  Acupuncture: no  Massage: no  Chiropractic: no    Other: no      Pain Medications:  - Adjuvant Medications: Advil,Motrin ( Ibuprofen), Mobic (Meloxicam), Zanaflex ( Tizanidine) and NSAIDs, Tylenol, Biofreeze, EMLA cream    Pain Procedures:   Intra-articular steroid and visco-supplementation in bilateral knees - in other dept  -10/4/21: L5/S1 IL KARISSA on  with 90% pain relief   -04/22/2022:  L5-S1 interlaminar epidural steroid injection with right paramedian approach with 65-70% relief  -Bilateral  L5/S1 TF KARISSA on 11/18/22 with 65-70% relief    Review of Systems:   GENERAL:  No weight loss, malaise or fevers.  HEENT:   No recent changes in vision or hearing  NECK:  Negative for lumps, no difficulty with swallowing.  RESPIRATORY:  Negative for cough, wheezing or shortness of breath, patient denies any recent URI.  CARDIOVASCULAR:   Negative for chest pain, leg swelling or palpitations.  GI:  Negative for abdominal discomfort, blood in stools or black stools or change in bowel habits.  MUSCULOSKELETAL:  See HPI.  SKIN:  Negative for lesions, rash, and itching.  PSYCH:  No mood disorder or recent psychosocial stressors.  Patients sleep is not disturbed secondary to pain.  HEMATOLOGY/LYMPHOLOGY:  Negative for prolonged bleeding, bruising easily or swollen nodes.  Patient is not currently taking any anti-coagulants  NEURO:   No history of headaches, syncope, paralysis, seizures or tremors.  All other reviewed and negative other than HPI.    OBJECTIVE:    Physical Exam:  Telemedicine Exam  There were no vitals filed for this visit.  There is no height or weight on file to calculate BMI.   (reviewed on 12/16/2022)     GENERAL: Well appearing, in no acute distress, alert and oriented x3.  Cooperative.  PSYCH:  Mood and affect appropriate.  SKIN: Skin color & texture with no obvious abnormalities.    HEAD/FACE:  Normocephalic, atraumatic.    PULM:  No difficulty breathing. No nasal flaring. No obvious wheezing.  EXTREMITIES: No obvious deformities. Moving all extremities well, appears to have symmetric strength throughout.  MUSCULOSKELETAL: No obvious atrophy abnormalities are noted.   NEURO: No obvious neurologic deficit.   GAIT: sitting.     Physical Exam: last in clinic visit:      There were no vitals filed for this visit. There is no height or weight on file to calculate BMI.   (reviewed on 12/16/2022)  Last clinic visit:  GENERAL: Well appearing, in no acute distress, alert and oriented x3.  PSYCH:  Mood and affect appropriate.  SKIN: Skin color, texture, turgor normal, no rashes or lesions.  HEAD/FACE:  Normocephalic, atraumatic. Cranial nerves grossly intact.  NECK: No pain to palpation over the cervical paraspinous muscles. Spurling Negative. No pain with neck flexion, extension, or lateral flexion.   PULM: No evidence of respiratory  difficulty, symmetric chest rise.  GI:  Soft and non-tender.  BACK: Straight leg raising in the sitting and supine positions is negative to radicular pain. No pain to palpation over the facet joints of the lumbar spine or spinous processes. Normal range of motion without pain reproduction.  EXTREMITIES: Peripheral joint ROM is full and pain free without obvious instability or laxity in all four extremities- with exception of knees.  No deformities, edema, or skin discoloration. Good capillary refill.  MUSCULOSKELETAL: Shoulder, hip, and knee provocative maneuvers are negative.  There is no pain with palpation over the sacroiliac joints bilaterally.  FABERs test is negative.  FAER test is negative.   Bilateral upper and lower extremity strength is normal and symmetric.  No atrophy or tone abnormalities are noted. Decreased sensation LLE: L4-5 distribution to knee.  Pain with extension of knee. TTP diffusely over right knee patella.  NEURO: BUE & BLE coordination and muscle stretch reflexes are physiologic and symmetric.  Plantar response are downgoing. No clonus.   GAIT: normal.      Imaging (Reviewed on 12/16/2022):     MRI lumbar spine 10/13/2022  FINDINGS:  At T11-T12, circumferential disc bulge results in mild central canal narrowing.     At T12-L1, L1-L2, L2-L3, normal.     At L3-L4, minor disc desiccation without narrowing.  Mild facet joint osteoarthrosis is present bilaterally.     At L4-L5, moderate bilateral facet joint osteoarthrosis allows 4 mm anterolisthesis of L4 on L5 and combines with disc bulge to result in mild central canal narrowing and moderate bilateral neural foramen narrowing.     At L5-S1, moderate loss of disc space height and circumferential disc bulge combined with minor facet joint osteoarthrosis to result in minor central canal narrowing and mild right and severe left neural foramen narrowing.     Lumbosacral alignment is normal.  Vertebral bodies show mild diffuse red marrow  recruitment, with otherwise normal bone marrow signal.  Conus terminates normally at L2.  Paraspinal soft tissues are unremarkable.     Impression:     1. Severe left L5-S1 neural foramen narrowing.  Correlation for left L5 radiculopathy is requested.  2. Grade 1 anterolisthesis of L4 on L5 due to facet joint osteoarthrosis resulting in moderate bilateral neural foramen narrowing and mild central canal narrowing when combined with degenerative disc disease.  X-Ray Lumbar Spine Complete 5 View    Narrative  Comparison: None    Findings:    Body habitus limits the study.  For tumor body heights and alignment appear well-maintained.  There is uniform loss of diskal height at the L4 -- 5 L5 -- S1 levels.  Multilevel facet arthropathy.  Pedicles and SI joints appear intact.  No spondylolysis  or spondylolisthesis.    X-Ray Cervical Spine AP And Lateral    Narrative  Comparison: None    Technique: Four views were obtained of the cervical spine    Findings: There is straightening of the normal cervical lordosis which can be associated with muscle spasm.  Vertebral body heights are well maintained.  No spondylolisthesis demonstrated.  There is mild loss of disk height from C3-4 and C5-6 with associated degenerative endplate changes and osteophyte production.  Posterior elements appear intact without acute fractures or subluxations demonstrated.  Odontoid process appears intact.  Atlantoaxial articulations appear normal.  Prevertebral soft tissues are within normal limits.        ASSESSMENT: 62 y.o. year old female with lower back and left lower extremity pain, consistent with     1. Lumbar radiculopathy                    PLAN:   1. Interventional: Repeat L5/S1 IL KARISSA should symptoms return or exacerbate  -Bilateral  L5/S1 TF KARISSA on 11/18/22 with 65-70% relief    - Anticoagulation use: None.     2. Pharmacologic:   - Continue gabapentin 300mg. Original plan was titration up to gabapentin 600mg TID, but patient is just  taking PRN.  Can restart titration if pain returns or worsens.     -We have discussed continuing anti spasmodic, tizanidine 4 mg nightly as this helps with myofascial pain.  We have discussed potential deleterious side effects associated with this medication including  dizziness, drowsiness, dry mouth or tingling sensation in the upper or lower extremities.     3. Rehabilitative:  -Currently enrolled in physical therapy     4. Diagnostic:  Reviewed MRI lumbar spine with the patient and answered any questions.      5. Consult/ Referral: Follow-up PRN with Orthopedics if needed.    6. Follow up: 3 months or PRN    - This condition does not require this patient to take time off of work, and the primary goal of our Pain Management services is to improve the patient's functional capacity.   - I discussed the risks, benefits, and alternatives to potential treatment options. All questions and concerns were fully addressed today in clinic.       Sharmaine Huber PA-C    Disclaimer:  This note was prepared using voice recognition system and is likely to have sound alike errors that may have been overlooked even after proof reading.  Please call me with any questions.

## 2022-12-16 ENCOUNTER — OFFICE VISIT (OUTPATIENT)
Dept: PAIN MEDICINE | Facility: CLINIC | Age: 62
End: 2022-12-16
Payer: COMMERCIAL

## 2022-12-16 DIAGNOSIS — M54.16 LUMBAR RADICULOPATHY: Primary | ICD-10-CM

## 2022-12-16 PROCEDURE — 1159F MED LIST DOCD IN RCRD: CPT | Mod: CPTII,95,, | Performed by: PHYSICIAN ASSISTANT

## 2022-12-16 PROCEDURE — 99214 OFFICE O/P EST MOD 30 MIN: CPT | Mod: 95,,, | Performed by: PHYSICIAN ASSISTANT

## 2022-12-16 PROCEDURE — 1160F RVW MEDS BY RX/DR IN RCRD: CPT | Mod: CPTII,95,, | Performed by: PHYSICIAN ASSISTANT

## 2022-12-16 PROCEDURE — 1160F PR REVIEW ALL MEDS BY PRESCRIBER/CLIN PHARMACIST DOCUMENTED: ICD-10-PCS | Mod: CPTII,95,, | Performed by: PHYSICIAN ASSISTANT

## 2022-12-16 PROCEDURE — 99214 PR OFFICE/OUTPT VISIT, EST, LEVL IV, 30-39 MIN: ICD-10-PCS | Mod: 95,,, | Performed by: PHYSICIAN ASSISTANT

## 2022-12-16 PROCEDURE — 1159F PR MEDICATION LIST DOCUMENTED IN MEDICAL RECORD: ICD-10-PCS | Mod: CPTII,95,, | Performed by: PHYSICIAN ASSISTANT

## 2023-01-13 ENCOUNTER — OFFICE VISIT (OUTPATIENT)
Dept: FAMILY MEDICINE | Facility: CLINIC | Age: 63
End: 2023-01-13
Payer: COMMERCIAL

## 2023-01-13 ENCOUNTER — HOSPITAL ENCOUNTER (OUTPATIENT)
Dept: RADIOLOGY | Facility: HOSPITAL | Age: 63
Discharge: HOME | End: 2023-01-13
Attending: FAMILY MEDICINE
Payer: COMMERCIAL

## 2023-01-13 VITALS
HEIGHT: 62 IN | BODY MASS INDEX: 53.92 KG/M2 | WEIGHT: 293 LBS | SYSTOLIC BLOOD PRESSURE: 160 MMHG | DIASTOLIC BLOOD PRESSURE: 86 MMHG | HEART RATE: 91 BPM | RESPIRATION RATE: 18 BRPM | OXYGEN SATURATION: 97 % | TEMPERATURE: 98 F

## 2023-01-13 DIAGNOSIS — R10.32 LEFT LOWER QUADRANT ABDOMINAL PAIN: ICD-10-CM

## 2023-01-13 DIAGNOSIS — R06.09 DOE (DYSPNEA ON EXERTION): ICD-10-CM

## 2023-01-13 DIAGNOSIS — Z78.0 POSTMENOPAUSAL: ICD-10-CM

## 2023-01-13 DIAGNOSIS — I10 HYPERTENSION, UNSPECIFIED TYPE: ICD-10-CM

## 2023-01-13 DIAGNOSIS — E55.9 VITAMIN D DEFICIENCY: ICD-10-CM

## 2023-01-13 DIAGNOSIS — M47.816 LUMBAR SPONDYLOSIS: ICD-10-CM

## 2023-01-13 DIAGNOSIS — Z12.31 OTHER SCREENING MAMMOGRAM: ICD-10-CM

## 2023-01-13 DIAGNOSIS — E03.9 HYPOTHYROIDISM, UNSPECIFIED TYPE: ICD-10-CM

## 2023-01-13 DIAGNOSIS — Z00.00 ANNUAL PHYSICAL EXAM: Primary | ICD-10-CM

## 2023-01-13 DIAGNOSIS — Z23 NEED FOR TD VACCINE: ICD-10-CM

## 2023-01-13 DIAGNOSIS — E66.01 MORBID OBESITY WITH BMI OF 50.0-59.9, ADULT: ICD-10-CM

## 2023-01-13 DIAGNOSIS — M17.0 PRIMARY OSTEOARTHRITIS OF BOTH KNEES: ICD-10-CM

## 2023-01-13 PROCEDURE — 1160F RVW MEDS BY RX/DR IN RCRD: CPT | Mod: CPTII,S$GLB,, | Performed by: FAMILY MEDICINE

## 2023-01-13 PROCEDURE — 90471 TD VACCINE GREATER THAN OR EQUAL TO 7YO PRESERVATIVE FREE IM: ICD-10-PCS | Mod: S$GLB,,, | Performed by: FAMILY MEDICINE

## 2023-01-13 PROCEDURE — 99999 PR PBB SHADOW E&M-EST. PATIENT-LVL V: ICD-10-PCS | Mod: PBBFAC,,, | Performed by: FAMILY MEDICINE

## 2023-01-13 PROCEDURE — 90471 IMMUNIZATION ADMIN: CPT | Mod: S$GLB,,, | Performed by: FAMILY MEDICINE

## 2023-01-13 PROCEDURE — 71046 X-RAY EXAM CHEST 2 VIEWS: CPT | Mod: TC,FY,PO

## 2023-01-13 PROCEDURE — 81003 URINALYSIS AUTO W/O SCOPE: CPT | Performed by: FAMILY MEDICINE

## 2023-01-13 PROCEDURE — 3077F SYST BP >= 140 MM HG: CPT | Mod: CPTII,S$GLB,, | Performed by: FAMILY MEDICINE

## 2023-01-13 PROCEDURE — 3079F PR MOST RECENT DIASTOLIC BLOOD PRESSURE 80-89 MM HG: ICD-10-PCS | Mod: CPTII,S$GLB,, | Performed by: FAMILY MEDICINE

## 2023-01-13 PROCEDURE — 3077F PR MOST RECENT SYSTOLIC BLOOD PRESSURE >= 140 MM HG: ICD-10-PCS | Mod: CPTII,S$GLB,, | Performed by: FAMILY MEDICINE

## 2023-01-13 PROCEDURE — 90714 TD VACC NO PRESV 7 YRS+ IM: CPT | Mod: S$GLB,,, | Performed by: FAMILY MEDICINE

## 2023-01-13 PROCEDURE — 3008F PR BODY MASS INDEX (BMI) DOCUMENTED: ICD-10-PCS | Mod: CPTII,S$GLB,, | Performed by: FAMILY MEDICINE

## 2023-01-13 PROCEDURE — 90714 TD VACCINE GREATER THAN OR EQUAL TO 7YO PRESERVATIVE FREE IM: ICD-10-PCS | Mod: S$GLB,,, | Performed by: FAMILY MEDICINE

## 2023-01-13 PROCEDURE — 1160F PR REVIEW ALL MEDS BY PRESCRIBER/CLIN PHARMACIST DOCUMENTED: ICD-10-PCS | Mod: CPTII,S$GLB,, | Performed by: FAMILY MEDICINE

## 2023-01-13 PROCEDURE — 1159F PR MEDICATION LIST DOCUMENTED IN MEDICAL RECORD: ICD-10-PCS | Mod: CPTII,S$GLB,, | Performed by: FAMILY MEDICINE

## 2023-01-13 PROCEDURE — 71046 X-RAY EXAM CHEST 2 VIEWS: CPT | Mod: 26,,, | Performed by: RADIOLOGY

## 2023-01-13 PROCEDURE — 3008F BODY MASS INDEX DOCD: CPT | Mod: CPTII,S$GLB,, | Performed by: FAMILY MEDICINE

## 2023-01-13 PROCEDURE — 99396 PREV VISIT EST AGE 40-64: CPT | Mod: 25,S$GLB,, | Performed by: FAMILY MEDICINE

## 2023-01-13 PROCEDURE — 99999 PR PBB SHADOW E&M-EST. PATIENT-LVL V: CPT | Mod: PBBFAC,,, | Performed by: FAMILY MEDICINE

## 2023-01-13 PROCEDURE — 71046 XR CHEST PA AND LATERAL: ICD-10-PCS | Mod: 26,,, | Performed by: RADIOLOGY

## 2023-01-13 PROCEDURE — 3079F DIAST BP 80-89 MM HG: CPT | Mod: CPTII,S$GLB,, | Performed by: FAMILY MEDICINE

## 2023-01-13 PROCEDURE — 1159F MED LIST DOCD IN RCRD: CPT | Mod: CPTII,S$GLB,, | Performed by: FAMILY MEDICINE

## 2023-01-13 PROCEDURE — 99396 PR PREVENTIVE VISIT,EST,40-64: ICD-10-PCS | Mod: 25,S$GLB,, | Performed by: FAMILY MEDICINE

## 2023-01-13 RX ORDER — AMLODIPINE BESYLATE 5 MG/1
5 TABLET ORAL DAILY
Qty: 90 TABLET | Refills: 1 | Status: SHIPPED | OUTPATIENT
Start: 2023-01-13 | End: 2023-04-15 | Stop reason: SDUPTHER

## 2023-01-13 RX ORDER — TIZANIDINE 4 MG/1
4 TABLET ORAL 2 TIMES DAILY
COMMUNITY
Start: 2022-12-02 | End: 2023-03-20 | Stop reason: SDUPTHER

## 2023-01-13 NOTE — PROGRESS NOTES
CHIEF COMPLAINT: Annual wellness examination.     HISTORY OF PRESENT ILLNESS: Ms. Mirza comes in today fasting and with taking thyroid medication for annual wellness examination.     END OF LIFE DECISION: She has no living will and does not desire life support.    Current Outpatient Medications   Medication Sig    diclofenac sodium 1.5 % Drop daily as needed.    ergocalciferol (ERGOCALCIFEROL) 50,000 unit Cap Take 1 capsule (50,000 Units total) by mouth twice a week.    fluticasone propionate (FLONASE) 50 mcg/actuation nasal spray 2 sprays (100 mcg total) by Each Nostril route daily as needed for Rhinitis or Allergies.    ibuprofen (ADVIL,MOTRIN) 800 MG tablet Take 1 tablet (800 mg total) by mouth 2 (two) times daily as needed for Pain (food).    levothyroxine (SYNTHROID) 200 MCG tablet Take 1 pill by mouth every morning before breakfast    levothyroxine (SYNTHROID) 25 MCG tablet Take 1 tablet (25 mcg total) by mouth before breakfast.    lidocaine-prilocaine (EMLA) cream as needed.    tiZANidine (ZANAFLEX) 4 MG tablet Take 4 mg by mouth 2 (two) times daily.    traZODone (DESYREL) 50 MG tablet Take 1 tablet (50 mg total) by mouth nightly as needed for Insomnia.         gabapentin (NEURONTIN) 300 MG capsule Take 1 capsule (300 mg total) by mouth every evening. (Patient not taking: Reported on 1/13/2023)      SCREENINGS:  Cholesterol: August 17, 2021.  FFS/Colonoscopy: August 26, 2022 - benign colon polyp, internal hemorrhoids, diverticulosis; repeat in 10 years.  Mammogram: September 23, 2022 - okay.   GYN Exam: August 17, 2021 - okay.  Dexa Scan: April 17, 2017 - okay; repeat in 5 years. She desires.  Eye Exam: February 2022 with Dr. Hill.  She wears glasses.   PPD: Negative in the past.  Immunizations: Td/Tdap - May 30, 2012. She desires.  Gardasil - N./A.  Zostavax - N./A.  Shingrix - August 17, 2020, February 17, 2021.  Pneumovax - Never.  Seasonal Flu - N./A. Discussed. She declines.  Covid-19 (Moderna) vaccine  series - February 27, 2021, March 26, 2021, November 13, 2021.     ROS:  GENERAL: Denies fever, chills or unusual weight change. Appetite normal. Weight 138.2 kg (304 lb 10.8 oz) at May 30, 2022 visit. Exercises 2 times per week with physical therapy for her back and knees, 60 minutes each time - water aerobics, banding and biking. Monitors diet some times. Reports chronic fatigue.  SKIN: Denies rashes, itching, changes in mole, color or texture of skin or easy bruising.  HEAD: Denies headaches or recent head trauma.  EYES: Denies change in vision, pain, diplopia, redness, drainage. Wears glasses.  EARS: Denies ear pain, discharge, vertigo or decreased hearing.  NOSE: Denies loss of smell, epistaxis or rhinitis.  MOUTH & THROAT: Denies hoarseness or change in voice. Denies excessive gum bleeding or mouth sores. Denies sore throat.  NODES: Denies swollen glands.  CHEST: Denies IYER, wheezing, cough, or sputum production. Reports gets short of breath with walking stairs and with walking in parking lot x several weeks.  CARDIOVASCULAR: Denies chest pain, PND, orthopnea or reduced exercise tolerance. Denies palpitations. Reports no history of hypertension but reports blood pressure levels are elevated lately as back at work following [place] break and in November 2022 prior to having KARISSA.  Reports cooks with salt/seasonal blend.  ABDOMEN: Denies diarrhea, constipation, nausea, vomiting, abdominal pain, or blood in stool. Reports chronic, intermittent pain at left lower quadrant abdomen x 1 year but daily since October 2022 and told likely due to scar tissue; reports takes Ibuprofen but not sure if helps.  URINARY: Denies flank pain, dysuria or hematuria. Reports urine frequency every 20 minutes for several months; reports occasionally drinks more water.  GENITOURINARY: Denies flank pain, dysuria, frequency or hematuria. Performs monthly breast self exams.  ENDOCRINE: Denies diabetes, cholesterol problems. Reports takes  "Vitamin D 50,000 units twice weekly for vitamin D deficiency and continues Synthroid 200 mcg daily for thyroid replacement.  HEME/LYMPH: Denies bleeding problems.  PERIPHERAL VASCULAR:Denies claudication or cyanosis.  MUSCULOSKELETAL: Denies joint stiffness, pain or swelling except reports chronic arthritic knee and back pain. Reports currently in physical therapy twice a week for back and knee(s) pain with help. Reports had KARISSA in November 2022 with help. Denies edema.    NEUROLOGIC: Denies history of seizures, tremors, paralysis, alteration of gait or coordination.   PSYCHIATRIC: Denies mood swings, depression, anxiety, homicidal or suicidal thoughts. Reports chronic insomnia and takes Trazodone with help.     PE:   VS: Blood Pressure (Abnormal) 160/86   Pulse 91   Temperature 97.7 °F (36.5 °C)   Respiration 18   Height 5' 2" (1.575 m)   Weight (Abnormal) 138.7 kg (305 lb 12.5 oz)   Oxygen Saturation 97%   Body Mass Index 55.93 kg/m²   APPEARANCE: Well nourished, well developed female, obese and pleasant, alert and oriented in no acute distress.  HEAD: Nontender. Full range of motion.  EYES: PERRL, conjunctiva pink, lids no edema. She wears glasses.  EARS: External canal patent, no swelling or redness. TM's shiny and clear.  NOSE: Mucosa and turbinates pink, not swollen. No discharge. Nontender sinuses.  THROAT: No pharyngeal erythema or exudate. No stridor.  NECK: Supple, no mass, thyroid not enlarged.  NODES: No cervical, axillary lymph node enlargement.  CHEST: Normal respiratory effort. Lungs clear to auscultation.  CARDIOVASCULAR: Normal S1, S2. No rubs, murmurs or gallops. PMI not displaced. No carotid bruit. Pedal pulses palpable bilaterally. No edema.  ABDOMEN: Bowel sounds present. Not distended. Soft. No tenderness, masses or organomegaly.  BREAST EXAM: Symmetrical, no external lesions, no discharge, no masses palpated.  PELVIC EXAM: Not examined per patient request.  RECTAL EXAM: Not examined per " patient request.  MUSCULOSKELETAL: No joint deformities except slight bilateral knee stiffness without tenderness noted today. She is ambulatory without problems.  SKIN: No rashes or suspicious lesions, normal color and turgor.  NEUROLOGIC: Cranial Nerves: II-XII grossly intact. DTR's: Knees, Ankles 2+ and equal bilaterally. Gait & Posture: Normal gait and fine motion.  PSYCHIATRIC: Patient alert, oriented x 3. Mood/Affect normal without acute anxiety or depression noted. Judgment/insight good as she makes appropriate decisions on today's examination.     ASSESSMENT:    ICD-10-CM ICD-9-CM    1. Annual physical exam  Z00.00 V70.0 CBC Auto Differential      TSH      Urinalysis      Comprehensive Metabolic Panel      Lipid Panel      Urine culture      Hemoglobin A1C      2. Hypertension, unspecified type  I10 401.9 amLODIPine (NORVASC) 5 MG tablet      3. IYER (dyspnea on exertion)  R06.09 786.09 X-Ray Chest PA And Lateral      4. Hypothyroidism, unspecified type  E03.9 244.9       5. Vitamin D deficiency  E55.9 268.9       6. Left lower quadrant abdominal pain  R10.32 789.04 US Abdomen Complete      7. Lumbar spondylosis  M47.816 721.3       8. Primary osteoarthritis of both knees  M17.0 715.16       9. Morbid obesity with BMI of 50.0-59.9, adult  E66.01 278.01     Z68.43 V85.43       10. Postmenopausal  Z78.0 V49.81 DXA Bone Density Spine And Hip      11. Other screening mammogram  Z12.31 V76.12 Mammo Digital Screening Bilat w/ [facility]      12. Need for Td vaccine  Z23 V06.5 (In Office Administered) Td Vaccine - Preservative Free          PLAN:  1. Age-appropriate counseling-appropriate low-sodium, low-cholesterol, low carbohydrate diet and exercise daily, monthly breast self exam, annual wellness examination.   2. Patient advised to call for results.  3. Continue current medications.  4. Add Amlodipine 5 mg daily for blood pressure control; medication precautions discussed with patient.  5. Home blood pressure checks  advised with goal BP < 140/90.  6. Advised patient to avoid cooking with salt.  7. Discussed OAB medication, side effects with patient if desires to try if normal urine results.  8. Keep follow up with specialists.  9. Follow up in about 2 months (around 3/13/2023) for hypertension follow up.

## 2023-01-14 LAB
BILIRUB UR QL STRIP: NEGATIVE
CLARITY UR REFRACT.AUTO: ABNORMAL
COLOR UR AUTO: YELLOW
GLUCOSE UR QL STRIP: NEGATIVE
HGB UR QL STRIP: NEGATIVE
KETONES UR QL STRIP: NEGATIVE
LEUKOCYTE ESTERASE UR QL STRIP: NEGATIVE
NITRITE UR QL STRIP: NEGATIVE
PH UR STRIP: 5 [PH] (ref 5–8)
PROT UR QL STRIP: NEGATIVE
SP GR UR STRIP: 1.02 (ref 1–1.03)
URN SPEC COLLECT METH UR: ABNORMAL

## 2023-01-15 DIAGNOSIS — E03.9 HYPOTHYROIDISM, UNSPECIFIED TYPE: ICD-10-CM

## 2023-01-15 PROBLEM — I10 HYPERTENSION: Status: ACTIVE | Noted: 2023-01-15

## 2023-01-15 RX ORDER — LEVOTHYROXINE SODIUM 200 UG/1
TABLET ORAL
Qty: 90 TABLET | Refills: 1 | Status: SHIPPED | OUTPATIENT
Start: 2023-01-15 | End: 2024-04-02

## 2023-01-15 RX ORDER — LEVOTHYROXINE SODIUM 50 UG/1
CAPSULE ORAL
Qty: 90 TABLET | Refills: 1 | Status: SHIPPED | OUTPATIENT
Start: 2023-01-15 | End: 2023-02-06

## 2023-01-18 ENCOUNTER — TELEPHONE (OUTPATIENT)
Dept: FAMILY MEDICINE | Facility: CLINIC | Age: 63
End: 2023-01-18
Payer: COMMERCIAL

## 2023-01-18 NOTE — TELEPHONE ENCOUNTER
----- Message from Virgie Graham sent at 1/18/2023 10:16 AM CST -----  Contact: Sis([employer])/ [phone]  Sis is calling in regards to a coverage issue for a prescription they received and would like to know an alternative. Please give her a call back at  [phone] Ref#[id number]. Thank you s/g

## 2023-01-18 NOTE — TELEPHONE ENCOUNTER
[facility] requesting alternative be sent if instead of levothyroxine capsule d/t insurance coverage issues.

## 2023-01-19 ENCOUNTER — PATIENT MESSAGE (OUTPATIENT)
Dept: FAMILY MEDICINE | Facility: CLINIC | Age: 63
End: 2023-01-19
Payer: COMMERCIAL

## 2023-01-20 ENCOUNTER — TELEPHONE (OUTPATIENT)
Dept: FAMILY MEDICINE | Facility: CLINIC | Age: 63
End: 2023-01-20

## 2023-01-20 NOTE — TELEPHONE ENCOUNTER
Is she talking about Amlodipine that I just prescribed? If so, please check w/pharmacy regarding this. Thanks.

## 2023-01-20 NOTE — TELEPHONE ENCOUNTER
New Rx for high blood pressure.        You  Amelia Mirza 18 hours ago (2:13 PM)     KF  Leonel mrs. Mirza. What medication are you referring to? I'll forward it to Dr. Green.        Amelia Green Staff (supporting Eve Geren MD) 19 hours ago (12:25 PM)     I received notification that my new prescription can't be filled because it is not covered.  It was suggested that  I needed to ask you prescribe a covered alternative medicine. I also would like my Rx sent local drug store once so I can start it as soon as possible while it is being processed by [facility]. Thank you!      Per portal

## 2023-02-06 ENCOUNTER — PATIENT MESSAGE (OUTPATIENT)
Dept: FAMILY MEDICINE | Facility: CLINIC | Age: 63
End: 2023-02-06
Payer: COMMERCIAL

## 2023-02-06 ENCOUNTER — TELEPHONE (OUTPATIENT)
Dept: FAMILY MEDICINE | Facility: CLINIC | Age: 63
End: 2023-02-06

## 2023-02-06 DIAGNOSIS — E03.9 HYPOTHYROIDISM, UNSPECIFIED TYPE: Primary | ICD-10-CM

## 2023-02-06 RX ORDER — LEVOTHYROXINE SODIUM 50 UG/1
50 TABLET ORAL
Qty: 90 TABLET | Refills: 1 | Status: SHIPPED | OUTPATIENT
Start: 2023-02-06 | End: 2023-03-14 | Stop reason: SDUPTHER

## 2023-02-06 NOTE — TELEPHONE ENCOUNTER
Advise pt - I'm sorry for inconvenience and not sure what happened as Rxs for both doses 200 and 50 mcg were sent on 1/15/23.  I will send Rx for 50 mcg again to [facility].    I have put the following orders and/or medications to this note.  Please advise pt and assist.    No orders of the defined types were placed in this encounter.      Medications Ordered This Encounter   Medications    levothyroxine (SYNTHROID) 50 MCG tablet     Sig: Take 1 tablet (50 mcg total) by mouth before breakfast.     Dispense:  90 tablet     Refill:  1

## 2023-02-06 NOTE — TELEPHONE ENCOUNTER
IN January my lab results were within acceptable range except my  thyroid levels were abnormal. At that time you increased my  total dose of daily thyroid to 250 mcg daily. I have a RX for 200mcg and 25 mcg. The problem will occur when I run out of the 25 mcg in 15 days because of the increase from 225 to 250mcg. The pharmacy will not allow me to refill within that time period. I would like the [facility] m[facility].    Per Portal

## 2023-03-04 ENCOUNTER — PATIENT MESSAGE (OUTPATIENT)
Dept: ADMINISTRATIVE | Facility: OTHER | Age: 63
End: 2023-03-04
Payer: COMMERCIAL

## 2023-03-09 ENCOUNTER — OFFICE VISIT (OUTPATIENT)
Dept: FAMILY MEDICINE | Facility: CLINIC | Age: 63
End: 2023-03-09
Payer: COMMERCIAL

## 2023-03-09 ENCOUNTER — LAB VISIT (OUTPATIENT)
Dept: LAB | Facility: HOSPITAL | Age: 63
End: 2023-03-09
Attending: FAMILY MEDICINE
Payer: COMMERCIAL

## 2023-03-09 VITALS
DIASTOLIC BLOOD PRESSURE: 78 MMHG | HEIGHT: 62 IN | TEMPERATURE: 98 F | OXYGEN SATURATION: 96 % | SYSTOLIC BLOOD PRESSURE: 126 MMHG | BODY MASS INDEX: 53.92 KG/M2 | WEIGHT: 293 LBS | HEART RATE: 94 BPM

## 2023-03-09 DIAGNOSIS — I10 HYPERTENSION, UNSPECIFIED TYPE: ICD-10-CM

## 2023-03-09 DIAGNOSIS — E03.9 HYPOTHYROIDISM, UNSPECIFIED TYPE: Primary | ICD-10-CM

## 2023-03-09 DIAGNOSIS — M79.18 MYOFASCIAL PAIN: ICD-10-CM

## 2023-03-09 DIAGNOSIS — Z78.0 POSTMENOPAUSAL: ICD-10-CM

## 2023-03-09 DIAGNOSIS — M47.816 LUMBAR SPONDYLOSIS: ICD-10-CM

## 2023-03-09 DIAGNOSIS — E03.9 HYPOTHYROIDISM, UNSPECIFIED TYPE: ICD-10-CM

## 2023-03-09 DIAGNOSIS — E66.01 MORBID OBESITY WITH BMI OF 50.0-59.9, ADULT: ICD-10-CM

## 2023-03-09 PROCEDURE — 1160F RVW MEDS BY RX/DR IN RCRD: CPT | Mod: CPTII,S$GLB,, | Performed by: FAMILY MEDICINE

## 2023-03-09 PROCEDURE — 3044F HG A1C LEVEL LT 7.0%: CPT | Mod: CPTII,S$GLB,, | Performed by: FAMILY MEDICINE

## 2023-03-09 PROCEDURE — 99999 PR PBB SHADOW E&M-EST. PATIENT-LVL IV: CPT | Mod: PBBFAC,,, | Performed by: FAMILY MEDICINE

## 2023-03-09 PROCEDURE — 3078F PR MOST RECENT DIASTOLIC BLOOD PRESSURE < 80 MM HG: ICD-10-PCS | Mod: CPTII,S$GLB,, | Performed by: FAMILY MEDICINE

## 2023-03-09 PROCEDURE — 1159F MED LIST DOCD IN RCRD: CPT | Mod: CPTII,S$GLB,, | Performed by: FAMILY MEDICINE

## 2023-03-09 PROCEDURE — 3008F BODY MASS INDEX DOCD: CPT | Mod: CPTII,S$GLB,, | Performed by: FAMILY MEDICINE

## 2023-03-09 PROCEDURE — 83036 HEMOGLOBIN GLYCOSYLATED A1C: CPT | Performed by: FAMILY MEDICINE

## 2023-03-09 PROCEDURE — 3078F DIAST BP <80 MM HG: CPT | Mod: CPTII,S$GLB,, | Performed by: FAMILY MEDICINE

## 2023-03-09 PROCEDURE — 99214 OFFICE O/P EST MOD 30 MIN: CPT | Mod: S$GLB,,, | Performed by: FAMILY MEDICINE

## 2023-03-09 PROCEDURE — 1159F PR MEDICATION LIST DOCUMENTED IN MEDICAL RECORD: ICD-10-PCS | Mod: CPTII,S$GLB,, | Performed by: FAMILY MEDICINE

## 2023-03-09 PROCEDURE — 84443 ASSAY THYROID STIM HORMONE: CPT | Performed by: FAMILY MEDICINE

## 2023-03-09 PROCEDURE — 1160F PR REVIEW ALL MEDS BY PRESCRIBER/CLIN PHARMACIST DOCUMENTED: ICD-10-PCS | Mod: CPTII,S$GLB,, | Performed by: FAMILY MEDICINE

## 2023-03-09 PROCEDURE — 99214 PR OFFICE/OUTPT VISIT, EST, LEVL IV, 30-39 MIN: ICD-10-PCS | Mod: S$GLB,,, | Performed by: FAMILY MEDICINE

## 2023-03-09 PROCEDURE — 84439 ASSAY OF FREE THYROXINE: CPT | Performed by: FAMILY MEDICINE

## 2023-03-09 PROCEDURE — 36415 COLL VENOUS BLD VENIPUNCTURE: CPT | Mod: PO | Performed by: FAMILY MEDICINE

## 2023-03-09 PROCEDURE — 3074F SYST BP LT 130 MM HG: CPT | Mod: CPTII,S$GLB,, | Performed by: FAMILY MEDICINE

## 2023-03-09 PROCEDURE — 3008F PR BODY MASS INDEX (BMI) DOCUMENTED: ICD-10-PCS | Mod: CPTII,S$GLB,, | Performed by: FAMILY MEDICINE

## 2023-03-09 PROCEDURE — 3074F PR MOST RECENT SYSTOLIC BLOOD PRESSURE < 130 MM HG: ICD-10-PCS | Mod: CPTII,S$GLB,, | Performed by: FAMILY MEDICINE

## 2023-03-09 PROCEDURE — 99999 PR PBB SHADOW E&M-EST. PATIENT-LVL IV: ICD-10-PCS | Mod: PBBFAC,,, | Performed by: FAMILY MEDICINE

## 2023-03-09 PROCEDURE — 3044F PR MOST RECENT HEMOGLOBIN A1C LEVEL <7.0%: ICD-10-PCS | Mod: CPTII,S$GLB,, | Performed by: FAMILY MEDICINE

## 2023-03-09 NOTE — PROGRESS NOTES
Amelia Mirza    Chief Complaint   Patient presents with    Follow-up    Hypertension    Hypothyroidism       History of Present Illness:   Ms. Mirza comes in today for hypertension and hypothyroidism follow-up.  She is not fasting but has taken medications today.  She states she monitors her diet most times she states she does not eat fried foods but admits she occasionally eats fast foods when she is tired.  She states she exercises once a week, 30 minutes each time.  She states she occasionally performs home blood pressure checks with levels ranging 117/79.  She reports no problems with taking Synthroid 250 mcg daily for treatment of hypothyroidism.    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  She states she does not drink enough water.  She states she feels tired and achy.    She complains of having TMJ and headaches and states her dentist advised her to wear a  which she does with little help.  She states she takes ibuprofen which occasionally helps her pain.    Otherwise, she denies having fever, chills, fatigue, appetite changes; shortness of breath, cough, wheezing; chest pain, palpitations, leg swelling abdominal pain, nausea, vomiting, diarrhea, constipation; unusual urinary symptoms; polydipsia, polyphagia, polyuria, hot or cold intolerance; back pain; headache; anxiety, depression, homicidal or suicidal thoughts.     Labs:                     WBC                      5.75                01/13/2023                 HGB                      12.2                01/13/2023                 HCT                      40.3                01/13/2023                 PLT                      280                 01/13/2023                 CHOL                     200 (H)             01/13/2023                 TRIG                     67                  01/13/2023                 HDL                      79 (H)              01/13/2023                  ALT                      14                  01/13/2023                 AST                      12                  01/13/2023                 NA                       138                 01/13/2023                 K                        4.1                 01/13/2023                 CL                       102                 01/13/2023                 CREATININE               0.8                 01/13/2023                 BUN                      16                  01/13/2023                 CO2                      23                  01/13/2023                 TSH                      9.930 (H)           01/13/2023                 INR                      1.0                 08/30/2016                 HGBA1C                   6.0                 05/09/2012              LDLCALC                  107.6               01/13/2023                Current Outpatient Medications   Medication Sig    amLODIPine (NORVASC) 5 MG tablet Take 1 tablet (5 mg total) by mouth once daily.    ergocalciferol (ERGOCALCIFEROL) 50,000 unit Cap TAKE 1 CAPSULE TWICE A WEEK    fluticasone propionate (FLONASE) 50 mcg/actuation nasal spray 2 sprays (100 mcg total) by Each Nostril route daily as needed for Rhinitis or Allergies.    gabapentin (NEURONTIN) 300 MG capsule Take 1 capsule (300 mg total) by mouth every evening.    ibuprofen (ADVIL,MOTRIN) 800 MG tablet Take 1 tablet (800 mg total) by mouth 2 (two) times daily as needed for Pain (food).    levothyroxine (SYNTHROID) 200 MCG tablet Take 1 pill by mouth every morning before breakfast    levothyroxine (SYNTHROID) 50 MCG tablet Take 1 tablet (50 mcg total) by mouth before breakfast.    lidocaine-prilocaine (EMLA) cream as needed.    tiZANidine (ZANAFLEX) 4 MG tablet Take 4 mg by mouth 2 (two) times daily.    traZODone (DESYREL) 50 MG tablet Take 1 tablet (50 mg total) by mouth nightly as needed for Insomnia.       Review of Systems   Constitutional:  Positive for fatigue.  Negative for activity change, appetite change, chills and fever.        138.7 kg (305 lb 12.5 oz) at January 13, 2022 visit.   Eyes:  Negative for visual disturbance.   Respiratory:  Negative for cough, shortness of breath and wheezing.    Cardiovascular:  Negative for chest pain, palpitations and leg swelling.        See history of present illness.   Gastrointestinal:  Negative for abdominal pain, constipation, diarrhea, nausea and vomiting.   Endocrine: Negative for cold intolerance, heat intolerance, polydipsia, polyphagia and polyuria.        See history of present illness.   Genitourinary:  Negative for difficulty urinating.   Musculoskeletal:  Positive for myalgias. Negative for arthralgias and back pain.        See history of present illness.   Neurological:  Positive for headaches. Negative for numbness.   Psychiatric/Behavioral:  Negative for dysphoric mood and suicidal ideas. The patient is not nervous/anxious.         Negative for homicidal ideas. See history of present illness.     Objective:  Physical Exam  Vitals reviewed.   Constitutional:       General: She is not in acute distress.     Appearance: Normal appearance. She is well-developed. She is obese. She is not ill-appearing, toxic-appearing or diaphoretic.      Comments: Pleasant.   Neck:      Thyroid: No thyromegaly.   Cardiovascular:      Rate and Rhythm: Normal rate and regular rhythm.      Heart sounds: Normal heart sounds. No murmur heard.  Pulmonary:      Effort: Pulmonary effort is normal. No respiratory distress.      Breath sounds: Normal breath sounds. No wheezing.   Abdominal:      General: Bowel sounds are normal. There is no distension.      Palpations: Abdomen is soft. There is no mass.      Tenderness: There is no abdominal tenderness. There is no guarding.   Musculoskeletal:         General: No swelling or tenderness. Normal range of motion.      Cervical back: Normal range of motion and neck supple. No tenderness.      Comments:  She is ambulatory without problems. Non tender low back with full range of motion noted.   Lymphadenopathy:      Cervical: No cervical adenopathy.   Neurological:      General: No focal deficit present.      Mental Status: She is alert and oriented to person, place, and time.      Deep Tendon Reflexes: Reflexes normal.   Psychiatric:         Mood and Affect: Mood normal.         Behavior: Behavior normal.         Thought Content: Thought content normal.         Judgment: Judgment normal.       ASSESSMENT:  1. Hypothyroidism, unspecified type    2. Hypertension, unspecified type    3. Lumbar spondylosis    4. Myofascial pain    5. Morbid obesity with BMI of 50.0-59.9, adult    6. Postmenopausal        PLAN:  Amelia was seen today for follow-up, hypertension and hypothyroidism.    Diagnoses and all orders for this visit:    Hypothyroidism, unspecified type  -     TSH; Future    Hypertension, unspecified type  -     Hemoglobin A1C; Future    Lumbar spondylosis    Myofascial pain    Morbid obesity with BMI of 50.0-59.9, adult    Postmenopausal    Patient advised to call for results.  Continue current medications, follow low sodium, low cholesterol, low carb diet, daily walks.  Keep follow up with specialist.  See me sooner if no improvement or worsening symptoms noted.  Follow up in about 6 months (around 9/9/2023) for hypertension follow up.

## 2023-03-11 LAB
ESTIMATED AVG GLUCOSE: 126 MG/DL (ref 68–131)
HBA1C MFR BLD: 6 % (ref 4–5.6)
T4 FREE SERPL-MCNC: 1.41 NG/DL (ref 0.71–1.51)
TSH SERPL DL<=0.005 MIU/L-ACNC: 0.01 UIU/ML (ref 0.4–4)

## 2023-03-14 ENCOUNTER — PATIENT MESSAGE (OUTPATIENT)
Dept: FAMILY MEDICINE | Facility: CLINIC | Age: 63
End: 2023-03-14
Payer: COMMERCIAL

## 2023-03-14 DIAGNOSIS — E03.9 HYPOTHYROIDISM, UNSPECIFIED TYPE: ICD-10-CM

## 2023-03-14 RX ORDER — LEVOTHYROXINE SODIUM 25 UG/1
TABLET ORAL
Qty: 90 TABLET | Refills: 1 | Status: SHIPPED | OUTPATIENT
Start: 2023-03-14 | End: 2024-02-29 | Stop reason: SDUPTHER

## 2023-03-17 ENCOUNTER — TELEPHONE (OUTPATIENT)
Dept: PAIN MEDICINE | Facility: CLINIC | Age: 63
End: 2023-03-17
Payer: COMMERCIAL

## 2023-03-20 ENCOUNTER — OFFICE VISIT (OUTPATIENT)
Dept: PAIN MEDICINE | Facility: CLINIC | Age: 63
End: 2023-03-20
Payer: COMMERCIAL

## 2023-03-20 VITALS
SYSTOLIC BLOOD PRESSURE: 146 MMHG | DIASTOLIC BLOOD PRESSURE: 74 MMHG | HEIGHT: 62 IN | WEIGHT: 293 LBS | BODY MASS INDEX: 53.92 KG/M2 | HEART RATE: 59 BPM

## 2023-03-20 DIAGNOSIS — M70.62 GREATER TROCHANTERIC BURSITIS OF BOTH HIPS: ICD-10-CM

## 2023-03-20 DIAGNOSIS — M46.1 SACROILIITIS: Primary | ICD-10-CM

## 2023-03-20 DIAGNOSIS — M70.61 GREATER TROCHANTERIC BURSITIS OF BOTH HIPS: ICD-10-CM

## 2023-03-20 PROCEDURE — 3044F PR MOST RECENT HEMOGLOBIN A1C LEVEL <7.0%: ICD-10-PCS | Mod: CPTII,S$GLB,, | Performed by: PHYSICIAN ASSISTANT

## 2023-03-20 PROCEDURE — 1159F MED LIST DOCD IN RCRD: CPT | Mod: CPTII,S$GLB,, | Performed by: PHYSICIAN ASSISTANT

## 2023-03-20 PROCEDURE — 1159F PR MEDICATION LIST DOCUMENTED IN MEDICAL RECORD: ICD-10-PCS | Mod: CPTII,S$GLB,, | Performed by: PHYSICIAN ASSISTANT

## 2023-03-20 PROCEDURE — 99999 PR PBB SHADOW E&M-EST. PATIENT-LVL IV: CPT | Mod: PBBFAC,,, | Performed by: PHYSICIAN ASSISTANT

## 2023-03-20 PROCEDURE — 99214 PR OFFICE/OUTPT VISIT, EST, LEVL IV, 30-39 MIN: ICD-10-PCS | Mod: S$GLB,,, | Performed by: PHYSICIAN ASSISTANT

## 2023-03-20 PROCEDURE — 3077F PR MOST RECENT SYSTOLIC BLOOD PRESSURE >= 140 MM HG: ICD-10-PCS | Mod: CPTII,S$GLB,, | Performed by: PHYSICIAN ASSISTANT

## 2023-03-20 PROCEDURE — 1160F PR REVIEW ALL MEDS BY PRESCRIBER/CLIN PHARMACIST DOCUMENTED: ICD-10-PCS | Mod: CPTII,S$GLB,, | Performed by: PHYSICIAN ASSISTANT

## 2023-03-20 PROCEDURE — 99999 PR PBB SHADOW E&M-EST. PATIENT-LVL IV: ICD-10-PCS | Mod: PBBFAC,,, | Performed by: PHYSICIAN ASSISTANT

## 2023-03-20 PROCEDURE — 1160F RVW MEDS BY RX/DR IN RCRD: CPT | Mod: CPTII,S$GLB,, | Performed by: PHYSICIAN ASSISTANT

## 2023-03-20 PROCEDURE — 3078F DIAST BP <80 MM HG: CPT | Mod: CPTII,S$GLB,, | Performed by: PHYSICIAN ASSISTANT

## 2023-03-20 PROCEDURE — 3078F PR MOST RECENT DIASTOLIC BLOOD PRESSURE < 80 MM HG: ICD-10-PCS | Mod: CPTII,S$GLB,, | Performed by: PHYSICIAN ASSISTANT

## 2023-03-20 PROCEDURE — 99214 OFFICE O/P EST MOD 30 MIN: CPT | Mod: S$GLB,,, | Performed by: PHYSICIAN ASSISTANT

## 2023-03-20 PROCEDURE — 3008F PR BODY MASS INDEX (BMI) DOCUMENTED: ICD-10-PCS | Mod: CPTII,S$GLB,, | Performed by: PHYSICIAN ASSISTANT

## 2023-03-20 PROCEDURE — 3077F SYST BP >= 140 MM HG: CPT | Mod: CPTII,S$GLB,, | Performed by: PHYSICIAN ASSISTANT

## 2023-03-20 PROCEDURE — 3044F HG A1C LEVEL LT 7.0%: CPT | Mod: CPTII,S$GLB,, | Performed by: PHYSICIAN ASSISTANT

## 2023-03-20 PROCEDURE — 3008F BODY MASS INDEX DOCD: CPT | Mod: CPTII,S$GLB,, | Performed by: PHYSICIAN ASSISTANT

## 2023-03-20 RX ORDER — TIZANIDINE 4 MG/1
4 TABLET ORAL 2 TIMES DAILY
Qty: 60 TABLET | Refills: 2 | Status: SHIPPED | OUTPATIENT
Start: 2023-03-20 | End: 2024-02-20 | Stop reason: SDUPTHER

## 2023-03-20 NOTE — PROGRESS NOTES
Established Patient Chronic Pain Note (Follow-up Visit)    The patient location is: car  The chief complaint leading to consultation is: LBP  Visit type: Virtual visit with synchronous audio and video  Total time spent with patient: 15m  Each patient to whom he or she provides medical services by telemedicine is: (1) informed of the relationship between the physician and patient and the respective role of any other health care provider with respect to management of the patient; and (2) notified that he or she may decline to receive medical services by telemedicine and may withdraw from such care at any time.    Referring Physician: Eve Green MD    PCP: Eve Green MD    Chief Complaint:   LBP     SUBJECTIVE:  Interval History (3/20/2023):  Amelia Mirza presents today for 3 month follow-up visit.  Patient was last seen on 12/16/2022. Last injection Bilateral  L5/S1 TF KARISSA on 11/18/22 with 65-70% relief.  She reports persistent and constant lower lumbosacral pain, right worse than left with radiation into her lateral hips and lateral thighs. She reports only intermittent radiation into her right lower extremity/foot, but her constant pain is axial in nature. Pain is worsened with prolonged sitting, standing, or walking. Pain is alleviated with ibuprofen, biofreeze, and rest. She has completed formal physical therapy without relief. Pain interferes with daily activities. Patient reports pain as 5/10 today and 7/10 with exacerbating factors.      Interval History (12/16/2022): Amelia Mirza presents today for follow-up visit.  she underwent Bilateral  L5/S1 TF KARISSA on 11/18/22.  The patient reports that she is/was better following the procedure.  she reports 65-70% pain relief.  The changes lasted 4 weeks so far.  The changes have continued through this visit.  She reports this injection was equally effective c/t IL KARISSA in April, but neither were as effective as the initial IL KARISSA she had in October of  2021. She recently restarted physical therapy, which does offer improvement and relief in her symptoms. Patient reports pain as 2/10 today. She reports increased mobility since the injection, she is able to stand and walk longer distances.    Interval History (10/28/2022):  Amelia Mirza presents today via telemed for follow-up visit for MRI review.  Patient was last seen on 10/13/2022. She reports continued lumbosacral pain in a band-like distribution with radiation into her bilateral lower extremities left > right. She reports her symptoms previously were worse in her right LE but now she favors the other leg to compensate. She reports pain radiates primarily posteriorly along L5/S1 distribution with occasional radiation into her anterior thighs. She reports improvement in her radicular symptoms with physical therapy. Patient reports pain as 7/10 today and daily which is constant in nature and interferes with daily activity.    MRI lumbar spine 10/13/2022  FINDINGS:  At T11-T12, circumferential disc bulge results in mild central canal narrowing.     At T12-L1, L1-L2, L2-L3, normal.     At L3-L4, minor disc desiccation without narrowing.  Mild facet joint osteoarthrosis is present bilaterally.     At L4-L5, moderate bilateral facet joint osteoarthrosis allows 4 mm anterolisthesis of L4 on L5 and combines with disc bulge to result in mild central canal narrowing and moderate bilateral neural foramen narrowing.     At L5-S1, moderate loss of disc space height and circumferential disc bulge combined with minor facet joint osteoarthrosis to result in minor central canal narrowing and mild right and severe left neural foramen narrowing.     Lumbosacral alignment is normal.  Vertebral bodies show mild diffuse red marrow recruitment, with otherwise normal bone marrow signal.  Conus terminates normally at L2.  Paraspinal soft tissues are unremarkable.     Impression:     1. Severe left L5-S1 neural foramen narrowing.   Correlation for left L5 radiculopathy is requested.  2. Grade 1 anterolisthesis of L4 on L5 due to facet joint osteoarthrosis resulting in moderate bilateral neural foramen narrowing and mild central canal narrowing when combined with degenerative disc disease.     Interval history 10/13/2022  Patient presents status post L5-S1 interlaminar epidural steroid injection with right paramedian approach 04/22/2022.  Patient reports 65-70% relief in lower back and leg pain following her last epidural steroid injection.  Patient reports pain has been insidiously worsening over the last month.  Pain today is rated an 8/10.  Patient again reports pain in the lower back which radiates down the posterior aspects of bilateral lower extremities and L5-S1 distribution to the feet.  Patient reports pain has now interfered with her sleep.  Patient is interested in pursuing repeat injection.  Patient reports she is actively been participating in traditional as well as aquatic physical therapy which has been helping with strength and range of motion.      Interval history 03/31/2022  Patient presents for 5 month follow-up.  She reports return of lower back pain which radiates into the buttocks and down the posterior aspects of bilateral lower extremities and L5-S1 distribution.  Patient reports the symptoms are identical to prior lumbar epidural steroid injection.  Patient is interested in pursuing repeat intervention.  Patient also reports myofascial pain along thoracic paraspinous musculature.  Patient reports pain is interrupting with her sleep.  She denies new onset lower extremity weakness, bowel or bladder incontinence or saddle anesthesia.    Interval History (10/28/2021):  Amelia Mirza presents today for follow-up visit.  S/p L5/S1 IL KARISSA on 10/4/21 with 90% pain relief.  Pain was basically resolved after, but then she had a fall which Increased knee pain.  She hasnt seen Orthopedics in years.  Patient reports pain as  ""0/10 today.  Overall, she has greatly improved since procedure.     Initial HPI (7/12/21) - Dr. Adler:  Amelia Mirza is a 61 y.o.   female with past medical history significant for hypoTH, morbid obesity, osteoarthritis (knees), lumbar spondylosis who presents to the clinic for the evaluation of lower back and left leg pain . The pain started  1 year prior ago  without preceding accident or trauma and symptoms have been worsening.The pain is located in a bandlike distribution at the level of the iliac crest with radiation down the left lower extremity along the lateral and posterior aspects in L4-5 distribution.  The pain is described as constant, aching and sharp and is rated as 7/10. The pain is rated with a score of  7/10 on the BEST day and a score of 10/10 on the WORST day.  Symptoms interfere with daily activity, sleeping and work.   Patient does work from home but currently is off her summer break and is disheartened by her inability to participate in outdoor activities or time with her family secondary to her pain. The pain is exacerbated by Sitting, Standing, Walking and Getting out of bed/chair.    Patient is able to ambulate approximately 3 blocks before requiring rest.The pain is mitigated by laying down and rest.     Pt saw MELI Mar for bilateral knee pain 3/2019 with topical compound cream prescribed and diagnostic genicular procedure discussed but not performed.     Pt saw Dr. Padilla (2017) for lower back pain with radiculopathy with recommendation for PT, NSAID analgesic prescribed.     Patient denies urinary incontinence, bowel incontinence, significant motor weakness and loss of sensations.    Pain Disability Index Review:  Last 3 PDI Scores 3/6/2019   Pain Disability Index (PDI) 31       Non-Pharmacologic Treatments:  Physical Therapy/Home Exercise: yes  Ice/Heat:yes  TENS: no  Acupuncture: no  Massage: no  Chiropractic: no    Other: no      Pain Medications:  - " "Adjuvant Medications: Advil,Motrin ( Ibuprofen), Mobic (Meloxicam), Zanaflex ( Tizanidine) and NSAIDs, Tylenol, Biofreeze, EMLA cream    Pain Procedures:   Intra-articular steroid and visco-supplementation in bilateral knees - in other dept  -10/4/21: L5/S1 IL KARISSA on  with 90% pain relief   -04/22/2022:  L5-S1 interlaminar epidural steroid injection with right paramedian approach with 65-70% relief  -Bilateral  L5/S1 TF KARISSA on 11/18/22 with 65-70% relief    Review of Systems:   GENERAL:  No weight loss, malaise or fevers.  HEENT:   No recent changes in vision or hearing  NECK:  Negative for lumps, no difficulty with swallowing.  RESPIRATORY:  Negative for cough, wheezing or shortness of breath, patient denies any recent URI.  CARDIOVASCULAR:  Negative for chest pain, leg swelling or palpitations.  GI:  Negative for abdominal discomfort, blood in stools or black stools or change in bowel habits.  MUSCULOSKELETAL:  See HPI.  SKIN:  Negative for lesions, rash, and itching.  PSYCH:  No mood disorder or recent psychosocial stressors.  Patients sleep is not disturbed secondary to pain.  HEMATOLOGY/LYMPHOLOGY:  Negative for prolonged bleeding, bruising easily or swollen nodes.  Patient is not currently taking any anti-coagulants  NEURO:   No history of headaches, syncope, paralysis, seizures or tremors.  All other reviewed and negative other than HPI.    OBJECTIVE:    Physical Exam:    Vitals:    03/20/23 0703   BP: (!) 146/74   Pulse: (!) 59   Weight: (!) 138.4 kg (305 lb 1.9 oz)   Height: 5' 2" (1.575 m)   PainSc:   5   PainLoc: Back    Body mass index is 55.81 kg/m².   (reviewed on 3/20/2023)  Last clinic visit:  GENERAL: Well appearing, in no acute distress, alert and oriented x3.  PSYCH:  Mood and affect appropriate.  SKIN: Skin color, texture, turgor normal, no rashes or lesions.  HEAD/FACE:  Normocephalic, atraumatic. Cranial nerves grossly intact.  NECK: No pain to palpation over the cervical paraspinous muscles. " Spurling Negative. No pain with neck flexion, extension, or lateral flexion.   PULM: No evidence of respiratory difficulty, symmetric chest rise.  GI:  Soft and non-tender.  BACK: Straight leg raising in the sitting and supine positions is negative to radicular pain. No pain to palpation over the facet joints of the lumbar spine or spinous processes. Normal range of motion without pain reproduction.  SIJ testing:  - TTP over SI joint: Present bilaterally, right > left  - Rick's/ Jean-Pierre's: Positive bilaterally  - Sacroiliac Distraction Test (anterior pressure): Positive  - Sacroiliac Compression Test (lateral pressure): Positive   - SacralThrust Test (posterior pressure): Positive  -TTP along bilateral GT bursa, right > left  EXTREMITIES: Peripheral joint ROM is full and pain free without obvious instability or laxity in all four extremities- with exception of knees.  No deformities, edema, or skin discoloration. Good capillary refill.  MUSCULOSKELETAL: Shoulder, hip, and knee provocative maneuvers are negative.  T   Bilateral upper and lower extremity strength is normal and symmetric.  No atrophy or tone abnormalities are noted. Decreased sensation LLE: L4-5 distribution to knee.  Pain with extension of knee. TTP diffusely over right knee patella.  NEURO: BUE & BLE coordination and muscle stretch reflexes are physiologic and symmetric.  Plantar response are downgoing. No clonus.   GAIT: normal.      Imaging (Reviewed on 3/20/2023):     MRI lumbar spine 10/13/2022  FINDINGS:  At T11-T12, circumferential disc bulge results in mild central canal narrowing.     At T12-L1, L1-L2, L2-L3, normal.     At L3-L4, minor disc desiccation without narrowing.  Mild facet joint osteoarthrosis is present bilaterally.     At L4-L5, moderate bilateral facet joint osteoarthrosis allows 4 mm anterolisthesis of L4 on L5 and combines with disc bulge to result in mild central canal narrowing and moderate bilateral neural foramen  narrowing.     At L5-S1, moderate loss of disc space height and circumferential disc bulge combined with minor facet joint osteoarthrosis to result in minor central canal narrowing and mild right and severe left neural foramen narrowing.     Lumbosacral alignment is normal.  Vertebral bodies show mild diffuse red marrow recruitment, with otherwise normal bone marrow signal.  Conus terminates normally at L2.  Paraspinal soft tissues are unremarkable.     Impression:     1. Severe left L5-S1 neural foramen narrowing.  Correlation for left L5 radiculopathy is requested.  2. Grade 1 anterolisthesis of L4 on L5 due to facet joint osteoarthrosis resulting in moderate bilateral neural foramen narrowing and mild central canal narrowing when combined with degenerative disc disease.  X-Ray Lumbar Spine Complete 5 View    Narrative  Comparison: None    Findings:    Body habitus limits the study.  For tumor body heights and alignment appear well-maintained.  There is uniform loss of diskal height at the L4 -- 5 L5 -- S1 levels.  Multilevel facet arthropathy.  Pedicles and SI joints appear intact.  No spondylolysis  or spondylolisthesis.    X-Ray Cervical Spine AP And Lateral    Narrative  Comparison: None    Technique: Four views were obtained of the cervical spine    Findings: There is straightening of the normal cervical lordosis which can be associated with muscle spasm.  Vertebral body heights are well maintained.  No spondylolisthesis demonstrated.  There is mild loss of disk height from C3-4 and C5-6 with associated degenerative endplate changes and osteophyte production.  Posterior elements appear intact without acute fractures or subluxations demonstrated.  Odontoid process appears intact.  Atlantoaxial articulations appear normal.  Prevertebral soft tissues are within normal limits.        ASSESSMENT: 63 y.o. year old female with lower back and left lower extremity pain, consistent with     1. Sacroiliitis  IR SI Joint  Injection Bilat w/Image    Case Request-RAD/Other Procedure Area: Bilateral GT bursa + bilateral SIJ injection, Bilateral GT bursa + bilateral SIJ injection    IR Aspiration Injection Large Joint W FL    IR Aspiration Injection Large Joint W FL    tiZANidine (ZANAFLEX) 4 MG tablet      2. Greater trochanteric bursitis of both hips  IR SI Joint Injection Bilat w/Image    Case Request-RAD/Other Procedure Area: Bilateral GT bursa + bilateral SIJ injection, Bilateral GT bursa + bilateral SIJ injection    IR Aspiration Injection Large Joint W FL    IR Aspiration Injection Large Joint W FL    tiZANidine (ZANAFLEX) 4 MG tablet                    PLAN:   1. Interventional: Schedule for bilateral SIJ + GT bursa injection to address sacroiliitis and bursitis  -Bilateral  L5/S1 TF KARISSA on 11/18/22 with 65-70% relief    - Anticoagulation use: None.     2. Pharmacologic:   - patient d/c'd Gabapentin due to side effects (didn't feel like herself)    -We have discussed continuing anti spasmodic, tizanidine 4 mg nightly as this helps with myofascial pain.  We have discussed potential deleterious side effects associated with this medication including  dizziness, drowsiness, dry mouth or tingling sensation in the upper or lower extremities.     3. Rehabilitative:  -She has completed physical therapy, continue exercises as part of HEP as directed    4. Diagnostic:  Reviewed MRI lumbar spine with the patient and answered any questions.      5. Consult/ Referral: Follow-up PRN with Orthopedics if needed.    6. Follow up: 4 weeks after injection    - This condition does not require this patient to take time off of work, and the primary goal of our Pain Management services is to improve the patient's functional capacity.   - I discussed the risks, benefits, and alternatives to potential treatment options. All questions and concerns were fully addressed today in clinic.       Sharmaine Huber PA-C    Disclaimer:  This note was prepared  using voice recognition system and is likely to have sound alike errors that may have been overlooked even after proof reading.  Please call me with any questions.

## 2023-03-24 ENCOUNTER — TELEPHONE (OUTPATIENT)
Dept: INTERNAL MEDICINE | Facility: CLINIC | Age: 63
End: 2023-03-24
Payer: COMMERCIAL

## 2023-03-28 ENCOUNTER — PATIENT MESSAGE (OUTPATIENT)
Dept: RESEARCH | Facility: HOSPITAL | Age: 63
End: 2023-03-28
Payer: COMMERCIAL

## 2023-04-03 ENCOUNTER — PATIENT MESSAGE (OUTPATIENT)
Dept: PAIN MEDICINE | Facility: HOSPITAL | Age: 63
End: 2023-04-03
Payer: COMMERCIAL

## 2023-04-03 NOTE — PRE-PROCEDURE INSTRUCTIONS
Spoke with patient regarding procedure scheduled on 4.5     Arrival time 1100     Has patient been sick with fever or on antibiotics within the last 7 days? No     Does the patient have any open wounds, sores or rashes? No     Does the patient have any recent fractures? no     Has patient received a vaccination within the last 7 days? No     Received the COVID vaccination? yes     Has the patient stopped all medications as directed? na     Does patient have a pacemaker and or defibrillator? no     Does the patient have a ride to and from procedure and someone reliable to remain with patient? april     Is the patient diabetic? no     Does the patient have sleep apnea? Or use O2 at home? no     Is the patient receiving sedation? Yes cleared by pat np/      Is the patient instructed to remain NPO beginning at midnight the night before their procedure? yes     Procedure location confirmed with patient? Yes     Covid- Denies signs/symptoms. Instructed to notify PAT/MD if any changes.

## 2023-04-04 NOTE — PRE-PROCEDURE INSTRUCTIONS
Spoke with patient regarding procedure scheduled on 4.5     Arrival time 1000. Verbalized understanding

## 2023-04-05 ENCOUNTER — HOSPITAL ENCOUNTER (OUTPATIENT)
Facility: HOSPITAL | Age: 63
Discharge: HOME | End: 2023-04-05
Attending: ANESTHESIOLOGY | Admitting: ANESTHESIOLOGY
Payer: COMMERCIAL

## 2023-04-05 VITALS
HEIGHT: 62 IN | RESPIRATION RATE: 15 BRPM | TEMPERATURE: 98 F | WEIGHT: 293 LBS | BODY MASS INDEX: 53.92 KG/M2 | OXYGEN SATURATION: 97 % | SYSTOLIC BLOOD PRESSURE: 118 MMHG | DIASTOLIC BLOOD PRESSURE: 57 MMHG | HEART RATE: 50 BPM

## 2023-04-05 DIAGNOSIS — M70.61 GREATER TROCHANTERIC BURSITIS OF BOTH HIPS: ICD-10-CM

## 2023-04-05 DIAGNOSIS — M70.62 GREATER TROCHANTERIC BURSITIS OF BOTH HIPS: ICD-10-CM

## 2023-04-05 DIAGNOSIS — M46.1 SACROILIITIS: ICD-10-CM

## 2023-04-05 PROCEDURE — 20610 DRAIN/INJ JOINT/BURSA W/O US: CPT | Mod: 50,59,, | Performed by: ANESTHESIOLOGY

## 2023-04-05 PROCEDURE — 27096 INJECT SACROILIAC JOINT: CPT | Mod: RT,,, | Performed by: ANESTHESIOLOGY

## 2023-04-05 PROCEDURE — 63600175 PHARM REV CODE 636 W HCPCS: Performed by: ANESTHESIOLOGY

## 2023-04-05 PROCEDURE — 20610 PR DRAIN/INJECT LARGE JOINT/BURSA: ICD-10-PCS | Mod: 50,59,, | Performed by: ANESTHESIOLOGY

## 2023-04-05 PROCEDURE — 27096 PR INJECTION,SACROILIAC JOINT: ICD-10-PCS | Mod: RT,,, | Performed by: ANESTHESIOLOGY

## 2023-04-05 PROCEDURE — 25000003 PHARM REV CODE 250: Performed by: ANESTHESIOLOGY

## 2023-04-05 PROCEDURE — 25500020 PHARM REV CODE 255: Performed by: ANESTHESIOLOGY

## 2023-04-05 PROCEDURE — 27096 INJECT SACROILIAC JOINT: CPT | Mod: LT | Performed by: ANESTHESIOLOGY

## 2023-04-05 PROCEDURE — 20610 DRAIN/INJ JOINT/BURSA W/O US: CPT | Mod: 59,LT | Performed by: ANESTHESIOLOGY

## 2023-04-05 RX ORDER — BUPIVACAINE HYDROCHLORIDE 2.5 MG/ML
INJECTION, SOLUTION EPIDURAL; INFILTRATION; INTRACAUDAL
Status: DISCONTINUED | OUTPATIENT
Start: 2023-04-05 | End: 2023-04-05 | Stop reason: HOSPADM

## 2023-04-05 RX ORDER — INDOMETHACIN 25 MG/1
CAPSULE ORAL
Status: DISCONTINUED | OUTPATIENT
Start: 2023-04-05 | End: 2023-04-05 | Stop reason: HOSPADM

## 2023-04-05 RX ORDER — FENTANYL CITRATE 50 UG/ML
INJECTION, SOLUTION INTRAMUSCULAR; INTRAVENOUS
Status: DISCONTINUED | OUTPATIENT
Start: 2023-04-05 | End: 2023-04-05 | Stop reason: HOSPADM

## 2023-04-05 RX ORDER — TRIAMCINOLONE ACETONIDE 40 MG/ML
INJECTION, SUSPENSION INTRA-ARTICULAR; INTRAMUSCULAR
Status: DISCONTINUED | OUTPATIENT
Start: 2023-04-05 | End: 2023-04-05 | Stop reason: HOSPADM

## 2023-04-05 RX ORDER — MIDAZOLAM HYDROCHLORIDE 1 MG/ML
INJECTION, SOLUTION INTRAMUSCULAR; INTRAVENOUS
Status: DISCONTINUED | OUTPATIENT
Start: 2023-04-05 | End: 2023-04-05 | Stop reason: HOSPADM

## 2023-04-05 NOTE — DISCHARGE SUMMARY
Discharge Note  Short Stay      SUMMARY     Admit Date: 4/5/2023    Attending Physician: Josemanuel Adler MD        Discharge Physician: Josemanuel Adler MD        Discharge Date: 4/5/2023 11:44 AM    Procedure(s) (LRB):  Bilateral GT bursa + bilateral SIJ injection (Bilateral)  Bilateral GT bursa + bilateral SIJ injection (Bilateral)    Final Diagnosis: Sacroiliitis [M46.1]  Greater trochanteric bursitis of both hips [M70.61, M70.62]    Disposition: Home or self care    Patient Instructions:   Current Discharge Medication List        CONTINUE these medications which have NOT CHANGED    Details   amLODIPine (NORVASC) 5 MG tablet Take 1 tablet (5 mg total) by mouth once daily.  Qty: 90 tablet, Refills: 1    Comments: .  Associated Diagnoses: Hypertension, unspecified type      !! levothyroxine (SYNTHROID) 200 MCG tablet Take 1 pill by mouth every morning before breakfast  Qty: 90 tablet, Refills: 1    Associated Diagnoses: Hypothyroidism, unspecified type      !! levothyroxine (SYNTHROID) 25 MCG tablet Take 1 pill by mouth daily before breakfast  Qty: 90 tablet, Refills: 1    Associated Diagnoses: Hypothyroidism, unspecified type      ergocalciferol (ERGOCALCIFEROL) 50,000 unit Cap TAKE 1 CAPSULE TWICE A WEEK  Qty: 25 capsule, Refills: 3    Associated Diagnoses: Vitamin D deficiency      fluticasone propionate (FLONASE) 50 mcg/actuation nasal spray 2 sprays (100 mcg total) by Each Nostril route daily as needed for Rhinitis or Allergies.  Qty: 16 g, Refills: 2    Associated Diagnoses: Seasonal allergic rhinitis, unspecified trigger      gabapentin (NEURONTIN) 300 MG capsule Take 1 capsule (300 mg total) by mouth every evening.  Qty: 30 capsule, Refills: 2    Associated Diagnoses: Lumbar radiculopathy, chronic      ibuprofen (ADVIL,MOTRIN) 800 MG tablet Take 1 tablet (800 mg total) by mouth 2 (two) times daily as needed for Pain (food).  Qty: 60 tablet, Refills: 2    Associated Diagnoses: Chronic low back pain with sciatica,  sciatica laterality unspecified, unspecified back pain laterality; Lumbar spondylosis; DDD (degenerative disc disease), cervical      lidocaine-prilocaine (EMLA) cream as needed.      tiZANidine (ZANAFLEX) 4 MG tablet Take 1 tablet (4 mg total) by mouth 2 (two) times daily.  Qty: 60 tablet, Refills: 2    Associated Diagnoses: Sacroiliitis; Greater trochanteric bursitis of both hips      traZODone (DESYREL) 50 MG tablet Take 1 tablet (50 mg total) by mouth nightly as needed for Insomnia.  Qty: 90 tablet, Refills: 1    Associated Diagnoses: Insomnia, unspecified type       !! - Potential duplicate medications found. Please discuss with provider.              Discharge Diagnosis: Sacroiliitis [M46.1]  Greater trochanteric bursitis of both hips [M70.61, M70.62]  Condition on Discharge: Stable with no complications to procedure   Diet on Discharge: Same as before.  Activity: as per instruction sheet.  Discharge to: Home with a responsible adult.  Follow up: 2-4 weeks       Please call the office at [phone] if you experience any weakness or loss of sensation, fever > 101.5, pain uncontrolled with oral medications, persistent nausea/vomiting/or diarrhea, redness or drainage from the incisions, or any other worrisome concerns. If physician on call was not reached or could not communicate with our office for any reason please go to the nearest emergency department

## 2023-04-05 NOTE — DISCHARGE INSTRUCTIONS

## 2023-04-05 NOTE — PLAN OF CARE
Pt discharged home, awake, alert, oriented x's 4, no apparent distress noted. All questions and concerns addressed and answered, pt verbalizes understanding of discharge process, pt meets discharge criteria and is being discharged to car via wheelchair.

## 2023-04-05 NOTE — OP NOTE
Amelia Mirza  63 y.o. female      Vitals:    04/05/23 1140   BP: (!) 161/76   Pulse: (!) 50   Resp: 13   Temp:        Procedure Date: 4/5/23      INFORMED CONSENT: The procedure, risks, benefits and options were discussed with patient. There are no contraindications to the procedure. The patient expressed understanding and agreed to proceed. The personnel performing the procedure was discussed. I verify that I personally obtained consent prior to the start of the procedure and the signed consent can be found on the patient's chart.       Anesthesia:   Conscious sedation provided by M.D    The patient was monitored with continuous pulse oximetry, EKG, and intermittent blood pressure monitors.  The patient was hemodynamically stable throughout the entire process was responsive to voice, and breathing spontaneously.  Supplemental O2 was provided at 2L/min via nasal cannula.  Patient was comfortable for the duration of the procedure. (See nurse documentation and case log for sedation time)    There was a total of 2mg IV Midazolam and 100mcg Fentanyl titrated for the procedure    Pre Procedure diagnosis: Sacroiliitis [M46.1]  Greater trochanteric bursitis of both hips [M70.61, M70.62]  Post-Procedure diagnosis: SAME      PROCEDURE:  1) Bilateral greater trochanteric bursa injection    2) Bilateral sacroiliac joint injection                            REASON FOR PROCEDURE:   Sacroiliitis [M46.1]  Greater trochanteric bursitis[M70.61]      MEDICATIONS INJECTED: 1mL 40mg/ml Kenalog and 4mL Bupivacaine 0.25% into each site    LOCAL ANESTHETIC USED: Xylocaine 1% 6ml     ESTIMATED BLOOD LOSS: None.   COMPLICATIONS: None.     TECHNIQUE:   Greater trochanteric bursa injection:  The area overlying the greater trochanteric bursa was identified using fluoroscopy, and the area overlying the skin was prepped and draped in usual sterile fashion. Local Xylocaine was injected by raising a wheel and going down to the periosteum  using a 27-gauge hypodermic needle. A 5 inch 22-gauge spinal needle was introduce into the Bilateral greater trochanteric bursa. Negative pressure applied to confirm no intravascular placement. Omnipaque was injected to confirm placement and to confirm that there was no vascular runoff. The medication was then injected slowly.  Displacement of the contrast after injection of the medication confirmed that the medication went into the area of the greater trochanteric bursa    Sacroiliac joint injection:   Laying in the prone position, the patient was prepped and draped in the usual sterile fashion using ChloraPrep and fenestrated drape.  The area was determined under fluoroscopy.  Local Xylocaine was injected by raising a wheel and going down to the periosteum using a 27-gauge hypodermic needle.  The 3.5 inch 22-gauge spinal needle was introduce into the Bilateral sacroiliac joint.  Negative pressure applied to confirm no intravascular placement.  Omnipaque was injected to confirm placement and to confirm that there was no vascular runoff.  The medication was then injected slowly.  The patient tolerated the procedure well.                       The patient was monitored for approximately 30 minutes after the procedure. Patient was given post procedure and discharge instructions to follow at home. We will see the patient back in two weeks or the patient may call to inform of status. The patient was discharged in a stable condition

## 2023-05-05 ENCOUNTER — TELEPHONE (OUTPATIENT)
Dept: PAIN MEDICINE | Facility: CLINIC | Age: 63
End: 2023-05-05
Payer: COMMERCIAL

## 2023-05-05 NOTE — TELEPHONE ENCOUNTER
Returned pt call. Informed her that we will get her switched over to a virtual. All questions answered.

## 2023-05-05 NOTE — TELEPHONE ENCOUNTER
----- Message from Shaye Green sent at 5/5/2023  4:34 PM CDT -----  Contact: Patient  Type:  Patient Call          Who Called: Patient         Does the patient know what this is regarding?: requesting a call back pt said that she would like to change her 5/8 appt to a virtual appt ; please advise           Would the patient rather a call back or a response via [facility]? Call           Best Call Back Number: [phone]             Additional Information:

## 2023-05-08 ENCOUNTER — OFFICE VISIT (OUTPATIENT)
Dept: PAIN MEDICINE | Facility: CLINIC | Age: 63
End: 2023-05-08
Payer: COMMERCIAL

## 2023-05-08 DIAGNOSIS — M46.1 SACROILIITIS: Primary | ICD-10-CM

## 2023-05-08 PROCEDURE — 1159F PR MEDICATION LIST DOCUMENTED IN MEDICAL RECORD: ICD-10-PCS | Mod: CPTII,95,, | Performed by: PHYSICIAN ASSISTANT

## 2023-05-08 PROCEDURE — 99214 OFFICE O/P EST MOD 30 MIN: CPT | Mod: 95,,, | Performed by: PHYSICIAN ASSISTANT

## 2023-05-08 PROCEDURE — 1159F MED LIST DOCD IN RCRD: CPT | Mod: CPTII,95,, | Performed by: PHYSICIAN ASSISTANT

## 2023-05-08 PROCEDURE — 1160F RVW MEDS BY RX/DR IN RCRD: CPT | Mod: CPTII,95,, | Performed by: PHYSICIAN ASSISTANT

## 2023-05-08 PROCEDURE — 99214 PR OFFICE/OUTPT VISIT, EST, LEVL IV, 30-39 MIN: ICD-10-PCS | Mod: 95,,, | Performed by: PHYSICIAN ASSISTANT

## 2023-05-08 PROCEDURE — 3044F HG A1C LEVEL LT 7.0%: CPT | Mod: CPTII,95,, | Performed by: PHYSICIAN ASSISTANT

## 2023-05-08 PROCEDURE — 3044F PR MOST RECENT HEMOGLOBIN A1C LEVEL <7.0%: ICD-10-PCS | Mod: CPTII,95,, | Performed by: PHYSICIAN ASSISTANT

## 2023-05-08 PROCEDURE — 1160F PR REVIEW ALL MEDS BY PRESCRIBER/CLIN PHARMACIST DOCUMENTED: ICD-10-PCS | Mod: CPTII,95,, | Performed by: PHYSICIAN ASSISTANT

## 2023-05-08 NOTE — PROGRESS NOTES
Established Patient Chronic Pain Note (Follow-up Visit)    The patient location is: car  The chief complaint leading to consultation is: LBP  Visit type: Virtual visit with synchronous audio and video  Total time spent with patient: 15m  Each patient to whom he or she provides medical services by telemedicine is: (1) informed of the relationship between the physician and patient and the respective role of any other health care provider with respect to management of the patient; and (2) notified that he or she may decline to receive medical services by telemedicine and may withdraw from such care at any time.    Referring Physician: Eve Green MD    PCP: Eve Green MD    Chief Complaint:   LBP     SUBJECTIVE:  Interval History (5/8/2023): Amelia Mirza presents today for follow-up visit.  she underwent bilateral SIJ + GT bursa injection on 4/5/23.  The patient reports that she is/was better following the procedure.  she reports 75-80% pain relief in her low back, 70-80% relief in her left bursa, and 50-60% relief in her right bursa. Continues to report intermittent pain throughout the day, but overall much improved. Pain is exacerbated by prolonged activity, improved with rest.  The changes lasted 4 weeks so far.  The changes have continued through this visit.  Patient reports pain as 2/10 today.      Interval History (3/20/2023):  Amelia Mirza presents today for 3 month follow-up visit.  Patient was last seen on 12/16/2022. Last injection Bilateral  L5/S1 TF KARISSA on 11/18/22 with 65-70% relief.  She reports persistent and constant lower lumbosacral pain, right worse than left with radiation into her lateral hips and lateral thighs. She reports only intermittent radiation into her right lower extremity/foot, but her constant pain is axial in nature. Pain is worsened with prolonged sitting, standing, or walking. Pain is alleviated with ibuprofen, biofreeze, and rest. She has completed formal physical  therapy without relief. Pain interferes with daily activities. Patient reports pain as 5/10 today and 7/10 with exacerbating factors.      Interval History (12/16/2022): Amelia Mirza presents today for follow-up visit.  she underwent Bilateral  L5/S1 TF KARISSA on 11/18/22.  The patient reports that she is/was better following the procedure.  she reports 65-70% pain relief.  The changes lasted 4 weeks so far.  The changes have continued through this visit.  She reports this injection was equally effective c/t IL KARISSA in April, but neither were as effective as the initial IL KARISSA she had in October of 2021. She recently restarted physical therapy, which does offer improvement and relief in her symptoms. Patient reports pain as 2/10 today. She reports increased mobility since the injection, she is able to stand and walk longer distances.    Interval History (10/28/2022):  Amelia Mirza presents today via telemed for follow-up visit for MRI review.  Patient was last seen on 10/13/2022. She reports continued lumbosacral pain in a band-like distribution with radiation into her bilateral lower extremities left > right. She reports her symptoms previously were worse in her right LE but now she favors the other leg to compensate. She reports pain radiates primarily posteriorly along L5/S1 distribution with occasional radiation into her anterior thighs. She reports improvement in her radicular symptoms with physical therapy. Patient reports pain as 7/10 today and daily which is constant in nature and interferes with daily activity.    MRI lumbar spine 10/13/2022  FINDINGS:  At T11-T12, circumferential disc bulge results in mild central canal narrowing.     At T12-L1, L1-L2, L2-L3, normal.     At L3-L4, minor disc desiccation without narrowing.  Mild facet joint osteoarthrosis is present bilaterally.     At L4-L5, moderate bilateral facet joint osteoarthrosis allows 4 mm anterolisthesis of L4 on L5 and combines with disc  bulge to result in mild central canal narrowing and moderate bilateral neural foramen narrowing.     At L5-S1, moderate loss of disc space height and circumferential disc bulge combined with minor facet joint osteoarthrosis to result in minor central canal narrowing and mild right and severe left neural foramen narrowing.     Lumbosacral alignment is normal.  Vertebral bodies show mild diffuse red marrow recruitment, with otherwise normal bone marrow signal.  Conus terminates normally at L2.  Paraspinal soft tissues are unremarkable.     Impression:     1. Severe left L5-S1 neural foramen narrowing.  Correlation for left L5 radiculopathy is requested.  2. Grade 1 anterolisthesis of L4 on L5 due to facet joint osteoarthrosis resulting in moderate bilateral neural foramen narrowing and mild central canal narrowing when combined with degenerative disc disease.     Interval history 10/13/2022  Patient presents status post L5-S1 interlaminar epidural steroid injection with right paramedian approach 04/22/2022.  Patient reports 65-70% relief in lower back and leg pain following her last epidural steroid injection.  Patient reports pain has been insidiously worsening over the last month.  Pain today is rated an 8/10.  Patient again reports pain in the lower back which radiates down the posterior aspects of bilateral lower extremities and L5-S1 distribution to the feet.  Patient reports pain has now interfered with her sleep.  Patient is interested in pursuing repeat injection.  Patient reports she is actively been participating in traditional as well as aquatic physical therapy which has been helping with strength and range of motion.      Interval history 03/31/2022  Patient presents for 5 month follow-up.  She reports return of lower back pain which radiates into the buttocks and down the posterior aspects of bilateral lower extremities and L5-S1 distribution.  Patient reports the symptoms are identical to prior lumbar  "epidural steroid injection.  Patient is interested in pursuing repeat intervention.  Patient also reports myofascial pain along thoracic paraspinous musculature.  Patient reports pain is interrupting with her sleep.  She denies new onset lower extremity weakness, bowel or bladder incontinence or saddle anesthesia.    Interval History (10/28/2021):  Amelia Mirza presents today for follow-up visit.  S/p L5/S1 IL KARISSA on 10/4/21 with 90% pain relief.  Pain was basically resolved after, but then she had a fall which Increased knee pain.  She hasnt seen Orthopedics in years.  Patient reports pain as "0/10 today.  Overall, she has greatly improved since procedure.     Initial HPI (7/12/21) - Dr. Adler:  Amelia Mirza is a 61 y.o.   female with past medical history significant for hypoTH, morbid obesity, osteoarthritis (knees), lumbar spondylosis who presents to the clinic for the evaluation of lower back and left leg pain . The pain started  1 year prior ago  without preceding accident or trauma and symptoms have been worsening.The pain is located in a bandlike distribution at the level of the iliac crest with radiation down the left lower extremity along the lateral and posterior aspects in L4-5 distribution.  The pain is described as constant, aching and sharp and is rated as 7/10. The pain is rated with a score of  7/10 on the BEST day and a score of 10/10 on the WORST day.  Symptoms interfere with daily activity, sleeping and work.   Patient does work from home but currently is off her summer break and is disheartened by her inability to participate in outdoor activities or time with her family secondary to her pain. The pain is exacerbated by Sitting, Standing, Walking and Getting out of bed/chair.    Patient is able to ambulate approximately 3 blocks before requiring rest.The pain is mitigated by laying down and rest.     Pt saw MELI Mar for bilateral knee pain 3/2019 with topical " compound cream prescribed and diagnostic genicular procedure discussed but not performed.     Pt saw Dr. Padilla (2017) for lower back pain with radiculopathy with recommendation for PT, NSAID analgesic prescribed.     Patient denies urinary incontinence, bowel incontinence, significant motor weakness and loss of sensations.    Pain Disability Index Review:  Last 3 PDI Scores 3/6/2019   Pain Disability Index (PDI) 31       Non-Pharmacologic Treatments:  Physical Therapy/Home Exercise: yes  Ice/Heat:yes  TENS: no  Acupuncture: no  Massage: no  Chiropractic: no    Other: no      Pain Medications:  - Adjuvant Medications: Advil,Motrin ( Ibuprofen), Mobic (Meloxicam), Zanaflex ( Tizanidine) and NSAIDs, Tylenol, Biofreeze, EMLA cream    Pain Procedures:   Intra-articular steroid and visco-supplementation in bilateral knees - in other dept  -10/4/21: L5/S1 IL KARISSA on  with 90% pain relief   -04/22/2022:  L5-S1 interlaminar epidural steroid injection with right paramedian approach with 65-70% relief  -Bilateral  L5/S1 TF KARISSA on 11/18/22 with 65-70% relief  -bilateral SIJ + GT bursa injection on 4/5/23 with 75-80% pain relief in her low back, 70-80% relief in her left bursa, and 50-60% relief in her right bursa    Review of Systems:   GENERAL:  No weight loss, malaise or fevers.  HEENT:   No recent changes in vision or hearing  NECK:  Negative for lumps, no difficulty with swallowing.  RESPIRATORY:  Negative for cough, wheezing or shortness of breath, patient denies any recent URI.  CARDIOVASCULAR:  Negative for chest pain, leg swelling or palpitations.  GI:  Negative for abdominal discomfort, blood in stools or black stools or change in bowel habits.  MUSCULOSKELETAL:  See HPI.  SKIN:  Negative for lesions, rash, and itching.  PSYCH:  No mood disorder or recent psychosocial stressors.  Patients sleep is not disturbed secondary to pain.  HEMATOLOGY/LYMPHOLOGY:  Negative for prolonged bleeding, bruising easily or swollen  nodes.  Patient is not currently taking any anti-coagulants  NEURO:   No history of headaches, syncope, paralysis, seizures or tremors.  All other reviewed and negative other than HPI.    OBJECTIVE:  Telemedicine Exam  There were no vitals filed for this visit.  There is no height or weight on file to calculate BMI.   (reviewed on 5/8/2023)     GENERAL: Well appearing, in no acute distress, alert and oriented x3.  Cooperative.  PSYCH:  Mood and affect appropriate.  SKIN: Skin color & texture with no obvious abnormalities.    HEAD/FACE:  Normocephalic, atraumatic.    PULM:  No difficulty breathing. No nasal flaring. No obvious wheezing.  EXTREMITIES: No obvious deformities. Moving all extremities well, appears to have symmetric strength throughout.  MUSCULOSKELETAL: No obvious atrophy abnormalities are noted.   NEURO: No obvious neurologic deficit.   GAIT: sitting.     Physical Exam: last in clinic visit:    Physical Exam:    There were no vitals filed for this visit.   There is no height or weight on file to calculate BMI.   (reviewed on 5/8/2023)  Last clinic visit:  GENERAL: Well appearing, in no acute distress, alert and oriented x3.  PSYCH:  Mood and affect appropriate.  SKIN: Skin color, texture, turgor normal, no rashes or lesions.  HEAD/FACE:  Normocephalic, atraumatic. Cranial nerves grossly intact.  NECK: No pain to palpation over the cervical paraspinous muscles. Spurling Negative. No pain with neck flexion, extension, or lateral flexion.   PULM: No evidence of respiratory difficulty, symmetric chest rise.  GI:  Soft and non-tender.  BACK: Straight leg raising in the sitting and supine positions is negative to radicular pain. No pain to palpation over the facet joints of the lumbar spine or spinous processes. Normal range of motion without pain reproduction.  SIJ testing:  - TTP over SI joint: Present bilaterally, right > left  - Rick's/ Jean-Pierre's: Positive bilaterally  - Sacroiliac Distraction Test  (anterior pressure): Positive  - Sacroiliac Compression Test (lateral pressure): Positive   - SacralThrust Test (posterior pressure): Positive  -TTP along bilateral GT bursa, right > left  EXTREMITIES: Peripheral joint ROM is full and pain free without obvious instability or laxity in all four extremities- with exception of knees.  No deformities, edema, or skin discoloration. Good capillary refill.  MUSCULOSKELETAL: Shoulder, hip, and knee provocative maneuvers are negative.  T   Bilateral upper and lower extremity strength is normal and symmetric.  No atrophy or tone abnormalities are noted. Decreased sensation LLE: L4-5 distribution to knee.  Pain with extension of knee. TTP diffusely over right knee patella.  NEURO: BUE & BLE coordination and muscle stretch reflexes are physiologic and symmetric.  Plantar response are downgoing. No clonus.   GAIT: normal.      Imaging (Reviewed on 5/8/2023):     MRI lumbar spine 10/13/2022  FINDINGS:  At T11-T12, circumferential disc bulge results in mild central canal narrowing.     At T12-L1, L1-L2, L2-L3, normal.     At L3-L4, minor disc desiccation without narrowing.  Mild facet joint osteoarthrosis is present bilaterally.     At L4-L5, moderate bilateral facet joint osteoarthrosis allows 4 mm anterolisthesis of L4 on L5 and combines with disc bulge to result in mild central canal narrowing and moderate bilateral neural foramen narrowing.     At L5-S1, moderate loss of disc space height and circumferential disc bulge combined with minor facet joint osteoarthrosis to result in minor central canal narrowing and mild right and severe left neural foramen narrowing.     Lumbosacral alignment is normal.  Vertebral bodies show mild diffuse red marrow recruitment, with otherwise normal bone marrow signal.  Conus terminates normally at L2.  Paraspinal soft tissues are unremarkable.     Impression:     1. Severe left L5-S1 neural foramen narrowing.  Correlation for left L5  radiculopathy is requested.  2. Grade 1 anterolisthesis of L4 on L5 due to facet joint osteoarthrosis resulting in moderate bilateral neural foramen narrowing and mild central canal narrowing when combined with degenerative disc disease.  X-Ray Lumbar Spine Complete 5 View    Narrative  Comparison: None    Findings:    Body habitus limits the study.  For tumor body heights and alignment appear well-maintained.  There is uniform loss of diskal height at the L4 -- 5 L5 -- S1 levels.  Multilevel facet arthropathy.  Pedicles and SI joints appear intact.  No spondylolysis  or spondylolisthesis.    X-Ray Cervical Spine AP And Lateral    Narrative  Comparison: None    Technique: Four views were obtained of the cervical spine    Findings: There is straightening of the normal cervical lordosis which can be associated with muscle spasm.  Vertebral body heights are well maintained.  No spondylolisthesis demonstrated.  There is mild loss of disk height from C3-4 and C5-6 with associated degenerative endplate changes and osteophyte production.  Posterior elements appear intact without acute fractures or subluxations demonstrated.  Odontoid process appears intact.  Atlantoaxial articulations appear normal.  Prevertebral soft tissues are within normal limits.        ASSESSMENT: 63 y.o. year old female with lower back and left lower extremity pain, consistent with     1. Sacroiliitis                        PLAN:   1. Interventional: None at this time, consider repeat injection should symptoms exacerbate  -s/p bilateral SIJ + GT bursa injection on 4/5/23 with 75-80% pain relief in her low back, 70-80% relief in her left bursa, and 50-60% relief in her right bursa  -Bilateral  L5/S1 TF KARISSA on 11/18/22 with 65-70% relief    - Anticoagulation use: None.     2. Pharmacologic:   - patient d/c'd Gabapentin due to side effects (didn't feel like herself)    -We have discussed continuing anti spasmodic, tizanidine 4 mg nightly as this helps  with myofascial pain.  We have discussed potential deleterious side effects associated with this medication including  dizziness, drowsiness, dry mouth or tingling sensation in the upper or lower extremities.     3. Rehabilitative:  -She has completed physical therapy, continue exercises as part of HEP as directed    4. Diagnostic:  Reviewed MRI lumbar spine with the patient and answered any questions.      5. Consult/ Referral: Follow-up PRN with Orthopedics if needed.    6. Follow up: PRN    - This condition does not require this patient to take time off of work, and the primary goal of our Pain Management services is to improve the patient's functional capacity.   - I discussed the risks, benefits, and alternatives to potential treatment options. All questions and concerns were fully addressed today in clinic.       Sharmaine Huber PA-C    Disclaimer:  This note was prepared using voice recognition system and is likely to have sound alike errors that may have been overlooked even after proof reading.  Please call me with any questions.

## 2023-06-12 ENCOUNTER — PATIENT MESSAGE (OUTPATIENT)
Dept: PAIN MEDICINE | Facility: CLINIC | Age: 63
End: 2023-06-12
Payer: COMMERCIAL

## 2023-06-14 DIAGNOSIS — G47.00 INSOMNIA, UNSPECIFIED TYPE: ICD-10-CM

## 2023-06-14 DIAGNOSIS — M54.16 LUMBAR RADICULOPATHY, CHRONIC: Primary | ICD-10-CM

## 2023-06-14 RX ORDER — TRAZODONE HYDROCHLORIDE 50 MG/1
TABLET ORAL
Qty: 90 TABLET | Refills: 3 | Status: SHIPPED | OUTPATIENT
Start: 2023-06-14

## 2023-06-15 ENCOUNTER — PATIENT MESSAGE (OUTPATIENT)
Dept: PAIN MEDICINE | Facility: CLINIC | Age: 63
End: 2023-06-15
Payer: COMMERCIAL

## 2023-06-15 NOTE — TELEPHONE ENCOUNTER
Refill Decision Note   Amelia Mirza  is requesting a refill authorization.  Brief Assessment and Rationale for Refill:  Approve     Medication Therapy Plan:         Comments:     Note composed:9:43 PM 06/14/2023             Appointments     Last Visit   3/9/2023 Eve Green MD   Next Visit   9/15/2023 Eve Green MD

## 2023-06-15 NOTE — TELEPHONE ENCOUNTER
No care due was identified.  [facility] Embedded Care Due Messages. Reference number: [id number].   6/14/2023 9:41:41 PM CDT

## 2023-06-27 NOTE — PRE-PROCEDURE INSTRUCTIONS
Spoke with patient regarding procedure scheduled on 7.7     Arrival time 0645     Has patient been sick with fever or on antibiotics within the last 7 days? No     Does the patient have any open wounds, sores or rashes? No     Does the patient have any recent fractures? no     Has patient received a vaccination within the last 7 days? no     Received the COVID vaccination?      Has the patient stopped all medications as directed? na     Does patient have a pacemaker and or defibrillator? no     Does the patient have a ride to and from procedure and someone reliable to remain with patient?  victor m     Is the patient diabetic? no     Does the patient have sleep apnea? Or use O2 at home? no     Is the patient receiving sedation?      Is the patient instructed to remain NPO beginning at midnight the night before their procedure? yes     Procedure location confirmed with patient? Yes     Covid- Denies signs/symptoms. Instructed to notify PAT/MD if any changes.

## 2023-06-30 ENCOUNTER — TELEPHONE (OUTPATIENT)
Dept: FAMILY MEDICINE | Facility: CLINIC | Age: 63
End: 2023-06-30
Payer: COMMERCIAL

## 2023-06-30 NOTE — TELEPHONE ENCOUNTER
----- Message from Namita Loza sent at 6/30/2023 11:21 AM CDT -----  Contact: Amelia  Type:  Patient Returning Call    Who Called:Amelia  Who Left Message for Patient:Juana   Does the patient know what this is regarding?: same day appointment   Would the patient rather a call back or a response via [facility]? Call back  Best Call Back Number:[phone]  Additional Information:   Thanks   Am

## 2023-06-30 NOTE — TELEPHONE ENCOUNTER
----- Message from Bernadette Farias sent at 6/30/2023  9:21 AM CDT -----  Type:  Same Day Appointment Request    Caller is requesting a same day appointment.  Caller declined first available appointment listed below.    Name of Caller:pt  When is the first available appointment?December  Symptoms:LT ear hurts  Best Call Back Number:[phone]  Additional Information: .    Thank you

## 2023-07-03 ENCOUNTER — PATIENT MESSAGE (OUTPATIENT)
Dept: PAIN MEDICINE | Facility: HOSPITAL | Age: 63
End: 2023-07-03
Payer: COMMERCIAL

## 2023-07-03 ENCOUNTER — OFFICE VISIT (OUTPATIENT)
Dept: FAMILY MEDICINE | Facility: CLINIC | Age: 63
End: 2023-07-03
Attending: FAMILY MEDICINE
Payer: COMMERCIAL

## 2023-07-03 VITALS
OXYGEN SATURATION: 95 % | BODY MASS INDEX: 53.92 KG/M2 | SYSTOLIC BLOOD PRESSURE: 130 MMHG | HEART RATE: 101 BPM | DIASTOLIC BLOOD PRESSURE: 80 MMHG | RESPIRATION RATE: 18 BRPM | HEIGHT: 62 IN | WEIGHT: 293 LBS | TEMPERATURE: 98 F

## 2023-07-03 DIAGNOSIS — J01.90 ACUTE NON-RECURRENT SINUSITIS, UNSPECIFIED LOCATION: Primary | ICD-10-CM

## 2023-07-03 DIAGNOSIS — E66.01 MORBID OBESITY WITH BMI OF 50.0-59.9, ADULT: ICD-10-CM

## 2023-07-03 PROCEDURE — 99213 OFFICE O/P EST LOW 20 MIN: CPT | Mod: 25,S$GLB,, | Performed by: FAMILY MEDICINE

## 2023-07-03 PROCEDURE — 3075F SYST BP GE 130 - 139MM HG: CPT | Mod: CPTII,S$GLB,, | Performed by: FAMILY MEDICINE

## 2023-07-03 PROCEDURE — 3075F PR MOST RECENT SYSTOLIC BLOOD PRESS GE 130-139MM HG: ICD-10-PCS | Mod: CPTII,S$GLB,, | Performed by: FAMILY MEDICINE

## 2023-07-03 PROCEDURE — 3044F PR MOST RECENT HEMOGLOBIN A1C LEVEL <7.0%: ICD-10-PCS | Mod: CPTII,S$GLB,, | Performed by: FAMILY MEDICINE

## 2023-07-03 PROCEDURE — 99999 PR PBB SHADOW E&M-EST. PATIENT-LVL IV: ICD-10-PCS | Mod: PBBFAC,,, | Performed by: FAMILY MEDICINE

## 2023-07-03 PROCEDURE — 96372 THER/PROPH/DIAG INJ SC/IM: CPT | Mod: S$GLB,,, | Performed by: FAMILY MEDICINE

## 2023-07-03 PROCEDURE — 96372 PR INJECTION,THERAP/PROPH/DIAG2ST, IM OR SUBCUT: ICD-10-PCS | Mod: S$GLB,,, | Performed by: FAMILY MEDICINE

## 2023-07-03 PROCEDURE — 99999 PR PBB SHADOW E&M-EST. PATIENT-LVL IV: CPT | Mod: PBBFAC,,, | Performed by: FAMILY MEDICINE

## 2023-07-03 PROCEDURE — 3079F PR MOST RECENT DIASTOLIC BLOOD PRESSURE 80-89 MM HG: ICD-10-PCS | Mod: CPTII,S$GLB,, | Performed by: FAMILY MEDICINE

## 2023-07-03 PROCEDURE — 3008F PR BODY MASS INDEX (BMI) DOCUMENTED: ICD-10-PCS | Mod: CPTII,S$GLB,, | Performed by: FAMILY MEDICINE

## 2023-07-03 PROCEDURE — 99213 PR OFFICE/OUTPT VISIT, EST, LEVL III, 20-29 MIN: ICD-10-PCS | Mod: 25,S$GLB,, | Performed by: FAMILY MEDICINE

## 2023-07-03 PROCEDURE — 3008F BODY MASS INDEX DOCD: CPT | Mod: CPTII,S$GLB,, | Performed by: FAMILY MEDICINE

## 2023-07-03 PROCEDURE — 1159F MED LIST DOCD IN RCRD: CPT | Mod: CPTII,S$GLB,, | Performed by: FAMILY MEDICINE

## 2023-07-03 PROCEDURE — 3044F HG A1C LEVEL LT 7.0%: CPT | Mod: CPTII,S$GLB,, | Performed by: FAMILY MEDICINE

## 2023-07-03 PROCEDURE — 1159F PR MEDICATION LIST DOCUMENTED IN MEDICAL RECORD: ICD-10-PCS | Mod: CPTII,S$GLB,, | Performed by: FAMILY MEDICINE

## 2023-07-03 PROCEDURE — 3079F DIAST BP 80-89 MM HG: CPT | Mod: CPTII,S$GLB,, | Performed by: FAMILY MEDICINE

## 2023-07-03 RX ORDER — AMOXICILLIN 875 MG/1
875 TABLET, FILM COATED ORAL 2 TIMES DAILY
Qty: 20 TABLET | Refills: 0 | Status: SHIPPED | OUTPATIENT
Start: 2023-07-03 | End: 2023-07-13

## 2023-07-03 RX ORDER — METHYLPREDNISOLONE ACETATE 80 MG/ML
80 INJECTION, SUSPENSION INTRA-ARTICULAR; INTRALESIONAL; INTRAMUSCULAR; SOFT TISSUE ONCE
Status: COMPLETED | OUTPATIENT
Start: 2023-07-03 | End: 2023-07-03

## 2023-07-03 RX ORDER — FLUTICASONE PROPIONATE 50 MCG
2 SPRAY, SUSPENSION (ML) NASAL DAILY
Qty: 16 G | Refills: 1 | Status: SHIPPED | OUTPATIENT
Start: 2023-07-03

## 2023-07-03 RX ADMIN — METHYLPREDNISOLONE ACETATE 80 MG: 80 INJECTION, SUSPENSION INTRA-ARTICULAR; INTRALESIONAL; INTRAMUSCULAR; SOFT TISSUE at 12:07

## 2023-07-03 NOTE — PROGRESS NOTES
Amelia Mirza    Chief Complaint   Patient presents with    Otalgia       History of Present Illness:   Ms. Mirza, a nonsmoker, comes in today with complaint of having intermittent pain in her left ear since last week and mainly with laying down.  She states pain is 7/10 on pain scale now but was 5/10 on pain scale earlier today.  She states she takes Tylenol for pain without help.  She states she uses her cell phone at her left ear.  She reports history of TMJ and states she wears  which has not been helpful for pain.  She states she tests weekly for COVID at work with negative COVID test result on last Tuesday.  She denies recent exposure to known ill contacts.    She states she does have sinus symptoms with headaches.  Otherwise, she denies having ear drainage, hearing loss, tinnitus; fever, chills, fatigue, appetite changes; shortness of breath, cough, wheezing; chest pain, palpitations, leg swelling; abdominal pain, nausea, vomiting, diarrhea, constipation; unusual urinary symptoms; polydipsia, polyphagia, polyuria, hot or cold intolerance; back pain; numbness; acute visual changes; anxiety, depression, homicidal or suicidal thoughts.           Labs:                     WBC                      5.75                01/13/2023                 HGB                      12.2                01/13/2023                 HCT                      40.3                01/13/2023                 PLT                      280                 01/13/2023                 CHOL                     200 (H)             01/13/2023                 TRIG                     67                  01/13/2023                 HDL                      79 (H)              01/13/2023                 ALT                      14                  01/13/2023                 AST                      12                  01/13/2023                 NA                       138                 01/13/2023                 K                         4.1                 01/13/2023                 CL                       102                 01/13/2023                 CREATININE               0.8                 01/13/2023                 BUN                      16                  01/13/2023                 CO2                      23                  01/13/2023                 TSH                      0.014 (L)           03/09/2023                 INR                      1.0                 08/30/2016                 HGBA1C                   6.0 (H)             03/09/2023              LDLCALC                  107.6               01/13/2023                Current Outpatient Medications   Medication Sig    amLODIPine (NORVASC) 5 MG tablet Take 1 tablet (5 mg total) by mouth once daily.    ergocalciferol (ERGOCALCIFEROL) 50,000 unit Cap TAKE 1 CAPSULE TWICE A WEEK    ibuprofen (ADVIL,MOTRIN) 800 MG tablet Take 1 tablet (800 mg total) by mouth 2 (two) times daily as needed for Pain (food).    levothyroxine (SYNTHROID) 200 MCG tablet Take 1 pill by mouth every morning before breakfast    levothyroxine (SYNTHROID) 25 MCG tablet Take 1 pill by mouth daily before breakfast    tiZANidine (ZANAFLEX) 4 MG tablet Take 1 tablet (4 mg total) by mouth 2 (two) times daily.    traZODone (DESYREL) 50 MG tablet TAKE 1 TABLET NIGHTLY AS NEEDED FOR INSOMNIA       Review of Systems   Constitutional:  Negative for activity change, appetite change, chills, fatigue and fever.   HENT:  Positive for congestion, ear pain, rhinorrhea, sinus pressure, sinus pain and sneezing. Negative for ear discharge, hearing loss, postnasal drip, sore throat and tinnitus.    Eyes:  Negative for pain, discharge, redness and itching.   Respiratory:  Negative for cough, shortness of breath and wheezing.    Cardiovascular:  Negative for chest pain, palpitations and leg swelling.   Gastrointestinal:  Negative for abdominal pain, constipation, diarrhea, nausea and vomiting.   Endocrine: Negative for  cold intolerance, heat intolerance, polydipsia, polyphagia and polyuria.   Genitourinary:  Negative for difficulty urinating.   Musculoskeletal:  Negative for back pain.   Neurological:  Positive for headaches. Negative for numbness.   Psychiatric/Behavioral:  Negative for dysphoric mood and suicidal ideas. The patient is not nervous/anxious.         Negative for homicidal ideas.     Objective:  Physical Exam  Vitals reviewed.   Constitutional:       General: She is not in acute distress.     Appearance: Normal appearance. She is obese. She is not ill-appearing, toxic-appearing or diaphoretic.      Comments: Pleasant.   HENT:      Head: Normocephalic and atraumatic.      Comments: Tender frontal sinus.     Right Ear: Tympanic membrane, ear canal and external ear normal. There is no impacted cerumen.      Left Ear: Tympanic membrane, ear canal and external ear normal. There is no impacted cerumen.      Nose: Congestion present. No rhinorrhea.      Mouth/Throat:      Mouth: Mucous membranes are moist.      Pharynx: Oropharynx is clear. No oropharyngeal exudate or posterior oropharyngeal erythema.   Eyes:      General:         Right eye: No discharge.         Left eye: No discharge.      Extraocular Movements: Extraocular movements intact.      Conjunctiva/sclera: Conjunctivae normal.      Pupils: Pupils are equal, round, and reactive to light.   Cardiovascular:      Rate and Rhythm: Normal rate and regular rhythm.      Pulses: Normal pulses.      Heart sounds: No murmur heard.  Pulmonary:      Effort: Pulmonary effort is normal. No respiratory distress.      Breath sounds: Normal breath sounds. No wheezing.   Abdominal:      General: Bowel sounds are normal. There is no distension.      Palpations: Abdomen is soft. There is no mass.      Tenderness: There is no abdominal tenderness. There is no guarding or rebound.   Musculoskeletal:         General: No swelling or tenderness. Normal range of motion.      Cervical  back: Normal range of motion and neck supple. No tenderness.      Comments: She is ambulatory without problems.   Lymphadenopathy:      Cervical: No cervical adenopathy.   Skin:     General: Skin is warm.   Neurological:      General: No focal deficit present.      Mental Status: She is alert and oriented to person, place, and time.   Psychiatric:         Mood and Affect: Mood normal.         Behavior: Behavior normal.         Thought Content: Thought content normal.         Judgment: Judgment normal.       ASSESSMENT:  1. Acute non-recurrent sinusitis, unspecified location    2. Morbid obesity with BMI of 50.0-59.9, adult        PLAN:  Amelia was seen today for otalgia.    Diagnoses and all orders for this visit:    Acute non-recurrent sinusitis, unspecified location  -     amoxicillin (AMOXIL) 875 MG tablet; Take 1 tablet (875 mg total) by mouth 2 (two) times daily. for 10 days  -     fluticasone propionate (FLONASE) 50 mcg/actuation nasal spray; 2 sprays (100 mcg total) by Each Nostril route once daily.  -     methylPREDNISolone acetate injection 80 mg    Morbid obesity with BMI of 50.0-59.9, adult      Add Amoxicillin 875 mg twice daily x 10 days; medication precautions discussed with patient.  Add Flonase nasal spray as directed; medication precautions discussed with patient.  Continue current medications, follow low sodium, low cholesterol, low carb diet, daily walks.  Keep follow up with specialists.  Flu shot this fall.  Follow up if symptoms worsen or fail to improve.

## 2023-07-06 ENCOUNTER — HOSPITAL ENCOUNTER (OUTPATIENT)
Dept: RADIOLOGY | Facility: HOSPITAL | Age: 63
Discharge: HOME | End: 2023-07-06
Attending: FAMILY MEDICINE
Payer: COMMERCIAL

## 2023-07-06 ENCOUNTER — TELEPHONE (OUTPATIENT)
Dept: FAMILY MEDICINE | Facility: CLINIC | Age: 63
End: 2023-07-06
Payer: COMMERCIAL

## 2023-07-06 DIAGNOSIS — R10.32 LEFT LOWER QUADRANT ABDOMINAL PAIN: Primary | ICD-10-CM

## 2023-07-06 DIAGNOSIS — R10.32 LEFT LOWER QUADRANT ABDOMINAL PAIN: ICD-10-CM

## 2023-07-06 PROCEDURE — 76700 US EXAM ABDOM COMPLETE: CPT | Mod: TC

## 2023-07-06 PROCEDURE — 76700 US EXAM ABDOM COMPLETE: CPT | Mod: 26,,, | Performed by: RADIOLOGY

## 2023-07-06 PROCEDURE — 76700 US ABDOMEN COMPLETE: ICD-10-PCS | Mod: 26,,, | Performed by: RADIOLOGY

## 2023-07-06 NOTE — TELEPHONE ENCOUNTER
Pt is scheduled for US today at 2pm. Jb with registration states pt needs new referral put in. Please advise

## 2023-07-06 NOTE — TELEPHONE ENCOUNTER
I have put the following orders and/or medications to this note.  Please advise pt and assist.    Orders Placed This Encounter   Procedures    US Abdomen Complete     Standing Status:   Future     Standing Expiration Date:   7/6/2024     Order Specific Question:   May the Radiologist modify the order per protocol to meet the clinical needs of the patient?     Answer:   Yes     Order Specific Question:   Release to patient     Answer:   Immediate

## 2023-07-10 ENCOUNTER — PATIENT MESSAGE (OUTPATIENT)
Dept: FAMILY MEDICINE | Facility: CLINIC | Age: 63
End: 2023-07-10
Payer: COMMERCIAL

## 2023-07-13 ENCOUNTER — TELEPHONE (OUTPATIENT)
Dept: FAMILY MEDICINE | Facility: CLINIC | Age: 63
End: 2023-07-13
Payer: COMMERCIAL

## 2023-07-13 DIAGNOSIS — R93.429 ABNORMAL ULTRASOUND OF KIDNEY: ICD-10-CM

## 2023-07-13 DIAGNOSIS — R10.32 LEFT LOWER QUADRANT ABDOMINAL PAIN: Primary | ICD-10-CM

## 2023-07-13 NOTE — TELEPHONE ENCOUNTER
Advise pt U/S shows mild fullness of the left kidney, If still w/pain in the area, I recommend CT scan of the abdomen pelvis without contrast to evaluate for kidney stone.  If CT scan ordered, schedule virtual visit with me for results review. Thanks.    I have put the following orders and/or medications to this note.  Please advise pt and assist.    Orders Placed This Encounter   Procedures    CT Chest Abdomen Pelvis Without Contrast (XPD)     Standing Status:   Future     Standing Expiration Date:   7/13/2024     Order Specific Question:   Oral/Rectal Contrast instructions:     Answer:   Routine Oral Contrast     Order Specific Question:   Special CT ABD Protocol Request?     Answer:   Routine     Order Specific Question:   May the Radiologist modify the order per protocol to meet the clinical needs of the patient?     Answer:   Yes

## 2023-07-14 ENCOUNTER — HOSPITAL ENCOUNTER (OUTPATIENT)
Dept: RADIOLOGY | Facility: HOSPITAL | Age: 63
Discharge: HOME | End: 2023-07-14
Attending: FAMILY MEDICINE
Payer: COMMERCIAL

## 2023-07-14 DIAGNOSIS — R10.32 LEFT LOWER QUADRANT ABDOMINAL PAIN: ICD-10-CM

## 2023-07-14 DIAGNOSIS — R93.429 ABNORMAL ULTRASOUND OF KIDNEY: ICD-10-CM

## 2023-07-14 PROCEDURE — 74176 CT ABD & PELVIS W/O CONTRAST: CPT | Mod: 26,,, | Performed by: RADIOLOGY

## 2023-07-14 PROCEDURE — A9698 NON-RAD CONTRAST MATERIALNOC: HCPCS | Performed by: FAMILY MEDICINE

## 2023-07-14 PROCEDURE — 71250 CT THORAX DX C-: CPT | Mod: 26,,, | Performed by: RADIOLOGY

## 2023-07-14 PROCEDURE — 74176 CT ABD & PELVIS W/O CONTRAST: CPT | Mod: TC

## 2023-07-14 PROCEDURE — 25500020 PHARM REV CODE 255: Performed by: FAMILY MEDICINE

## 2023-07-14 PROCEDURE — 74176 CT CHEST ABDOMEN PELVIS WITHOUT CONTRAST(XPD): ICD-10-PCS | Mod: 26,,, | Performed by: RADIOLOGY

## 2023-07-14 PROCEDURE — 71250 CT CHEST ABDOMEN PELVIS WITHOUT CONTRAST(XPD): ICD-10-PCS | Mod: 26,,, | Performed by: RADIOLOGY

## 2023-07-14 RX ADMIN — IOHEXOL 1000 ML: 9 SOLUTION ORAL at 05:07

## 2023-07-19 ENCOUNTER — PATIENT MESSAGE (OUTPATIENT)
Dept: FAMILY MEDICINE | Facility: CLINIC | Age: 63
End: 2023-07-19
Payer: COMMERCIAL

## 2023-07-21 NOTE — PRE-PROCEDURE INSTRUCTIONS
Spoke with patient regarding procedure scheduled on 7.28     Arrival time 0930     Has patient been sick with fever or on antibiotics within the last 7 days? No     Does the patient have any open wounds, sores or rashes? No     Does the patient have any recent fractures? no     Has patient received a vaccination within the last 7 days? No     Received the COVID vaccination? yes     Has the patient stopped all medications as directed? na     Does patient have a pacemaker and or defibrillator? no     Does the patient have a ride to and from procedure and someone reliable to remain with patient? coordinating transportation. Aware of policy      Is the patient diabetic? no     Does the patient have sleep apnea? Or use O2 at home? no     Is the patient receiving sedation? Yes cleared by pat np     Is the patient instructed to remain NPO beginning at midnight the night before their procedure? yes     Procedure location confirmed with patient? Yes     Covid- Denies signs/symptoms. Instructed to notify PAT/MD if any changes.

## 2023-07-27 DIAGNOSIS — N13.5 UPJ OBSTRUCTION, ACQUIRED: Primary | ICD-10-CM

## 2023-07-27 RX ORDER — TAMSULOSIN HYDROCHLORIDE 0.4 MG/1
0.4 CAPSULE ORAL DAILY
Qty: 30 CAPSULE | Refills: 11 | Status: SHIPPED | OUTPATIENT
Start: 2023-07-27 | End: 2023-08-04

## 2023-07-28 ENCOUNTER — HOSPITAL ENCOUNTER (OUTPATIENT)
Facility: HOSPITAL | Age: 63
Discharge: HOME | End: 2023-07-28
Attending: ANESTHESIOLOGY | Admitting: ANESTHESIOLOGY
Payer: COMMERCIAL

## 2023-07-28 VITALS
TEMPERATURE: 98 F | WEIGHT: 293 LBS | DIASTOLIC BLOOD PRESSURE: 62 MMHG | HEART RATE: 55 BPM | HEIGHT: 62 IN | BODY MASS INDEX: 53.92 KG/M2 | OXYGEN SATURATION: 98 % | RESPIRATION RATE: 19 BRPM | SYSTOLIC BLOOD PRESSURE: 115 MMHG

## 2023-07-28 DIAGNOSIS — M54.16 LUMBAR RADICULOPATHY: ICD-10-CM

## 2023-07-28 PROCEDURE — 64483 NJX AA&/STRD TFRM EPI L/S 1: CPT | Mod: 50,,, | Performed by: ANESTHESIOLOGY

## 2023-07-28 PROCEDURE — 25000003 PHARM REV CODE 250: Performed by: ANESTHESIOLOGY

## 2023-07-28 PROCEDURE — 64483 PR EPIDURAL INJ, ANES/STEROID, TRANSFORAMINAL, LUMB/SACR, SNGL LEVL: ICD-10-PCS | Mod: 50,,, | Performed by: ANESTHESIOLOGY

## 2023-07-28 PROCEDURE — 25500020 PHARM REV CODE 255: Performed by: ANESTHESIOLOGY

## 2023-07-28 PROCEDURE — 64483 NJX AA&/STRD TFRM EPI L/S 1: CPT | Mod: 50 | Performed by: ANESTHESIOLOGY

## 2023-07-28 PROCEDURE — 63600175 PHARM REV CODE 636 W HCPCS: Performed by: ANESTHESIOLOGY

## 2023-07-28 RX ORDER — DEXAMETHASONE SODIUM PHOSPHATE 10 MG/ML
INJECTION INTRAMUSCULAR; INTRAVENOUS
Status: DISCONTINUED | OUTPATIENT
Start: 2023-07-28 | End: 2023-07-28 | Stop reason: HOSPADM

## 2023-07-28 RX ORDER — INDOMETHACIN 25 MG/1
CAPSULE ORAL
Status: DISCONTINUED | OUTPATIENT
Start: 2023-07-28 | End: 2023-07-28 | Stop reason: HOSPADM

## 2023-07-28 RX ORDER — BUPIVACAINE HYDROCHLORIDE 2.5 MG/ML
INJECTION, SOLUTION EPIDURAL; INFILTRATION; INTRACAUDAL
Status: DISCONTINUED | OUTPATIENT
Start: 2023-07-28 | End: 2023-07-28 | Stop reason: HOSPADM

## 2023-07-28 RX ORDER — FENTANYL CITRATE 50 UG/ML
INJECTION, SOLUTION INTRAMUSCULAR; INTRAVENOUS
Status: DISCONTINUED | OUTPATIENT
Start: 2023-07-28 | End: 2023-07-28 | Stop reason: HOSPADM

## 2023-07-28 RX ORDER — MIDAZOLAM HYDROCHLORIDE 1 MG/ML
INJECTION, SOLUTION INTRAMUSCULAR; INTRAVENOUS
Status: DISCONTINUED | OUTPATIENT
Start: 2023-07-28 | End: 2023-07-28 | Stop reason: HOSPADM

## 2023-07-28 NOTE — DISCHARGE INSTRUCTIONS

## 2023-07-28 NOTE — OP NOTE
Amelia Mirza  63 y.o. female      Vitals:    07/28/23 0954   BP: (!) 145/69   Pulse: (!) 46   Resp: 18   Temp:      Procedure Date: 07/28/2023        INFORMED CONSENT: The procedure, risks, benefits and options were discussed with patient. There are no contraindications to the procedure. The patient expressed understanding and agreed to proceed. The personnel performing the procedure was discussed. I verify that I personally obtained consent prior to the start of the procedure and the signed consent can be found on the patient's chart.       Anesthesia:   Conscious sedation provided by M.D    The patient was monitored with continuous pulse oximetry, EKG, and intermittent blood pressure monitors.  The patient was hemodynamically stable throughout the entire process was responsive to voice, and breathing spontaneously.  Supplemental O2 was provided at 2L/min via nasal cannula.  Patient was comfortable for the duration of the procedure. (See nurse documentation and case log for sedation time)    There was a total of 2mg IV Midazolam and 50mcg Fentanyl titrated for the procedure    Pre Procedure diagnosis: Lumbar radiculopathy, chronic [M54.16]  Post-Procedure diagnosis: SAME     Complications: None    Specimens: None      DESCRIPTION OF PROCEDURE: The patient was brought to the procedure room. IV access was obtained prior to the procedure. The patient was positioned prone on the fluoroscopy table. Continuous hemodynamic monitoring was initiated including blood pressure, EKG, and pulse oximetry. . The skin was prepped with chlorhexidine and draped in a sterile fashion.      The  L5/S1 transforaminal spaces were identified with fluoroscopy in the  AP, oblique, and lateral views.  A 22 gauge spinal quinke needle was then advanced into the area of the trans foraminal spaces bilateral with confirmation of proper needle position using AP, oblique, and lateral fluoroscopic views. Once the needle tip was in the area of  the transforaminal space, and there was no blood, CSF or paraesthesias,  1.5 mL of Omnipaque 300mg/ml was injected on bilateral for a total of 3mL.  Fluoroscopic imaging in the AP and lateral views revealed a clear outline of the spinal nerve with proximal spread of agent through the neural foramen into the epidural space. A total combination of 2 mL of Bupivacaine and 10 mg dexamethasone was injected on each side with displacement of the contrast dye confirming that the medication went into the area of the transforaminal spaces bilateral. A sterile bandaid was applied.   Patient tolerated the procedure well.    Patient was taken back to the recovery room for further observation.     The patient was discharged to home in stable condition

## 2023-07-28 NOTE — H&P
HPI  Patient presenting for Procedure(s) (LRB):  Bilateral L5/S1 TF KARISSA RN IV Sedation (Bilateral)     Patient on Anti-coagulation No    No health changes since previous encounter    Past Medical History:   Diagnosis Date    Arthritis     Benign colon polyp     DDD (degenerative disc disease), lumbar     Hypertension 1/15/2023    Hypothyroidism     Mild anemia     Obesity     Postmenopausal     No history of abnormal pap smear    Vitamin D deficiency      Past Surgical History:   Procedure Laterality Date    bilateral tubal ligation      COLONOSCOPY N/A 8/26/2022    Procedure: COLONOSCOPY;  Surgeon: Taj Marshall MD;  Location: [facility];  Service: General;  Laterality: N/A;    EPIDURAL STEROID INJECTION N/A 10/4/2021    Procedure: Lumbar L5/S1 IL KARISSA WOULD LIKE LATEST POSSIBLE TIME;  Surgeon: Josemanuel Adler MD;  Location: [facility];  Service: Pain Management;  Laterality: N/A;    EPIDURAL STEROID INJECTION N/A 4/22/2022    Procedure: Lumbar L5/S1 IL KARISSA with RN IV sedation;  Surgeon: Josemanuel Adler MD;  Location: [facility];  Service: Pain Management;  Laterality: N/A;    INJECTION OF ANESTHETIC AGENT INTO SACROILIAC JOINT Bilateral 4/5/2023    Procedure: Bilateral GT bursa + bilateral SIJ injection;  Surgeon: Josemanuel Adler MD;  Location: [facility];  Service: Pain Management;  Laterality: Bilateral;    INJECTION OF JOINT Bilateral 4/5/2023    Procedure: Bilateral GT bursa + bilateral SIJ injection;  Surgeon: Josemanuel Adler MD;  Location: [facility];  Service: Pain Management;  Laterality: Bilateral;    OOPHORECTOMY      Partial hysterectomy with USO      for DUB     SELECTIVE INJECTION OF ANESTHETIC AGENT AROUND LUMBAR SPINAL NERVE ROOT BY TRANSFORAMINAL APPROACH Bilateral 11/18/2022    Procedure: Bilateral L5/S1 TF KARISSA RN IV Sedation;  Surgeon: Josemanuel Adler MD;  Location: [facility];  Service: Pain Management;  Laterality: Bilateral;    THYROIDECTOMY, PARTIAL       Review of patient's  allergies indicates:  No Known Allergies     No current facility-administered medications on file prior to encounter.     Current Outpatient Medications on File Prior to Encounter   Medication Sig Dispense Refill    amLODIPine (NORVASC) 5 MG tablet Take 1 tablet (5 mg total) by mouth once daily. 90 tablet 1    ergocalciferol (ERGOCALCIFEROL) 50,000 unit Cap TAKE 1 CAPSULE TWICE A WEEK 25 capsule 3    ibuprofen (ADVIL,MOTRIN) 800 MG tablet Take 1 tablet (800 mg total) by mouth 2 (two) times daily as needed for Pain (food). 60 tablet 2    levothyroxine (SYNTHROID) 200 MCG tablet Take 1 pill by mouth every morning before breakfast 90 tablet 1    levothyroxine (SYNTHROID) 25 MCG tablet Take 1 pill by mouth daily before breakfast 90 tablet 1    tiZANidine (ZANAFLEX) 4 MG tablet Take 1 tablet (4 mg total) by mouth 2 (two) times daily. 60 tablet 2        PMHx, PSHx, Allergies, Medications reviewed in epic    ROS negative except pain complaints in HPI    OBJECTIVE:    There were no vitals taken for this visit.    PHYSICAL EXAMINATION:    GENERAL: Well appearing, in no acute distress, alert and oriented x3.  PSYCH:  Mood and affect appropriate.  SKIN: Skin color, texture, turgor normal, no rashes or lesions which will impact the procedure.  CV: RRR with palpation of the radial artery.  PULM: No evidence of respiratory difficulty, symmetric chest rise. Clear to auscultation.  NEURO: Cranial nerves grossly intact.    Plan:    Proceed with procedure as planned Procedure(s) (LRB):  Bilateral L5/S1 TF KARISSA RN IV Sedation (Bilateral)    Josemanuel Adler MD  07/28/2023

## 2023-07-28 NOTE — DISCHARGE SUMMARY
Discharge Note  Short Stay      SUMMARY     Admit Date: 7/28/2023    Attending Physician: Josemanuel Adler MD        Discharge Physician: Josemanuel Adler MD        Discharge Date: 7/28/2023 10:00 AM    Procedure(s) (LRB):  Bilateral L5/S1 TF KARISSA RN IV Sedation (Bilateral)    Final Diagnosis: Lumbar radiculopathy, chronic [M54.16]    Disposition: Home or self care    Patient Instructions:   Current Discharge Medication List        CONTINUE these medications which have NOT CHANGED    Details   amLODIPine (NORVASC) 5 MG tablet Take 1 tablet (5 mg total) by mouth once daily.  Qty: 90 tablet, Refills: 1    Comments: .  Associated Diagnoses: Hypertension, unspecified type      ergocalciferol (ERGOCALCIFEROL) 50,000 unit Cap TAKE 1 CAPSULE TWICE A WEEK  Qty: 25 capsule, Refills: 3    Associated Diagnoses: Vitamin D deficiency      ibuprofen (ADVIL,MOTRIN) 800 MG tablet Take 1 tablet (800 mg total) by mouth 2 (two) times daily as needed for Pain (food).  Qty: 60 tablet, Refills: 2    Associated Diagnoses: Chronic low back pain with sciatica, sciatica laterality unspecified, unspecified back pain laterality; Lumbar spondylosis; DDD (degenerative disc disease), cervical      !! levothyroxine (SYNTHROID) 200 MCG tablet Take 1 pill by mouth every morning before breakfast  Qty: 90 tablet, Refills: 1    Associated Diagnoses: Hypothyroidism, unspecified type      !! levothyroxine (SYNTHROID) 25 MCG tablet Take 1 pill by mouth daily before breakfast  Qty: 90 tablet, Refills: 1    Associated Diagnoses: Hypothyroidism, unspecified type      tamsulosin (FLOMAX) 0.4 mg Cap Take 1 capsule (0.4 mg total) by mouth once daily.  Qty: 30 capsule, Refills: 11    Associated Diagnoses: UPJ obstruction, acquired      tiZANidine (ZANAFLEX) 4 MG tablet Take 1 tablet (4 mg total) by mouth 2 (two) times daily.  Qty: 60 tablet, Refills: 2    Associated Diagnoses: Sacroiliitis; Greater trochanteric bursitis of both hips      traZODone (DESYREL) 50 MG  tablet TAKE 1 TABLET NIGHTLY AS NEEDED FOR INSOMNIA  Qty: 90 tablet, Refills: 3    Associated Diagnoses: Insomnia, unspecified type      fluticasone propionate (FLONASE) 50 mcg/actuation nasal spray 2 sprays (100 mcg total) by Each Nostril route once daily.  Qty: 16 g, Refills: 1    Associated Diagnoses: Acute non-recurrent sinusitis, unspecified location       !! - Potential duplicate medications found. Please discuss with provider.              Discharge Diagnosis: Lumbar radiculopathy, chronic [M54.16]  Condition on Discharge: Stable with no complications to procedure   Diet on Discharge: Same as before.  Activity: as per instruction sheet.  Discharge to: Home with a responsible adult.  Follow up: 2-4 weeks       Please call the office at [phone] if you experience any weakness or loss of sensation, fever > 101.5, pain uncontrolled with oral medications, persistent nausea/vomiting/or diarrhea, redness or drainage from the incisions, or any other worrisome concerns. If physician on call was not reached or could not communicate with our office for any reason please go to the nearest emergency department

## 2023-08-04 ENCOUNTER — OFFICE VISIT (OUTPATIENT)
Dept: UROLOGY | Facility: CLINIC | Age: 63
End: 2023-08-04
Payer: COMMERCIAL

## 2023-08-04 VITALS
BODY MASS INDEX: 53.92 KG/M2 | DIASTOLIC BLOOD PRESSURE: 73 MMHG | HEIGHT: 62 IN | HEART RATE: 75 BPM | SYSTOLIC BLOOD PRESSURE: 123 MMHG | WEIGHT: 293 LBS

## 2023-08-04 DIAGNOSIS — R10.32 LEFT LOWER QUADRANT ABDOMINAL PAIN: ICD-10-CM

## 2023-08-04 DIAGNOSIS — N28.1 CYST OF KIDNEY, ACQUIRED: Primary | ICD-10-CM

## 2023-08-04 DIAGNOSIS — N13.5 UPJ OBSTRUCTION, ACQUIRED: ICD-10-CM

## 2023-08-04 PROCEDURE — 1160F RVW MEDS BY RX/DR IN RCRD: CPT | Mod: CPTII,S$GLB,, | Performed by: UROLOGY

## 2023-08-04 PROCEDURE — 3078F PR MOST RECENT DIASTOLIC BLOOD PRESSURE < 80 MM HG: ICD-10-PCS | Mod: CPTII,S$GLB,, | Performed by: UROLOGY

## 2023-08-04 PROCEDURE — 3044F HG A1C LEVEL LT 7.0%: CPT | Mod: CPTII,S$GLB,, | Performed by: UROLOGY

## 2023-08-04 PROCEDURE — 3044F PR MOST RECENT HEMOGLOBIN A1C LEVEL <7.0%: ICD-10-PCS | Mod: CPTII,S$GLB,, | Performed by: UROLOGY

## 2023-08-04 PROCEDURE — 3074F SYST BP LT 130 MM HG: CPT | Mod: CPTII,S$GLB,, | Performed by: UROLOGY

## 2023-08-04 PROCEDURE — 99999 PR PBB SHADOW E&M-EST. PATIENT-LVL IV: ICD-10-PCS | Mod: PBBFAC,,, | Performed by: UROLOGY

## 2023-08-04 PROCEDURE — 99204 OFFICE O/P NEW MOD 45 MIN: CPT | Mod: S$GLB,,, | Performed by: UROLOGY

## 2023-08-04 PROCEDURE — 99999 PR PBB SHADOW E&M-EST. PATIENT-LVL IV: CPT | Mod: PBBFAC,,, | Performed by: UROLOGY

## 2023-08-04 PROCEDURE — 3078F DIAST BP <80 MM HG: CPT | Mod: CPTII,S$GLB,, | Performed by: UROLOGY

## 2023-08-04 PROCEDURE — 3008F PR BODY MASS INDEX (BMI) DOCUMENTED: ICD-10-PCS | Mod: CPTII,S$GLB,, | Performed by: UROLOGY

## 2023-08-04 PROCEDURE — 99204 PR OFFICE/OUTPT VISIT, NEW, LEVL IV, 45-59 MIN: ICD-10-PCS | Mod: S$GLB,,, | Performed by: UROLOGY

## 2023-08-04 PROCEDURE — 3074F PR MOST RECENT SYSTOLIC BLOOD PRESSURE < 130 MM HG: ICD-10-PCS | Mod: CPTII,S$GLB,, | Performed by: UROLOGY

## 2023-08-04 PROCEDURE — 1160F PR REVIEW ALL MEDS BY PRESCRIBER/CLIN PHARMACIST DOCUMENTED: ICD-10-PCS | Mod: CPTII,S$GLB,, | Performed by: UROLOGY

## 2023-08-04 PROCEDURE — 1159F PR MEDICATION LIST DOCUMENTED IN MEDICAL RECORD: ICD-10-PCS | Mod: CPTII,S$GLB,, | Performed by: UROLOGY

## 2023-08-04 PROCEDURE — 1159F MED LIST DOCD IN RCRD: CPT | Mod: CPTII,S$GLB,, | Performed by: UROLOGY

## 2023-08-04 PROCEDURE — 3008F BODY MASS INDEX DOCD: CPT | Mod: CPTII,S$GLB,, | Performed by: UROLOGY

## 2023-08-04 NOTE — PROGRESS NOTES
Chief Complaint:   Encounter Diagnoses   Name Primary?    UPJ obstruction, acquired     Cyst of kidney, acquired Yes    Left lower quadrant abdominal pain        HPI:  JOSUÉ Mirza ramon 63 y.o. female who presents with abnormal CT findings.  Patient has been undergoing a workup for chronic left lower quadrant abdominal pain that had been going on for more than a year.  She underwent a renal ultrasound in January showing mild fullness of the left renal pelvis.  She underwent a CT scan recently of the chest abdomen and pelvis without IV contrast which once again showed either parapelvic cyst or an extrarenal pelvis or possibly a mild UPJ obstruction.  She was referred here for further evaluation.  She denies any difficulty with urinary infections or kidney stones in the past.  She is had annual UA is which are clear.  She was having slightly more urinary frequency however has no urgency or urge incontinence.  She does have mild stress incontinence.  History:  Past Medical History:   Diagnosis Date    Arthritis     Benign colon polyp     DDD (degenerative disc disease), lumbar     Hypertension 1/15/2023    Hypothyroidism     Mild anemia     Obesity     Postmenopausal     No history of abnormal pap smear    Vitamin D deficiency      Past Surgical History:   Procedure Laterality Date    bilateral tubal ligation      COLONOSCOPY N/A 8/26/2022    Procedure: COLONOSCOPY;  Surgeon: Taj Marshall MD;  Location: [facility];  Service: General;  Laterality: N/A;    EPIDURAL STEROID INJECTION N/A 10/4/2021    Procedure: Lumbar L5/S1 IL KARISSA WOULD LIKE LATEST POSSIBLE TIME;  Surgeon: Josemanuel Adler MD;  Location: [facility];  Service: Pain Management;  Laterality: N/A;    EPIDURAL STEROID INJECTION N/A 4/22/2022    Procedure: Lumbar L5/S1 IL KARISSA with RN IV sedation;  Surgeon: Josemanuel Adler MD;  Location: [facility] [place];  Service: Pain Management;  Laterality: N/A;    INJECTION OF ANESTHETIC AGENT INTO SACROILIAC  JOINT Bilateral 4/5/2023    Procedure: Bilateral GT bursa + bilateral SIJ injection;  Surgeon: Josemanuel Adler MD;  Location: [facility];  Service: Pain Management;  Laterality: Bilateral;    INJECTION OF JOINT Bilateral 4/5/2023    Procedure: Bilateral GT bursa + bilateral SIJ injection;  Surgeon: Josemanuel Adler MD;  Location: [facility];  Service: Pain Management;  Laterality: Bilateral;    OOPHORECTOMY      Partial hysterectomy with USO      for DUB     SELECTIVE INJECTION OF ANESTHETIC AGENT AROUND LUMBAR SPINAL NERVE ROOT BY TRANSFORAMINAL APPROACH Bilateral 11/18/2022    Procedure: Bilateral L5/S1 TF KARISSA RN IV Sedation;  Surgeon: Josemanuel Adler MD;  Location: [facility];  Service: Pain Management;  Laterality: Bilateral;    SELECTIVE INJECTION OF ANESTHETIC AGENT AROUND LUMBAR SPINAL NERVE ROOT BY TRANSFORAMINAL APPROACH Bilateral 7/28/2023    Procedure: Bilateral L5/S1 TF KARISSA RN IV Sedation;  Surgeon: Josemanuel Adler MD;  Location: [facility];  Service: Pain Management;  Laterality: Bilateral;    THYROIDECTOMY, PARTIAL       Family History   Problem Relation Age of Onset    Cancer Mother         Breast cancer    Diabetes Mother     Breast cancer Mother 42    Depression Daughter     Insulin resistance Daughter     Depression Maternal Aunt     Rheum arthritis Maternal Aunt     Diabetes Maternal Aunt     Heart disease Maternal Grandmother         MI/CAD    Heart disease Maternal Grandfather         MI/CAD    Heart disease Paternal Grandfather         MI/CAD    Cancer Paternal Grandfather         Pancreas    Colon cancer Father     No Known Problems Son     No Known Problems Son     Stroke Neg Hx     Hypertension Neg Hx     Lupus Neg Hx        Current Outpatient Medications on File Prior to Visit   Medication Sig Dispense Refill    amLODIPine (NORVASC) 5 MG tablet Take 1 tablet (5 mg total) by mouth once daily. 90 tablet 1    ergocalciferol (ERGOCALCIFEROL) 50,000 unit Cap TAKE 1 CAPSULE TWICE A WEEK 25  "capsule 3    fluticasone propionate (FLONASE) 50 mcg/actuation nasal spray 2 sprays (100 mcg total) by Each Nostril route once daily. 16 g 1    ibuprofen (ADVIL,MOTRIN) 800 MG tablet Take 1 tablet (800 mg total) by mouth 2 (two) times daily as needed for Pain (food). 60 tablet 2    levothyroxine (SYNTHROID) 200 MCG tablet Take 1 pill by mouth every morning before breakfast 90 tablet 1    levothyroxine (SYNTHROID) 25 MCG tablet Take 1 pill by mouth daily before breakfast 90 tablet 1    tamsulosin (FLOMAX) 0.4 mg Cap Take 1 capsule (0.4 mg total) by mouth once daily. 30 capsule 11    tiZANidine (ZANAFLEX) 4 MG tablet Take 1 tablet (4 mg total) by mouth 2 (two) times daily. 60 tablet 2    traZODone (DESYREL) 50 MG tablet TAKE 1 TABLET NIGHTLY AS NEEDED FOR INSOMNIA 90 tablet 3     No current facility-administered medications on file prior to visit.        Objective:     Vitals:    08/04/23 0812   BP: 123/73   Pulse: 75   Weight: 134.5 kg (296 lb 6.5 oz)   Height: 5' 2" (1.575 m)      BMI Readings from Last 1 Encounters:   08/04/23 54.21 kg/m²          Physical Exam    No acute distress alert oriented x3   Respirations even unlabored   Abdomen is obese  Lab Results   Component Value Date    CREATININE 0.8 01/13/2023           Assessment:       1. Cyst of kidney, acquired    2. UPJ obstruction, acquired    3. Left lower quadrant abdominal pain        Plan:     1. Cyst of kidney, acquired    2. UPJ obstruction, acquired    3. Left lower quadrant abdominal pain       Orders Placed This Encounter    CT Abdomen With Without Contrast    Creatinine, serum     Chronic left lower quadrant abdominal pain.  I do not suspect the renal findings are to blame.  It is possible that this represents a extrarenal pelvis or a renal cyst.  Both of which are benign findings.  I do not see any intra renal hydronephrosis or renal parenchymal loss after reviewing all her imaging.  I suspect minimal to no obstruction.  will discontinue " tamsulosin.  We will call her with these results.

## 2023-08-11 DIAGNOSIS — N28.1 CYST OF KIDNEY, ACQUIRED: Primary | ICD-10-CM

## 2023-08-14 ENCOUNTER — HOSPITAL ENCOUNTER (OUTPATIENT)
Dept: RADIOLOGY | Facility: HOSPITAL | Age: 63
Discharge: HOME | End: 2023-08-14
Attending: UROLOGY
Payer: COMMERCIAL

## 2023-08-14 DIAGNOSIS — N28.1 CYST OF KIDNEY, ACQUIRED: ICD-10-CM

## 2023-08-14 PROCEDURE — 25500020 PHARM REV CODE 255: Performed by: UROLOGY

## 2023-08-14 PROCEDURE — 74170 CT ABD WO CNTRST FLWD CNTRST: CPT | Mod: TC

## 2023-08-14 PROCEDURE — 74170 CT ABDOMEN W WO CONTRAST: ICD-10-PCS | Mod: 26,,, | Performed by: RADIOLOGY

## 2023-08-14 PROCEDURE — 74170 CT ABD WO CNTRST FLWD CNTRST: CPT | Mod: 26,,, | Performed by: RADIOLOGY

## 2023-08-14 RX ADMIN — IOHEXOL 100 ML: 350 INJECTION, SOLUTION INTRAVENOUS at 05:08

## 2023-09-15 ENCOUNTER — LAB VISIT (OUTPATIENT)
Dept: LAB | Facility: HOSPITAL | Age: 63
End: 2023-09-15
Attending: FAMILY MEDICINE
Payer: COMMERCIAL

## 2023-09-15 ENCOUNTER — OFFICE VISIT (OUTPATIENT)
Dept: FAMILY MEDICINE | Facility: CLINIC | Age: 63
End: 2023-09-15
Payer: COMMERCIAL

## 2023-09-15 VITALS
BODY MASS INDEX: 53.71 KG/M2 | HEART RATE: 60 BPM | OXYGEN SATURATION: 99 % | RESPIRATION RATE: 18 BRPM | SYSTOLIC BLOOD PRESSURE: 110 MMHG | WEIGHT: 291.88 LBS | DIASTOLIC BLOOD PRESSURE: 68 MMHG | HEIGHT: 62 IN | TEMPERATURE: 97 F

## 2023-09-15 DIAGNOSIS — R73.03 PREDIABETES: ICD-10-CM

## 2023-09-15 DIAGNOSIS — I10 HYPERTENSION, UNSPECIFIED TYPE: Primary | ICD-10-CM

## 2023-09-15 DIAGNOSIS — M47.816 LUMBAR SPONDYLOSIS: ICD-10-CM

## 2023-09-15 DIAGNOSIS — E03.9 HYPOTHYROIDISM, UNSPECIFIED TYPE: ICD-10-CM

## 2023-09-15 DIAGNOSIS — E66.01 MORBID OBESITY WITH BMI OF 50.0-59.9, ADULT: ICD-10-CM

## 2023-09-15 LAB
ESTIMATED AVG GLUCOSE: 120 MG/DL (ref 68–131)
HBA1C MFR BLD: 5.8 % (ref 4–5.6)
T4 FREE SERPL-MCNC: 1.67 NG/DL (ref 0.71–1.51)
TSH SERPL DL<=0.005 MIU/L-ACNC: <0.01 UIU/ML (ref 0.4–4)

## 2023-09-15 PROCEDURE — 99999 PR PBB SHADOW E&M-EST. PATIENT-LVL V: ICD-10-PCS | Mod: PBBFAC,,, | Performed by: FAMILY MEDICINE

## 2023-09-15 PROCEDURE — 99999 PR PBB SHADOW E&M-EST. PATIENT-LVL V: CPT | Mod: PBBFAC,,, | Performed by: FAMILY MEDICINE

## 2023-09-15 PROCEDURE — 3078F PR MOST RECENT DIASTOLIC BLOOD PRESSURE < 80 MM HG: ICD-10-PCS | Mod: CPTII,S$GLB,, | Performed by: FAMILY MEDICINE

## 2023-09-15 PROCEDURE — 36415 COLL VENOUS BLD VENIPUNCTURE: CPT | Mod: PO | Performed by: FAMILY MEDICINE

## 2023-09-15 PROCEDURE — 3078F DIAST BP <80 MM HG: CPT | Mod: CPTII,S$GLB,, | Performed by: FAMILY MEDICINE

## 2023-09-15 PROCEDURE — 3074F PR MOST RECENT SYSTOLIC BLOOD PRESSURE < 130 MM HG: ICD-10-PCS | Mod: CPTII,S$GLB,, | Performed by: FAMILY MEDICINE

## 2023-09-15 PROCEDURE — 3008F BODY MASS INDEX DOCD: CPT | Mod: CPTII,S$GLB,, | Performed by: FAMILY MEDICINE

## 2023-09-15 PROCEDURE — 3008F PR BODY MASS INDEX (BMI) DOCUMENTED: ICD-10-PCS | Mod: CPTII,S$GLB,, | Performed by: FAMILY MEDICINE

## 2023-09-15 PROCEDURE — 1159F MED LIST DOCD IN RCRD: CPT | Mod: CPTII,S$GLB,, | Performed by: FAMILY MEDICINE

## 2023-09-15 PROCEDURE — 84439 ASSAY OF FREE THYROXINE: CPT | Performed by: FAMILY MEDICINE

## 2023-09-15 PROCEDURE — 1160F RVW MEDS BY RX/DR IN RCRD: CPT | Mod: CPTII,S$GLB,, | Performed by: FAMILY MEDICINE

## 2023-09-15 PROCEDURE — 1160F PR REVIEW ALL MEDS BY PRESCRIBER/CLIN PHARMACIST DOCUMENTED: ICD-10-PCS | Mod: CPTII,S$GLB,, | Performed by: FAMILY MEDICINE

## 2023-09-15 PROCEDURE — 3044F PR MOST RECENT HEMOGLOBIN A1C LEVEL <7.0%: ICD-10-PCS | Mod: CPTII,S$GLB,, | Performed by: FAMILY MEDICINE

## 2023-09-15 PROCEDURE — 83036 HEMOGLOBIN GLYCOSYLATED A1C: CPT | Performed by: FAMILY MEDICINE

## 2023-09-15 PROCEDURE — 99214 PR OFFICE/OUTPT VISIT, EST, LEVL IV, 30-39 MIN: ICD-10-PCS | Mod: S$GLB,,, | Performed by: FAMILY MEDICINE

## 2023-09-15 PROCEDURE — 99214 OFFICE O/P EST MOD 30 MIN: CPT | Mod: S$GLB,,, | Performed by: FAMILY MEDICINE

## 2023-09-15 PROCEDURE — 1159F PR MEDICATION LIST DOCUMENTED IN MEDICAL RECORD: ICD-10-PCS | Mod: CPTII,S$GLB,, | Performed by: FAMILY MEDICINE

## 2023-09-15 PROCEDURE — 84443 ASSAY THYROID STIM HORMONE: CPT | Performed by: FAMILY MEDICINE

## 2023-09-15 PROCEDURE — 3074F SYST BP LT 130 MM HG: CPT | Mod: CPTII,S$GLB,, | Performed by: FAMILY MEDICINE

## 2023-09-15 PROCEDURE — 3044F HG A1C LEVEL LT 7.0%: CPT | Mod: CPTII,S$GLB,, | Performed by: FAMILY MEDICINE

## 2023-09-15 NOTE — PROGRESS NOTES
Amelia Mirza    Chief Complaint   Patient presents with    Hypertension    Follow-up       History of Present Illness:   Ms. Mirza comes in today for hypertension follow-up.  She states she has taken morning medications and is not fasting.  She states she performs blood pressure checks at work 2 times a week with levels ranging 110-125/76-80's.  She states she exercises with leg lifts 2 times a week, 10-15 minutes each time as limited due to back, sciatica pain.  She states she monitors her diet.    She states she continues to have occasional low back with sciatica pain (right more than left now).  She states she has pain with walking to work and requests mobility impairment sticker.  She saw MELI Huber with pain management on May 8, 2023 for sacroiliitis.  She states she has received KARISSA with temporary relief.    She saw Dr. Arnulfo Silva, urologist, on August 4, 2023 for cyst of kidney, acquired, uPJ obstruction, acquired, left lower quadrant abdominal pain with CT abdomen w/wo contrast ordered and states she was told to follow up if pain worsens.    Otherwise, she denies having fever, chills, fatigue, appetite changes; shortness of breath, cough, wheezing; chest pain, palpitations, leg swelling; abdominal pain, nausea, vomiting, diarrhea, constipation; unusual urinary symptoms; polydipsia, polyphagia, polyuria, hot or cold intolerance; acute visual changes, numbness, headache; anxiety, depression, homicidal or suicidal thoughts.         Labs:                    WBC                      5.75                01/13/2023                 HGB                      12.2                01/13/2023                 HCT                      40.3                01/13/2023                 PLT                      280                 01/13/2023                 CHOL                     200 (H)             01/13/2023                 TRIG                     67                  01/13/2023                 HDL                       79 (H)              01/13/2023                 ALT                      14                  01/13/2023                 AST                      12                  01/13/2023                 NA                       138                 01/13/2023                 K                        4.1                 01/13/2023                 CL                       102                 01/13/2023                 CREATININE               0.9                 08/14/2023                 BUN                      16                  01/13/2023                 CO2                      23                  01/13/2023                 TSH                      0.014 (L)           03/09/2023                 INR                      1.0                 08/30/2016                 HGBA1C                   6.0 (H)             03/09/2023              LDLCALC                  107.6               01/13/2023                  Current Outpatient Medications   Medication Sig    amLODIPine (NORVASC) 5 MG tablet Take 1 tablet (5 mg total) by mouth once daily.    ergocalciferol (ERGOCALCIFEROL) 50,000 unit Cap TAKE 1 CAPSULE TWICE A WEEK    fluticasone propionate (FLONASE) 50 mcg/actuation nasal spray 2 sprays (100 mcg total) by Each Nostril route once daily.    ibuprofen (ADVIL,MOTRIN) 800 MG tablet Take 1 tablet (800 mg total) by mouth 2 (two) times daily as needed for Pain (food).    levothyroxine (SYNTHROID) 200 MCG tablet Take 1 pill by mouth every morning before breakfast    levothyroxine (SYNTHROID) 25 MCG tablet Take 1 pill by mouth daily before breakfast    tiZANidine (ZANAFLEX) 4 MG tablet Take 1 tablet (4 mg total) by mouth 2 (two) times daily.    traZODone (DESYREL) 50 MG tablet TAKE 1 TABLET NIGHTLY AS NEEDED FOR INSOMNIA       Review of Systems   Constitutional:  Negative for activity change, appetite change, chills, fatigue and fever.        Weight 137.6 kg (303 lb 5.7 oz) at July 3, 2023 visit.   Eyes:  Negative for  visual disturbance.   Respiratory:  Negative for cough, shortness of breath and wheezing.    Cardiovascular:  Negative for chest pain, palpitations and leg swelling.        See history of present illness.   Gastrointestinal:  Positive for abdominal pain. Negative for constipation, diarrhea, nausea and vomiting.        See history of present illness.   Endocrine: Negative for cold intolerance, heat intolerance, polydipsia, polyphagia and polyuria.        See history of present illness.   Genitourinary:  Negative for difficulty urinating.        See history of present illness.   Musculoskeletal:  Positive for back pain and myalgias. Negative for arthralgias.        See history of present illness.   Neurological:  Negative for numbness and headaches.   Psychiatric/Behavioral:  Negative for dysphoric mood and suicidal ideas. The patient is not nervous/anxious.         Negative for homicidal ideas. See history of present illness.       Objective:  Physical Exam  Vitals reviewed.   Constitutional:       General: She is not in acute distress.     Appearance: Normal appearance. She is well-developed. She is obese. She is not ill-appearing, toxic-appearing or diaphoretic.      Comments: Pleasant.   Neck:      Thyroid: No thyromegaly.   Cardiovascular:      Rate and Rhythm: Normal rate and regular rhythm.      Pulses:           Dorsalis pedis pulses are 3+ on the right side and 3+ on the left side.      Heart sounds: Normal heart sounds. No murmur heard.  Pulmonary:      Effort: Pulmonary effort is normal. No respiratory distress.      Breath sounds: Normal breath sounds. No wheezing.   Abdominal:      General: Bowel sounds are normal. There is no distension.      Palpations: Abdomen is soft. There is no mass.      Tenderness: There is no abdominal tenderness. There is no guarding or rebound.   Musculoskeletal:         General: Tenderness present. No swelling. Normal range of motion.      Cervical back: Normal range of motion  and neck supple. No tenderness.      Comments: She is ambulatory without problems. Subtle tenderness at low back and at right lateral hip with full range of motion noted.   Feet:      Right foot:      Protective Sensation: 5 sites tested.  5 sites sensed.      Skin integrity: No ulcer or skin breakdown.      Left foot:      Protective Sensation: 5 sites tested.  5 sites sensed.      Skin integrity: No ulcer or skin breakdown.   Lymphadenopathy:      Cervical: No cervical adenopathy.   Neurological:      General: No focal deficit present.      Mental Status: She is alert and oriented to person, place, and time.      Deep Tendon Reflexes: Reflexes normal.   Psychiatric:         Mood and Affect: Mood normal.         Behavior: Behavior normal.         Thought Content: Thought content normal.         Judgment: Judgment normal.         ASSESSMENT:  1. Hypertension, unspecified type    2. Hypothyroidism, unspecified type    3. Prediabetes    4. Lumbar spondylosis    5. Morbid obesity with BMI of 50.0-59.9, adult        PLAN:  Amelia was seen today for hypertension and follow-up.    Diagnoses and all orders for this visit:    Hypertension, unspecified type    Hypothyroidism, unspecified type  -     TSH; Future    Prediabetes  -     Hemoglobin A1C; Future    Lumbar spondylosis    Morbid obesity with BMI of 50.0-59.9, adult      Patient advised to call for results.  Continue current medications, follow low sodium, low cholesterol, low carb diet, daily walks.  Keep follow up with specialists.  Mobility impairment form for temporary use completed and given to patient as requested.  Flu shot this fall.  Follow up in about 17 weeks (around 1/12/2024).

## 2023-09-25 ENCOUNTER — HOSPITAL ENCOUNTER (OUTPATIENT)
Dept: RADIOLOGY | Facility: HOSPITAL | Age: 63
Discharge: HOME | End: 2023-09-25
Attending: FAMILY MEDICINE
Payer: COMMERCIAL

## 2023-09-25 VITALS — BODY MASS INDEX: 53.71 KG/M2 | WEIGHT: 291.88 LBS | HEIGHT: 62 IN

## 2023-09-25 DIAGNOSIS — Z12.31 OTHER SCREENING MAMMOGRAM: ICD-10-CM

## 2023-09-25 PROCEDURE — 77063 BREAST TOMOSYNTHESIS BI: CPT | Mod: 26,,, | Performed by: RADIOLOGY

## 2023-09-25 PROCEDURE — 77067 SCR MAMMO BI INCL CAD: CPT | Mod: TC

## 2023-09-25 PROCEDURE — 77067 MAMMO DIGITAL SCREENING BILAT WITH TOMO: ICD-10-PCS | Mod: 26,,, | Performed by: RADIOLOGY

## 2023-09-25 PROCEDURE — 77063 MAMMO DIGITAL SCREENING BILAT WITH TOMO: ICD-10-PCS | Mod: 26,,, | Performed by: RADIOLOGY

## 2023-09-25 PROCEDURE — 77067 SCR MAMMO BI INCL CAD: CPT | Mod: 26,,, | Performed by: RADIOLOGY

## 2023-10-10 ENCOUNTER — TELEPHONE (OUTPATIENT)
Dept: PAIN MEDICINE | Facility: CLINIC | Age: 63
End: 2023-10-10
Payer: COMMERCIAL

## 2023-10-10 NOTE — TELEPHONE ENCOUNTER
Attempt to call patient to confirm appointment. Patent did not answer, Left message on patients voice mail to call back at earliest convenience to confirm or reschedule p.t apt.

## 2023-10-11 ENCOUNTER — OFFICE VISIT (OUTPATIENT)
Dept: PAIN MEDICINE | Facility: CLINIC | Age: 63
End: 2023-10-11
Payer: COMMERCIAL

## 2023-10-11 ENCOUNTER — HOSPITAL ENCOUNTER (OUTPATIENT)
Dept: RADIOLOGY | Facility: HOSPITAL | Age: 63
Discharge: HOME | End: 2023-10-11
Attending: ANESTHESIOLOGY
Payer: COMMERCIAL

## 2023-10-11 DIAGNOSIS — M25.552 CHRONIC PAIN OF BOTH HIPS: ICD-10-CM

## 2023-10-11 DIAGNOSIS — M25.551 CHRONIC PAIN OF BOTH HIPS: ICD-10-CM

## 2023-10-11 DIAGNOSIS — G89.29 CHRONIC PAIN OF BOTH HIPS: ICD-10-CM

## 2023-10-11 DIAGNOSIS — M48.061 SPINAL STENOSIS OF LUMBAR REGION WITHOUT NEUROGENIC CLAUDICATION: ICD-10-CM

## 2023-10-11 DIAGNOSIS — M54.16 LUMBAR RADICULOPATHY: Primary | ICD-10-CM

## 2023-10-11 DIAGNOSIS — M43.16 ANTEROLISTHESIS OF LUMBAR SPINE: ICD-10-CM

## 2023-10-11 PROCEDURE — 1159F PR MEDICATION LIST DOCUMENTED IN MEDICAL RECORD: ICD-10-PCS | Mod: CPTII,95,, | Performed by: ANESTHESIOLOGY

## 2023-10-11 PROCEDURE — 73521 X-RAY EXAM HIPS BI 2 VIEWS: CPT | Mod: TC

## 2023-10-11 PROCEDURE — 73521 X-RAY EXAM HIPS BI 2 VIEWS: CPT | Mod: 26,,, | Performed by: RADIOLOGY

## 2023-10-11 PROCEDURE — 73521 XR HIPS BILATERAL 2 VIEW INCL AP PELVIS: ICD-10-PCS | Mod: 26,,, | Performed by: RADIOLOGY

## 2023-10-11 PROCEDURE — 99214 PR OFFICE/OUTPT VISIT, EST, LEVL IV, 30-39 MIN: ICD-10-PCS | Mod: 95,,, | Performed by: ANESTHESIOLOGY

## 2023-10-11 PROCEDURE — 1160F PR REVIEW ALL MEDS BY PRESCRIBER/CLIN PHARMACIST DOCUMENTED: ICD-10-PCS | Mod: CPTII,95,, | Performed by: ANESTHESIOLOGY

## 2023-10-11 PROCEDURE — 1159F MED LIST DOCD IN RCRD: CPT | Mod: CPTII,95,, | Performed by: ANESTHESIOLOGY

## 2023-10-11 PROCEDURE — 1160F RVW MEDS BY RX/DR IN RCRD: CPT | Mod: CPTII,95,, | Performed by: ANESTHESIOLOGY

## 2023-10-11 PROCEDURE — 3044F PR MOST RECENT HEMOGLOBIN A1C LEVEL <7.0%: ICD-10-PCS | Mod: CPTII,95,, | Performed by: ANESTHESIOLOGY

## 2023-10-11 PROCEDURE — 3044F HG A1C LEVEL LT 7.0%: CPT | Mod: CPTII,95,, | Performed by: ANESTHESIOLOGY

## 2023-10-11 PROCEDURE — 99214 OFFICE O/P EST MOD 30 MIN: CPT | Mod: 95,,, | Performed by: ANESTHESIOLOGY

## 2023-10-11 NOTE — PROGRESS NOTES
Established Patient Chronic Pain Note (Follow-up Visit)    The patient location is: car  The chief complaint leading to consultation is: LBP  Visit type: Virtual visit with synchronous audio and video  Total time spent with patient: 15m  Each patient to whom he or she provides medical services by telemedicine is: (1) informed of the relationship between the physician and patient and the respective role of any other health care provider with respect to management of the patient; and (2) notified that he or she may decline to receive medical services by telemedicine and may withdraw from such care at any time.    Referring Physician: Eve Green MD    PCP: Eve Green MD    Chief Complaint:   LBP     SUBJECTIVE:  Interval history 10/11/2023  Patient presents status post bilateral L5-S1 transforaminal epidural steroid injection 07/28/2023.  Today patient reports only approximately 30% relief in lower back and radicular symptoms following her epidural.  Today she is reporting pain in the lower back which radiates into bilateral hips and intermittently down the lateral aspect of the lower extremities in L4-5 distribution to the knee.  Pain is more severe on the right than on the left.  Patient reports secondary to family obligations she had to temporarily discontinue physical therapy but did report her therapy was helping.  She would like to restart her physical therapy.  She reports pain is exacerbated with prolonged standing and with ambulation.  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  She reports pain is improved while sitting but is not completely resolved.      Interval History (5/8/2023): Amelia Mirza presents today for follow-up visit.  she underwent bilateral SIJ + GT bursa injection on 4/5/23.  The patient reports that she is/was better following the  procedure.  she reports 75-80% pain relief in her low back, 70-80% relief in her left bursa, and 50-60% relief in her right bursa. Continues to report intermittent pain throughout the day, but overall much improved. Pain is exacerbated by prolonged activity, improved with rest.  The changes lasted 4 weeks so far.  The changes have continued through this visit.  Patient reports pain as 2/10 today.      Interval History (3/20/2023):  Amelia Mirza presents today for 3 month follow-up visit.  Patient was last seen on 12/16/2022. Last injection Bilateral  L5/S1 TF KARISSA on 11/18/22 with 65-70% relief.  She reports persistent and constant lower lumbosacral pain, right worse than left with radiation into her lateral hips and lateral thighs. She reports only intermittent radiation into her right lower extremity/foot, but her constant pain is axial in nature. Pain is worsened with prolonged sitting, standing, or walking. Pain is alleviated with ibuprofen, biofreeze, and rest. She has completed formal physical therapy without relief. Pain interferes with daily activities. Patient reports pain as 5/10 today and 7/10 with exacerbating factors.      Interval History (12/16/2022): Amelia Mirza presents today for follow-up visit.  she underwent Bilateral  L5/S1 TF KARISSA on 11/18/22.  The patient reports that she is/was better following the procedure.  she reports 65-70% pain relief.  The changes lasted 4 weeks so far.  The changes have continued through this visit.  She reports this injection was equally effective c/t IL KARISSA in April, but neither were as effective as the initial IL KARISSA she had in October of 2021. She recently restarted physical therapy, which does offer improvement and relief in her symptoms. Patient reports pain as 2/10 today. She reports increased mobility since the injection, she is able to stand and walk longer distances.    Interval History (10/28/2022):  Amelia Mirza presents today via [facility]  for follow-up visit for MRI review.  Patient was last seen on 10/13/2022. She reports continued lumbosacral pain in a band-like distribution with radiation into her bilateral lower extremities left > right. She reports her symptoms previously were worse in her right LE but now she favors the other leg to compensate. She reports pain radiates primarily posteriorly along L5/S1 distribution with occasional radiation into her anterior thighs. She reports improvement in her radicular symptoms with physical therapy. Patient reports pain as 7/10 today and daily which is constant in nature and interferes with daily activity.       Interval history 10/13/2022  Patient presents status post L5-S1 interlaminar epidural steroid injection with right paramedian approach 04/22/2022.  Patient reports 65-70% relief in lower back and leg pain following her last epidural steroid injection.  Patient reports pain has been insidiously worsening over the last month.  Pain today is rated an 8/10.  Patient again reports pain in the lower back which radiates down the posterior aspects of bilateral lower extremities and L5-S1 distribution to the feet.  Patient reports pain has now interfered with her sleep.  Patient is interested in pursuing repeat injection.  Patient reports she is actively been participating in traditional as well as aquatic physical therapy which has been helping with strength and range of motion.      Interval history 03/31/2022  Patient presents for 5 month follow-up.  She reports return of lower back pain which radiates into the buttocks and down the posterior aspects of bilateral lower extremities and L5-S1 distribution.  Patient reports the symptoms are identical to prior lumbar epidural steroid injection.  Patient is interested in pursuing repeat intervention.  Patient also reports myofascial pain along thoracic paraspinous musculature.  Patient reports pain is interrupting with her sleep.  She denies new onset lower  "extremity weakness, bowel or bladder incontinence or saddle anesthesia.    Interval History (10/28/2021):  Amelia Mirza presents today for follow-up visit.  S/p L5/S1 IL KARISSA on 10/4/21 with 90% pain relief.  Pain was basically resolved after, but then she had a fall which Increased knee pain.  She hasnt seen Orthopedics in years.  Patient reports pain as "0/10 today.  Overall, she has greatly improved since procedure.     Initial HPI (7/12/21) - Dr. Adler:  Amelia Mirza is a 61 y.o.   female with past medical history significant for hypoTH, morbid obesity, osteoarthritis (knees), lumbar spondylosis who presents to the clinic for the evaluation of lower back and left leg pain . The pain started  1 year prior ago  without preceding accident or trauma and symptoms have been worsening.The pain is located in a bandlike distribution at the level of the iliac crest with radiation down the left lower extremity along the lateral and posterior aspects in L4-5 distribution.  The pain is described as constant, aching and sharp and is rated as 7/10. The pain is rated with a score of  7/10 on the BEST day and a score of 10/10 on the WORST day.  Symptoms interfere with daily activity, sleeping and work.   Patient does work from home but currently is off her summer break and is disheartened by her inability to participate in outdoor activities or time with her family secondary to her pain. The pain is exacerbated by Sitting, Standing, Walking and Getting out of bed/chair.    Patient is able to ambulate approximately 3 blocks before requiring rest.The pain is mitigated by laying down and rest.     Pt saw MELI Mar for bilateral knee pain 3/2019 with topical compound cream prescribed and diagnostic genicular procedure discussed but not performed.     Pt saw Dr. Padilla ([id number]) for lower back pain with radiculopathy with recommendation for PT, NSAID analgesic prescribed.     Patient denies urinary " incontinence, bowel incontinence, significant motor weakness and loss of sensations.    Pain Disability Index Review:      3/6/2019    12:00 PM   Last 3 PDI Scores   Pain Disability Index (PDI) 31       Non-Pharmacologic Treatments:  Physical Therapy/Home Exercise: yes  Ice/Heat:yes  TENS: no  Acupuncture: no  Massage: no  Chiropractic: no    Other: no      Pain Medications:  - Adjuvant Medications: Advil,Motrin ( Ibuprofen), Mobic (Meloxicam), Zanaflex ( Tizanidine) and NSAIDs, Tylenol, Biofreeze, EMLA cream    Pain Procedures:   Intra-articular steroid and visco-supplementation in bilateral knees - in other dept    -10/4/2021: L5/S1 IL KARISSA with 90% pain relief   -04/22/2022:  L5-S1 interlaminar epidural steroid injection with right paramedian approach with 65-70% relief  -11/18/2022: Bilateral  L5/S1 TF KARISSA with 65-70% relief  -04/05/2023: bilateral SIJ + GT bursa injection with 75-80% pain relief in her low back, 70-80% relief in her left bursa, and 50-60% relief in her right bursa  -07/28/2023: Bilateral L5-S1 transforaminal epidural steroid injection    Review of Systems:   GENERAL:  No weight loss, malaise or fevers.  HEENT:   No recent changes in vision or hearing  NECK:  Negative for lumps, no difficulty with swallowing.  RESPIRATORY:  Negative for cough, wheezing or shortness of breath, patient denies any recent URI.  CARDIOVASCULAR:  Negative for chest pain, leg swelling or palpitations.  GI:  Negative for abdominal discomfort, blood in stools or black stools or change in bowel habits.  MUSCULOSKELETAL:  See HPI.  SKIN:  Negative for lesions, rash, and itching.  PSYCH:  No mood disorder or recent psychosocial stressors.  Patients sleep is not disturbed secondary to pain.  HEMATOLOGY/LYMPHOLOGY:  Negative for prolonged bleeding, bruising easily or swollen nodes.  Patient is not currently taking any anti-coagulants  NEURO:   No history of headaches, syncope, paralysis, seizures or tremors.  All other  reviewed and negative other than HPI.    OBJECTIVE:  Telemedicine Exam  There were no vitals filed for this visit.  There is no height or weight on file to calculate BMI.   (reviewed on 10/11/2023)     GENERAL: Well appearing, in no acute distress, alert and oriented x3.  Cooperative.  PSYCH:  Mood and affect appropriate.  SKIN: Skin color & texture with no obvious abnormalities.    HEAD/FACE:  Normocephalic, atraumatic.    PULM:  No difficulty breathing. No nasal flaring. No obvious wheezing.  EXTREMITIES: No obvious deformities. Moving all extremities well, appears to have symmetric strength throughout.  MUSCULOSKELETAL: No obvious atrophy abnormalities are noted.   NEURO: No obvious neurologic deficit.   GAIT: sitting.     Physical Exam: last in clinic visit:    Physical Exam:    There were no vitals filed for this visit.   There is no height or weight on file to calculate BMI.   (reviewed on 10/11/2023)  Last clinic visit:  GENERAL: Well appearing, in no acute distress, alert and oriented x3.  PSYCH:  Mood and affect appropriate.  SKIN: Skin color, texture, turgor normal, no rashes or lesions.  HEAD/FACE:  Normocephalic, atraumatic. Cranial nerves grossly intact.  NECK: No pain to palpation over the cervical paraspinous muscles. Spurling Negative. No pain with neck flexion, extension, or lateral flexion.   PULM: No evidence of respiratory difficulty, symmetric chest rise.  GI:  Soft and non-tender.  BACK: Straight leg raising in the sitting and supine positions is negative to radicular pain. No pain to palpation over the facet joints of the lumbar spine or spinous processes. Normal range of motion without pain reproduction.  SIJ testing:  - TTP over SI joint: Present bilaterally, right > left  - Rick's/ Jean-Pierre's: Positive bilaterally  - Sacroiliac Distraction Test (anterior pressure): Positive  - Sacroiliac Compression Test (lateral pressure): Positive   - SacralThrust Test (posterior pressure): Positive  -TTP  along bilateral GT bursa, right > left  EXTREMITIES: Peripheral joint ROM is full and pain free without obvious instability or laxity in all four extremities- with exception of knees.  No deformities, edema, or skin discoloration. Good capillary refill.  MUSCULOSKELETAL: Shoulder, hip, and knee provocative maneuvers are negative.  T   Bilateral upper and lower extremity strength is normal and symmetric.  No atrophy or tone abnormalities are noted. Decreased sensation LLE: L4-5 distribution to knee.  Pain with extension of knee. TTP diffusely over right knee patella.  NEURO: BUE & BLE coordination and muscle stretch reflexes are physiologic and symmetric.  Plantar response are downgoing. No clonus.   GAIT: normal.      Imaging (Reviewed on 10/11/2023):     MRI lumbar spine 10/13/2022  FINDINGS:  At T11-T12, circumferential disc bulge results in mild central canal narrowing.     At T12-L1, L1-L2, L2-L3, normal.     At L3-L4, minor disc desiccation without narrowing.  Mild facet joint osteoarthrosis is present bilaterally.     At L4-L5, moderate bilateral facet joint osteoarthrosis allows 4 mm anterolisthesis of L4 on L5 and combines with disc bulge to result in mild central canal narrowing and moderate bilateral neural foramen narrowing.     At L5-S1, moderate loss of disc space height and circumferential disc bulge combined with minor facet joint osteoarthrosis to result in minor central canal narrowing and mild right and severe left neural foramen narrowing.     Lumbosacral alignment is normal.  Vertebral bodies show mild diffuse red marrow recruitment, with otherwise normal bone marrow signal.  Conus terminates normally at L2.  Paraspinal soft tissues are unremarkable.     Impression:     1. Severe left L5-S1 neural foramen narrowing.  Correlation for left L5 radiculopathy is requested.  2. Grade 1 anterolisthesis of L4 on L5 due to facet joint osteoarthrosis resulting in moderate bilateral neural foramen narrowing  and mild central canal narrowing when combined with degenerative disc disease.  X-Ray Lumbar Spine Complete 5 View    Narrative  Comparison: None    Findings:    Body habitus limits the study.  For tumor body heights and alignment appear well-maintained.  There is uniform loss of diskal height at the L4 -- 5 L5 -- S1 levels.  Multilevel facet arthropathy.  Pedicles and SI joints appear intact.  No spondylolysis  or spondylolisthesis.    X-Ray Cervical Spine AP And Lateral    Narrative  Comparison: None    Technique: Four views were obtained of the cervical spine    Findings: There is straightening of the normal cervical lordosis which can be associated with muscle spasm.  Vertebral body heights are well maintained.  No spondylolisthesis demonstrated.  There is mild loss of disk height from C3-4 and C5-6 with associated degenerative endplate changes and osteophyte production.  Posterior elements appear intact without acute fractures or subluxations demonstrated.  Odontoid process appears intact.  Atlantoaxial articulations appear normal.  Prevertebral soft tissues are within normal limits.        ASSESSMENT: 63 y.o. year old female with lower back and left lower extremity pain, consistent with     1. Lumbar radiculopathy  Ambulatory referral/consult to Physical/Occupational Therapy      2. Spinal stenosis of lumbar region without neurogenic claudication  Ambulatory referral/consult to Physical/Occupational Therapy      3. Anterolisthesis of lumbar spine        4. Chronic pain of both hips  X-Ray Hips Bilateral 2 View Inc AP Pelvis    Ambulatory referral/consult to Physical/Occupational Therapy            PLAN:   1. Interventional:  None at this time.  We will 1st review bilateral hip x-ray.    - Anticoagulation use: None.     2. Pharmacologic:    reviewed and consistent with use.    - patient d/c'd Gabapentin due to side effects (didn't feel like herself)    -We have discussed continuing anti spasmodic, tizanidine  4 mg nightly as this helps with myofascial pain.  We have discussed potential deleterious side effects associated with this medication including  dizziness, drowsiness, dry mouth or tingling sensation in the upper or lower extremities.     3. Rehabilitative:  -We discussed initiating physical therapy to help manage the patient/s painful condition. The patient was counseled that muscle strengthening will improve the long term prognosis in regards to pain and may also help increase range of motion and mobility. They were told that one of the goals of physical therapy is that they learn how to do the exercises so that they can do them independently at home daily upon completion. The patient's questions were answered and they were agreeable to this course. A referral for physical therapy was provided to the patient.    4. Diagnostic:  Reviewed MRI lumbar spine with the patient and answered any questions.  Bilateral hip x-ray to better evaluate pain    5. Consult/ Referral:  We will consider referral to Neurosurgery pending hip x-ray review    6. Follow up:  Audio call following hip x-ray      The above plan and management options were discussed at length with patient. Patient is in agreement with the above and verbalized understanding.    - I discussed the goals of interventional chronic pain management with the patient on today's visit. We discussed a multimodal and systematic approach to pain.  This includes diagnostic and therapeutic injections, adjuvant pharmacologic treatment, physical therapy, and at times psychiatry.  I emphasized the importance of regular exercise, core strengthening and stretching, diet and weight loss as a cornerstone of long-term pain management.    - This condition does not require this patient to take time off of work, and the primary goal of our Pain Management services is to improve the patient's functional capacity.  - Patient Questions: Answered all of the patient's questions regarding  diagnoses, therapy, treatment and next steps        Josemanuel Adler MD    Disclaimer:  This note was prepared using voice recognition system and is likely to have sound alike errors that may have been overlooked even after proof reading.  Please call me with any questions.

## 2023-10-18 ENCOUNTER — PATIENT MESSAGE (OUTPATIENT)
Dept: PAIN MEDICINE | Facility: CLINIC | Age: 63
End: 2023-10-18
Payer: COMMERCIAL

## 2023-10-18 DIAGNOSIS — M48.061 SPINAL STENOSIS OF LUMBAR REGION WITHOUT NEUROGENIC CLAUDICATION: ICD-10-CM

## 2023-10-18 DIAGNOSIS — M54.16 LUMBAR RADICULOPATHY: Primary | ICD-10-CM

## 2023-10-18 DIAGNOSIS — M46.1 SACROILIITIS: ICD-10-CM

## 2023-11-06 DIAGNOSIS — G89.29 CHRONIC LOW BACK PAIN WITH SCIATICA, SCIATICA LATERALITY UNSPECIFIED, UNSPECIFIED BACK PAIN LATERALITY: ICD-10-CM

## 2023-11-06 DIAGNOSIS — M47.816 LUMBAR SPONDYLOSIS: ICD-10-CM

## 2023-11-06 DIAGNOSIS — M54.40 CHRONIC LOW BACK PAIN WITH SCIATICA, SCIATICA LATERALITY UNSPECIFIED, UNSPECIFIED BACK PAIN LATERALITY: ICD-10-CM

## 2023-11-06 DIAGNOSIS — M50.30 DDD (DEGENERATIVE DISC DISEASE), CERVICAL: ICD-10-CM

## 2023-11-06 NOTE — TELEPHONE ENCOUNTER
Refill Routing Note   Medication(s) are not appropriate for processing by [facility] for the following reason(s):      Medication outside of protocol    [facility] action(s):  Route Care Due:  None identified            Appointments  past 12m or future 3m with PCP    Date Provider   Last Visit   9/15/2023 Eve Green MD   Next Visit   1/19/2024 Eve Green MD   ED visits in past 90 days: 0        Note composed:3:08 PM 11/06/2023

## 2023-11-06 NOTE — TELEPHONE ENCOUNTER
Care Due:                  Date            Visit Type   Department     Provider  --------------------------------------------------------------------------------                                EP -                              PRIMARY      [facility]  Last Visit: 09-      CARE ([facility])   MEDICINE       Eve Green                              EP -                              PRIMARY      [facility]  Next Visit: 01-      CARE ([facility])   MEDICINE       Eve Green                                                            Last  Test          Frequency    Reason                     Performed    Due Date  --------------------------------------------------------------------------------    CBC.........  12 months..  ibuprofen................  01- 01-    [facility] Embedded Care Due Messages. Reference number: [id number].   11/06/2023 3:01:16 PM CST

## 2023-11-07 RX ORDER — IBUPROFEN 800 MG/1
800 TABLET ORAL 2 TIMES DAILY PRN
Qty: 60 TABLET | Refills: 2 | Status: SHIPPED | OUTPATIENT
Start: 2023-11-07

## 2024-01-05 ENCOUNTER — TELEPHONE (OUTPATIENT)
Dept: FAMILY MEDICINE | Facility: CLINIC | Age: 64
End: 2024-01-05

## 2024-01-05 ENCOUNTER — E-VISIT (OUTPATIENT)
Dept: FAMILY MEDICINE | Facility: CLINIC | Age: 64
End: 2024-01-05
Attending: FAMILY MEDICINE
Payer: COMMERCIAL

## 2024-01-05 DIAGNOSIS — J32.9 SINOBRONCHITIS: Primary | ICD-10-CM

## 2024-01-05 DIAGNOSIS — J40 SINOBRONCHITIS: Primary | ICD-10-CM

## 2024-01-05 DIAGNOSIS — E03.9 HYPOTHYROIDISM, UNSPECIFIED TYPE: ICD-10-CM

## 2024-01-05 PROCEDURE — 99422 OL DIG E/M SVC 11-20 MIN: CPT | Mod: ,,, | Performed by: FAMILY MEDICINE

## 2024-01-05 RX ORDER — AMOXICILLIN 875 MG/1
875 TABLET, FILM COATED ORAL 2 TIMES DAILY
Qty: 20 TABLET | Refills: 0 | Status: SHIPPED | OUTPATIENT
Start: 2024-01-05 | End: 2024-01-15

## 2024-01-05 RX ORDER — BENZONATATE 100 MG/1
100 CAPSULE ORAL 3 TIMES DAILY PRN
Qty: 30 CAPSULE | Refills: 0 | Status: SHIPPED | OUTPATIENT
Start: 2024-01-05 | End: 2024-01-15

## 2024-01-05 NOTE — PROGRESS NOTES
Patient ID: Amelia Mirza is a 63 y.o. female.    Chief Complaint: URI (Entered automatically based on patient selection in Patient Portal.)    The patient initiated a request through [employer] on 1/5/2024 for evaluation and management with a chief complaint of URI (Entered automatically based on patient selection in Patient Portal.)     I evaluated the questionnaire submission on 1/5/2024.    [facility] Peq Evisit Upper Respitatory/Cough Questionnaire    1/5/2024  3:33 AM CST - Filed by Patient   Do you agree to participate in an E-Visit? Yes   If you have any of the following symptoms, please present to your local ER or call 911:  I acknowledge   What is the main issue that you would like for your doctor to address today? Coughing,  congestion,  sinus, & pressure around ears.   Are you able to take your vital signs? No   What symptoms do you currently have?  Cough;  Headache;  Nasal Congestion;  Runny nose;  Pain around the nose and face;  Ear pain   Describe your cough: Productive (containing mucus)   Describe the mucus: Green;  Thick   Have you ever smoked? I have never smoked   Have you had a fever? No   When did your symptoms first appear? 1/2/2024   In the last two weeks, have you been in close contact with someone who has COVID-19 or the Flu? No   In the last two weeks, have you worked or volunteered in a healthcare facility or as a ? Healthcare facilities include a hospital, medical or dental clinic, long-term care facility, or nursing home No   Do you live in a long-term care facility, nursing home, group home, or homeless shelter? No   List what you have done or taken to help your symptoms. Tylenol, nasal spray   How severe are your symptoms? Moderate   Have your symptoms improved since they first appeared? Worse   Have you taken an at home Covid test? Yes   What were the results? Negative   Have you taken a Flu test? No   Have you been fully vaccinated for COVID? (2 Pfizer, 2 Moderna or 1  Basim & Basim vaccine injections) Yes   Have you received a booster? No   Have you recieved a Flu shot? No   Do you have transportation to get tested for COVID if it is indicated and ordered for you at an [facility] location? Yes   Provide any information you feel is important to your history not asked above    Please attach any relevant images or files          Recent Labs Obtained:  No visits with results within 7 Day(s) from this visit.   Latest known visit with results is:   Lab Visit on 09/15/2023   Component Date Value Ref Range Status    TSH 09/15/2023 <0.010 (L)  0.400 - 4.000 uIU/mL Final    Hemoglobin A1C 09/15/2023 5.8 (H)  4.0 - 5.6 % Final    Comment: ADA Screening Guidelines:  5.7-6.4%  Consistent with prediabetes  >or=6.5%  Consistent with diabetes    High levels of fetal hemoglobin interfere with the HbA1C  assay. Heterozygous hemoglobin variants (HbS, HgC, etc)do  not significantly interfere with this assay.   However, presence of multiple variants may affect accuracy.      Estimated Avg Glucose 09/15/2023 120  68 - 131 mg/dL Final    Free T4 09/15/2023 1.67 (H)  0.71 - 1.51 ng/dL Final       Encounter Diagnoses   Name Primary?    Sinobronchitis Yes    Hypothyroidism, unspecified type         Orders Placed This Encounter   Procedures    TSH     Standing Status:   Future     Standing Expiration Date:   1/4/2025      Medications Ordered This Encounter   Medications    amoxicillin (AMOXIL) 875 MG tablet     Sig: Take 1 tablet (875 mg total) by mouth 2 (two) times daily. for 10 days     Dispense:  20 tablet     Refill:  0    benzonatate (TESSALON) 100 MG capsule     Sig: Take 1 capsule (100 mg total) by mouth 3 (three) times daily as needed for Cough.     Dispense:  30 capsule     Refill:  0      Continue current medications but add Amoxicillin and Tessalon Perles for symptoms.  Get TSH lab next week with result discussion at upcoming appointment with on 1/26/2024.  Follow up if symptoms worsen or fail  to improve.       E-Visit Time Trackin minutes    Day 1 Time (in minutes): 11     Total Time (in minutes): 11

## 2024-01-05 NOTE — TELEPHONE ENCOUNTER
----- Message from Danae Osborne sent at 1/5/2024  3:39 PM CST -----  Regarding: pt said she sent a message this morning @ 3a no respose. before weekend  Please call , Pt has mucus and feels it draining . Around nose and ears sore, Please call back [phone]    [facility] CenterThename.[facility] #[id number], [place] - [address]

## 2024-01-05 NOTE — TELEPHONE ENCOUNTER
Patient called in stating she is having the following symptoms: Mucus draining from her nose, congestion in her nose and ears and her ears are sore. All symptoms started Tuesday night. She had FLU the week of 12/18.

## 2024-01-17 ENCOUNTER — LAB VISIT (OUTPATIENT)
Dept: LAB | Facility: HOSPITAL | Age: 64
End: 2024-01-17
Attending: FAMILY MEDICINE
Payer: COMMERCIAL

## 2024-01-17 DIAGNOSIS — E03.9 HYPOTHYROIDISM, UNSPECIFIED TYPE: ICD-10-CM

## 2024-01-17 LAB — TSH SERPL DL<=0.005 MIU/L-ACNC: 2.4 UIU/ML (ref 0.4–4)

## 2024-01-17 PROCEDURE — 36415 COLL VENOUS BLD VENIPUNCTURE: CPT | Mod: PO | Performed by: FAMILY MEDICINE

## 2024-01-17 PROCEDURE — 84443 ASSAY THYROID STIM HORMONE: CPT | Performed by: FAMILY MEDICINE

## 2024-02-20 ENCOUNTER — OFFICE VISIT (OUTPATIENT)
Dept: FAMILY MEDICINE | Facility: CLINIC | Age: 64
End: 2024-02-20
Attending: FAMILY MEDICINE
Payer: COMMERCIAL

## 2024-02-20 ENCOUNTER — LAB VISIT (OUTPATIENT)
Dept: LAB | Facility: HOSPITAL | Age: 64
End: 2024-02-20
Attending: FAMILY MEDICINE
Payer: COMMERCIAL

## 2024-02-20 VITALS
HEIGHT: 62 IN | WEIGHT: 285.94 LBS | SYSTOLIC BLOOD PRESSURE: 110 MMHG | OXYGEN SATURATION: 95 % | TEMPERATURE: 98 F | HEART RATE: 76 BPM | BODY MASS INDEX: 52.62 KG/M2 | DIASTOLIC BLOOD PRESSURE: 70 MMHG

## 2024-02-20 DIAGNOSIS — Z00.00 ANNUAL PHYSICAL EXAM: ICD-10-CM

## 2024-02-20 DIAGNOSIS — E03.9 HYPOTHYROIDISM, UNSPECIFIED TYPE: ICD-10-CM

## 2024-02-20 DIAGNOSIS — R73.03 PREDIABETES: ICD-10-CM

## 2024-02-20 DIAGNOSIS — Z00.00 ANNUAL PHYSICAL EXAM: Primary | ICD-10-CM

## 2024-02-20 DIAGNOSIS — M46.1 SACROILIITIS: ICD-10-CM

## 2024-02-20 DIAGNOSIS — M70.62 GREATER TROCHANTERIC BURSITIS OF BOTH HIPS: ICD-10-CM

## 2024-02-20 DIAGNOSIS — M70.61 GREATER TROCHANTERIC BURSITIS OF BOTH HIPS: ICD-10-CM

## 2024-02-20 DIAGNOSIS — M17.0 PRIMARY OSTEOARTHRITIS OF BOTH KNEES: ICD-10-CM

## 2024-02-20 DIAGNOSIS — E66.01 MORBID OBESITY WITH BMI OF 50.0-59.9, ADULT: ICD-10-CM

## 2024-02-20 DIAGNOSIS — M47.816 LUMBAR SPONDYLOSIS: ICD-10-CM

## 2024-02-20 DIAGNOSIS — Z78.0 POSTMENOPAUSAL: ICD-10-CM

## 2024-02-20 DIAGNOSIS — G47.00 INSOMNIA, UNSPECIFIED TYPE: ICD-10-CM

## 2024-02-20 DIAGNOSIS — E55.9 VITAMIN D DEFICIENCY: ICD-10-CM

## 2024-02-20 DIAGNOSIS — K63.5 BENIGN COLON POLYP: ICD-10-CM

## 2024-02-20 DIAGNOSIS — I10 HYPERTENSION, UNSPECIFIED TYPE: ICD-10-CM

## 2024-02-20 DIAGNOSIS — Z12.31 OTHER SCREENING MAMMOGRAM: ICD-10-CM

## 2024-02-20 LAB
25(OH)D3+25(OH)D2 SERPL-MCNC: 27 NG/ML (ref 30–96)
ALBUMIN SERPL BCP-MCNC: 4 G/DL (ref 3.5–5.2)
ALP SERPL-CCNC: 87 U/L (ref 55–135)
ALT SERPL W/O P-5'-P-CCNC: 17 U/L (ref 10–44)
ANION GAP SERPL CALC-SCNC: 10 MMOL/L (ref 8–16)
AST SERPL-CCNC: 11 U/L (ref 10–40)
BASOPHILS # BLD AUTO: 0.04 K/UL (ref 0–0.2)
BASOPHILS NFR BLD: 0.6 % (ref 0–1.9)
BILIRUB SERPL-MCNC: 0.5 MG/DL (ref 0.1–1)
BUN SERPL-MCNC: 14 MG/DL (ref 8–23)
CALCIUM SERPL-MCNC: 9.5 MG/DL (ref 8.7–10.5)
CHLORIDE SERPL-SCNC: 104 MMOL/L (ref 95–110)
CHOLEST SERPL-MCNC: 198 MG/DL (ref 120–199)
CHOLEST/HDLC SERPL: 2.7 {RATIO} (ref 2–5)
CO2 SERPL-SCNC: 25 MMOL/L (ref 23–29)
CREAT SERPL-MCNC: 0.8 MG/DL (ref 0.5–1.4)
DIFFERENTIAL METHOD BLD: ABNORMAL
EOSINOPHIL # BLD AUTO: 0.1 K/UL (ref 0–0.5)
EOSINOPHIL NFR BLD: 1.8 % (ref 0–8)
ERYTHROCYTE [DISTWIDTH] IN BLOOD BY AUTOMATED COUNT: 16.1 % (ref 11.5–14.5)
EST. GFR  (NO RACE VARIABLE): >60 ML/MIN/1.73 M^2
GLUCOSE SERPL-MCNC: 86 MG/DL (ref 70–110)
HCT VFR BLD AUTO: 38.7 % (ref 37–48.5)
HDLC SERPL-MCNC: 73 MG/DL (ref 40–75)
HDLC SERPL: 36.9 % (ref 20–50)
HGB BLD-MCNC: 12 G/DL (ref 12–16)
IMM GRANULOCYTES # BLD AUTO: 0.02 K/UL (ref 0–0.04)
IMM GRANULOCYTES NFR BLD AUTO: 0.3 % (ref 0–0.5)
LDLC SERPL CALC-MCNC: 104.4 MG/DL (ref 63–159)
LYMPHOCYTES # BLD AUTO: 2.5 K/UL (ref 1–4.8)
LYMPHOCYTES NFR BLD: 36.9 % (ref 18–48)
MCH RBC QN AUTO: 25.9 PG (ref 27–31)
MCHC RBC AUTO-ENTMCNC: 31 G/DL (ref 32–36)
MCV RBC AUTO: 84 FL (ref 82–98)
MONOCYTES # BLD AUTO: 0.4 K/UL (ref 0.3–1)
MONOCYTES NFR BLD: 6.4 % (ref 4–15)
NEUTROPHILS # BLD AUTO: 3.7 K/UL (ref 1.8–7.7)
NEUTROPHILS NFR BLD: 54 % (ref 38–73)
NONHDLC SERPL-MCNC: 125 MG/DL
NRBC BLD-RTO: 0 /100 WBC
PLATELET # BLD AUTO: 277 K/UL (ref 150–450)
PMV BLD AUTO: 10.7 FL (ref 9.2–12.9)
POTASSIUM SERPL-SCNC: 4 MMOL/L (ref 3.5–5.1)
PROT SERPL-MCNC: 7.8 G/DL (ref 6–8.4)
RBC # BLD AUTO: 4.63 M/UL (ref 4–5.4)
SODIUM SERPL-SCNC: 139 MMOL/L (ref 136–145)
TRIGL SERPL-MCNC: 103 MG/DL (ref 30–150)
TSH SERPL DL<=0.005 MIU/L-ACNC: 4.79 UIU/ML (ref 0.4–4)
WBC # BLD AUTO: 6.83 K/UL (ref 3.9–12.7)

## 2024-02-20 PROCEDURE — 82306 VITAMIN D 25 HYDROXY: CPT | Performed by: FAMILY MEDICINE

## 2024-02-20 PROCEDURE — 3078F DIAST BP <80 MM HG: CPT | Mod: CPTII,S$GLB,, | Performed by: FAMILY MEDICINE

## 2024-02-20 PROCEDURE — 84439 ASSAY OF FREE THYROXINE: CPT | Performed by: FAMILY MEDICINE

## 2024-02-20 PROCEDURE — 99999 PR PBB SHADOW E&M-EST. PATIENT-LVL IV: CPT | Mod: PBBFAC,,, | Performed by: FAMILY MEDICINE

## 2024-02-20 PROCEDURE — 81001 URINALYSIS AUTO W/SCOPE: CPT | Performed by: FAMILY MEDICINE

## 2024-02-20 PROCEDURE — 85025 COMPLETE CBC W/AUTO DIFF WBC: CPT | Performed by: FAMILY MEDICINE

## 2024-02-20 PROCEDURE — 3008F BODY MASS INDEX DOCD: CPT | Mod: CPTII,S$GLB,, | Performed by: FAMILY MEDICINE

## 2024-02-20 PROCEDURE — 3074F SYST BP LT 130 MM HG: CPT | Mod: CPTII,S$GLB,, | Performed by: FAMILY MEDICINE

## 2024-02-20 PROCEDURE — 36415 COLL VENOUS BLD VENIPUNCTURE: CPT | Mod: PO | Performed by: FAMILY MEDICINE

## 2024-02-20 PROCEDURE — 80061 LIPID PANEL: CPT | Performed by: FAMILY MEDICINE

## 2024-02-20 PROCEDURE — 1159F MED LIST DOCD IN RCRD: CPT | Mod: CPTII,S$GLB,, | Performed by: FAMILY MEDICINE

## 2024-02-20 PROCEDURE — 1160F RVW MEDS BY RX/DR IN RCRD: CPT | Mod: CPTII,S$GLB,, | Performed by: FAMILY MEDICINE

## 2024-02-20 PROCEDURE — 80053 COMPREHEN METABOLIC PANEL: CPT | Performed by: FAMILY MEDICINE

## 2024-02-20 PROCEDURE — 99396 PREV VISIT EST AGE 40-64: CPT | Mod: S$GLB,,, | Performed by: FAMILY MEDICINE

## 2024-02-20 PROCEDURE — 84443 ASSAY THYROID STIM HORMONE: CPT | Performed by: FAMILY MEDICINE

## 2024-02-20 RX ORDER — TIZANIDINE 4 MG/1
4 TABLET ORAL 2 TIMES DAILY
Qty: 60 TABLET | Refills: 2 | Status: SHIPPED | OUTPATIENT
Start: 2024-02-20

## 2024-02-20 NOTE — PROGRESS NOTES
CHIEF COMPLAINT: Annual wellness examination.     HISTORY OF PRESENT ILLNESS: Ms. Mirza comes in today not fasting and with taking thyroid medication for annual wellness examination.     END OF LIFE DECISION: She has no living will and does not desire life support.    Current Outpatient Medications   Medication Sig    amLODIPine (NORVASC) 5 MG tablet Take 1 tablet (5 mg total) by mouth once daily.    ergocalciferol (ERGOCALCIFEROL) 50,000 unit Cap TAKE 1 CAPSULE TWICE A WEEK    fluticasone propionate (FLONASE) 50 mcg/actuation nasal spray 2 sprays (100 mcg total) by Each Nostril route once daily.    ibuprofen (ADVIL,MOTRIN) 800 MG tablet Take 1 tablet (800 mg total) by mouth 2 (two) times daily as needed for Pain (food).    levothyroxine (SYNTHROID) 200 MCG tablet Take 1 pill by mouth every morning before breakfast    levothyroxine (SYNTHROID) 25 MCG tablet Take 1 pill by mouth daily before breakfast    tiZANidine (ZANAFLEX) 4 MG tablet Take 1 tablet (4 mg total) by mouth 2 (two) times daily.    traZODone (DESYREL) 50 MG tablet TAKE 1 TABLET NIGHTLY AS NEEDED FOR INSOMNIA      SCREENINGS:  Cholesterol: January 13, 2023.  FFS/Colonoscopy: August 26, 2022 - benign colon polyp, internal hemorrhoids, diverticulosis; repeat in 10 years.  Mammogram: September 25, 2023 - okay.   GYN Exam: January 13, 2023 - okay.  Dexa Scan:  September 25, 2023 - okay; repeat in 5 years.   Eye Exam: February 2023 with Dr. Hill.  She wears glasses.   PPD: Negative in the past.  Immunizations: Tdap - May 30, 2012. Td - January 13, 2023.  Gardasil - N./A.  Zostavax - N./A.  Shingrix - August 17, 2020, February 17, 2021.  Pneumovax - Never.  Seasonal Flu - N./A. Discussed. She declines.  RSV - Never. Advised patient available at local pharmacy.  Covid-19 (Moderna) vaccine series - February 27, 2021, March 26, 2021, November 13, 2021.     ROS:  GENERAL: Denies fever, chills or unusual weight change. Appetite normal. Weight 132.4 kg (291 lb  14.2 oz) at September 15, 2023 visit. Exercises 2 times per week with physical therapy for her back (sciatica), 60 minutes each time - water aerobics and land at [facility] for 4 months now. Monitors diet some times. Reports chronic fatigue.  SKIN: Denies rashes, itching, changes in mole, color or texture of skin or easy bruising.  HEAD: Denies headaches or recent head trauma.  EYES: Denies change in vision, pain, diplopia, redness, drainage. Wears glasses.  EARS: Denies ear pain, discharge, vertigo or decreased hearing.  NOSE: Denies loss of smell, epistaxis or rhinitis.  MOUTH & THROAT: Denies hoarseness or change in voice. Denies excessive gum bleeding or mouth sores. Denies sore throat.  NODES: Denies swollen glands.  CHEST: Denies IYER, wheezing, cough, or sputum production.   CARDIOVASCULAR: Denies chest pain, PND, orthopnea or reduced exercise tolerance. Denies palpitations. Reports performs home blood pressure checks 2 times per week with levels ranging 118/72.  ABDOMEN: Denies diarrhea, constipation, nausea, vomiting, abdominal pain, or blood in stool.   URINARY: Denies flank pain, dysuria or hematuria. Reports chronic urine frequency.  GENITOURINARY: Denies flank pain, dysuria, frequency or hematuria. Performs monthly breast self exams.  ENDOCRINE: Denies diabetes, cholesterol problems. Reports takes Vitamin D 50,000 units twice weekly for vitamin D deficiency and continues Synthroid 200 + 35 mcg daily for thyroid replacement.  HEME/LYMPH: Denies bleeding problems.  PERIPHERAL VASCULAR:Denies claudication or cyanosis.  MUSCULOSKELETAL: Denies joint stiffness, pain or swelling except reports chronic arthritic knee and back pain. Reports currently in physical therapy twice a week for back (sciatica) pain  with help. Saw Dr. Josemanuel Adler, pain management specialist, on October 11, 2023 for lumbar radiculopathy, spinal stenosis of lumbar region without neurogenic claudication, anterolisthesis of lumbar  "spine, chronic pain of both hips. Denies edema.    NEUROLOGIC: Denies history of seizures, tremors, paralysis, alteration of gait or coordination.   PSYCHIATRIC: Denies mood swings, depression, anxiety, homicidal or suicidal thoughts. Reports chronic insomnia and takes Trazodone with help.     PE:   VS: Blood Pressure 110/70   Pulse 76   Temperature 98 °F (36.7 °C) (Temporal)   Height 5' 2" (1.575 m)   Weight 129.7 kg (285 lb 15 oz)   Oxygen Saturation 95%   Body Mass Index 52.30 kg/m²   APPEARANCE: Well nourished, well developed female, obese and pleasant, alert and oriented in no acute distress.  HEAD: Nontender. Full range of motion.  EYES: PERRL, conjunctiva pink, lids no edema. She wears glasses.  EARS: External canal patent, no swelling or redness. TM's shiny and clear.  NOSE: Mucosa and turbinates pink, not swollen. No discharge. Nontender sinuses.  THROAT: No pharyngeal erythema or exudate. No stridor.  NECK: Supple, no mass, thyroid not enlarged.  NODES: No cervical, axillary lymph node enlargement.  CHEST: Normal respiratory effort. Lungs clear to auscultation.  CARDIOVASCULAR: Normal S1, S2. No rubs, murmurs or gallops. PMI not displaced. No carotid bruit. Pedal pulses palpable bilaterally. No edema.  ABDOMEN: Bowel sounds present. Not distended. Soft. No tenderness, masses or organomegaly.  BREAST EXAM: Symmetrical, no external lesions, no discharge, no masses palpated.  PELVIC EXAM: Not examined per patient request.  RECTAL EXAM: Not examined per patient request.  MUSCULOSKELETAL: No joint deformities, stiffness or tenderness noted today. She is ambulatory without problems.  SKIN: No rashes or suspicious lesions, normal color and turgor.  NEUROLOGIC: Cranial Nerves: II-XII grossly intact. DTR's: Knees, Ankles 2+ and equal bilaterally. Gait & Posture: Normal gait and fine motion.  PSYCHIATRIC: Patient alert, oriented x 3. Mood/Affect normal without acute anxiety or depression noted. " Judgment/insight good as she makes appropriate decisions on today's examination.       Lab Results   Component Value Date    TSH 2.400 01/17/2024        ASSESSMENT:    ICD-10-CM ICD-9-CM    1. Annual physical exam  Z00.00 V70.0 Lipid Panel      Comprehensive Metabolic Panel      CBC Auto Differential      TSH      Urinalysis      Vitamin D      2. Hypertension, unspecified type  I10 401.9       3. Hypothyroidism, unspecified type  E03.9 244.9       4. Prediabetes  R73.03 790.29       5. Vitamin D deficiency  E55.9 268.9       6. Sacroiliitis  M46.1 720.2 tiZANidine (ZANAFLEX) 4 MG tablet      7. Lumbar spondylosis  M47.816 721.3       8. Greater trochanteric bursitis of both hips  M70.61 726.5 tiZANidine (ZANAFLEX) 4 MG tablet    M70.62        9. Primary osteoarthritis of both knees  M17.0 715.16       10. Benign colon polyp  K63.5 211.3       11. Insomnia, unspecified type  G47.00 780.52       12. Morbid obesity with BMI of 50.0-59.9, adult  E66.01 278.01     Z68.43 V85.43       13. Postmenopausal  Z78.0 V49.81       14. Other screening mammogram  Z12.31 V76.12 Mammo Digital Screening Bilat w/ [facility]          PLAN:  1. Age-appropriate counseling-appropriate low-sodium, low-cholesterol, low carbohydrate diet and exercise daily, monthly breast self exam, annual wellness examination.   2. Patient advised to call for results.  3. Continue current medications.  4. Prescription refill as noted above.  5. Keep follow up with specialists.  6. Follow up in about 6 months (around 8/20/2024) for hypertension and prediabetes follow up.     Answers submitted by the patient for this visit:  Review of Systems Questionnaire (Submitted on 2/15/2024)  activity change: No  unexpected weight change: No  neck pain: No  hearing loss: No  rhinorrhea: No  trouble swallowing: No  eye discharge: No  visual disturbance: No  chest tightness: No  wheezing: No  chest pain: No  palpitations: No  blood in stool: No  constipation: No  vomiting:  No  diarrhea: No  polydipsia: No  polyuria: No  difficulty urinating: No  hematuria: No  menstrual problem: No  dysuria: No  joint swelling: No  arthralgias: Yes  headaches: Yes  weakness: Yes  confusion: No  dysphoric mood: No

## 2024-02-21 LAB
BILIRUB UR QL STRIP: NEGATIVE
CLARITY UR REFRACT.AUTO: ABNORMAL
COLOR UR AUTO: YELLOW
GLUCOSE UR QL STRIP: NEGATIVE
HGB UR QL STRIP: NEGATIVE
KETONES UR QL STRIP: NEGATIVE
LEUKOCYTE ESTERASE UR QL STRIP: ABNORMAL
MICROSCOPIC COMMENT: ABNORMAL
NITRITE UR QL STRIP: NEGATIVE
PH UR STRIP: 6 [PH] (ref 5–8)
PROT UR QL STRIP: NEGATIVE
RBC #/AREA URNS AUTO: 4 /HPF (ref 0–4)
SP GR UR STRIP: 1.02 (ref 1–1.03)
SQUAMOUS #/AREA URNS AUTO: 9 /HPF
T4 FREE SERPL-MCNC: 0.94 NG/DL (ref 0.71–1.51)
URN SPEC COLLECT METH UR: ABNORMAL
WBC #/AREA URNS AUTO: 10 /HPF (ref 0–5)

## 2024-02-29 DIAGNOSIS — N39.0 URINARY TRACT INFECTION WITHOUT HEMATURIA, SITE UNSPECIFIED: Primary | ICD-10-CM

## 2024-02-29 DIAGNOSIS — E03.9 HYPOTHYROIDISM, UNSPECIFIED TYPE: ICD-10-CM

## 2024-02-29 RX ORDER — LEVOTHYROXINE SODIUM 25 UG/1
TABLET ORAL
Qty: 114 TABLET | Refills: 1 | Status: SHIPPED | OUTPATIENT
Start: 2024-02-29

## 2024-02-29 RX ORDER — SULFAMETHOXAZOLE AND TRIMETHOPRIM 800; 160 MG/1; MG/1
1 TABLET ORAL 2 TIMES DAILY
Qty: 14 TABLET | Refills: 0 | Status: SHIPPED | OUTPATIENT
Start: 2024-02-29 | End: 2024-03-07

## 2024-04-01 DIAGNOSIS — E03.9 HYPOTHYROIDISM, UNSPECIFIED TYPE: ICD-10-CM

## 2024-04-02 RX ORDER — LEVOTHYROXINE SODIUM 200 UG/1
TABLET ORAL
Qty: 90 TABLET | Refills: 3 | Status: SHIPPED | OUTPATIENT
Start: 2024-04-02

## 2024-04-02 NOTE — TELEPHONE ENCOUNTER
Refill Routing Note   Medication(s) are not appropriate for processing by [facility] for the following reason(s):        Due for refill >6 months ago    [facility] action(s):  Defer             Appointments  past 12m or future 3m with PCP    Date Provider   Last Visit   2/20/2024 Eve Green MD   Next Visit   Visit date not found Eve Green MD   ED visits in past 90 days: 0        Note composed:10:41 AM 04/02/2024

## 2024-04-02 NOTE — TELEPHONE ENCOUNTER
No care due was identified.  [facility] Embedded Care Due Messages. Reference number: [id number].   4/01/2024 9:17:32 PM CDT

## 2024-06-03 ENCOUNTER — TELEPHONE (OUTPATIENT)
Dept: PAIN MEDICINE | Facility: CLINIC | Age: 64
End: 2024-06-03
Payer: COMMERCIAL

## 2024-06-03 NOTE — H&P (VIEW-ONLY)
Established Patient Interventional Pain Note (Follow-up Visit)    The patient location is: car  The chief complaint leading to consultation is: LBP  Visit type: Virtual visit with synchronous audio and video  Total time spent with patient: 15m  Each patient to whom he or she provides medical services by telemedicine is: (1) informed of the relationship between the physician and patient and the respective role of any other health care provider with respect to management of the patient; and (2) notified that he or she may decline to receive medical services by telemedicine and may withdraw from such care at any time.    Referring Physician: Eve Green MD    PCP: Eve Green MD    Chief Complaint:   LBP     SUBJECTIVE:  Interval history 06/04/2024  Patient presents for bilateral hip x-ray review.  Today patient again reports pain in the lower back which radiates primarily into the hips, she does report intermittent radiation down the right lower extremity in L4 distribution to the knee.  90%, majority of her pain remains in the lower back and hips.  Pain is particularly exacerbated with positional changes moving from sitting to standing and with prolonged standing.  Patient reports she has had numerous presentations since our last clinic visit which greatly exacerbated her pain with prolonged standing.  She states she was helping to meal prep for a friend the day prior and was unable to complete cooking secondary to her severe pain in her hips.  Pain today is rated a 10/10 and is constant.  Patient has been performing physician directed physical therapy exercises daily over the last 8 weeks from 04/04/2024 through 06/04/2024 for lower back and hip pain with no significant improvement in pain, range of motion.    Patient does report last bilateral L5-S1 transforaminal epidural steroid injection did give her significant relief however this was very short-lived.      Interval history 10/11/2023  Patient  presents status post bilateral L5-S1 transforaminal epidural steroid injection 07/28/2023.  Today patient reports only approximately 30% relief in lower back and radicular symptoms following her epidural.  Today she is reporting pain in the lower back which radiates into bilateral hips and intermittently down the lateral aspect of the lower extremities in L4-5 distribution to the knee.  Pain is more severe on the right than on the left.  Patient reports secondary to family obligations she had to temporarily discontinue physical therapy but did report her therapy was helping.  She would like to restart her physical therapy.  She reports pain is exacerbated with prolonged standing and with ambulation.  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  She reports pain is improved while sitting but is not completely resolved.      Interval History (5/8/2023): Amelia Mirza presents today for follow-up visit.  she underwent bilateral SIJ + GT bursa injection on 4/5/23.  The patient reports that she is/was better following the procedure.  she reports 75-80% pain relief in her low back, 70-80% relief in her left bursa, and 50-60% relief in her right bursa. Continues to report intermittent pain throughout the day, but overall much improved. Pain is exacerbated by prolonged activity, improved with rest.  The changes lasted 4 weeks so far.  The changes have continued through this visit.  Patient reports pain as 2/10 today.      Interval History (3/20/2023):  Amelia Mirza presents today for 3 month follow-up visit.  Patient was last seen on 12/16/2022. Last injection Bilateral  L5/S1 TF KARISSA on 11/18/22 with 65-70% relief.  She reports persistent and constant lower lumbosacral pain, right worse than left with radiation into her lateral hips and lateral thighs. She reports only  intermittent radiation into her right lower extremity/foot, but her constant pain is axial in nature. Pain is worsened with prolonged sitting, standing, or walking. Pain is alleviated with ibuprofen, biofreeze, and rest. She has completed formal physical therapy without relief. Pain interferes with daily activities. Patient reports pain as 5/10 today and 7/10 with exacerbating factors.      Interval History (12/16/2022): Amelia Mirza presents today for follow-up visit.  she underwent Bilateral  L5/S1 TF KARISSA on 11/18/22.  The patient reports that she is/was better following the procedure.  she reports 65-70% pain relief.  The changes lasted 4 weeks so far.  The changes have continued through this visit.  She reports this injection was equally effective c/t IL KARISSA in April, but neither were as effective as the initial IL KARISSA she had in October of 2021. She recently restarted physical therapy, which does offer improvement and relief in her symptoms. Patient reports pain as 2/10 today. She reports increased mobility since the injection, she is able to stand and walk longer distances.    Interval History (10/28/2022):  Amelia Mirza presents today via telemed for follow-up visit for MRI review.  Patient was last seen on 10/13/2022. She reports continued lumbosacral pain in a band-like distribution with radiation into her bilateral lower extremities left > right. She reports her symptoms previously were worse in her right LE but now she favors the other leg to compensate. She reports pain radiates primarily posteriorly along L5/S1 distribution with occasional radiation into her anterior thighs. She reports improvement in her radicular symptoms with physical therapy. Patient reports pain as 7/10 today and daily which is constant in nature and interferes with daily activity.       Interval history 10/13/2022  Patient presents status post L5-S1 interlaminar epidural steroid injection with right paramedian approach  "04/22/2022.  Patient reports 65-70% relief in lower back and leg pain following her last epidural steroid injection.  Patient reports pain has been insidiously worsening over the last month.  Pain today is rated an 8/10.  Patient again reports pain in the lower back which radiates down the posterior aspects of bilateral lower extremities and L5-S1 distribution to the feet.  Patient reports pain has now interfered with her sleep.  Patient is interested in pursuing repeat injection.  Patient reports she is actively been participating in traditional as well as aquatic physical therapy which has been helping with strength and range of motion.      Interval history 03/31/2022  Patient presents for 5 month follow-up.  She reports return of lower back pain which radiates into the buttocks and down the posterior aspects of bilateral lower extremities and L5-S1 distribution.  Patient reports the symptoms are identical to prior lumbar epidural steroid injection.  Patient is interested in pursuing repeat intervention.  Patient also reports myofascial pain along thoracic paraspinous musculature.  Patient reports pain is interrupting with her sleep.  She denies new onset lower extremity weakness, bowel or bladder incontinence or saddle anesthesia.    Interval History (10/28/2021):  Amelia Mirza presents today for follow-up visit.  S/p L5/S1 IL KARISSA on 10/4/21 with 90% pain relief.  Pain was basically resolved after, but then she had a fall which Increased knee pain.  She hasnt seen Orthopedics in years.  Patient reports pain as "0/10 today.  Overall, she has greatly improved since procedure.     Initial HPI (7/12/21) - Dr. Adler:  Amelia Mirza is a 61 y.o.   female with past medical history significant for hypoTH, morbid obesity, osteoarthritis (knees), lumbar spondylosis who presents to the clinic for the evaluation of lower back and left leg pain . The pain started  1 year prior ago  without preceding " accident or trauma and symptoms have been worsening.The pain is located in a bandlike distribution at the level of the iliac crest with radiation down the left lower extremity along the lateral and posterior aspects in L4-5 distribution.  The pain is described as constant, aching and sharp and is rated as 7/10. The pain is rated with a score of  7/10 on the BEST day and a score of 10/10 on the WORST day.  Symptoms interfere with daily activity, sleeping and work.   Patient does work from home but currently is off her summer break and is disheartened by her inability to participate in outdoor activities or time with her family secondary to her pain. The pain is exacerbated by Sitting, Standing, Walking and Getting out of bed/chair.    Patient is able to ambulate approximately 3 blocks before requiring rest.The pain is mitigated by laying down and rest.     Pt saw MELI Mar for bilateral knee pain 3/2019 with topical compound cream prescribed and diagnostic genicular procedure discussed but not performed.     Pt saw Dr. Padilla (2017) for lower back pain with radiculopathy with recommendation for PT, NSAID analgesic prescribed.     Patient denies urinary incontinence, bowel incontinence, significant motor weakness and loss of sensations.    Pain Disability Index Review:      3/6/2019    12:00 PM   Last 3 PDI Scores   Pain Disability Index (PDI) 31       Non-Pharmacologic Treatments:  Physical Therapy/Home Exercise: yes  Ice/Heat:yes  TENS: no  Acupuncture: no  Massage: no  Chiropractic: no    Other: no      Pain Medications:  - Adjuvant Medications: Advil,Motrin ( Ibuprofen), Mobic (Meloxicam), Zanaflex ( Tizanidine) and NSAIDs, Tylenol, Biofreeze, EMLA cream    Pain Procedures:   Intra-articular steroid and visco-supplementation in bilateral knees - in other dept    Dr. Adler:  -10/04/2021: L5/S1 IL KARISSA with 90% pain relief   -04/22/2022:  L5-S1 interlaminar epidural steroid injection with right paramedian  approach with 65-70% relief  -11/18/2022: Bilateral  L5/S1 TF KARISSA with 65-70% relief  -04/05/2023: bilateral SIJ + GT bursa injection with 75-80% pain relief in her low back, 70-80% relief in her left bursa, and 50-60% relief in her right bursa  -07/28/2023: Bilateral L5-S1 transforaminal epidural steroid injection      Review of Systems:   GENERAL:  No weight loss, malaise or fevers.  HEENT:   No recent changes in vision or hearing  NECK:  Negative for lumps, no difficulty with swallowing.  RESPIRATORY:  Negative for cough, wheezing or shortness of breath, patient denies any recent URI.  CARDIOVASCULAR:  Negative for chest pain, leg swelling or palpitations.  GI:  Negative for abdominal discomfort, blood in stools or black stools or change in bowel habits.  MUSCULOSKELETAL:  See HPI.  SKIN:  Negative for lesions, rash, and itching.  PSYCH:  No mood disorder or recent psychosocial stressors.  Patients sleep is not disturbed secondary to pain.  HEMATOLOGY/LYMPHOLOGY:  Negative for prolonged bleeding, bruising easily or swollen nodes.  Patient is not currently taking any anti-coagulants  NEURO:   No history of headaches, syncope, paralysis, seizures or tremors.  All other reviewed and negative other than HPI.    OBJECTIVE:  Telemedicine Exam  There were no vitals filed for this visit.  There is no height or weight on file to calculate BMI.   (reviewed on 6/4/2024)     GENERAL: Well appearing, in no acute distress, alert and oriented x3.  Cooperative.  PSYCH:  Mood and affect appropriate.  SKIN: Skin color & texture with no obvious abnormalities.    HEAD/FACE:  Normocephalic, atraumatic.    PULM:  No difficulty breathing. No nasal flaring. No obvious wheezing.  EXTREMITIES: No obvious deformities. Moving all extremities well, appears to have symmetric strength throughout.  MUSCULOSKELETAL: No obvious atrophy abnormalities are noted.   NEURO: No obvious neurologic deficit.   GAIT: sitting.     Physical Exam: last in  clinic visit:    Physical Exam:    There were no vitals filed for this visit.   There is no height or weight on file to calculate BMI.   (reviewed on 6/4/2024)  Last clinic visit:  GENERAL: Well appearing, in no acute distress, alert and oriented x3.  PSYCH:  Mood and affect appropriate.  SKIN: Skin color, texture, turgor normal, no rashes or lesions.  HEAD/FACE:  Normocephalic, atraumatic. Cranial nerves grossly intact.  NECK: No pain to palpation over the cervical paraspinous muscles. Spurling Negative. No pain with neck flexion, extension, or lateral flexion.   PULM: No evidence of respiratory difficulty, symmetric chest rise.  GI:  Soft and non-tender.  BACK: Straight leg raising in the sitting and supine positions is negative to radicular pain. No pain to palpation over the facet joints of the lumbar spine or spinous processes. Normal range of motion without pain reproduction.  SIJ testing:  - TTP over SI joint: Present bilaterally, right > left  - Rick's/ Jean-Pierre's: Positive bilaterally  - Sacroiliac Distraction Test (anterior pressure): Positive  - Sacroiliac Compression Test (lateral pressure): Positive   - SacralThrust Test (posterior pressure): Positive  -TTP along bilateral GT bursa, right > left  EXTREMITIES: Peripheral joint ROM is full and pain free without obvious instability or laxity in all four extremities- with exception of knees.  No deformities, edema, or skin discoloration. Good capillary refill.  MUSCULOSKELETAL: Shoulder, hip, and knee provocative maneuvers are negative.  T   Bilateral upper and lower extremity strength is normal and symmetric.  No atrophy or tone abnormalities are noted. Decreased sensation LLE: L4-5 distribution to knee.  Pain with extension of knee. TTP diffusely over right knee patella.  NEURO: BUE & BLE coordination and muscle stretch reflexes are physiologic and symmetric.  Plantar response are downgoing. No clonus.   GAIT: normal.      Imaging (Reviewed on 6/4/2024):      X-ray bilateral hips 10/11/2023  FINDINGS: No fracture, suspicious bone marrow lesion or other acute disease is seen in the pelvis or either hip. Joint alignment is anatomic. There is no radiographic evidence of femoral head osteonecrosis. Mild degenerative changes superior acetabulum.       MRI lumbar spine 10/13/2022  FINDINGS:  At T11-T12, circumferential disc bulge results in mild central canal narrowing.     At T12-L1, L1-L2, L2-L3, normal.     At L3-L4, minor disc desiccation without narrowing.  Mild facet joint osteoarthrosis is present bilaterally.     At L4-L5, moderate bilateral facet joint osteoarthrosis allows 4 mm anterolisthesis of L4 on L5 and combines with disc bulge to result in mild central canal narrowing and moderate bilateral neural foramen narrowing.     At L5-S1, moderate loss of disc space height and circumferential disc bulge combined with minor facet joint osteoarthrosis to result in minor central canal narrowing and mild right and severe left neural foramen narrowing.     Lumbosacral alignment is normal.  Vertebral bodies show mild diffuse red marrow recruitment, with otherwise normal bone marrow signal.  Conus terminates normally at L2.  Paraspinal soft tissues are unremarkable.     Impression:     1. Severe left L5-S1 neural foramen narrowing.  Correlation for left L5 radiculopathy is requested.  2. Grade 1 anterolisthesis of L4 on L5 due to facet joint osteoarthrosis resulting in moderate bilateral neural foramen narrowing and mild central canal narrowing when combined with degenerative disc disease.  X-Ray Lumbar Spine Complete 5 View    Narrative  Comparison: None    Findings:    Body habitus limits the study.  For tumor body heights and alignment appear well-maintained.  There is uniform loss of diskal height at the L4 -- 5 L5 -- S1 levels.  Multilevel facet arthropathy.  Pedicles and SI joints appear intact.  No spondylolysis  or spondylolisthesis.    X-Ray Cervical Spine AP And  "Lateral    Narrative  Comparison: None    Technique: Four views were obtained of the cervical spine    Findings: There is straightening of the normal cervical lordosis which can be associated with muscle spasm.  Vertebral body heights are well maintained.  No spondylolisthesis demonstrated.  There is mild loss of disk height from C3-4 and C5-6 with associated degenerative endplate changes and osteophyte production.  Posterior elements appear intact without acute fractures or subluxations demonstrated.  Odontoid process appears intact.  Atlantoaxial articulations appear normal.  Prevertebral soft tissues are within normal limits.        ASSESSMENT: 64 y.o. year old female with lower back and left lower extremity pain, consistent with     1. Primary osteoarthritis of both hips  Case Request-RAD/Other Procedure Area: Bilateral Hip Joint injection      2. Lumbar radiculopathy  MRI Lumbar Spine Without Contrast      3. Anterolisthesis of lumbar spine            PLAN:   1. Interventional:  Schedule for bilateral intra-articular hip joint injection to see if this helps with hip pain secondary to osteoarthritis.  We have discussed the procedure, benefits and potential risk and alternative options in detail.  Patient has elected to pursue this procedure.    - Anticoagulation use: None.     2. Pharmacologic:    reviewed and consistent with use.    - DC Gabapentin 2/2 side effects ("didn't feel like herself")    -Continue Tizanidine 4 mg nightly PRN for myofascial pain.  We have discussed potential deleterious side effects associated with this medication including  dizziness, drowsiness, dry mouth or tingling sensation in the upper or lower extremities.     3. Rehabilitative:  -We discussed continuing at home physician directed physical therapy to help manage the patient/s painful condition. The patient was counseled that muscle strengthening will improve the long term prognosis in regards to pain and may also help increase " range of motion and mobility.  Patient has completed conventional land-based physical therapy from 07/21/2021 through 10/28/2021, 14 sessions total.    4. Diagnostic:  Reviewed diagnostic imaging (bilateral hip x-ray, MRI lumbar spine) with the patient and answered any questions.  MRI lumbar spine to better evaluate pain and weakness    5. Consult/ Referral:    -We will consider referral to Neurosurgery .  Updated lumbar MRI ordered    6. Follow up:  4-6 weeks status post intra-articular hip joint injection      The above plan and management options were discussed at length with patient. Patient is in agreement with the above and verbalized understanding.    - I discussed the goals of interventional chronic pain management with the patient on today's visit. We discussed a multimodal and systematic approach to pain.  This includes diagnostic and therapeutic injections, adjuvant pharmacologic treatment, physical therapy, and at times psychiatry.  I emphasized the importance of regular exercise, core strengthening and stretching, diet and weight loss as a cornerstone of long-term pain management.    - This condition does not require this patient to take time off of work, and the primary goal of our Pain Management services is to improve the patient's functional capacity.  - Patient Questions: Answered all of the patient's questions regarding diagnoses, therapy, treatment and next steps        Josemanuel Adler MD    Disclaimer:  This note was prepared using voice recognition system and is likely to have sound alike errors that may have been overlooked even after proof reading.  Please call me with any questions.

## 2024-06-03 NOTE — TELEPHONE ENCOUNTER
Attempt to call patient to confirm appointment and to offer a virtual . Patent did not answer, Left message on patients voice mail to call back at earliest convenience to confirm or reschedule p.t apt.

## 2024-06-04 ENCOUNTER — TELEPHONE (OUTPATIENT)
Dept: PAIN MEDICINE | Facility: CLINIC | Age: 64
End: 2024-06-04

## 2024-06-04 ENCOUNTER — OFFICE VISIT (OUTPATIENT)
Dept: PAIN MEDICINE | Facility: CLINIC | Age: 64
End: 2024-06-04
Payer: COMMERCIAL

## 2024-06-04 DIAGNOSIS — M16.0 PRIMARY OSTEOARTHRITIS OF BOTH HIPS: Primary | ICD-10-CM

## 2024-06-04 DIAGNOSIS — M43.16 ANTEROLISTHESIS OF LUMBAR SPINE: ICD-10-CM

## 2024-06-04 DIAGNOSIS — M54.16 LUMBAR RADICULOPATHY: ICD-10-CM

## 2024-06-04 PROCEDURE — 1160F RVW MEDS BY RX/DR IN RCRD: CPT | Mod: CPTII,95,, | Performed by: ANESTHESIOLOGY

## 2024-06-04 PROCEDURE — 1159F MED LIST DOCD IN RCRD: CPT | Mod: CPTII,95,, | Performed by: ANESTHESIOLOGY

## 2024-06-04 PROCEDURE — 99214 OFFICE O/P EST MOD 30 MIN: CPT | Mod: 95,,, | Performed by: ANESTHESIOLOGY

## 2024-06-12 ENCOUNTER — HOSPITAL ENCOUNTER (OUTPATIENT)
Dept: RADIOLOGY | Facility: HOSPITAL | Age: 64
Discharge: HOME | End: 2024-06-12
Attending: ANESTHESIOLOGY
Payer: COMMERCIAL

## 2024-06-12 DIAGNOSIS — M54.16 LUMBAR RADICULOPATHY: ICD-10-CM

## 2024-06-12 PROCEDURE — 72148 MRI LUMBAR SPINE W/O DYE: CPT | Mod: TC

## 2024-06-12 PROCEDURE — 72148 MRI LUMBAR SPINE W/O DYE: CPT | Mod: 26,,, | Performed by: RADIOLOGY

## 2024-06-12 NOTE — PRE-PROCEDURE INSTRUCTIONS
Spoke with patient regarding procedure scheduled on 6.25     Arrival time 0615     Has patient been sick with fever or on antibiotics within the last 7 days? No     Does the patient have any open wounds, sores or rashes? No     Does the patient have any recent fractures? no     Has patient received a vaccination within the last 7 days? No     Received the COVID vaccination? yes     Has the patient stopped all medications as directed? na      Does patient have a pacemaker, defibrillator, or implantable stimulator? No     Does the patient have a ride to and from procedure and someone reliable to remain with patient?  DAUGHTER     Is the patient diabetic? no     Does the patient have sleep apnea? Or use O2 at home? no     Is the patient receiving sedation? LOCAL 4 HRS      Is the patient instructed to remain NPO beginning at midnight the night before their procedure? yes     Procedure location confirmed with patient? Yes     Covid- Denies signs/symptoms. Instructed to notify PAT/MD if any changes.

## 2024-06-25 ENCOUNTER — HOSPITAL ENCOUNTER (OUTPATIENT)
Facility: HOSPITAL | Age: 64
Discharge: HOME | End: 2024-06-25
Attending: ANESTHESIOLOGY | Admitting: ANESTHESIOLOGY
Payer: COMMERCIAL

## 2024-06-25 VITALS
TEMPERATURE: 97 F | WEIGHT: 293 LBS | SYSTOLIC BLOOD PRESSURE: 157 MMHG | RESPIRATION RATE: 16 BRPM | DIASTOLIC BLOOD PRESSURE: 74 MMHG | HEIGHT: 62 IN | OXYGEN SATURATION: 100 % | BODY MASS INDEX: 53.92 KG/M2 | HEART RATE: 53 BPM

## 2024-06-25 DIAGNOSIS — M16.9 HIP OSTEOARTHRITIS: ICD-10-CM

## 2024-06-25 PROBLEM — M16.0 PRIMARY OSTEOARTHRITIS OF BOTH HIPS: Status: ACTIVE | Noted: 2024-06-25

## 2024-06-25 PROCEDURE — 25000003 PHARM REV CODE 250: Performed by: ANESTHESIOLOGY

## 2024-06-25 PROCEDURE — 25500020 PHARM REV CODE 255: Performed by: ANESTHESIOLOGY

## 2024-06-25 PROCEDURE — 20610 DRAIN/INJ JOINT/BURSA W/O US: CPT | Mod: 50,,, | Performed by: ANESTHESIOLOGY

## 2024-06-25 PROCEDURE — 77002 NEEDLE LOCALIZATION BY XRAY: CPT | Mod: 26,,, | Performed by: ANESTHESIOLOGY

## 2024-06-25 PROCEDURE — 20610 DRAIN/INJ JOINT/BURSA W/O US: CPT | Mod: 50 | Performed by: ANESTHESIOLOGY

## 2024-06-25 PROCEDURE — 63600175 PHARM REV CODE 636 W HCPCS: Performed by: ANESTHESIOLOGY

## 2024-06-25 RX ORDER — INDOMETHACIN 25 MG/1
CAPSULE ORAL
Status: DISCONTINUED | OUTPATIENT
Start: 2024-06-25 | End: 2024-06-25 | Stop reason: HOSPADM

## 2024-06-25 RX ORDER — TRIAMCINOLONE ACETONIDE 40 MG/ML
INJECTION, SUSPENSION INTRA-ARTICULAR; INTRAMUSCULAR
Status: DISCONTINUED | OUTPATIENT
Start: 2024-06-25 | End: 2024-06-25 | Stop reason: HOSPADM

## 2024-06-25 RX ORDER — BUPIVACAINE HYDROCHLORIDE 2.5 MG/ML
INJECTION, SOLUTION EPIDURAL; INFILTRATION; INTRACAUDAL
Status: DISCONTINUED | OUTPATIENT
Start: 2024-06-25 | End: 2024-06-25 | Stop reason: HOSPADM

## 2024-06-25 NOTE — DISCHARGE SUMMARY
Discharge Note  Short Stay      SUMMARY     Admit Date: 6/25/2024    Attending Physician: Josemanuel Adler MD        Discharge Physician: Josemanuel Adler MD        Discharge Date: 6/25/2024 7:13 AM    Procedure(s) (LRB):  Bilateral Hip Joint injection (Bilateral)    Final Diagnosis: Primary osteoarthritis of both hips [M16.0]    Disposition: Home or self care    Patient Instructions:   Current Discharge Medication List        CONTINUE these medications which have NOT CHANGED    Details   amLODIPine (NORVASC) 5 MG tablet Take 1 tablet (5 mg total) by mouth once daily.  Qty: 90 tablet, Refills: 1    Comments: .  Associated Diagnoses: Hypertension, unspecified type      ergocalciferol (ERGOCALCIFEROL) 50,000 unit Cap TAKE 1 CAPSULE TWICE A WEEK  Qty: 25 capsule, Refills: 3    Associated Diagnoses: Vitamin D deficiency      fluticasone propionate (FLONASE) 50 mcg/actuation nasal spray 2 sprays (100 mcg total) by Each Nostril route once daily.  Qty: 16 g, Refills: 1    Associated Diagnoses: Acute non-recurrent sinusitis, unspecified location      ibuprofen (ADVIL,MOTRIN) 800 MG tablet Take 1 tablet (800 mg total) by mouth 2 (two) times daily as needed for Pain (food).  Qty: 60 tablet, Refills: 2    Associated Diagnoses: Chronic low back pain with sciatica, sciatica laterality unspecified, unspecified back pain laterality; Lumbar spondylosis; DDD (degenerative disc disease), cervical      !! levothyroxine (SYNTHROID) 200 MCG tablet TAKE 1 TABLET EVERY MORNING BEFORE BREAKFAST  Qty: 90 tablet, Refills: 3    Associated Diagnoses: Hypothyroidism, unspecified type      !! levothyroxine (SYNTHROID) 25 MCG tablet Take 1 pill by mouth daily before breakfast on Monday through Friday and Take 2 pills by mouth daily before breakfast on Saturday and Sunday  Qty: 114 tablet, Refills: 1    Associated Diagnoses: Hypothyroidism, unspecified type      tiZANidine (ZANAFLEX) 4 MG tablet Take 1 tablet (4 mg total) by mouth 2 (two) times  daily.  Qty: 60 tablet, Refills: 2    Associated Diagnoses: Sacroiliitis; Greater trochanteric bursitis of both hips      traZODone (DESYREL) 50 MG tablet TAKE 1 TABLET NIGHTLY AS NEEDED FOR INSOMNIA  Qty: 90 tablet, Refills: 3    Associated Diagnoses: Insomnia, unspecified type       !! - Potential duplicate medications found. Please discuss with provider.              Discharge Diagnosis: Primary osteoarthritis of both hips [M16.0]  Condition on Discharge: Stable with no complications to procedure   Diet on Discharge: Same as before.  Activity: as per instruction sheet.  Discharge to: Home with a responsible adult.  Follow up: 2-4 weeks       Please call the office at [phone] if you experience any weakness or loss of sensation, fever > 101.5, pain uncontrolled with oral medications, persistent nausea/vomiting/or diarrhea, redness or drainage from the incisions, or any other worrisome concerns. If physician on call was not reached or could not communicate with our office for any reason please go to the nearest emergency department

## 2024-06-25 NOTE — PLAN OF CARE
Patient recovered to baseline. Instructions given. All questions answered. All bandaids remain clean,dry, intact. Discharged to designated  at this time. Dr. Adler notified that pt states he numbness and tingling has slightly increase post procedure. Push pulls equal and strong. Pt able to stand and ambulate. OK per Dr. Adler for discharge.

## 2024-06-25 NOTE — DISCHARGE INSTRUCTIONS

## 2024-06-25 NOTE — OP NOTE
Procedure: Hip (acetabulofemoral) joint injection under fluoroscopic guidance    Side: bilateral    Pre-procedure diagnosis: osteoarthritis of the hip (of the above noted laterality)  Post-procedure diagnosis: same    PROVIDER: Josemanuel Adler MD  Assistant(s): None    Anesthesia:   Local    The patient was monitored with continuous pulse oximetry, EKG, and intermittent blood pressure monitors.  The patient was hemodynamically stable throughout the entire process was responsive to voice, and breathing spontaneously.  Supplemental O2 was provided at 2L/min via nasal cannula.  Patient was comfortable for the duration of the procedure. (See nurse documentation and case log for sedation time)    INDICATION: The patient has hip pain unresponsive to conservative treatments. Fluoroscopy was used to optimize visualization of needle placement and to maximize safety.     Description of Procedure:  Prior to starting this procedure, risks, benefits, complications, and alternatives were discussed with the patient. The patient agreed to proceed with the procedure and signed a consent. The site and side of the procedure was identified and marked. The patient was taken to the procedural suite and positioned on the table in prone orientation. A time out was performed. All in attendance were in agreement with the time out. The area over the above noted joint/s was widely prepped with ChloraPrep and drapped in usual sterile fashion.    The above noted joint/s was identified on fluoroscopy. A 27-gauge 1.5 inch needle was used to localize the site of entry with 3 mL of 1% PF Lidocaine. A 22 gauge 3.5 inch spinal needle was then introduced and advanced towards the neck of the femur. The target site was approximately 0.5 to 1.0 cm distal to the lateral border of the femoral head and 0.5 to 1.0 cm below the superior aspect of the femoral neck. The needle was advanced until osseus interface was met. Following negative aspiration, a solution  containing 8 mL of 0.25% Bupivacaine and 1 mL of Triamcinolone (40 mg/mL) was then injected. There was minimal resistance encountered throughout injection. No paresthesias were noted on injection. The needle stylet was replaced and the needle was removed intact. The patient tolerated the procedure well. A bandage was applied to the site of injection.    Description of Findings: Not applicable    Prosthetic devices, grafts, tissues, or devices implanted: None    Specimen Removed: No    Estimated Blood Loss: minimal    COMPLICATIONS: None    DISPOSITION / PLANS: The patient was transferred to the recovery area in a stable condition for observation. The patient was reexamined prior to discharge. There was no evidence of acute neurologic injury following the procedure.  Patient was discharged from the recovery room after meeting discharge criteria. Home discharge instructions were given to the patient by the staff.    Josemanuel Adler

## 2024-07-31 NOTE — H&P (VIEW-ONLY)
Established Patient Interventional Pain Note (Follow-up Visit)    The patient location is: car  The chief complaint leading to consultation is: LBP  Visit type: Virtual visit with synchronous audio and video  Total time spent with patient: 15m  Each patient to whom he or she provides medical services by telemedicine is: (1) informed of the relationship between the physician and patient and the respective role of any other health care provider with respect to management of the patient; and (2) notified that he or she may decline to receive medical services by telemedicine and may withdraw from such care at any time.    Referring Physician: Eve Green MD    PCP: Eve Green MD    Chief Complaint:   LBP     SUBJECTIVE:  Interval history 08/01/2024  Patient presents status post bilateral intra-articular hip joint injection 06/25/2024.  Patient reports at least 50% noticeable relief in bilateral hip pain following her injection compounded by improvement in functionality.  She reports normally when traveling, she was unable to walk for distances in the airport.  She reports after her procedure she was able to reach her gate  in [place] without difficulty.  Today she again reiterates pain in the lower back which radiates into the hip and down the lateral and posterior aspect of the right lower extremity in L4-S1 distribution to the knee.  Patient reports this morning she did have pain across both sides of the lower back but after a hot shower, pain was only maintained in the right lower back and leg.  She has continued physician directed physical therapy exercises for lower back and leg pain over the last 8 weeks from 06/01/2024 through 08/01/2024 with marginal improvement in her symptoms.      Interval history 06/04/2024  Patient presents for bilateral hip x-ray review.  Today patient again reports pain in the lower back which radiates primarily into the hips, she does report intermittent radiation down the right  lower extremity in L4 distribution to the knee.  90%, majority of her pain remains in the lower back and hips.  Pain is particularly exacerbated with positional changes moving from sitting to standing and with prolonged standing.  Patient reports she has had numerous presentations since our last clinic visit which greatly exacerbated her pain with prolonged standing.  She states she was helping to meal prep for a friend the day prior and was unable to complete cooking secondary to her severe pain in her hips.  Pain today is rated a 10/10 and is constant.  Patient has been performing physician directed physical therapy exercises daily over the last 8 weeks from 04/04/2024 through 06/04/2024 for lower back and hip pain with no significant improvement in pain, range of motion.    Patient does report last bilateral L5-S1 transforaminal epidural steroid injection did give her significant relief however this was very short-lived.      Interval history 10/11/2023  Patient presents status post bilateral L5-S1 transforaminal epidural steroid injection 07/28/2023 with 80 % relief initially.  Today patient reports only approximately 30% relief in lower back and radicular symptoms following her epidural.  Today she is reporting pain in the lower back which radiates into bilateral hips and intermittently down the lateral aspect of the lower extremities in L4-5 distribution to the knee.  Pain is more severe on the right than on the left.  Patient reports secondary to family obligations she had to temporarily discontinue physical therapy but did report her therapy was helping.  She would like to restart her physical therapy.  She reports pain is exacerbated with prolonged standing and with ambulation.  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  She reports pain is improved while sitting but is  not completely resolved.      Interval History (5/8/2023): Amelia Mirza presents today for follow-up visit.  she underwent bilateral SIJ + GT bursa injection on 4/5/23.  The patient reports that she is/was better following the procedure.  she reports 75-80% pain relief in her low back, 70-80% relief in her left bursa, and 50-60% relief in her right bursa. Continues to report intermittent pain throughout the day, but overall much improved. Pain is exacerbated by prolonged activity, improved with rest.  The changes lasted 4 weeks so far.  The changes have continued through this visit.  Patient reports pain as 2/10 today.      Interval History (3/20/2023):  Amelia Mirza presents today for 3 month follow-up visit.  Patient was last seen on 12/16/2022. Last injection Bilateral  L5/S1 TF KARISSA on 11/18/22 with 65-70% relief.  She reports persistent and constant lower lumbosacral pain, right worse than left with radiation into her lateral hips and lateral thighs. She reports only intermittent radiation into her right lower extremity/foot, but her constant pain is axial in nature. Pain is worsened with prolonged sitting, standing, or walking. Pain is alleviated with ibuprofen, biofreeze, and rest. She has completed formal physical therapy without relief. Pain interferes with daily activities. Patient reports pain as 5/10 today and 7/10 with exacerbating factors.      Interval History (12/16/2022): Amelia Mirza presents today for follow-up visit.  she underwent Bilateral  L5/S1 TF KARISSA on 11/18/22.  The patient reports that she is/was better following the procedure.  she reports 65-70% pain relief.  The changes lasted 4 weeks so far.  The changes have continued through this visit.  She reports this injection was equally effective c/t IL KARISSA in April, but neither were as effective as the initial IL KARISSA she had in October of 2021. She recently restarted physical therapy, which does offer improvement and relief  in her symptoms. Patient reports pain as 2/10 today. She reports increased mobility since the injection, she is able to stand and walk longer distances.    Interval History (10/28/2022):  Amelia Mirza presents today via telemed for follow-up visit for MRI review.  Patient was last seen on 10/13/2022. She reports continued lumbosacral pain in a band-like distribution with radiation into her bilateral lower extremities left > right. She reports her symptoms previously were worse in her right LE but now she favors the other leg to compensate. She reports pain radiates primarily posteriorly along L5/S1 distribution with occasional radiation into her anterior thighs. She reports improvement in her radicular symptoms with physical therapy. Patient reports pain as 7/10 today and daily which is constant in nature and interferes with daily activity.       Interval history 10/13/2022  Patient presents status post L5-S1 interlaminar epidural steroid injection with right paramedian approach 04/22/2022.  Patient reports 65-70% relief in lower back and leg pain following her last epidural steroid injection.  Patient reports pain has been insidiously worsening over the last month.  Pain today is rated an 8/10.  Patient again reports pain in the lower back which radiates down the posterior aspects of bilateral lower extremities and L5-S1 distribution to the feet.  Patient reports pain has now interfered with her sleep.  Patient is interested in pursuing repeat injection.  Patient reports she is actively been participating in traditional as well as aquatic physical therapy which has been helping with strength and range of motion.      Interval history 03/31/2022  Patient presents for 5 month follow-up.  She reports return of lower back pain which radiates into the buttocks and down the posterior aspects of bilateral lower extremities and L5-S1 distribution.  Patient reports the symptoms are identical to prior lumbar epidural  "steroid injection.  Patient is interested in pursuing repeat intervention.  Patient also reports myofascial pain along thoracic paraspinous musculature.  Patient reports pain is interrupting with her sleep.  She denies new onset lower extremity weakness, bowel or bladder incontinence or saddle anesthesia.    Interval History (10/28/2021):  Amelia Mirza presents today for follow-up visit.  S/p L5/S1 IL KARISSA on 10/4/21 with 90% pain relief.  Pain was basically resolved after, but then she had a fall which Increased knee pain.  She hasnt seen Orthopedics in years.  Patient reports pain as "0/10 today.  Overall, she has greatly improved since procedure.     Initial HPI (7/12/21) - Dr. Adlre:  Amelia Mirza is a 61 y.o.   female with past medical history significant for hypoTH, morbid obesity, osteoarthritis (knees), lumbar spondylosis who presents to the clinic for the evaluation of lower back and left leg pain . The pain started  1 year prior ago  without preceding accident or trauma and symptoms have been worsening.The pain is located in a bandlike distribution at the level of the iliac crest with radiation down the left lower extremity along the lateral and posterior aspects in L4-5 distribution.  The pain is described as constant, aching and sharp and is rated as 7/10. The pain is rated with a score of  7/10 on the BEST day and a score of 10/10 on the WORST day.  Symptoms interfere with daily activity, sleeping and work.   Patient does work from home but currently is off her summer break and is disheartened by her inability to participate in outdoor activities or time with her family secondary to her pain. The pain is exacerbated by Sitting, Standing, Walking and Getting out of bed/chair.    Patient is able to ambulate approximately 3 blocks before requiring rest.The pain is mitigated by laying down and rest.     Pt saw MELI Mar for bilateral knee pain 3/2019 with topical compound cream " prescribed and diagnostic genicular procedure discussed but not performed.     Pt saw Dr. Padilla (2017) for lower back pain with radiculopathy with recommendation for PT, NSAID analgesic prescribed.     Patient denies urinary incontinence, bowel incontinence, significant motor weakness and loss of sensations.    Pain Disability Index Review:      3/6/2019    12:00 PM   Last 3 PDI Scores   Pain Disability Index (PDI) 31       Non-Pharmacologic Treatments:  Physical Therapy/Home Exercise: yes  Ice/Heat:yes  TENS: no  Acupuncture: no  Massage: no  Chiropractic: no    Other: no      Pain Medications:  - Adjuvant Medications: Advil,Motrin ( Ibuprofen), Mobic (Meloxicam), Zanaflex ( Tizanidine) and NSAIDs, Tylenol, Biofreeze, EMLA cream    Pain Procedures:   Intra-articular steroid and visco-supplementation in bilateral knees - in other dept    Dr. Adler:  -10/04/2021: L5/S1 IL KARISSA with 90% pain relief   -04/22/2022:  L5-S1 interlaminar epidural steroid injection with right paramedian approach with 65-70% relief  -11/18/2022: Bilateral  L5/S1 TF KARISSA with 65-70% relief  -04/05/2023: bilateral SIJ + GT bursa injection with 75-80% pain relief in her low back, 70-80% relief in her left bursa, and 50-60% relief in her right bursa  -07/28/2023: Bilateral L5-S1 transforaminal epidural steroid injection  -06/25/2024: Bilateral intra-articular hip joint injection      Review of Systems:   GENERAL:  No weight loss, malaise or fevers.  HEENT:   No recent changes in vision or hearing  NECK:  Negative for lumps, no difficulty with swallowing.  RESPIRATORY:  Negative for cough, wheezing or shortness of breath, patient denies any recent URI.  CARDIOVASCULAR:  Negative for chest pain, leg swelling or palpitations.  GI:  Negative for abdominal discomfort, blood in stools or black stools or change in bowel habits.  MUSCULOSKELETAL:  See HPI.  SKIN:  Negative for lesions, rash, and itching.  PSYCH:  No mood disorder or recent psychosocial  stressors.  Patients sleep is not disturbed secondary to pain.  HEMATOLOGY/LYMPHOLOGY:  Negative for prolonged bleeding, bruising easily or swollen nodes.  Patient is not currently taking any anti-coagulants  NEURO:   No history of headaches, syncope, paralysis, seizures or tremors.  All other reviewed and negative other than HPI.    OBJECTIVE:  Telemedicine Exam  There were no vitals filed for this visit.  There is no height or weight on file to calculate BMI.   (reviewed on 8/1/2024)     GENERAL: Well appearing, in no acute distress, alert and oriented x3.  Cooperative.  PSYCH:  Mood and affect appropriate.  SKIN: Skin color & texture with no obvious abnormalities.    HEAD/FACE:  Normocephalic, atraumatic.    PULM:  No difficulty breathing. No nasal flaring. No obvious wheezing.  EXTREMITIES: No obvious deformities. Moving all extremities well, appears to have symmetric strength throughout.  MUSCULOSKELETAL: No obvious atrophy abnormalities are noted.   NEURO: No obvious neurologic deficit.   GAIT: sitting.     Physical Exam: last in clinic visit:    Physical Exam:    There were no vitals filed for this visit.   There is no height or weight on file to calculate BMI.   (reviewed on 8/1/2024)  Last clinic visit:  GENERAL: Well appearing, in no acute distress, alert and oriented x3.  PSYCH:  Mood and affect appropriate.  SKIN: Skin color, texture, turgor normal, no rashes or lesions.  HEAD/FACE:  Normocephalic, atraumatic. Cranial nerves grossly intact.  NECK: No pain to palpation over the cervical paraspinous muscles. Spurling Negative. No pain with neck flexion, extension, or lateral flexion.   PULM: No evidence of respiratory difficulty, symmetric chest rise.  GI:  Soft and non-tender.  BACK: Straight leg raising in the sitting and supine positions is negative to radicular pain. No pain to palpation over the facet joints of the lumbar spine or spinous processes. Normal range of motion without pain  reproduction.  SIJ testing:  - TTP over SI joint: Present bilaterally, right > left  - Rick's/ Jean-Pierre's: Positive bilaterally  - Sacroiliac Distraction Test (anterior pressure): Positive  - Sacroiliac Compression Test (lateral pressure): Positive   - SacralThrust Test (posterior pressure): Positive  -TTP along bilateral GT bursa, right > left  EXTREMITIES: Peripheral joint ROM is full and pain free without obvious instability or laxity in all four extremities- with exception of knees.  No deformities, edema, or skin discoloration. Good capillary refill.  MUSCULOSKELETAL: Shoulder, hip, and knee provocative maneuvers are negative.  T   Bilateral upper and lower extremity strength is normal and symmetric.  No atrophy or tone abnormalities are noted. Decreased sensation LLE: L4-5 distribution to knee.  Pain with extension of knee. TTP diffusely over right knee patella.  NEURO: BUE & BLE coordination and muscle stretch reflexes are physiologic and symmetric.  Plantar response are downgoing. No clonus.   GAIT: normal.      Imaging (Reviewed on 8/1/2024):     X-ray bilateral hips 10/11/2023  FINDINGS: No fracture, suspicious bone marrow lesion or other acute disease is seen in the pelvis or either hip. Joint alignment is anatomic. There is no radiographic evidence of femoral head osteonecrosis. Mild degenerative changes superior acetabulum.       MRI lumbar spine 06/12/2024  FINDINGS:  Alignment: Approximately 5 mm of grade 1 anterolisthesis at L4-5.  Slight retrolisthesis at L5-S1.     Vertebrae: Discogenic sub endplate marrow signal alteration (Modic changes) at L4-5 and to a lesser degree at L5-S1.     Discs: Progressive loss of disc height at L4-5 and L5-S1.     Cord: Normal.  Conus terminates at L1.     Degenerative findings:     T11-12: Small posterior disc bulge.  No significant neural foraminal narrowing or spinal canal stenosis.     T12-L1: No significant neural foraminal narrowing or spinal canal stenosis.      L1-L2: No significant neural foraminal narrowing or spinal canal stenosis.     L2-L3: No significant neural foraminal narrowing or spinal canal stenosis.     L3-L4: Minimal annular bulge and bilateral facet arthropathy contributing to mild left greater than right inferior neural foraminal narrowing.  No significant spinal canal stenosis.     L4-L5: Prominent posterior disc bulge with slight right foraminal asymmetry and bilateral hypertrophic facet arthropathy.  Resultant moderate to severe bilateral neural foraminal narrowing.  No significant spinal canal stenosis.     L5-S1: Broad-based posterior disc bulge with small central protrusion and bilateral facet arthropathy.  Resultant severe left and moderate right neural foraminal narrowing.  No significant spinal canal stenosis.     Paraspinal muscles & soft tissues: Small bilateral renal cortical cysts.  Otherwise unremarkable.     Impression:     Multilevel lumbar spondylosis and degenerative disc disease with mild progression since 2022.    Narrative  Comparison: None    Findings:    Body habitus limits the study.  For tumor body heights and alignment appear well-maintained.  There is uniform loss of diskal height at the L4 -- 5 L5 -- S1 levels.  Multilevel facet arthropathy.  Pedicles and SI joints appear intact.  No spondylolysis  or spondylolisthesis.    X-Ray Cervical Spine AP And Lateral    Narrative  Comparison: None    Technique: Four views were obtained of the cervical spine    Findings: There is straightening of the normal cervical lordosis which can be associated with muscle spasm.  Vertebral body heights are well maintained.  No spondylolisthesis demonstrated.  There is mild loss of disk height from C3-4 and C5-6 with associated degenerative endplate changes and osteophyte production.  Posterior elements appear intact without acute fractures or subluxations demonstrated.  Odontoid process appears intact.  Atlantoaxial articulations appear normal.   "Prevertebral soft tissues are within normal limits.        ASSESSMENT: 64 y.o. year old female with lower back and left lower extremity pain, consistent with     1. Primary osteoarthritis of both hips        2. Lumbar radiculopathy  Case Request-RAD/Other Procedure Area: R sided L4/5 and L5/S1 transforaminal epidural steroid injection    Ambulatory referral/consult to Neurosurgery    X-Ray Lumbar Complete Including Flex And Ext      3. Lumbar degenerative disc disease  Ambulatory referral/consult to Neurosurgery    X-Ray Lumbar Complete Including Flex And Ext      4. Anterolisthesis of lumbar spine  Ambulatory referral/consult to Neurosurgery    X-Ray Lumbar Complete Including Flex And Ext            PLAN:   1. Interventional:  Schedule for right-sided L4-5 and L5-S1 transforaminal epidural steroid injection for lumbar radiculopathy.  Of note patient received 80% relief with last epidural steroid injection.  We have discussed the procedure, benefits and potential risk and alternative options in detail.  Patient has elected to pursue this procedure.    - Anticoagulation use: None.     -patient has 50% sustained relief in bilateral hip pain following intra-articular hip joint injection.  We have discussed repeating this injection in the future should hip pain exacerbate.    2. Pharmacologic:    reviewed and consistent with use.    - DC Gabapentin 2/2 side effects ("didn't feel like herself")    -Continue Tizanidine 4 mg nightly PRN for myofascial pain.  We have discussed potential deleterious side effects associated with this medication including  dizziness, drowsiness, dry mouth or tingling sensation in the upper or lower extremities.     3. Rehabilitative:  -We discussed continuing at home physician directed physical therapy to help manage the patient/s painful condition. The patient was counseled that muscle strengthening will improve the long term prognosis in regards to pain and may also help increase range of " motion and mobility.  Patient has completed conventional land-based physical therapy from 07/21/2021 through 10/28/2021, 14 sessions total.    4. Diagnostic:  Reviewed diagnostic imaging (bilateral hip x-ray, MRI lumbar spine) with the patient and answered any questions.  Updated x-ray lumbar spine-flexion/extension    5. Consult/ Referral:    -Neurosurgery, Dr. Magaña:  Lumbar degenerative disc disease, anterolisthesis and radiculopathy    6. Follow up:  4-6 weeks status post epidural steroid injection.  Virtual visit      The above plan and management options were discussed at length with patient. Patient is in agreement with the above and verbalized understanding.    - I discussed the goals of interventional chronic pain management with the patient on today's visit. We discussed a multimodal and systematic approach to pain.  This includes diagnostic and therapeutic injections, adjuvant pharmacologic treatment, physical therapy, and at times psychiatry.  I emphasized the importance of regular exercise, core strengthening and stretching, diet and weight loss as a cornerstone of long-term pain management.    - This condition does not require this patient to take time off of work, and the primary goal of our Pain Management services is to improve the patient's functional capacity.  - Patient Questions: Answered all of the patient's questions regarding diagnoses, therapy, treatment and next steps    Visit today included increased complexity associated with the care of the episodic problem of chronic pain which was addressed and continue to manage the longitudinal care of the patient due to the serious and/or complex managed problem(s) listed above.      Josemanuel Adler MD    Disclaimer:  This note was prepared using voice recognition system and is likely to have sound alike errors that may have been overlooked even after proof reading.  Please call me with any questions.

## 2024-07-31 NOTE — PROGRESS NOTES
Established Patient Interventional Pain Note (Follow-up Visit)    The patient location is: car  The chief complaint leading to consultation is: LBP  Visit type: Virtual visit with synchronous audio and video  Total time spent with patient: 15m  Each patient to whom he or she provides medical services by telemedicine is: (1) informed of the relationship between the physician and patient and the respective role of any other health care provider with respect to management of the patient; and (2) notified that he or she may decline to receive medical services by telemedicine and may withdraw from such care at any time.    Referring Physician: Eve Green MD    PCP: Eve Green MD    Chief Complaint:   LBP     SUBJECTIVE:  Interval history 08/01/2024  Patient presents status post bilateral intra-articular hip joint injection 06/25/2024.  Patient reports at least 50% noticeable relief in bilateral hip pain following her injection compounded by improvement in functionality.  She reports normally when traveling, she was unable to walk for distances in the airport.  She reports after her procedure she was able to reach her gate  in [place] without difficulty.  Today she again reiterates pain in the lower back which radiates into the hip and down the lateral and posterior aspect of the right lower extremity in L4-S1 distribution to the knee.  Patient reports this morning she did have pain across both sides of the lower back but after a hot shower, pain was only maintained in the right lower back and leg.  She has continued physician directed physical therapy exercises for lower back and leg pain over the last 8 weeks from 06/01/2024 through 08/01/2024 with marginal improvement in her symptoms.      Interval history 06/04/2024  Patient presents for bilateral hip x-ray review.  Today patient again reports pain in the lower back which radiates primarily into the hips, she does report intermittent radiation down the right  lower extremity in L4 distribution to the knee.  90%, majority of her pain remains in the lower back and hips.  Pain is particularly exacerbated with positional changes moving from sitting to standing and with prolonged standing.  Patient reports she has had numerous presentations since our last clinic visit which greatly exacerbated her pain with prolonged standing.  She states she was helping to meal prep for a friend the day prior and was unable to complete cooking secondary to her severe pain in her hips.  Pain today is rated a 10/10 and is constant.  Patient has been performing physician directed physical therapy exercises daily over the last 8 weeks from 04/04/2024 through 06/04/2024 for lower back and hip pain with no significant improvement in pain, range of motion.    Patient does report last bilateral L5-S1 transforaminal epidural steroid injection did give her significant relief however this was very short-lived.      Interval history 10/11/2023  Patient presents status post bilateral L5-S1 transforaminal epidural steroid injection 07/28/2023 with 80 % relief initially.  Today patient reports only approximately 30% relief in lower back and radicular symptoms following her epidural.  Today she is reporting pain in the lower back which radiates into bilateral hips and intermittently down the lateral aspect of the lower extremities in L4-5 distribution to the knee.  Pain is more severe on the right than on the left.  Patient reports secondary to family obligations she had to temporarily discontinue physical therapy but did report her therapy was helping.  She would like to restart her physical therapy.  She reports pain is exacerbated with prolonged standing and with ambulation.  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  She reports pain is improved while sitting but is  not completely resolved.      Interval History (5/8/2023): Amelia Mirza presents today for follow-up visit.  she underwent bilateral SIJ + GT bursa injection on 4/5/23.  The patient reports that she is/was better following the procedure.  she reports 75-80% pain relief in her low back, 70-80% relief in her left bursa, and 50-60% relief in her right bursa. Continues to report intermittent pain throughout the day, but overall much improved. Pain is exacerbated by prolonged activity, improved with rest.  The changes lasted 4 weeks so far.  The changes have continued through this visit.  Patient reports pain as 2/10 today.      Interval History (3/20/2023):  Amelia Mirza presents today for 3 month follow-up visit.  Patient was last seen on 12/16/2022. Last injection Bilateral  L5/S1 TF KARISSA on 11/18/22 with 65-70% relief.  She reports persistent and constant lower lumbosacral pain, right worse than left with radiation into her lateral hips and lateral thighs. She reports only intermittent radiation into her right lower extremity/foot, but her constant pain is axial in nature. Pain is worsened with prolonged sitting, standing, or walking. Pain is alleviated with ibuprofen, biofreeze, and rest. She has completed formal physical therapy without relief. Pain interferes with daily activities. Patient reports pain as 5/10 today and 7/10 with exacerbating factors.      Interval History (12/16/2022): Amelia Mirza presents today for follow-up visit.  she underwent Bilateral  L5/S1 TF KARISSA on 11/18/22.  The patient reports that she is/was better following the procedure.  she reports 65-70% pain relief.  The changes lasted 4 weeks so far.  The changes have continued through this visit.  She reports this injection was equally effective c/t IL KARISSA in April, but neither were as effective as the initial IL KARISSA she had in October of 2021. She recently restarted physical therapy, which does offer improvement and relief  in her symptoms. Patient reports pain as 2/10 today. She reports increased mobility since the injection, she is able to stand and walk longer distances.    Interval History (10/28/2022):  Amelia Mirza presents today via telemed for follow-up visit for MRI review.  Patient was last seen on 10/13/2022. She reports continued lumbosacral pain in a band-like distribution with radiation into her bilateral lower extremities left > right. She reports her symptoms previously were worse in her right LE but now she favors the other leg to compensate. She reports pain radiates primarily posteriorly along L5/S1 distribution with occasional radiation into her anterior thighs. She reports improvement in her radicular symptoms with physical therapy. Patient reports pain as 7/10 today and daily which is constant in nature and interferes with daily activity.       Interval history 10/13/2022  Patient presents status post L5-S1 interlaminar epidural steroid injection with right paramedian approach 04/22/2022.  Patient reports 65-70% relief in lower back and leg pain following her last epidural steroid injection.  Patient reports pain has been insidiously worsening over the last month.  Pain today is rated an 8/10.  Patient again reports pain in the lower back which radiates down the posterior aspects of bilateral lower extremities and L5-S1 distribution to the feet.  Patient reports pain has now interfered with her sleep.  Patient is interested in pursuing repeat injection.  Patient reports she is actively been participating in traditional as well as aquatic physical therapy which has been helping with strength and range of motion.      Interval history 03/31/2022  Patient presents for 5 month follow-up.  She reports return of lower back pain which radiates into the buttocks and down the posterior aspects of bilateral lower extremities and L5-S1 distribution.  Patient reports the symptoms are identical to prior lumbar epidural  "steroid injection.  Patient is interested in pursuing repeat intervention.  Patient also reports myofascial pain along thoracic paraspinous musculature.  Patient reports pain is interrupting with her sleep.  She denies new onset lower extremity weakness, bowel or bladder incontinence or saddle anesthesia.    Interval History (10/28/2021):  Amelia Mirza presents today for follow-up visit.  S/p L5/S1 IL KARISSA on 10/4/21 with 90% pain relief.  Pain was basically resolved after, but then she had a fall which Increased knee pain.  She hasnt seen Orthopedics in years.  Patient reports pain as "0/10 today.  Overall, she has greatly improved since procedure.     Initial HPI (7/12/21) - Dr. Adler:  Amelia Mirza is a 61 y.o.   female with past medical history significant for hypoTH, morbid obesity, osteoarthritis (knees), lumbar spondylosis who presents to the clinic for the evaluation of lower back and left leg pain . The pain started  1 year prior ago  without preceding accident or trauma and symptoms have been worsening.The pain is located in a bandlike distribution at the level of the iliac crest with radiation down the left lower extremity along the lateral and posterior aspects in L4-5 distribution.  The pain is described as constant, aching and sharp and is rated as 7/10. The pain is rated with a score of  7/10 on the BEST day and a score of 10/10 on the WORST day.  Symptoms interfere with daily activity, sleeping and work.   Patient does work from home but currently is off her summer break and is disheartened by her inability to participate in outdoor activities or time with her family secondary to her pain. The pain is exacerbated by Sitting, Standing, Walking and Getting out of bed/chair.    Patient is able to ambulate approximately 3 blocks before requiring rest.The pain is mitigated by laying down and rest.     Pt saw MELI Mar for bilateral knee pain 3/2019 with topical compound cream " prescribed and diagnostic genicular procedure discussed but not performed.     Pt saw Dr. Padilla (2017) for lower back pain with radiculopathy with recommendation for PT, NSAID analgesic prescribed.     Patient denies urinary incontinence, bowel incontinence, significant motor weakness and loss of sensations.    Pain Disability Index Review:      3/6/2019    12:00 PM   Last 3 PDI Scores   Pain Disability Index (PDI) 31       Non-Pharmacologic Treatments:  Physical Therapy/Home Exercise: yes  Ice/Heat:yes  TENS: no  Acupuncture: no  Massage: no  Chiropractic: no    Other: no      Pain Medications:  - Adjuvant Medications: Advil,Motrin ( Ibuprofen), Mobic (Meloxicam), Zanaflex ( Tizanidine) and NSAIDs, Tylenol, Biofreeze, EMLA cream    Pain Procedures:   Intra-articular steroid and visco-supplementation in bilateral knees - in other dept    Dr. Adler:  -10/04/2021: L5/S1 IL KARISSA with 90% pain relief   -04/22/2022:  L5-S1 interlaminar epidural steroid injection with right paramedian approach with 65-70% relief  -11/18/2022: Bilateral  L5/S1 TF KARISSA with 65-70% relief  -04/05/2023: bilateral SIJ + GT bursa injection with 75-80% pain relief in her low back, 70-80% relief in her left bursa, and 50-60% relief in her right bursa  -07/28/2023: Bilateral L5-S1 transforaminal epidural steroid injection  -06/25/2024: Bilateral intra-articular hip joint injection      Review of Systems:   GENERAL:  No weight loss, malaise or fevers.  HEENT:   No recent changes in vision or hearing  NECK:  Negative for lumps, no difficulty with swallowing.  RESPIRATORY:  Negative for cough, wheezing or shortness of breath, patient denies any recent URI.  CARDIOVASCULAR:  Negative for chest pain, leg swelling or palpitations.  GI:  Negative for abdominal discomfort, blood in stools or black stools or change in bowel habits.  MUSCULOSKELETAL:  See HPI.  SKIN:  Negative for lesions, rash, and itching.  PSYCH:  No mood disorder or recent psychosocial  stressors.  Patients sleep is not disturbed secondary to pain.  HEMATOLOGY/LYMPHOLOGY:  Negative for prolonged bleeding, bruising easily or swollen nodes.  Patient is not currently taking any anti-coagulants  NEURO:   No history of headaches, syncope, paralysis, seizures or tremors.  All other reviewed and negative other than HPI.    OBJECTIVE:  Telemedicine Exam  There were no vitals filed for this visit.  There is no height or weight on file to calculate BMI.   (reviewed on 8/1/2024)     GENERAL: Well appearing, in no acute distress, alert and oriented x3.  Cooperative.  PSYCH:  Mood and affect appropriate.  SKIN: Skin color & texture with no obvious abnormalities.    HEAD/FACE:  Normocephalic, atraumatic.    PULM:  No difficulty breathing. No nasal flaring. No obvious wheezing.  EXTREMITIES: No obvious deformities. Moving all extremities well, appears to have symmetric strength throughout.  MUSCULOSKELETAL: No obvious atrophy abnormalities are noted.   NEURO: No obvious neurologic deficit.   GAIT: sitting.     Physical Exam: last in clinic visit:    Physical Exam:    There were no vitals filed for this visit.   There is no height or weight on file to calculate BMI.   (reviewed on 8/1/2024)  Last clinic visit:  GENERAL: Well appearing, in no acute distress, alert and oriented x3.  PSYCH:  Mood and affect appropriate.  SKIN: Skin color, texture, turgor normal, no rashes or lesions.  HEAD/FACE:  Normocephalic, atraumatic. Cranial nerves grossly intact.  NECK: No pain to palpation over the cervical paraspinous muscles. Spurling Negative. No pain with neck flexion, extension, or lateral flexion.   PULM: No evidence of respiratory difficulty, symmetric chest rise.  GI:  Soft and non-tender.  BACK: Straight leg raising in the sitting and supine positions is negative to radicular pain. No pain to palpation over the facet joints of the lumbar spine or spinous processes. Normal range of motion without pain  reproduction.  SIJ testing:  - TTP over SI joint: Present bilaterally, right > left  - Rick's/ Jean-Pierre's: Positive bilaterally  - Sacroiliac Distraction Test (anterior pressure): Positive  - Sacroiliac Compression Test (lateral pressure): Positive   - SacralThrust Test (posterior pressure): Positive  -TTP along bilateral GT bursa, right > left  EXTREMITIES: Peripheral joint ROM is full and pain free without obvious instability or laxity in all four extremities- with exception of knees.  No deformities, edema, or skin discoloration. Good capillary refill.  MUSCULOSKELETAL: Shoulder, hip, and knee provocative maneuvers are negative.  T   Bilateral upper and lower extremity strength is normal and symmetric.  No atrophy or tone abnormalities are noted. Decreased sensation LLE: L4-5 distribution to knee.  Pain with extension of knee. TTP diffusely over right knee patella.  NEURO: BUE & BLE coordination and muscle stretch reflexes are physiologic and symmetric.  Plantar response are downgoing. No clonus.   GAIT: normal.      Imaging (Reviewed on 8/1/2024):     X-ray bilateral hips 10/11/2023  FINDINGS: No fracture, suspicious bone marrow lesion or other acute disease is seen in the pelvis or either hip. Joint alignment is anatomic. There is no radiographic evidence of femoral head osteonecrosis. Mild degenerative changes superior acetabulum.       MRI lumbar spine 06/12/2024  FINDINGS:  Alignment: Approximately 5 mm of grade 1 anterolisthesis at L4-5.  Slight retrolisthesis at L5-S1.     Vertebrae: Discogenic sub endplate marrow signal alteration (Modic changes) at L4-5 and to a lesser degree at L5-S1.     Discs: Progressive loss of disc height at L4-5 and L5-S1.     Cord: Normal.  Conus terminates at L1.     Degenerative findings:     T11-12: Small posterior disc bulge.  No significant neural foraminal narrowing or spinal canal stenosis.     T12-L1: No significant neural foraminal narrowing or spinal canal stenosis.      L1-L2: No significant neural foraminal narrowing or spinal canal stenosis.     L2-L3: No significant neural foraminal narrowing or spinal canal stenosis.     L3-L4: Minimal annular bulge and bilateral facet arthropathy contributing to mild left greater than right inferior neural foraminal narrowing.  No significant spinal canal stenosis.     L4-L5: Prominent posterior disc bulge with slight right foraminal asymmetry and bilateral hypertrophic facet arthropathy.  Resultant moderate to severe bilateral neural foraminal narrowing.  No significant spinal canal stenosis.     L5-S1: Broad-based posterior disc bulge with small central protrusion and bilateral facet arthropathy.  Resultant severe left and moderate right neural foraminal narrowing.  No significant spinal canal stenosis.     Paraspinal muscles & soft tissues: Small bilateral renal cortical cysts.  Otherwise unremarkable.     Impression:     Multilevel lumbar spondylosis and degenerative disc disease with mild progression since 2022.    Narrative  Comparison: None    Findings:    Body habitus limits the study.  For tumor body heights and alignment appear well-maintained.  There is uniform loss of diskal height at the L4 -- 5 L5 -- S1 levels.  Multilevel facet arthropathy.  Pedicles and SI joints appear intact.  No spondylolysis  or spondylolisthesis.    X-Ray Cervical Spine AP And Lateral    Narrative  Comparison: None    Technique: Four views were obtained of the cervical spine    Findings: There is straightening of the normal cervical lordosis which can be associated with muscle spasm.  Vertebral body heights are well maintained.  No spondylolisthesis demonstrated.  There is mild loss of disk height from C3-4 and C5-6 with associated degenerative endplate changes and osteophyte production.  Posterior elements appear intact without acute fractures or subluxations demonstrated.  Odontoid process appears intact.  Atlantoaxial articulations appear normal.   "Prevertebral soft tissues are within normal limits.        ASSESSMENT: 64 y.o. year old female with lower back and left lower extremity pain, consistent with     1. Primary osteoarthritis of both hips        2. Lumbar radiculopathy  Case Request-RAD/Other Procedure Area: R sided L4/5 and L5/S1 transforaminal epidural steroid injection    Ambulatory referral/consult to Neurosurgery    X-Ray Lumbar Complete Including Flex And Ext      3. Lumbar degenerative disc disease  Ambulatory referral/consult to Neurosurgery    X-Ray Lumbar Complete Including Flex And Ext      4. Anterolisthesis of lumbar spine  Ambulatory referral/consult to Neurosurgery    X-Ray Lumbar Complete Including Flex And Ext            PLAN:   1. Interventional:  Schedule for right-sided L4-5 and L5-S1 transforaminal epidural steroid injection for lumbar radiculopathy.  Of note patient received 80% relief with last epidural steroid injection.  We have discussed the procedure, benefits and potential risk and alternative options in detail.  Patient has elected to pursue this procedure.    - Anticoagulation use: None.     -patient has 50% sustained relief in bilateral hip pain following intra-articular hip joint injection.  We have discussed repeating this injection in the future should hip pain exacerbate.    2. Pharmacologic:    reviewed and consistent with use.    - DC Gabapentin 2/2 side effects ("didn't feel like herself")    -Continue Tizanidine 4 mg nightly PRN for myofascial pain.  We have discussed potential deleterious side effects associated with this medication including  dizziness, drowsiness, dry mouth or tingling sensation in the upper or lower extremities.     3. Rehabilitative:  -We discussed continuing at home physician directed physical therapy to help manage the patient/s painful condition. The patient was counseled that muscle strengthening will improve the long term prognosis in regards to pain and may also help increase range of " motion and mobility.  Patient has completed conventional land-based physical therapy from 07/21/2021 through 10/28/2021, 14 sessions total.    4. Diagnostic:  Reviewed diagnostic imaging (bilateral hip x-ray, MRI lumbar spine) with the patient and answered any questions.  Updated x-ray lumbar spine-flexion/extension    5. Consult/ Referral:    -Neurosurgery, Dr. Magaña:  Lumbar degenerative disc disease, anterolisthesis and radiculopathy    6. Follow up:  4-6 weeks status post epidural steroid injection.  Virtual visit      The above plan and management options were discussed at length with patient. Patient is in agreement with the above and verbalized understanding.    - I discussed the goals of interventional chronic pain management with the patient on today's visit. We discussed a multimodal and systematic approach to pain.  This includes diagnostic and therapeutic injections, adjuvant pharmacologic treatment, physical therapy, and at times psychiatry.  I emphasized the importance of regular exercise, core strengthening and stretching, diet and weight loss as a cornerstone of long-term pain management.    - This condition does not require this patient to take time off of work, and the primary goal of our Pain Management services is to improve the patient's functional capacity.  - Patient Questions: Answered all of the patient's questions regarding diagnoses, therapy, treatment and next steps    Visit today included increased complexity associated with the care of the episodic problem of chronic pain which was addressed and continue to manage the longitudinal care of the patient due to the serious and/or complex managed problem(s) listed above.      Josemanuel Adler MD    Disclaimer:  This note was prepared using voice recognition system and is likely to have sound alike errors that may have been overlooked even after proof reading.  Please call me with any questions.

## 2024-08-01 ENCOUNTER — PATIENT MESSAGE (OUTPATIENT)
Dept: PAIN MEDICINE | Facility: CLINIC | Age: 64
End: 2024-08-01

## 2024-08-01 ENCOUNTER — OFFICE VISIT (OUTPATIENT)
Dept: PAIN MEDICINE | Facility: CLINIC | Age: 64
End: 2024-08-01
Payer: COMMERCIAL

## 2024-08-01 ENCOUNTER — TELEPHONE (OUTPATIENT)
Dept: PAIN MEDICINE | Facility: CLINIC | Age: 64
End: 2024-08-01
Payer: COMMERCIAL

## 2024-08-01 DIAGNOSIS — M51.36 LUMBAR DEGENERATIVE DISC DISEASE: ICD-10-CM

## 2024-08-01 DIAGNOSIS — M16.0 PRIMARY OSTEOARTHRITIS OF BOTH HIPS: Primary | ICD-10-CM

## 2024-08-01 DIAGNOSIS — M54.16 LUMBAR RADICULOPATHY: ICD-10-CM

## 2024-08-01 DIAGNOSIS — M43.16 ANTEROLISTHESIS OF LUMBAR SPINE: ICD-10-CM

## 2024-08-01 PROCEDURE — 1160F RVW MEDS BY RX/DR IN RCRD: CPT | Mod: CPTII,95,, | Performed by: ANESTHESIOLOGY

## 2024-08-01 PROCEDURE — 99214 OFFICE O/P EST MOD 30 MIN: CPT | Mod: 95,,, | Performed by: ANESTHESIOLOGY

## 2024-08-01 PROCEDURE — G2211 COMPLEX E/M VISIT ADD ON: HCPCS | Mod: 95,,, | Performed by: ANESTHESIOLOGY

## 2024-08-01 PROCEDURE — 1159F MED LIST DOCD IN RCRD: CPT | Mod: CPTII,95,, | Performed by: ANESTHESIOLOGY

## 2024-08-01 NOTE — TELEPHONE ENCOUNTER
Contacted patient regarding referral with Neurosurgery. Patient states no surgeries within last 2 years, nor was pain/injury caused by any accident that would be involved in any type of future litigation.    Sonal Guan RN

## 2024-08-05 ENCOUNTER — HOSPITAL ENCOUNTER (OUTPATIENT)
Dept: RADIOLOGY | Facility: HOSPITAL | Age: 64
Discharge: HOME | End: 2024-08-05
Attending: ANESTHESIOLOGY
Payer: COMMERCIAL

## 2024-08-05 DIAGNOSIS — M54.16 LUMBAR RADICULOPATHY: ICD-10-CM

## 2024-08-05 DIAGNOSIS — M43.16 ANTEROLISTHESIS OF LUMBAR SPINE: ICD-10-CM

## 2024-08-05 DIAGNOSIS — M51.36 LUMBAR DEGENERATIVE DISC DISEASE: ICD-10-CM

## 2024-08-05 PROCEDURE — 72114 X-RAY EXAM L-S SPINE BENDING: CPT | Mod: 26,,, | Performed by: RADIOLOGY

## 2024-08-05 PROCEDURE — 72114 X-RAY EXAM L-S SPINE BENDING: CPT | Mod: TC

## 2024-08-07 NOTE — PRE-PROCEDURE INSTRUCTIONS
Spoke with patient regarding procedure scheduled on 8.23     Arrival time 0615     Has patient been sick with fever or on antibiotics within the last 7 days? No     Does the patient have any open wounds, sores or rashes? No     Does the patient have any recent fractures? no     Has patient received a vaccination within the last 7 days? No     Received the COVID vaccination?      Has the patient stopped all medications as directed? na     Does patient have a pacemaker, defibrillator, or implantable stimulator? No     Does the patient have a ride to and from procedure and someone reliable to remain with patient? april     Is the patient diabetic? no     Does the patient have sleep apnea? Or use O2 at home? no     Is the patient receiving sedation? LOCAL     Is the patient instructed to remain NPO beginning at midnight the night before their procedure? yes     Procedure location confirmed with patient? Yes     Covid- Denies signs/symptoms. Instructed to notify PAT/MD if any changes

## 2024-08-16 ENCOUNTER — OFFICE VISIT (OUTPATIENT)
Dept: FAMILY MEDICINE | Facility: CLINIC | Age: 64
End: 2024-08-16
Payer: COMMERCIAL

## 2024-08-16 VITALS
SYSTOLIC BLOOD PRESSURE: 146 MMHG | WEIGHT: 293 LBS | OXYGEN SATURATION: 96 % | HEART RATE: 89 BPM | DIASTOLIC BLOOD PRESSURE: 68 MMHG | BODY MASS INDEX: 53.92 KG/M2 | HEIGHT: 62 IN | TEMPERATURE: 99 F

## 2024-08-16 DIAGNOSIS — I10 HYPERTENSION, UNSPECIFIED TYPE: ICD-10-CM

## 2024-08-16 DIAGNOSIS — R51.9 NONINTRACTABLE HEADACHE, UNSPECIFIED CHRONICITY PATTERN, UNSPECIFIED HEADACHE TYPE: Primary | ICD-10-CM

## 2024-08-16 PROCEDURE — 99999 PR PBB SHADOW E&M-EST. PATIENT-LVL IV: CPT | Mod: PBBFAC,,, | Performed by: NURSE PRACTITIONER

## 2024-08-16 RX ORDER — BUTALBITAL, ACETAMINOPHEN AND CAFFEINE 50; 325; 40 MG/1; MG/1; MG/1
1 TABLET ORAL EVERY 4 HOURS PRN
Qty: 30 TABLET | Refills: 3 | Status: SHIPPED | OUTPATIENT
Start: 2024-08-16 | End: 2024-09-15

## 2024-08-16 RX ORDER — AMLODIPINE BESYLATE 10 MG/1
10 TABLET ORAL DAILY
Qty: 90 TABLET | Refills: 3 | Status: SHIPPED | OUTPATIENT
Start: 2024-08-16 | End: 2025-08-16

## 2024-08-16 NOTE — PROGRESS NOTES
Amelia LINSEY Hermes  08/16/2024  [id number]    Eve Green MD  Patient Care Team:  Eve Green MD as PCP - General (Family Medicine)  Eve Green MD as Consulting Physician (Family Medicine)  Brandon Anderson LPN as Care Coordinator (Internal Medicine)          Visit Type:a scheduled routine follow-up visit    Chief Complaint:  Chief Complaint   Patient presents with    Follow-up       History of Present Illness:    65 yo female presents today with co of continued HA not relieved with IBP  She also reports her blood pressure has been elevated     History:  Past Medical History:   Diagnosis Date    Arthritis     Benign colon polyp     DDD (degenerative disc disease), lumbar     Hypertension 1/15/2023    Hypothyroidism     Mild anemia     Obesity     Postmenopausal     No history of abnormal pap smear    Vitamin D deficiency      Past Surgical History:   Procedure Laterality Date    bilateral tubal ligation      COLONOSCOPY N/A 8/26/2022    Procedure: COLONOSCOPY;  Surgeon: Taj Marshall MD;  Location: [facility] ENDO;  Service: General;  Laterality: N/A;    EPIDURAL STEROID INJECTION N/A 10/4/2021    Procedure: Lumbar L5/S1 IL KARISSA WOULD LIKE LATEST POSSIBLE TIME;  Surgeon: Josemanuel Adler MD;  Location: [facility] PAIN MGT;  Service: Pain Management;  Laterality: N/A;    EPIDURAL STEROID INJECTION N/A 4/22/2022    Procedure: Lumbar L5/S1 IL KARISSA with RN IV sedation;  Surgeon: Josemanuel Adler MD;  Location: [facility] PAIN MGT;  Service: Pain Management;  Laterality: N/A;    INJECTION OF ANESTHETIC AGENT INTO SACROILIAC JOINT Bilateral 4/5/2023    Procedure: Bilateral GT bursa + bilateral SIJ injection;  Surgeon: Josemanuel Adler MD;  Location: [facility] PAIN MGT;  Service: Pain Management;  Laterality: Bilateral;    INJECTION OF JOINT Bilateral 4/5/2023    Procedure: Bilateral GT bursa + bilateral SIJ injection;  Surgeon: Josemanuel Adler MD;  Location: [facility] PAIN MGT;  Service: Pain Management;  Laterality: Bilateral;     INJECTION OF JOINT Bilateral 6/25/2024    Procedure: Bilateral Hip Joint injection;  Surgeon: Josemanuel Adler MD;  Location: [facility] PAIN MGT;  Service: Pain Management;  Laterality: Bilateral;    OOPHORECTOMY      Partial hysterectomy with USO      for DUB     SELECTIVE INJECTION OF ANESTHETIC AGENT AROUND LUMBAR SPINAL NERVE ROOT BY TRANSFORAMINAL APPROACH Bilateral 11/18/2022    Procedure: Bilateral L5/S1 TF KARISSA RN IV Sedation;  Surgeon: Josemanuel Adler MD;  Location: [facility] PAIN MGT;  Service: Pain Management;  Laterality: Bilateral;    SELECTIVE INJECTION OF ANESTHETIC AGENT AROUND LUMBAR SPINAL NERVE ROOT BY TRANSFORAMINAL APPROACH Bilateral 7/28/2023    Procedure: Bilateral L5/S1 TF KARISSA RN IV Sedation;  Surgeon: Josemanuel Adler MD;  Location: [facility] PAIN MGT;  Service: Pain Management;  Laterality: Bilateral;    THYROIDECTOMY, PARTIAL       Family History   Problem Relation Name Age of Onset    Cancer Mother          Breast cancer    Diabetes Mother      Breast cancer Mother  42    Depression Daughter      Insulin resistance Daughter      Depression Maternal Aunt      Rheum arthritis Maternal Aunt      Diabetes Maternal Aunt      Heart disease Maternal Grandmother          MI/CAD    Heart disease Maternal Grandfather          MI/CAD    Heart disease Paternal Grandfather          MI/CAD    Cancer Paternal Grandfather          Pancreas    Colon cancer Father      No Known Problems Son      No Known Problems Son      Stroke Neg Hx      Hypertension Neg Hx      Lupus Neg Hx       Social History     Socioeconomic History    Marital status:     Number of children: 3   Occupational History    Occupation: Educator     Employer: [employer]     Employer: [employer]   Tobacco Use    Smoking status: Never    Smokeless tobacco: Never   Substance and Sexual Activity    Alcohol use: Yes     Comment: Occasionally    Drug use: No    Sexual activity: Yes     Partners: Male     Birth control/protection: Surgical      Comment: 1 partner   Social History Narrative    She wears seatbelt.     Social Determinants of Health     Financial Resource Strain: Low Risk  (2/15/2024)    Overall Financial Resource Strain (CARDIA)     Difficulty of Paying Living Expenses: Not hard at all   Food Insecurity: No Food Insecurity (2/15/2024)    Hunger Vital Sign     Worried About Running Out of Food in the Last Year: Never true     Ran Out of Food in the Last Year: Never true   Transportation Needs: No Transportation Needs (2/15/2024)    PRAPARE - Transportation     Lack of Transportation (Medical): No     Lack of Transportation (Non-Medical): No   Physical Activity: Insufficiently Active (2/15/2024)    Exercise Vital Sign     Days of Exercise per Week: 2 days     Minutes of Exercise per Session: 50 min   Stress: Stress Concern Present (2/15/2024)    [employer] [place] [employer] - Occupational Stress Questionnaire     Feeling of Stress : To some extent   Housing Stability: Low Risk  (2/15/2024)    Housing Stability Vital Sign     Unable to Pay for Housing in the Last Year: No     Number of Places Lived in the Last Year: 1     Unstable Housing in the Last Year: No     Patient Active Problem List   Diagnosis    Hypothyroidism    Vitamin D deficiency    Obesity    DDD (degenerative disc disease), cervical    Morbid obesity with BMI of 50.0-59.9, adult    Primary osteoarthritis of both knees    Postmenopausal    Benign colon polyp    Primary osteoarthritis of left knee    Primary osteoarthritis of right knee    Lumbar spondylosis    Difficulty walking    Lumbar radiculopathy, chronic    Insomnia    Myofascial pain    Hypertension    Sacroiliitis    Greater trochanteric bursitis of both hips    Prediabetes    Primary osteoarthritis of both hips     Review of patient's allergies indicates:  No Known Allergies    The following were reviewed at this visit: active problem list, medication list, allergies, family history, social history, and  health maintenance.    Medications:  Current Outpatient Medications on File Prior to Visit   Medication Sig Dispense Refill    ergocalciferol (ERGOCALCIFEROL) 50,000 unit Cap TAKE 1 CAPSULE TWICE A WEEK 25 capsule 3    ibuprofen (ADVIL,MOTRIN) 800 MG tablet Take 1 tablet (800 mg total) by mouth 2 (two) times daily as needed for Pain (food). 60 tablet 2    levothyroxine (SYNTHROID) 200 MCG tablet TAKE 1 TABLET EVERY MORNING BEFORE BREAKFAST 90 tablet 3    levothyroxine (SYNTHROID) 25 MCG tablet Take 1 pill by mouth daily before breakfast on Monday through Friday and Take 2 pills by mouth daily before breakfast on Saturday and Sunday 114 tablet 1    tiZANidine (ZANAFLEX) 4 MG tablet Take 1 tablet (4 mg total) by mouth 2 (two) times daily. 60 tablet 2    traZODone (DESYREL) 50 MG tablet TAKE 1 TABLET NIGHTLY AS NEEDED FOR INSOMNIA 90 tablet 3    fluticasone propionate (FLONASE) 50 mcg/actuation nasal spray 2 sprays (100 mcg total) by Each Nostril route once daily. (Patient not taking: Reported on 8/16/2024) 16 g 1    [DISCONTINUED] amLODIPine (NORVASC) 5 MG tablet Take 1 tablet (5 mg total) by mouth once daily. 90 tablet 1     No current facility-administered medications on file prior to visit.       Medications have been reviewed and reconciled with patient at this visit.  Barriers to medications reviewed with patient.    Adverse reactions to current medications reviewed with patient..    Over the counter medications reviewed and reconciled with patient.    Exam:  Wt Readings from Last 3 Encounters:   08/16/24 133.4 kg (294 lb 1.5 oz)   06/25/24 134.9 kg (297 lb 6.4 oz)   02/20/24 129.7 kg (285 lb 15 oz)     Temp Readings from Last 3 Encounters:   08/16/24 98.8 °F (37.1 °C) (Tympanic)   06/25/24 97.3 °F (36.3 °C) (Temporal)   02/20/24 98 °F (36.7 °C) (Temporal)     BP Readings from Last 3 Encounters:   08/16/24 (!) 146/68   06/25/24 (!) 157/74   02/20/24 110/70     Pulse Readings from Last 3 Encounters:   08/16/24 89    06/25/24 (!) 53   02/20/24 76     Body mass index is 53.79 kg/m².      Review of Systems   Constitutional:  Negative for fever.   Respiratory:  Negative for cough, shortness of breath and wheezing.    Cardiovascular:  Negative for chest pain and palpitations.   Gastrointestinal:  Negative for nausea.   Neurological:  Positive for headaches. Negative for speech change and weakness.   All other systems reviewed and are negative.    Physical Exam  Vitals and nursing note reviewed.   Constitutional:       Appearance: Normal appearance. She is obese.   HENT:      Head: Normocephalic and atraumatic.      Right Ear: Tympanic membrane, ear canal and external ear normal.      Left Ear: Tympanic membrane, ear canal and external ear normal.      Nose: Nose normal.      Mouth/Throat:      Mouth: Mucous membranes are moist.      Pharynx: Oropharynx is clear.   Eyes:      Extraocular Movements: Extraocular movements intact.      Conjunctiva/sclera: Conjunctivae normal.      Pupils: Pupils are equal, round, and reactive to light.   Cardiovascular:      Rate and Rhythm: Normal rate and regular rhythm.      Pulses: Normal pulses.      Heart sounds: Normal heart sounds.   Pulmonary:      Effort: Pulmonary effort is normal.      Breath sounds: Normal breath sounds.   Abdominal:      General: Bowel sounds are normal.      Palpations: Abdomen is soft.   Musculoskeletal:         General: Normal range of motion.      Cervical back: Normal range of motion and neck supple.   Skin:     General: Skin is warm and dry.      Capillary Refill: Capillary refill takes less than 2 seconds.   Neurological:      General: No focal deficit present.      Mental Status: She is alert and oriented to person, place, and time.   Psychiatric:         Mood and Affect: Mood normal.         Behavior: Behavior normal.         Thought Content: Thought content normal.         Judgment: Judgment normal.         Laboratory Reviewed ({Yes)  Lab Results   Component  Value Date    WBC 6.83 02/20/2024    HGB 12.0 02/20/2024    HCT 38.7 02/20/2024     02/20/2024    CHOL 198 02/20/2024    TRIG 103 02/20/2024    HDL 73 02/20/2024    ALT 17 02/20/2024    AST 11 02/20/2024     02/20/2024    K 4.0 02/20/2024     02/20/2024    CREATININE 0.8 02/20/2024    BUN 14 02/20/2024    CO2 25 02/20/2024    TSH 4.794 (H) 02/20/2024    INR 1.0 08/30/2016    HGBA1C 5.8 (H) 09/15/2023       Amelia was seen today for follow-up.    Diagnoses and all orders for this visit:    Nonintractable headache, unspecified chronicity pattern, unspecified headache type  -     butalbital-acetaminophen-caffeine -40 mg (FIORICET, ESGIC) -40 mg per tablet; Take 1 tablet by mouth every 4 (four) hours as needed for Pain.    Hypertension, unspecified type  -     amLODIPine (NORVASC) 10 MG tablet; Take 1 tablet (10 mg total) by mouth once daily.          PLAN   Increased amlodipine 10 mg   Start Fioricet for ha      Care Plan/Goals: Reviewed    Goals    None       Amelia was seen today for follow-up.    Diagnoses and all orders for this visit:    Nonintractable headache, unspecified chronicity pattern, unspecified headache type  -     butalbital-acetaminophen-caffeine -40 mg (FIORICET, ESGIC) -40 mg per tablet; Take 1 tablet by mouth every 4 (four) hours as needed for Pain.    Hypertension, unspecified type  -     amLODIPine (NORVASC) 10 MG tablet; Take 1 tablet (10 mg total) by mouth once daily.       Follow up: Follow up if symptoms worsen or fail to improve.    After visit summary was printed and given to patient upon discharge today.  Patient goals and care plan are included in After Visit Summary.

## 2024-08-19 DIAGNOSIS — M70.61 GREATER TROCHANTERIC BURSITIS OF BOTH HIPS: ICD-10-CM

## 2024-08-19 DIAGNOSIS — M46.1 SACROILIITIS: ICD-10-CM

## 2024-08-19 DIAGNOSIS — M70.62 GREATER TROCHANTERIC BURSITIS OF BOTH HIPS: ICD-10-CM

## 2024-08-19 NOTE — TELEPHONE ENCOUNTER
No care due was identified.  [facility] Embedded Care Due Messages. Reference number: [id number].   8/19/2024 5:01:36 PM CDT

## 2024-08-20 NOTE — TELEPHONE ENCOUNTER
Please see the attached refill request.    LOV: 08/16/24 (Maude)    Patient Comment: [facility] will no longer send a 30 day supply. Please paul ge this RX to a 90 day supply in order for them to fill it. Thank you

## 2024-08-23 ENCOUNTER — HOSPITAL ENCOUNTER (OUTPATIENT)
Facility: HOSPITAL | Age: 64
Discharge: HOME | End: 2024-08-23
Attending: ANESTHESIOLOGY | Admitting: ANESTHESIOLOGY
Payer: COMMERCIAL

## 2024-08-23 VITALS
OXYGEN SATURATION: 100 % | TEMPERATURE: 98 F | RESPIRATION RATE: 16 BRPM | BODY MASS INDEX: 53.73 KG/M2 | HEIGHT: 62 IN | DIASTOLIC BLOOD PRESSURE: 81 MMHG | WEIGHT: 292 LBS | SYSTOLIC BLOOD PRESSURE: 172 MMHG | HEART RATE: 54 BPM

## 2024-08-23 DIAGNOSIS — M54.16 LUMBAR RADICULOPATHY: ICD-10-CM

## 2024-08-23 PROCEDURE — 64483 NJX AA&/STRD TFRM EPI L/S 1: CPT | Mod: RT,,, | Performed by: ANESTHESIOLOGY

## 2024-08-23 PROCEDURE — 64484 NJX AA&/STRD TFRM EPI L/S EA: CPT | Mod: RT | Performed by: ANESTHESIOLOGY

## 2024-08-23 PROCEDURE — 25000003 PHARM REV CODE 250: Performed by: ANESTHESIOLOGY

## 2024-08-23 PROCEDURE — 64483 NJX AA&/STRD TFRM EPI L/S 1: CPT | Mod: RT | Performed by: ANESTHESIOLOGY

## 2024-08-23 PROCEDURE — 63600175 PHARM REV CODE 636 W HCPCS: Performed by: ANESTHESIOLOGY

## 2024-08-23 PROCEDURE — 25500020 PHARM REV CODE 255: Performed by: ANESTHESIOLOGY

## 2024-08-23 PROCEDURE — 64484 NJX AA&/STRD TFRM EPI L/S EA: CPT | Mod: RT,,, | Performed by: ANESTHESIOLOGY

## 2024-08-23 RX ORDER — DEXAMETHASONE SODIUM PHOSPHATE 10 MG/ML
INJECTION INTRAMUSCULAR; INTRAVENOUS
Status: DISCONTINUED | OUTPATIENT
Start: 2024-08-23 | End: 2024-08-23 | Stop reason: HOSPADM

## 2024-08-23 RX ORDER — TIZANIDINE 4 MG/1
4 TABLET ORAL 2 TIMES DAILY
Qty: 60 TABLET | Refills: 2 | Status: SHIPPED | OUTPATIENT
Start: 2024-08-23

## 2024-08-23 RX ORDER — BUPIVACAINE HYDROCHLORIDE 2.5 MG/ML
INJECTION, SOLUTION EPIDURAL; INFILTRATION; INTRACAUDAL
Status: DISCONTINUED | OUTPATIENT
Start: 2024-08-23 | End: 2024-08-23 | Stop reason: HOSPADM

## 2024-08-23 RX ORDER — SODIUM BICARBONATE 1 MEQ/ML
SYRINGE (ML) INTRAVENOUS
Status: DISCONTINUED | OUTPATIENT
Start: 2024-08-23 | End: 2024-08-23 | Stop reason: HOSPADM

## 2024-08-23 NOTE — DISCHARGE INSTRUCTIONS

## 2024-08-23 NOTE — OP NOTE
Tazdarrion Mirza  64 y.o. female      Vitals:    08/23/24 0710   BP: (!) 150/69   Pulse: (!) 54   Resp: 14   Temp:      Procedure Date:08/23/2024        INFORMED CONSENT: The procedure, risks, benefits and options were discussed with patient. There are no contraindications to the procedure. The patient expressed understanding and agreed to proceed. The personnel performing the procedure was discussed. I verify that I personally obtained consent prior to the start of the procedure and the signed consent can be found on the patient's chart.       Anesthesia:   local    The patient was monitored with continuous pulse oximetry, EKG, and intermittent blood pressure monitors.  The patient was hemodynamically stable throughout the entire process was responsive to voice, and breathing spontaneously.  Supplemental O2 was provided at 2L/min via nasal cannula.  Patient was comfortable for the duration of the procedure. (See nurse documentation and case log for sedation time)        Pre Procedure diagnosis: Lumbar radiculopathy [M54.16]  Post-Procedure diagnosis: SAME     Complications: None    Specimens: None      DESCRIPTION OF PROCEDURE: The patient was brought to the procedure room. IV access was obtained prior to the procedure. The patient was positioned prone on the fluoroscopy table. Continuous hemodynamic monitoring was initiated including blood pressure, EKG, and pulse oximetry. . The skin was prepped with chlorhexidine and draped in a sterile fashion.      The  L4/5 and  L5/S1 transforaminal spaces were identified with fluoroscopy in the  AP, oblique, and lateral views.  A 22 gauge spinal quinke needle was then advanced into the area of the trans foraminal spaces right with confirmation of proper needle position using AP, oblique, and lateral fluoroscopic views. Once the needle tip was in the area of the transforaminal space, and there was no blood, CSF or paraesthesias,  1.5 mL of Omnipaque 300mg/ml was injected on  right for a total of 3mL.  Fluoroscopic imaging in the AP and lateral views revealed a clear outline of the spinal nerve with proximal spread of agent through the neural foramen into the epidural space. A total combination of 2mL of Bupivacaine and 10 mg dexamethasone was injected on each side and at each level with displacement of the contrast dye confirming that the medication went into the area of the transforaminal spaces right. A sterile bandaid was applied.   Patient tolerated the procedure well.    Patient was taken back to the recovery room for further observation.     The patient was discharged to home in stable condition

## 2024-08-23 NOTE — DISCHARGE SUMMARY
Discharge Note  Short Stay      SUMMARY     Admit Date: 8/23/2024    Attending Physician: Josemanuel Adler MD        Discharge Physician: Josemanuel Adler MD        Discharge Date: 8/23/2024 7:15 AM    Procedure(s) (LRB):  R sided L4/5 and L5/S1 transforaminal epidural steroid injection (Right)    Final Diagnosis: Lumbar radiculopathy [M54.16]    Disposition: Home or self care    Patient Instructions:   Current Discharge Medication List        CONTINUE these medications which have NOT CHANGED    Details   amLODIPine (NORVASC) 10 MG tablet Take 1 tablet (10 mg total) by mouth once daily.  Qty: 90 tablet, Refills: 3    Comments: .  Associated Diagnoses: Hypertension, unspecified type      butalbital-acetaminophen-caffeine -40 mg (FIORICET, ESGIC) -40 mg per tablet Take 1 tablet by mouth every 4 (four) hours as needed for Pain.  Qty: 30 tablet, Refills: 3    Comments: [facility] [id number]  Associated Diagnoses: Nonintractable headache, unspecified chronicity pattern, unspecified headache type      tiZANidine (ZANAFLEX) 4 MG tablet Take 1 tablet (4 mg total) by mouth 2 (two) times daily.  Qty: 60 tablet, Refills: 2    Associated Diagnoses: Sacroiliitis; Greater trochanteric bursitis of both hips      traZODone (DESYREL) 50 MG tablet TAKE 1 TABLET NIGHTLY AS NEEDED FOR INSOMNIA  Qty: 90 tablet, Refills: 3    Associated Diagnoses: Insomnia, unspecified type      ergocalciferol (ERGOCALCIFEROL) 50,000 unit Cap TAKE 1 CAPSULE TWICE A WEEK  Qty: 25 capsule, Refills: 3    Associated Diagnoses: Vitamin D deficiency      fluticasone propionate (FLONASE) 50 mcg/actuation nasal spray 2 sprays (100 mcg total) by Each Nostril route once daily.  Qty: 16 g, Refills: 1    Associated Diagnoses: Acute non-recurrent sinusitis, unspecified location      ibuprofen (ADVIL,MOTRIN) 800 MG tablet Take 1 tablet (800 mg total) by mouth 2 (two) times daily as needed for Pain (food).  Qty: 60 tablet, Refills: 2    Associated Diagnoses: Chronic low  back pain with sciatica, sciatica laterality unspecified, unspecified back pain laterality; Lumbar spondylosis; DDD (degenerative disc disease), cervical      !! levothyroxine (SYNTHROID) 200 MCG tablet TAKE 1 TABLET EVERY MORNING BEFORE BREAKFAST  Qty: 90 tablet, Refills: 3    Associated Diagnoses: Hypothyroidism, unspecified type      !! levothyroxine (SYNTHROID) 25 MCG tablet Take 1 pill by mouth daily before breakfast on Monday through Friday and Take 2 pills by mouth daily before breakfast on Saturday and Sunday  Qty: 114 tablet, Refills: 1    Associated Diagnoses: Hypothyroidism, unspecified type       !! - Potential duplicate medications found. Please discuss with provider.              Discharge Diagnosis: Lumbar radiculopathy [M54.16]  Condition on Discharge: Stable with no complications to procedure   Diet on Discharge: Same as before.  Activity: as per instruction sheet.  Discharge to: Home with a responsible adult.  Follow up: 2-4 weeks       Please call the office at [phone] if you experience any weakness or loss of sensation, fever > 101.5, pain uncontrolled with oral medications, persistent nausea/vomiting/or diarrhea, redness or drainage from the incisions, or any other worrisome concerns. If physician on call was not reached or could not communicate with our office for any reason please go to the nearest emergency department

## 2024-09-05 ENCOUNTER — E-VISIT (OUTPATIENT)
Dept: FAMILY MEDICINE | Facility: CLINIC | Age: 64
End: 2024-09-05
Attending: FAMILY MEDICINE
Payer: COMMERCIAL

## 2024-09-05 ENCOUNTER — OFFICE VISIT (OUTPATIENT)
Dept: NEUROSURGERY | Facility: CLINIC | Age: 64
End: 2024-09-05
Payer: COMMERCIAL

## 2024-09-05 VITALS
SYSTOLIC BLOOD PRESSURE: 146 MMHG | BODY MASS INDEX: 53.76 KG/M2 | HEIGHT: 62 IN | DIASTOLIC BLOOD PRESSURE: 82 MMHG | HEART RATE: 84 BPM | WEIGHT: 292.13 LBS

## 2024-09-05 DIAGNOSIS — M43.16 SPONDYLOLISTHESIS, LUMBAR REGION: ICD-10-CM

## 2024-09-05 DIAGNOSIS — M51.36 LUMBAR DEGENERATIVE DISC DISEASE: ICD-10-CM

## 2024-09-05 DIAGNOSIS — M70.61 GREATER TROCHANTERIC BURSITIS OF BOTH HIPS: ICD-10-CM

## 2024-09-05 DIAGNOSIS — M43.16 ANTEROLISTHESIS OF LUMBAR SPINE: ICD-10-CM

## 2024-09-05 DIAGNOSIS — M46.1 SACROILIITIS: ICD-10-CM

## 2024-09-05 DIAGNOSIS — M70.62 GREATER TROCHANTERIC BURSITIS OF BOTH HIPS: ICD-10-CM

## 2024-09-05 DIAGNOSIS — M51.36 DEGENERATIVE DISC DISEASE, LUMBAR: Primary | ICD-10-CM

## 2024-09-05 DIAGNOSIS — M54.16 LUMBAR RADICULOPATHY: ICD-10-CM

## 2024-09-05 PROCEDURE — 99203 OFFICE O/P NEW LOW 30 MIN: CPT | Mod: S$GLB,,, | Performed by: NEUROLOGICAL SURGERY

## 2024-09-05 PROCEDURE — 3008F BODY MASS INDEX DOCD: CPT | Mod: CPTII,S$GLB,, | Performed by: NEUROLOGICAL SURGERY

## 2024-09-05 PROCEDURE — 3079F DIAST BP 80-89 MM HG: CPT | Mod: CPTII,S$GLB,, | Performed by: NEUROLOGICAL SURGERY

## 2024-09-05 PROCEDURE — 3077F SYST BP >= 140 MM HG: CPT | Mod: CPTII,S$GLB,, | Performed by: NEUROLOGICAL SURGERY

## 2024-09-05 PROCEDURE — 99999 PR PBB SHADOW E&M-EST. PATIENT-LVL III: CPT | Mod: PBBFAC,,, | Performed by: NEUROLOGICAL SURGERY

## 2024-09-05 RX ORDER — TRAMADOL HYDROCHLORIDE 50 MG/1
50 TABLET ORAL EVERY 6 HOURS PRN
Qty: 30 TABLET | Refills: 0 | Status: SHIPPED | OUTPATIENT
Start: 2024-09-05 | End: 2024-09-20

## 2024-09-05 NOTE — PROGRESS NOTES
Subjective:      Patient ID: Amelia Mirza is a 64 y.o. female.    Chief Complaint: Lumbar Spine Pain (L-Spine) (Pt reports with LBP that radiates to WES LE; Pt reports tingling, pens and needle sensation in LE; Pt has KARISSA 08/01/24 with Dr. Adler; Pt is currently in P/T and advises it has offered rolando relief)    Patient here today for evaluation lower back pain  She has had these symptoms for many years gradually getting worse in severity  Worse with activity and better with rest  Travel down the bilateral lower extremities with associated numbness and tingling in the same distribution  Ambulates unassisted  She has been working with pain management in his tried several injections in the past which have provided some lasting relief  She works on her feet and has to rest when needed  No previous lumbar surgery  PT has offered her relief   Denies any BB  She has an MRI of the lumbar hand a x-ray flexion-extension for my review      Review of Systems   Constitutional:  Negative for activity change, appetite change and chills.   HENT:  Negative for hearing loss, sore throat and tinnitus.    Eyes:  Negative for pain, discharge and itching.   Cardiovascular:  Negative for chest pain.   Gastrointestinal:  Negative for abdominal pain.   Endocrine: Negative for cold intolerance and heat intolerance.   Genitourinary:  Negative for difficulty urinating and dysuria.   Musculoskeletal:  Positive for back pain and gait problem.   Allergic/Immunologic: Negative for environmental allergies.   Neurological:  Positive for weakness. Negative for dizziness, tremors, light-headedness and headaches.   Hematological:  Negative for adenopathy.   Psychiatric/Behavioral:  Negative for agitation, behavioral problems and confusion.          Objective:       Physical Exam:  Nursing note and vitals reviewed.    Constitutional: She appears well-nourished. She is not diaphoretic. No distress.     Eyes: Pupils are equal, round, and reactive to  light. EOM are normal.     Cardiovascular: Normal rate and regular rhythm.     Psych/Behavior: She is alert. She is oriented to person, place, and time. She has a normal mood and affect.     Musculoskeletal:        Back: Range of motion is limited. There is tenderness. Muscle strength is 5/5.       Right Lower Extremities: Range of motion is full. There is no tenderness. Muscle strength is 5/5. Tone is normal.        Left Lower Extremities: There is no tenderness. Muscle strength is 5/5. Tone is normal.     Neurological:        Sensory: There is no sensory deficit in the trunk. There is no sensory deficit in the extremities.        Cranial nerves: Cranial nerve(s) II, III, IV, V, VI, VII, VIII, IX, X, XI and XII are intact.     General    Nursing note and vitals reviewed.  Constitutional: She is oriented to person, place, and time. She appears well-nourished. No distress.   Eyes: EOM are normal. Pupils are equal, round, and reactive to light.   Cardiovascular:  Normal rate and regular rhythm.            Neurological: She is alert and oriented to person, place, and time.   Psychiatric: She has a normal mood and affect.             Ortho Exam          MRI Lumbar Spine Without Contrast    Alignment: Approximately 5 mm of grade 1 anterolisthesis at L4-5.  Slight retrolisthesis at L5-S1.    L3-L4: Minimal annular bulge and bilateral facet arthropathy contributing to mild left greater than right inferior neural foraminal narrowing.  No significant spinal canal stenosis.    L4-L5: Prominent posterior disc bulge with slight right foraminal asymmetry and bilateral hypertrophic facet arthropathy.  Resultant moderate to severe bilateral neural foraminal narrowing.  No significant spinal canal stenosis.    L5-S1: Broad-based posterior disc bulge with small central protrusion and bilateral facet arthropathy.  Resultant severe left and moderate right neural foraminal narrowing.  No significant spinal canal stenosis.    Paraspinal  muscles & soft tissues: Small bilateral renal cortical cysts.  Otherwise unremarkable.    Impression  Multilevel lumbar spondylosis and degenerative disc disease with mild progression since 2022.          X-Ray Lumbar Complete Including Flex And Ext    Narrative  EXAM:  XR LUMBAR SPINE 5 VIEW WITH FLEX AND EXT    FINDINGS:  The vertebral body heights are normal.  Minimal grade 1 anterior listhesis of L4 on L5.  Disc space narrowing L4-L5 and L5-S1 with vacuum disc phenomenon.  Facet hypertrophy throughout the lumbar spine most pronounced at L4-L5 and L5-S1.  Increased anterior listhesis of L4 on L5 on extension from 6 to 9 mm.  Impression  Multilevel degenerative change centered at L4-L5 and L5-S1 with abnormal motion on extension at L4/L5 as above.    Normal lumbar spine radiograph.      Assessment:     1. Degenerative disc disease, lumbar    2. Lumbar radiculopathy    3. Lumbar degenerative disc disease    4. Anterolisthesis of lumbar spine    5. Spondylolisthesis, lumbar region      Plan:     Degenerative disc disease, lumbar    Lumbar radiculopathy  -     Ambulatory referral/consult to Neurosurgery    Lumbar degenerative disc disease  -     Ambulatory referral/consult to Neurosurgery    Anterolisthesis of lumbar spine  -     Ambulatory referral/consult to Neurosurgery    Spondylolisthesis, lumbar region    Other orders  -     traMADoL (ULTRAM) 50 mg tablet; Take 1 tablet (50 mg total) by mouth every 6 (six) hours as needed for Pain.  Dispense: 30 tablet; Refill: 0    Patient with degenerative disc disease and anterolisthesis of L4-5 with associated translation on dynamic imaging.  We discussed this is likely the source of her symptoms however at this time she is not a surgical candidate  She will continue conservative treatments and I have prescribed her tramadol to take on an as-needed basis  We will set follow-up near future to see if she may be a surgical candidate at that time    Thank you for the referral    Please call with any questions    Chucho Magaña MD  Neurosurgery     Disclaimer: This note was prepared using a voice recognition system and is likely to have sound alike errors within the text.

## 2024-09-06 RX ORDER — TIZANIDINE 4 MG/1
4 TABLET ORAL 2 TIMES DAILY
Qty: 180 TABLET | Refills: 0 | Status: SHIPPED | OUTPATIENT
Start: 2024-09-06

## 2024-09-06 NOTE — PROGRESS NOTES
Patient ID: Amelia Mirza is a 64 y.o. female.    Chief Complaint: General Illness (Entered automatically based on patient selection in [employer].) and Medication Refill    The patient initiated a request through [employer] on 9/5/2024 for evaluation and management with a chief complaint of General Illness (Entered automatically based on patient selection in [employer].) and Medication Refill     I evaluated the questionnaire submission on 9/5/2024.    [facility]Medication    9/5/2024  7:30 PM CDT - Filed by Patient   What do you need help with? Medication Request   Do you agree to participate in an E-Visit? Yes   If you have any of the following symptoms, please present to your local emergency room or call 911:  I acknowledge   Medication requests for narcotics will not be addressed via an E-Visit.  Please schedule an appointment. I acknowledge   Do you want to address a new or existing medication? I would like to address a medication I currently take   What is the main issue you would like addressed today? [facility] will not fill my tiZANidine 4 MG tablet  Commonly known as: ZANAFLEX because the Rx is for 30 days . They only fill 90 days supply. Please change my Rx to a 90 day supply OR send my 30 day RX to [facility] on [address] [facility]. Thanks   Would you like to change or continue your medication? Continue medication   What medication would you like to continue?  tiZANidine 4 MG tablet  Commonly known as: ZANAFLEX   Are you taking it as prescribed? Yes    What medical condition is the  medication intended to treat? Sciatic Nerve / arthritis   Is the medication helping your condition? Yes   Are you having any side effects from the medication? No   Provide any additional information you feel is important.    Please attach any relevant images or files    Are you able to take your vital signs? No         Encounter Diagnoses   Name Primary?    Sacroiliitis     Greater trochanteric bursitis of both  hips         No orders of the defined types were placed in this encounter.     Medications Ordered This Encounter   Medications    tiZANidine (ZANAFLEX) 4 MG tablet     Sig: Take 1 tablet (4 mg total) by mouth 2 (two) times daily.     Dispense:  180 tablet     Refill:  0        Follow up if symptoms worsen or fail to improve.      E-Visit Time Trackin minutes    Day 1 Time (in minutes): 5    Total Time (in minutes): 5

## 2024-09-17 ENCOUNTER — TELEPHONE (OUTPATIENT)
Dept: PAIN MEDICINE | Facility: CLINIC | Age: 64
End: 2024-09-17
Payer: COMMERCIAL

## 2024-09-17 NOTE — TELEPHONE ENCOUNTER
Reached out to patient to reschedule appointment because the provider will be out of clinic.  Apt has been made . All questions answered.     Yefri Reddy  Medical

## 2024-09-18 DIAGNOSIS — R73.03 PREDIABETES: ICD-10-CM

## 2024-09-25 NOTE — PROGRESS NOTES
Established Patient Interventional Pain Note (Follow-up Visit)    The patient location is: car  The chief complaint leading to consultation is: LBP  Visit type: Virtual visit with synchronous audio and video  Total time spent with patient: 15m  Each patient to whom he or she provides medical services by telemedicine is: (1) informed of the relationship between the physician and patient and the respective role of any other health care provider with respect to management of the patient; and (2) notified that he or she may decline to receive medical services by telemedicine and may withdraw from such care at any time.    Referring Physician: Eve Green MD    PCP: Eve Green MD    Chief Complaint:   LBP     SUBJECTIVE:  Interval history 09/26/2024  Patient presents status post right-sided L4-5 and L5-S1 transforaminal epidural steroid injection 08/23/2024.  Patient reports 65-70% sustained relief in right-sided radicular symptoms following her epidural steroid injection.  Today she reports pain primarily in the lower back which radiates into bilateral buttocks overlying bilateral sacroiliac joint territory.  Pain is intermittent and today is rated a 6/10.  Can can be exacerbated with bending forward, moving from sitting to standing and with ambulation.  Today she denies significant lower extremity radiculopathy, bowel or bladder incontinence or saddle anesthesia.  She has continued physician directed physical therapy exercises for lower back, sacroiliac joint and leg pain over the last 8 weeks from 07/26/2024 through 09/26/2024 with marginal improvement in her symptoms.    Of note patient saw Dr. Magaña 09/05/2024 with the following evaluation:      Patient reports neurosurgery recommended continuing with conservative treatments including physical therapy and intervention.  Patient was given tramadol prescription per Dr. Magaña, but she has been taking this medication judiciously as she read this was an  opioid.  Patient also reports she would not like to pursue surgical intervention in the near future as long as she is benefitting from physical therapy and injections.    Interval history 08/01/2024  Patient presents status post bilateral intra-articular hip joint injection 06/25/2024.  Patient reports at least 50% noticeable relief in bilateral hip pain following her injection compounded by improvement in functionality.  She reports normally when traveling, she was unable to walk for distances in the airport.  She reports after her procedure she was able to reach her gate  in [place] without difficulty.  Today she again reiterates pain in the lower back which radiates into the hip and down the lateral and posterior aspect of the right lower extremity in L4-S1 distribution to the knee.  Patient reports this morning she did have pain across both sides of the lower back but after a hot shower, pain was only maintained in the right lower back and leg.  She has continued physician directed physical therapy exercises for lower back and leg pain over the last 8 weeks from 06/01/2024 through 08/01/2024 with marginal improvement in her symptoms.      Interval history 06/04/2024  Patient presents for bilateral hip x-ray review.  Today patient again reports pain in the lower back which radiates primarily into the hips, she does report intermittent radiation down the right lower extremity in L4 distribution to the knee.  90%, majority of her pain remains in the lower back and hips.  Pain is particularly exacerbated with positional changes moving from sitting to standing and with prolonged standing.  Patient reports she has had numerous presentations since our last clinic visit which greatly exacerbated her pain with prolonged standing.  She states she was helping to meal prep for a friend the day prior and was unable to complete cooking secondary to her severe pain in her hips.  Pain today is rated a 10/10 and is constant.   Patient has been performing physician directed physical therapy exercises daily over the last 8 weeks from 04/04/2024 through 06/04/2024 for lower back and hip pain with no significant improvement in pain, range of motion.    Patient does report last bilateral L5-S1 transforaminal epidural steroid injection did give her significant relief however this was very short-lived.      Interval history 10/11/2023  Patient presents status post bilateral L5-S1 transforaminal epidural steroid injection 07/28/2023 with 80 % relief initially.  Today patient reports only approximately 30% relief in lower back and radicular symptoms following her epidural.  Today she is reporting pain in the lower back which radiates into bilateral hips and intermittently down the lateral aspect of the lower extremities in L4-5 distribution to the knee.  Pain is more severe on the right than on the left.  Patient reports secondary to family obligations she had to temporarily discontinue physical therapy but did report her therapy was helping.  She would like to restart her physical therapy.  She reports pain is exacerbated with prolonged standing and with ambulation.  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  She reports pain is improved while sitting but is not completely resolved.      Interval History (5/8/2023): Amelia Mirza presents today for follow-up visit.  she underwent bilateral SIJ + GT bursa injection on 4/5/23.  The patient reports that she is/was better following the procedure.  she reports 75-80% pain relief in her low back, 70-80% relief in her left bursa, and 50-60% relief in her right bursa. Continues to report intermittent pain throughout the day, but overall much improved. Pain is exacerbated by prolonged activity, improved with rest.  The changes lasted 4 weeks so far.  The changes  have continued through this visit.  Patient reports pain as 2/10 today.      Interval History (3/20/2023):  Amelia Mirza presents today for 3 month follow-up visit.  Patient was last seen on 12/16/2022. Last injection Bilateral  L5/S1 TF KARISSA on 11/18/22 with 65-70% relief.  She reports persistent and constant lower lumbosacral pain, right worse than left with radiation into her lateral hips and lateral thighs. She reports only intermittent radiation into her right lower extremity/foot, but her constant pain is axial in nature. Pain is worsened with prolonged sitting, standing, or walking. Pain is alleviated with ibuprofen, biofreeze, and rest. She has completed formal physical therapy without relief. Pain interferes with daily activities. Patient reports pain as 5/10 today and 7/10 with exacerbating factors.      Interval History (12/16/2022): Amelia Mirza presents today for follow-up visit.  she underwent Bilateral  L5/S1 TF KARISSA on 11/18/22.  The patient reports that she is/was better following the procedure.  she reports 65-70% pain relief.  The changes lasted 4 weeks so far.  The changes have continued through this visit.  She reports this injection was equally effective c/t IL KARISSA in April, but neither were as effective as the initial IL KARISSA she had in October of 2021. She recently restarted physical therapy, which does offer improvement and relief in her symptoms. Patient reports pain as 2/10 today. She reports increased mobility since the injection, she is able to stand and walk longer distances.    Interval History (10/28/2022):  Amelia Mirza presents today via telemed for follow-up visit for MRI review.  Patient was last seen on 10/13/2022. She reports continued lumbosacral pain in a band-like distribution with radiation into her bilateral lower extremities left > right. She reports her symptoms previously were worse in her right LE but now she favors the other leg to compensate. She  reports pain radiates primarily posteriorly along L5/S1 distribution with occasional radiation into her anterior thighs. She reports improvement in her radicular symptoms with physical therapy. Patient reports pain as 7/10 today and daily which is constant in nature and interferes with daily activity.       Interval history 10/13/2022  Patient presents status post L5-S1 interlaminar epidural steroid injection with right paramedian approach 04/22/2022.  Patient reports 65-70% relief in lower back and leg pain following her last epidural steroid injection.  Patient reports pain has been insidiously worsening over the last month.  Pain today is rated an 8/10.  Patient again reports pain in the lower back which radiates down the posterior aspects of bilateral lower extremities and L5-S1 distribution to the feet.  Patient reports pain has now interfered with her sleep.  Patient is interested in pursuing repeat injection.  Patient reports she is actively been participating in traditional as well as aquatic physical therapy which has been helping with strength and range of motion.      Interval history 03/31/2022  Patient presents for 5 month follow-up.  She reports return of lower back pain which radiates into the buttocks and down the posterior aspects of bilateral lower extremities and L5-S1 distribution.  Patient reports the symptoms are identical to prior lumbar epidural steroid injection.  Patient is interested in pursuing repeat intervention.  Patient also reports myofascial pain along thoracic paraspinous musculature.  Patient reports pain is interrupting with her sleep.  She denies new onset lower extremity weakness, bowel or bladder incontinence or saddle anesthesia.    Interval History (10/28/2021):  Amelia Mirza presents today for follow-up visit.  S/p L5/S1 IL KARISSA on 10/4/21 with 90% pain relief.  Pain was basically resolved after, but then she had a fall which Increased knee pain.  She hasnt seen  "Orthopedics in years.  Patient reports pain as "0/10 today.  Overall, she has greatly improved since procedure.     Initial HPI (7/12/21) - Dr. Adler:  Amelia Mirza is a 61 y.o.   female with past medical history significant for hypoTH, morbid obesity, osteoarthritis (knees), lumbar spondylosis who presents to the clinic for the evaluation of lower back and left leg pain . The pain started  1 year prior ago  without preceding accident or trauma and symptoms have been worsening.The pain is located in a bandlike distribution at the level of the iliac crest with radiation down the left lower extremity along the lateral and posterior aspects in L4-5 distribution.  The pain is described as constant, aching and sharp and is rated as 7/10. The pain is rated with a score of  7/10 on the BEST day and a score of 10/10 on the WORST day.  Symptoms interfere with daily activity, sleeping and work.   Patient does work from home but currently is off her summer break and is disheartened by her inability to participate in outdoor activities or time with her family secondary to her pain. The pain is exacerbated by Sitting, Standing, Walking and Getting out of bed/chair.    Patient is able to ambulate approximately 3 blocks before requiring rest.The pain is mitigated by laying down and rest.     Pt saw MELI Mar for bilateral knee pain 3/2019 with topical compound cream prescribed and diagnostic genicular procedure discussed but not performed.     Pt saw Dr. Padilla (2017) for lower back pain with radiculopathy with recommendation for PT, NSAID analgesic prescribed.     Patient denies urinary incontinence, bowel incontinence, significant motor weakness and loss of sensations.    Pain Disability Index Review:      3/6/2019    12:00 PM   Last 3 PDI Scores   Pain Disability Index (PDI) 31       Non-Pharmacologic Treatments:  Physical Therapy/Home Exercise: yes  Ice/Heat:yes  TENS: no  Acupuncture: no  Massage: " no  Chiropractic: no    Other: no      Pain Medications:  - Adjuvant Medications: Advil,Motrin ( Ibuprofen), Mobic (Meloxicam), Zanaflex ( Tizanidine) and NSAIDs, Tylenol, Biofreeze, EMLA cream    Pain Procedures:   Intra-articular steroid and visco-supplementation in bilateral knees - in other dept    Dr. Adler:  -10/04/2021: L5/S1 IL KARISSA with 90% pain relief   -04/22/2022:  L5-S1 interlaminar epidural steroid injection with right paramedian approach with 65-70% relief  -11/18/2022: Bilateral  L5/S1 TF KARISSA with 65-70% relief  -04/05/2023: bilateral SIJ + GT bursa injection with 75-80% pain relief in her low back, 70-80% relief in her left bursa, and 50-60% relief in her right bursa  -07/28/2023: Bilateral L5-S1 transforaminal epidural steroid injection  -06/25/2024: Bilateral intra-articular hip joint injection  -08/23/2024: Right-sided L4-5 and L5-S1 transforaminal epidural steroid injection      Review of Systems:   GENERAL:  No weight loss, malaise or fevers.  HEENT:   No recent changes in vision or hearing  NECK:  Negative for lumps, no difficulty with swallowing.  RESPIRATORY:  Negative for cough, wheezing or shortness of breath, patient denies any recent URI.  CARDIOVASCULAR:  Negative for chest pain, leg swelling or palpitations.  GI:  Negative for abdominal discomfort, blood in stools or black stools or change in bowel habits.  MUSCULOSKELETAL:  See HPI.  SKIN:  Negative for lesions, rash, and itching.  PSYCH:  No mood disorder or recent psychosocial stressors.  Patients sleep is not disturbed secondary to pain.  HEMATOLOGY/LYMPHOLOGY:  Negative for prolonged bleeding, bruising easily or swollen nodes.  Patient is not currently taking any anti-coagulants  NEURO:   No history of headaches, syncope, paralysis, seizures or tremors.  All other reviewed and negative other than HPI.    OBJECTIVE:  Telemedicine Exam  There were no vitals filed for this visit.  There is no height or weight on file to calculate BMI.    (reviewed on 9/26/2024)     GENERAL: Well appearing, in no acute distress, alert and oriented x3.  Cooperative.  PSYCH:  Mood and affect appropriate.  SKIN: Skin color & texture with no obvious abnormalities.    HEAD/FACE:  Normocephalic, atraumatic.    PULM:  No difficulty breathing. No nasal flaring. No obvious wheezing.  EXTREMITIES: No obvious deformities. Moving all extremities well, appears to have symmetric strength throughout.  MUSCULOSKELETAL: No obvious atrophy abnormalities are noted.   NEURO: No obvious neurologic deficit.   GAIT: sitting.     Physical Exam: last in clinic visit:    Physical Exam:    There were no vitals filed for this visit.   There is no height or weight on file to calculate BMI.   (reviewed on 9/26/2024)  Last clinic visit:  GENERAL: Well appearing, in no acute distress, alert and oriented x3.  PSYCH:  Mood and affect appropriate.  SKIN: Skin color, texture, turgor normal, no rashes or lesions.  HEAD/FACE:  Normocephalic, atraumatic. Cranial nerves grossly intact.  NECK: No pain to palpation over the cervical paraspinous muscles. Spurling Negative. No pain with neck flexion, extension, or lateral flexion.   PULM: No evidence of respiratory difficulty, symmetric chest rise.  GI:  Soft and non-tender.  BACK: Straight leg raising in the sitting and supine positions is negative to radicular pain. No pain to palpation over the facet joints of the lumbar spine or spinous processes. Normal range of motion without pain reproduction.  SIJ testing:  - TTP over SI joint: Present bilaterally, right > left  - Rick's/ Jean-Pierre's: Positive bilaterally  - Sacroiliac Distraction Test (anterior pressure): Positive  - Sacroiliac Compression Test (lateral pressure): Positive   - SacralThrust Test (posterior pressure): Positive  -TTP along bilateral GT bursa, right > left  EXTREMITIES: Peripheral joint ROM is full and pain free without obvious instability or laxity in all four extremities- with  exception of knees.  No deformities, edema, or skin discoloration. Good capillary refill.  MUSCULOSKELETAL: Shoulder, hip, and knee provocative maneuvers are negative.  T   Bilateral upper and lower extremity strength is normal and symmetric.  No atrophy or tone abnormalities are noted. Decreased sensation LLE: L4-5 distribution to knee.  Pain with extension of knee. TTP diffusely over right knee patella.  NEURO: BUE & BLE coordination and muscle stretch reflexes are physiologic and symmetric.  Plantar response are downgoing. No clonus.   GAIT: normal.      Imaging (Reviewed on 9/26/2024):     X-ray bilateral hips 10/11/2023  FINDINGS: No fracture, suspicious bone marrow lesion or other acute disease is seen in the pelvis or either hip. Joint alignment is anatomic. There is no radiographic evidence of femoral head osteonecrosis. Mild degenerative changes superior acetabulum.       MRI lumbar spine 06/12/2024  FINDINGS:  Alignment: Approximately 5 mm of grade 1 anterolisthesis at L4-5.  Slight retrolisthesis at L5-S1.     Vertebrae: Discogenic sub endplate marrow signal alteration (Modic changes) at L4-5 and to a lesser degree at L5-S1.     Discs: Progressive loss of disc height at L4-5 and L5-S1.     Cord: Normal.  Conus terminates at L1.     Degenerative findings:     T11-12: Small posterior disc bulge.  No significant neural foraminal narrowing or spinal canal stenosis.     T12-L1: No significant neural foraminal narrowing or spinal canal stenosis.     L1-L2: No significant neural foraminal narrowing or spinal canal stenosis.     L2-L3: No significant neural foraminal narrowing or spinal canal stenosis.     L3-L4: Minimal annular bulge and bilateral facet arthropathy contributing to mild left greater than right inferior neural foraminal narrowing.  No significant spinal canal stenosis.     L4-L5: Prominent posterior disc bulge with slight right foraminal asymmetry and bilateral hypertrophic facet arthropathy.   Resultant moderate to severe bilateral neural foraminal narrowing.  No significant spinal canal stenosis.     L5-S1: Broad-based posterior disc bulge with small central protrusion and bilateral facet arthropathy.  Resultant severe left and moderate right neural foraminal narrowing.  No significant spinal canal stenosis.     Paraspinal muscles & soft tissues: Small bilateral renal cortical cysts.  Otherwise unremarkable.     Impression:     Multilevel lumbar spondylosis and degenerative disc disease with mild progression since 2022.    Narrative  Comparison: None    Findings:    Body habitus limits the study.  For tumor body heights and alignment appear well-maintained.  There is uniform loss of diskal height at the L4 -- 5 L5 -- S1 levels.  Multilevel facet arthropathy.  Pedicles and SI joints appear intact.  No spondylolysis  or spondylolisthesis.    X-Ray Cervical Spine AP And Lateral    Narrative  Comparison: None    Technique: Four views were obtained of the cervical spine    Findings: There is straightening of the normal cervical lordosis which can be associated with muscle spasm.  Vertebral body heights are well maintained.  No spondylolisthesis demonstrated.  There is mild loss of disk height from C3-4 and C5-6 with associated degenerative endplate changes and osteophyte production.  Posterior elements appear intact without acute fractures or subluxations demonstrated.  Odontoid process appears intact.  Atlantoaxial articulations appear normal.  Prevertebral soft tissues are within normal limits.        ASSESSMENT: 64 y.o. year old female with lower back and left lower extremity pain, consistent with     1. Lumbar radiculopathy        2. Lumbar degenerative disc disease        3. Anterolisthesis of lumbar spine        4. Sacroiliitis  Case Request-RAD/Other Procedure Area: Bilateral SIJ Injection          PLAN:   1. Interventional:  Schedule for bilateral sacroiliac joint injection for sacroiliitis.  Of note  "patient received 80% relief with last sacroiliac joint injection.  We have discussed the procedure, benefits and potential risk and alternative options in detail.  Patient has elected to pursue this procedure.    - Anticoagulation use: None.     -patient has 50% sustained relief in bilateral hip pain following intra-articular hip joint injection.  We have discussed repeating this injection in the future should hip pain exacerbate.  -patient has 65-70% sustained relief following right-sided L4-5 and L5-S1 transforaminal epidural steroid injection.  We have discussed repeating this procedure should radicular pain exacerbate.    2. Pharmacologic:    reviewed and consistent with use.    - DC Gabapentin 2/2 side effects ("didn't feel like herself")    -Continue Tizanidine 4 mg nightly PRN for myofascial pain.  We have discussed potential deleterious side effects associated with this medication including  dizziness, drowsiness, dry mouth or tingling sensation in the upper or lower extremities.     3. Rehabilitative:  -We discussed continuing at home physician directed physical therapy to help manage the patient/s painful condition. The patient was counseled that muscle strengthening will improve the long term prognosis in regards to pain and may also help increase range of motion and mobility.  Patient has completed conventional land-based physical therapy from 07/21/2021 through 10/28/2021, 14 sessions total.    4. Diagnostic:  Reviewed diagnostic imaging (bilateral hip x-ray, MRI lumbar spine, flex/ex lumbar XR) with the patient and answered any questions.     5. Consult/ Referral:  (PRN)  -Neurosurgery, Dr. Magaña:  Lumbar degenerative disc disease, anterolisthesis and radiculopathy    6. Follow up:  4-6 weeks status post sacroiliac joint injection.  Virtual visit with Bernice Bolton PA-C      The above plan and management options were discussed at length with patient. Patient is in agreement with the above and " verbalized understanding.    - I discussed the goals of interventional chronic pain management with the patient on today's visit. We discussed a multimodal and systematic approach to pain.  This includes diagnostic and therapeutic injections, adjuvant pharmacologic treatment, physical therapy, and at times psychiatry.  I emphasized the importance of regular exercise, core strengthening and stretching, diet and weight loss as a cornerstone of long-term pain management.    - This condition does not require this patient to take time off of work, and the primary goal of our Pain Management services is to improve the patient's functional capacity.  - Patient Questions: Answered all of the patient's questions regarding diagnoses, therapy, treatment and next steps    Visit today included increased complexity associated with the care of the episodic problem of chronic pain which was addressed and continue to manage the longitudinal care of the patient due to the serious and/or complex managed problem(s) listed above.      Josemanuel Adler MD    Disclaimer:  This note was prepared using voice recognition system and is likely to have sound alike errors that may have been overlooked even after proof reading.  Please call me with any questions.

## 2024-09-25 NOTE — H&P (VIEW-ONLY)
Established Patient Interventional Pain Note (Follow-up Visit)    The patient location is: car  The chief complaint leading to consultation is: LBP  Visit type: Virtual visit with synchronous audio and video  Total time spent with patient: 15m  Each patient to whom he or she provides medical services by telemedicine is: (1) informed of the relationship between the physician and patient and the respective role of any other health care provider with respect to management of the patient; and (2) notified that he or she may decline to receive medical services by telemedicine and may withdraw from such care at any time.    Referring Physician: Eve Green MD    PCP: Eve Green MD    Chief Complaint:   LBP     SUBJECTIVE:  Interval history 09/26/2024  Patient presents status post right-sided L4-5 and L5-S1 transforaminal epidural steroid injection 08/23/2024.  Patient reports 65-70% sustained relief in right-sided radicular symptoms following her epidural steroid injection.  Today she reports pain primarily in the lower back which radiates into bilateral buttocks overlying bilateral sacroiliac joint territory.  Pain is intermittent and today is rated a 6/10.  Can can be exacerbated with bending forward, moving from sitting to standing and with ambulation.  Today she denies significant lower extremity radiculopathy, bowel or bladder incontinence or saddle anesthesia.  She has continued physician directed physical therapy exercises for lower back, sacroiliac joint and leg pain over the last 8 weeks from 07/26/2024 through 09/26/2024 with marginal improvement in her symptoms.    Of note patient saw Dr. Magaña 09/05/2024 with the following evaluation:      Patient reports neurosurgery recommended continuing with conservative treatments including physical therapy and intervention.  Patient was given tramadol prescription per Dr. Magaña, but she has been taking this medication judiciously as she read this was an  opioid.  Patient also reports she would not like to pursue surgical intervention in the near future as long as she is benefitting from physical therapy and injections.    Interval history 08/01/2024  Patient presents status post bilateral intra-articular hip joint injection 06/25/2024.  Patient reports at least 50% noticeable relief in bilateral hip pain following her injection compounded by improvement in functionality.  She reports normally when traveling, she was unable to walk for distances in the airport.  She reports after her procedure she was able to reach her gate  in [place] without difficulty.  Today she again reiterates pain in the lower back which radiates into the hip and down the lateral and posterior aspect of the right lower extremity in L4-S1 distribution to the knee.  Patient reports this morning she did have pain across both sides of the lower back but after a hot shower, pain was only maintained in the right lower back and leg.  She has continued physician directed physical therapy exercises for lower back and leg pain over the last 8 weeks from 06/01/2024 through 08/01/2024 with marginal improvement in her symptoms.      Interval history 06/04/2024  Patient presents for bilateral hip x-ray review.  Today patient again reports pain in the lower back which radiates primarily into the hips, she does report intermittent radiation down the right lower extremity in L4 distribution to the knee.  90%, majority of her pain remains in the lower back and hips.  Pain is particularly exacerbated with positional changes moving from sitting to standing and with prolonged standing.  Patient reports she has had numerous presentations since our last clinic visit which greatly exacerbated her pain with prolonged standing.  She states she was helping to meal prep for a friend the day prior and was unable to complete cooking secondary to her severe pain in her hips.  Pain today is rated a 10/10 and is constant.   Patient has been performing physician directed physical therapy exercises daily over the last 8 weeks from 04/04/2024 through 06/04/2024 for lower back and hip pain with no significant improvement in pain, range of motion.    Patient does report last bilateral L5-S1 transforaminal epidural steroid injection did give her significant relief however this was very short-lived.      Interval history 10/11/2023  Patient presents status post bilateral L5-S1 transforaminal epidural steroid injection 07/28/2023 with 80 % relief initially.  Today patient reports only approximately 30% relief in lower back and radicular symptoms following her epidural.  Today she is reporting pain in the lower back which radiates into bilateral hips and intermittently down the lateral aspect of the lower extremities in L4-5 distribution to the knee.  Pain is more severe on the right than on the left.  Patient reports secondary to family obligations she had to temporarily discontinue physical therapy but did report her therapy was helping.  She would like to restart her physical therapy.  She reports pain is exacerbated with prolonged standing and with ambulation.  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  She reports pain is improved while sitting but is not completely resolved.      Interval History (5/8/2023): Amelia Mirza presents today for follow-up visit.  she underwent bilateral SIJ + GT bursa injection on 4/5/23.  The patient reports that she is/was better following the procedure.  she reports 75-80% pain relief in her low back, 70-80% relief in her left bursa, and 50-60% relief in her right bursa. Continues to report intermittent pain throughout the day, but overall much improved. Pain is exacerbated by prolonged activity, improved with rest.  The changes lasted 4 weeks so far.  The changes  have continued through this visit.  Patient reports pain as 2/10 today.      Interval History (3/20/2023):  Amelia Mirza presents today for 3 month follow-up visit.  Patient was last seen on 12/16/2022. Last injection Bilateral  L5/S1 TF KARISSA on 11/18/22 with 65-70% relief.  She reports persistent and constant lower lumbosacral pain, right worse than left with radiation into her lateral hips and lateral thighs. She reports only intermittent radiation into her right lower extremity/foot, but her constant pain is axial in nature. Pain is worsened with prolonged sitting, standing, or walking. Pain is alleviated with ibuprofen, biofreeze, and rest. She has completed formal physical therapy without relief. Pain interferes with daily activities. Patient reports pain as 5/10 today and 7/10 with exacerbating factors.      Interval History (12/16/2022): Ameila Mirza presents today for follow-up visit.  she underwent Bilateral  L5/S1 TF KARISSA on 11/18/22.  The patient reports that she is/was better following the procedure.  she reports 65-70% pain relief.  The changes lasted 4 weeks so far.  The changes have continued through this visit.  She reports this injection was equally effective c/t IL KARISSA in April, but neither were as effective as the initial IL KARISSA she had in October of 2021. She recently restarted physical therapy, which does offer improvement and relief in her symptoms. Patient reports pain as 2/10 today. She reports increased mobility since the injection, she is able to stand and walk longer distances.    Interval History (10/28/2022):  Amelia Mirza presents today via telemed for follow-up visit for MRI review.  Patient was last seen on 10/13/2022. She reports continued lumbosacral pain in a band-like distribution with radiation into her bilateral lower extremities left > right. She reports her symptoms previously were worse in her right LE but now she favors the other leg to compensate. She  reports pain radiates primarily posteriorly along L5/S1 distribution with occasional radiation into her anterior thighs. She reports improvement in her radicular symptoms with physical therapy. Patient reports pain as 7/10 today and daily which is constant in nature and interferes with daily activity.       Interval history 10/13/2022  Patient presents status post L5-S1 interlaminar epidural steroid injection with right paramedian approach 04/22/2022.  Patient reports 65-70% relief in lower back and leg pain following her last epidural steroid injection.  Patient reports pain has been insidiously worsening over the last month.  Pain today is rated an 8/10.  Patient again reports pain in the lower back which radiates down the posterior aspects of bilateral lower extremities and L5-S1 distribution to the feet.  Patient reports pain has now interfered with her sleep.  Patient is interested in pursuing repeat injection.  Patient reports she is actively been participating in traditional as well as aquatic physical therapy which has been helping with strength and range of motion.      Interval history 03/31/2022  Patient presents for 5 month follow-up.  She reports return of lower back pain which radiates into the buttocks and down the posterior aspects of bilateral lower extremities and L5-S1 distribution.  Patient reports the symptoms are identical to prior lumbar epidural steroid injection.  Patient is interested in pursuing repeat intervention.  Patient also reports myofascial pain along thoracic paraspinous musculature.  Patient reports pain is interrupting with her sleep.  She denies new onset lower extremity weakness, bowel or bladder incontinence or saddle anesthesia.    Interval History (10/28/2021):  Amelia Mirza presents today for follow-up visit.  S/p L5/S1 IL KARISSA on 10/4/21 with 90% pain relief.  Pain was basically resolved after, but then she had a fall which Increased knee pain.  She hasnt seen  "Orthopedics in years.  Patient reports pain as "0/10 today.  Overall, she has greatly improved since procedure.     Initial HPI (7/12/21) - Dr. Adler:  Amelia Mirza is a 61 y.o.   female with past medical history significant for hypoTH, morbid obesity, osteoarthritis (knees), lumbar spondylosis who presents to the clinic for the evaluation of lower back and left leg pain . The pain started  1 year prior ago  without preceding accident or trauma and symptoms have been worsening.The pain is located in a bandlike distribution at the level of the iliac crest with radiation down the left lower extremity along the lateral and posterior aspects in L4-5 distribution.  The pain is described as constant, aching and sharp and is rated as 7/10. The pain is rated with a score of  7/10 on the BEST day and a score of 10/10 on the WORST day.  Symptoms interfere with daily activity, sleeping and work.   Patient does work from home but currently is off her summer break and is disheartened by her inability to participate in outdoor activities or time with her family secondary to her pain. The pain is exacerbated by Sitting, Standing, Walking and Getting out of bed/chair.    Patient is able to ambulate approximately 3 blocks before requiring rest.The pain is mitigated by laying down and rest.     Pt saw MELI Mar for bilateral knee pain 3/2019 with topical compound cream prescribed and diagnostic genicular procedure discussed but not performed.     Pt saw Dr. Padilla (2017) for lower back pain with radiculopathy with recommendation for PT, NSAID analgesic prescribed.     Patient denies urinary incontinence, bowel incontinence, significant motor weakness and loss of sensations.    Pain Disability Index Review:      3/6/2019    12:00 PM   Last 3 PDI Scores   Pain Disability Index (PDI) 31       Non-Pharmacologic Treatments:  Physical Therapy/Home Exercise: yes  Ice/Heat:yes  TENS: no  Acupuncture: no  Massage: " no  Chiropractic: no    Other: no      Pain Medications:  - Adjuvant Medications: Advil,Motrin ( Ibuprofen), Mobic (Meloxicam), Zanaflex ( Tizanidine) and NSAIDs, Tylenol, Biofreeze, EMLA cream    Pain Procedures:   Intra-articular steroid and visco-supplementation in bilateral knees - in other dept    Dr. Adler:  -10/04/2021: L5/S1 IL KARISSA with 90% pain relief   -04/22/2022:  L5-S1 interlaminar epidural steroid injection with right paramedian approach with 65-70% relief  -11/18/2022: Bilateral  L5/S1 TF KARISSA with 65-70% relief  -04/05/2023: bilateral SIJ + GT bursa injection with 75-80% pain relief in her low back, 70-80% relief in her left bursa, and 50-60% relief in her right bursa  -07/28/2023: Bilateral L5-S1 transforaminal epidural steroid injection  -06/25/2024: Bilateral intra-articular hip joint injection  -08/23/2024: Right-sided L4-5 and L5-S1 transforaminal epidural steroid injection      Review of Systems:   GENERAL:  No weight loss, malaise or fevers.  HEENT:   No recent changes in vision or hearing  NECK:  Negative for lumps, no difficulty with swallowing.  RESPIRATORY:  Negative for cough, wheezing or shortness of breath, patient denies any recent URI.  CARDIOVASCULAR:  Negative for chest pain, leg swelling or palpitations.  GI:  Negative for abdominal discomfort, blood in stools or black stools or change in bowel habits.  MUSCULOSKELETAL:  See HPI.  SKIN:  Negative for lesions, rash, and itching.  PSYCH:  No mood disorder or recent psychosocial stressors.  Patients sleep is not disturbed secondary to pain.  HEMATOLOGY/LYMPHOLOGY:  Negative for prolonged bleeding, bruising easily or swollen nodes.  Patient is not currently taking any anti-coagulants  NEURO:   No history of headaches, syncope, paralysis, seizures or tremors.  All other reviewed and negative other than HPI.    OBJECTIVE:  Telemedicine Exam  There were no vitals filed for this visit.  There is no height or weight on file to calculate BMI.    (reviewed on 9/26/2024)     GENERAL: Well appearing, in no acute distress, alert and oriented x3.  Cooperative.  PSYCH:  Mood and affect appropriate.  SKIN: Skin color & texture with no obvious abnormalities.    HEAD/FACE:  Normocephalic, atraumatic.    PULM:  No difficulty breathing. No nasal flaring. No obvious wheezing.  EXTREMITIES: No obvious deformities. Moving all extremities well, appears to have symmetric strength throughout.  MUSCULOSKELETAL: No obvious atrophy abnormalities are noted.   NEURO: No obvious neurologic deficit.   GAIT: sitting.     Physical Exam: last in clinic visit:    Physical Exam:    There were no vitals filed for this visit.   There is no height or weight on file to calculate BMI.   (reviewed on 9/26/2024)  Last clinic visit:  GENERAL: Well appearing, in no acute distress, alert and oriented x3.  PSYCH:  Mood and affect appropriate.  SKIN: Skin color, texture, turgor normal, no rashes or lesions.  HEAD/FACE:  Normocephalic, atraumatic. Cranial nerves grossly intact.  NECK: No pain to palpation over the cervical paraspinous muscles. Spurling Negative. No pain with neck flexion, extension, or lateral flexion.   PULM: No evidence of respiratory difficulty, symmetric chest rise.  GI:  Soft and non-tender.  BACK: Straight leg raising in the sitting and supine positions is negative to radicular pain. No pain to palpation over the facet joints of the lumbar spine or spinous processes. Normal range of motion without pain reproduction.  SIJ testing:  - TTP over SI joint: Present bilaterally, right > left  - Rick's/ Jean-Peirre's: Positive bilaterally  - Sacroiliac Distraction Test (anterior pressure): Positive  - Sacroiliac Compression Test (lateral pressure): Positive   - SacralThrust Test (posterior pressure): Positive  -TTP along bilateral GT bursa, right > left  EXTREMITIES: Peripheral joint ROM is full and pain free without obvious instability or laxity in all four extremities- with  exception of knees.  No deformities, edema, or skin discoloration. Good capillary refill.  MUSCULOSKELETAL: Shoulder, hip, and knee provocative maneuvers are negative.  T   Bilateral upper and lower extremity strength is normal and symmetric.  No atrophy or tone abnormalities are noted. Decreased sensation LLE: L4-5 distribution to knee.  Pain with extension of knee. TTP diffusely over right knee patella.  NEURO: BUE & BLE coordination and muscle stretch reflexes are physiologic and symmetric.  Plantar response are downgoing. No clonus.   GAIT: normal.      Imaging (Reviewed on 9/26/2024):     X-ray bilateral hips 10/11/2023  FINDINGS: No fracture, suspicious bone marrow lesion or other acute disease is seen in the pelvis or either hip. Joint alignment is anatomic. There is no radiographic evidence of femoral head osteonecrosis. Mild degenerative changes superior acetabulum.       MRI lumbar spine 06/12/2024  FINDINGS:  Alignment: Approximately 5 mm of grade 1 anterolisthesis at L4-5.  Slight retrolisthesis at L5-S1.     Vertebrae: Discogenic sub endplate marrow signal alteration (Modic changes) at L4-5 and to a lesser degree at L5-S1.     Discs: Progressive loss of disc height at L4-5 and L5-S1.     Cord: Normal.  Conus terminates at L1.     Degenerative findings:     T11-12: Small posterior disc bulge.  No significant neural foraminal narrowing or spinal canal stenosis.     T12-L1: No significant neural foraminal narrowing or spinal canal stenosis.     L1-L2: No significant neural foraminal narrowing or spinal canal stenosis.     L2-L3: No significant neural foraminal narrowing or spinal canal stenosis.     L3-L4: Minimal annular bulge and bilateral facet arthropathy contributing to mild left greater than right inferior neural foraminal narrowing.  No significant spinal canal stenosis.     L4-L5: Prominent posterior disc bulge with slight right foraminal asymmetry and bilateral hypertrophic facet arthropathy.   Resultant moderate to severe bilateral neural foraminal narrowing.  No significant spinal canal stenosis.     L5-S1: Broad-based posterior disc bulge with small central protrusion and bilateral facet arthropathy.  Resultant severe left and moderate right neural foraminal narrowing.  No significant spinal canal stenosis.     Paraspinal muscles & soft tissues: Small bilateral renal cortical cysts.  Otherwise unremarkable.     Impression:     Multilevel lumbar spondylosis and degenerative disc disease with mild progression since 2022.    Narrative  Comparison: None    Findings:    Body habitus limits the study.  For tumor body heights and alignment appear well-maintained.  There is uniform loss of diskal height at the L4 -- 5 L5 -- S1 levels.  Multilevel facet arthropathy.  Pedicles and SI joints appear intact.  No spondylolysis  or spondylolisthesis.    X-Ray Cervical Spine AP And Lateral    Narrative  Comparison: None    Technique: Four views were obtained of the cervical spine    Findings: There is straightening of the normal cervical lordosis which can be associated with muscle spasm.  Vertebral body heights are well maintained.  No spondylolisthesis demonstrated.  There is mild loss of disk height from C3-4 and C5-6 with associated degenerative endplate changes and osteophyte production.  Posterior elements appear intact without acute fractures or subluxations demonstrated.  Odontoid process appears intact.  Atlantoaxial articulations appear normal.  Prevertebral soft tissues are within normal limits.        ASSESSMENT: 64 y.o. year old female with lower back and left lower extremity pain, consistent with     1. Lumbar radiculopathy        2. Lumbar degenerative disc disease        3. Anterolisthesis of lumbar spine        4. Sacroiliitis  Case Request-RAD/Other Procedure Area: Bilateral SIJ Injection          PLAN:   1. Interventional:  Schedule for bilateral sacroiliac joint injection for sacroiliitis.  Of note  "patient received 80% relief with last sacroiliac joint injection.  We have discussed the procedure, benefits and potential risk and alternative options in detail.  Patient has elected to pursue this procedure.    - Anticoagulation use: None.     -patient has 50% sustained relief in bilateral hip pain following intra-articular hip joint injection.  We have discussed repeating this injection in the future should hip pain exacerbate.  -patient has 65-70% sustained relief following right-sided L4-5 and L5-S1 transforaminal epidural steroid injection.  We have discussed repeating this procedure should radicular pain exacerbate.    2. Pharmacologic:    reviewed and consistent with use.    - DC Gabapentin 2/2 side effects ("didn't feel like herself")    -Continue Tizanidine 4 mg nightly PRN for myofascial pain.  We have discussed potential deleterious side effects associated with this medication including  dizziness, drowsiness, dry mouth or tingling sensation in the upper or lower extremities.     3. Rehabilitative:  -We discussed continuing at home physician directed physical therapy to help manage the patient/s painful condition. The patient was counseled that muscle strengthening will improve the long term prognosis in regards to pain and may also help increase range of motion and mobility.  Patient has completed conventional land-based physical therapy from 07/21/2021 through 10/28/2021, 14 sessions total.    4. Diagnostic:  Reviewed diagnostic imaging (bilateral hip x-ray, MRI lumbar spine, flex/ex lumbar XR) with the patient and answered any questions.     5. Consult/ Referral:  (PRN)  -Neurosurgery, Dr. Magaña:  Lumbar degenerative disc disease, anterolisthesis and radiculopathy    6. Follow up:  4-6 weeks status post sacroiliac joint injection.  Virtual visit with Bernice Bolton PA-C      The above plan and management options were discussed at length with patient. Patient is in agreement with the above and " verbalized understanding.    - I discussed the goals of interventional chronic pain management with the patient on today's visit. We discussed a multimodal and systematic approach to pain.  This includes diagnostic and therapeutic injections, adjuvant pharmacologic treatment, physical therapy, and at times psychiatry.  I emphasized the importance of regular exercise, core strengthening and stretching, diet and weight loss as a cornerstone of long-term pain management.    - This condition does not require this patient to take time off of work, and the primary goal of our Pain Management services is to improve the patient's functional capacity.  - Patient Questions: Answered all of the patient's questions regarding diagnoses, therapy, treatment and next steps    Visit today included increased complexity associated with the care of the episodic problem of chronic pain which was addressed and continue to manage the longitudinal care of the patient due to the serious and/or complex managed problem(s) listed above.      Josemanuel Adler MD    Disclaimer:  This note was prepared using voice recognition system and is likely to have sound alike errors that may have been overlooked even after proof reading.  Please call me with any questions.

## 2024-09-26 ENCOUNTER — OFFICE VISIT (OUTPATIENT)
Dept: PAIN MEDICINE | Facility: CLINIC | Age: 64
End: 2024-09-26
Payer: COMMERCIAL

## 2024-09-26 ENCOUNTER — TELEPHONE (OUTPATIENT)
Dept: PAIN MEDICINE | Facility: CLINIC | Age: 64
End: 2024-09-26

## 2024-09-26 DIAGNOSIS — M46.1 SACROILIITIS: ICD-10-CM

## 2024-09-26 DIAGNOSIS — M54.16 LUMBAR RADICULOPATHY: Primary | ICD-10-CM

## 2024-09-26 DIAGNOSIS — M43.16 ANTEROLISTHESIS OF LUMBAR SPINE: ICD-10-CM

## 2024-09-26 DIAGNOSIS — M51.36 LUMBAR DEGENERATIVE DISC DISEASE: ICD-10-CM

## 2024-09-26 PROCEDURE — 1160F RVW MEDS BY RX/DR IN RCRD: CPT | Mod: CPTII,95,, | Performed by: ANESTHESIOLOGY

## 2024-09-26 PROCEDURE — 99214 OFFICE O/P EST MOD 30 MIN: CPT | Mod: 95,,, | Performed by: ANESTHESIOLOGY

## 2024-09-26 PROCEDURE — 1159F MED LIST DOCD IN RCRD: CPT | Mod: CPTII,95,, | Performed by: ANESTHESIOLOGY

## 2024-10-02 ENCOUNTER — TELEPHONE (OUTPATIENT)
Dept: PAIN MEDICINE | Facility: CLINIC | Age: 64
End: 2024-10-02
Payer: COMMERCIAL

## 2024-10-02 NOTE — TELEPHONE ENCOUNTER
----- Message from David sent at 10/2/2024  1:47 PM CDT -----  Contact: Pt  [phone]  Patient is returning a phone call.    Who left a message for the patient: Yefri Reddy MA    Does patient know what this is regarding:  Yes scheduling     Would you like a call back, or a response through your [facility] portal?:   Yes     Comments:   Yes

## 2024-10-10 ENCOUNTER — HOSPITAL ENCOUNTER (OUTPATIENT)
Dept: RADIOLOGY | Facility: HOSPITAL | Age: 64
Discharge: HOME | End: 2024-10-10
Attending: FAMILY MEDICINE
Payer: COMMERCIAL

## 2024-10-10 DIAGNOSIS — Z12.31 OTHER SCREENING MAMMOGRAM: ICD-10-CM

## 2024-10-10 PROCEDURE — 77067 SCR MAMMO BI INCL CAD: CPT | Mod: TC

## 2024-10-10 PROCEDURE — 77063 BREAST TOMOSYNTHESIS BI: CPT | Mod: TC

## 2024-10-10 PROCEDURE — 77063 BREAST TOMOSYNTHESIS BI: CPT | Mod: 26,,, | Performed by: RADIOLOGY

## 2024-10-10 PROCEDURE — 77067 SCR MAMMO BI INCL CAD: CPT | Mod: 26,,, | Performed by: RADIOLOGY

## 2024-10-11 ENCOUNTER — TELEPHONE (OUTPATIENT)
Dept: FAMILY MEDICINE | Facility: CLINIC | Age: 64
End: 2024-10-11
Payer: COMMERCIAL

## 2024-10-11 ENCOUNTER — PATIENT MESSAGE (OUTPATIENT)
Dept: PAIN MEDICINE | Facility: HOSPITAL | Age: 64
End: 2024-10-11
Payer: COMMERCIAL

## 2024-10-11 NOTE — TELEPHONE ENCOUNTER
Pt came into clinic with a mobility impairment form stating she needs her handicap form renewed. Pt states you have completed this for her previously. Please advise. Thanks.

## 2024-10-11 NOTE — PRE-PROCEDURE INSTRUCTIONS
Dental clearance received and scanned in chart. Spoke with patient regarding procedure scheduled on 10.18     Arrival time 0715     Has patient been sick with fever or on antibiotics within the last 7 days? No     Does the patient have any open wounds, sores or rashes? No     Does the patient have any recent fractures? no     Has patient received a vaccination within the last 7 days? No     Received the COVID vaccination? yes     Has the patient stopped all medications as directed? na     Does patient have a pacemaker, defibrillator, or implantable stimulator? No     Does the patient have a ride to and from procedure and someone reliable to remain with patient?       Is the patient diabetic? no     Does the patient have sleep apnea? Or use O2 at home? no     Is the patient receiving sedation? local     Is the patient instructed to remain NPO beginning at midnight the night before their procedure? 4 hours     Procedure location confirmed with patient? Yes     Covid- Denies signs/symptoms. Instructed to notify PAT/MD if any changes.

## 2024-10-18 ENCOUNTER — HOSPITAL ENCOUNTER (OUTPATIENT)
Facility: HOSPITAL | Age: 64
Discharge: HOME | End: 2024-10-18
Attending: ANESTHESIOLOGY | Admitting: ANESTHESIOLOGY
Payer: COMMERCIAL

## 2024-10-18 VITALS
BODY MASS INDEX: 53.92 KG/M2 | DIASTOLIC BLOOD PRESSURE: 72 MMHG | HEART RATE: 59 BPM | HEIGHT: 62 IN | OXYGEN SATURATION: 99 % | TEMPERATURE: 98 F | RESPIRATION RATE: 16 BRPM | WEIGHT: 293 LBS | SYSTOLIC BLOOD PRESSURE: 136 MMHG

## 2024-10-18 DIAGNOSIS — M46.1 SACROILIITIS: ICD-10-CM

## 2024-10-18 PROCEDURE — 25000003 PHARM REV CODE 250: Performed by: ANESTHESIOLOGY

## 2024-10-18 PROCEDURE — 27096 INJECT SACROILIAC JOINT: CPT | Mod: 50,,, | Performed by: ANESTHESIOLOGY

## 2024-10-18 PROCEDURE — 20610 DRAIN/INJ JOINT/BURSA W/O US: CPT | Mod: 50,59,, | Performed by: ANESTHESIOLOGY

## 2024-10-18 PROCEDURE — 20610 DRAIN/INJ JOINT/BURSA W/O US: CPT | Mod: 50 | Performed by: ANESTHESIOLOGY

## 2024-10-18 PROCEDURE — 27096 INJECT SACROILIAC JOINT: CPT | Mod: 50 | Performed by: ANESTHESIOLOGY

## 2024-10-18 PROCEDURE — 25500020 PHARM REV CODE 255: Performed by: ANESTHESIOLOGY

## 2024-10-18 PROCEDURE — 63600175 PHARM REV CODE 636 W HCPCS: Mod: JZ,JG | Performed by: ANESTHESIOLOGY

## 2024-10-18 RX ORDER — INDOMETHACIN 25 MG/1
CAPSULE ORAL
Status: DISCONTINUED | OUTPATIENT
Start: 2024-10-18 | End: 2024-10-18 | Stop reason: HOSPADM

## 2024-10-18 RX ORDER — BUPIVACAINE HYDROCHLORIDE 2.5 MG/ML
INJECTION, SOLUTION EPIDURAL; INFILTRATION; INTRACAUDAL
Status: DISCONTINUED | OUTPATIENT
Start: 2024-10-18 | End: 2024-10-18 | Stop reason: HOSPADM

## 2024-10-18 RX ORDER — TRIAMCINOLONE ACETONIDE 40 MG/ML
INJECTION, SUSPENSION INTRA-ARTICULAR; INTRAMUSCULAR
Status: DISCONTINUED | OUTPATIENT
Start: 2024-10-18 | End: 2024-10-18 | Stop reason: HOSPADM

## 2024-10-18 NOTE — DISCHARGE SUMMARY
Discharge Note  Short Stay      SUMMARY     Admit Date: 10/18/2024    Attending Physician: Josemanuel Adler MD        Discharge Physician: Josemanuel Adler MD        Discharge Date: 10/18/2024 8:14 AM    Procedure(s) (LRB):  Bilateral SIJ Injection (Bilateral)    Final Diagnosis: Sacroiliitis [M46.1]    Disposition: Home or self care    Patient Instructions:   Current Discharge Medication List        CONTINUE these medications which have NOT CHANGED    Details   amLODIPine (NORVASC) 10 MG tablet Take 1 tablet (10 mg total) by mouth once daily.  Qty: 90 tablet, Refills: 3    Comments: .  Associated Diagnoses: Hypertension, unspecified type      ergocalciferol (ERGOCALCIFEROL) 50,000 unit Cap TAKE 1 CAPSULE TWICE A WEEK  Qty: 25 capsule, Refills: 3    Associated Diagnoses: Vitamin D deficiency      !! levothyroxine (SYNTHROID) 200 MCG tablet TAKE 1 TABLET EVERY MORNING BEFORE BREAKFAST  Qty: 90 tablet, Refills: 3    Associated Diagnoses: Hypothyroidism, unspecified type      !! levothyroxine (SYNTHROID) 25 MCG tablet Take 1 pill by mouth daily before breakfast on Monday through Friday and Take 2 pills by mouth daily before breakfast on Saturday and Sunday  Qty: 114 tablet, Refills: 1    Associated Diagnoses: Hypothyroidism, unspecified type      tiZANidine (ZANAFLEX) 4 MG tablet Take 1 tablet (4 mg total) by mouth 2 (two) times daily.  Qty: 180 tablet, Refills: 0    Associated Diagnoses: Sacroiliitis; Greater trochanteric bursitis of both hips      traZODone (DESYREL) 50 MG tablet TAKE 1 TABLET NIGHTLY AS NEEDED FOR INSOMNIA  Qty: 90 tablet, Refills: 3    Associated Diagnoses: Insomnia, unspecified type      fluticasone propionate (FLONASE) 50 mcg/actuation nasal spray 2 sprays (100 mcg total) by Each Nostril route once daily.  Qty: 16 g, Refills: 1    Associated Diagnoses: Acute non-recurrent sinusitis, unspecified location      ibuprofen (ADVIL,MOTRIN) 800 MG tablet Take 1 tablet (800 mg total) by mouth 2 (two) times  daily as needed for Pain (food).  Qty: 60 tablet, Refills: 2    Associated Diagnoses: Chronic low back pain with sciatica, sciatica laterality unspecified, unspecified back pain laterality; Lumbar spondylosis; DDD (degenerative disc disease), cervical       !! - Potential duplicate medications found. Please discuss with provider.              Discharge Diagnosis: Sacroiliitis [M46.1]  Condition on Discharge: Stable with no complications to procedure   Diet on Discharge: Same as before.  Activity: as per instruction sheet.  Discharge to: Home with a responsible adult.  Follow up: 2-4 weeks       Please call the office at [phone] if you experience any weakness or loss of sensation, fever > 101.5, pain uncontrolled with oral medications, persistent nausea/vomiting/or diarrhea, redness or drainage from the incisions, or any other worrisome concerns. If physician on call was not reached or could not communicate with our office for any reason please go to the nearest emergency department

## 2024-10-18 NOTE — OP NOTE
Tazdarrion STILES Mirza  64 y.o. female      Vitals:    10/18/24 0810   BP: (!) 185/72   Pulse: (!) 59   Resp: 20   Temp:        Procedure Date: 10/18/2024        INFORMED CONSENT: The procedure, risks, benefits and options were discussed with patient. There are no contraindications to the procedure. The patient expressed understanding and agreed to proceed. The personnel performing the procedure was discussed. I verify that I personally obtained consent prior to the start of the procedure and the signed consent can be found on the patient's chart.       Anesthesia:   Local provided by M.D    The patient was monitored with continuous pulse oximetry, EKG, and intermittent blood pressure monitors.  The patient was hemodynamically stable throughout the entire process was responsive to voice, and breathing spontaneously.  Supplemental O2 was provided at 2L/min via nasal cannula.  Patient was comfortable for the duration of the procedure. (See nurse documentation and case log for sedation time)        Pre Procedure diagnosis: Sacroiliitis [M46.1]  Post-Procedure diagnosis: SAME      PROCEDURE:  Bilateral sacroiliac joint injection                            REASON FOR PROCEDURE:   Sacroiliitis [M46.1]        MEDICATIONS INJECTED: 1mL 40mg/ml Kenalog and 4mL Bupivacaine 0.25% into each site    LOCAL ANESTHETIC USED: Xylocaine 1% 6ml     ESTIMATED BLOOD LOSS: None.   COMPLICATIONS: None.     TECHNIQUE:   Greater trochanteric bursa injection:  The area overlying the greater trochanteric bursa was identified using fluoroscopy, and the area overlying the skin was prepped and draped in usual sterile fashion. Local Xylocaine was injected by raising a wheel and going down to the periosteum using a 27-gauge hypodermic needle. A 5 inch 22-gauge spinal needle was introduce into the Bilateral greater trochanteric bursa. Negative pressure applied to confirm no intravascular placement. Omnipaque was injected to confirm placement and to  confirm that there was no vascular runoff. The medication was then injected slowly.  Displacement of the contrast after injection of the medication confirmed that the medication went into the area of the greater trochanteric bursa    Sacroiliac joint injection:   Laying in the prone position, the patient was prepped and draped in the usual sterile fashion using ChloraPrep and fenestrated drape.  The area was determined under fluoroscopy.  Local Xylocaine was injected by raising a wheel and going down to the periosteum using a 27-gauge hypodermic needle.  The 3.5 inch 22-gauge spinal needle was introduce into the Bilateral sacroiliac joint.  Negative pressure applied to confirm no intravascular placement.  Omnipaque was injected to confirm placement and to confirm that there was no vascular runoff.  The medication was then injected slowly.  The patient tolerated the procedure well.                       The patient was monitored for approximately 30 minutes after the procedure. Patient was given post procedure and discharge instructions to follow at home. We will see the patient back in two weeks or the patient may call to inform of status. The patient was discharged in a stable condition

## 2024-10-18 NOTE — DISCHARGE INSTRUCTIONS

## 2024-11-11 NOTE — PROGRESS NOTES
Established Patient - TeleHealth Visit    The patient location is: [place]  The chief complaint leading to consultation is: chronic pain     Visit type: audiovisual    Face to Face time with patient: 10-15 minutes  20 minutes of total time spent on the encounter, which includes face to face time and non-face to face time preparing to see the patient (eg, review of tests), Obtaining and/or reviewing separately obtained history, Documenting clinical information in the electronic or other health record, Independently interpreting results (not separately reported) and communicating results to the patient/family/caregiver, or Care coordination (not separately reported).     Each patient to whom he or she provides medical services by telemedicine is:  (1) informed of the relationship between the physician and patient and the respective role of any other health care provider with respect to management of the patient; and (2) notified that he or she may decline to receive medical services by telemedicine and may withdraw from such care at any time.      Chronic Pain -- Established Patient (Follow-up visit)    Referring Physician: Eve Green MD    PCP: Eve Green MD      Chief complaint:  Follow-up     Low back pain with RLE radicular pain   Sacroiliac joint pain         SUBJECTIVE:    Interval History (11/12/2024): Amelia Mirza presents today for follow-up visit.  she underwent bilateral SIJ injection on 10/18/24.  The patient reports that she is/was better following the procedure.  she reports 85-90% pain relief.  The changes lasted 4 weeks so far.  The changes have continued through this visit.  Patient reports pain as 2/10 today.  She feels the combination of the lumbar epidural along with this SI joint injection has provided exponential pain relief.  Patient reports she is more active since this procedure.  She was able to go with her granddaughter to the [facility] and walk around for 3-4  hourss, which she has not been able to do in a very long time.  She has not been able to do any activities like this in a long time.  She continues physical therapy, along with aquatic therapy with improvement.  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    Interval history 09/26/2024  Patient presents status post right-sided L4-5 and L5-S1 transforaminal epidural steroid injection 08/23/2024.  Patient reports 65-70% sustained relief in right-sided radicular symptoms following her epidural steroid injection.  Today she reports pain primarily in the lower back which radiates into bilateral buttocks overlying bilateral sacroiliac joint territory.  Pain is intermittent and today is rated a 6/10.  Can can be exacerbated with bending forward, moving from sitting to standing and with ambulation.  Today she denies significant lower extremity radiculopathy, bowel or bladder incontinence or saddle anesthesia.  She has continued physician directed physical therapy exercises for lower back, sacroiliac joint and leg pain over the last 8 weeks from 07/26/2024 through 09/26/2024 with marginal improvement in her symptoms.    Of note patient saw Dr. Magaña 09/05/2024 with the following evaluation:      Patient reports neurosurgery recommended continuing with conservative treatments including physical therapy and intervention.  Patient was given tramadol prescription per Dr. Magaña, but she has been taking this medication judiciously as she read this was an opioid.  Patient also reports she would not like to pursue surgical intervention in the near future as long as she is benefitting from physical therapy and injections.    Interval history 08/01/2024  Patient presents status post bilateral intra-articular hip joint injection 06/25/2024.  Patient reports at least 50% noticeable relief in  bilateral hip pain following her injection compounded by improvement in functionality.  She reports normally when traveling, she was unable to walk for distances in the airport.  She reports after her procedure she was able to reach her gate  in [facility] without difficulty.  Today she again reiterates pain in the lower back which radiates into the hip and down the lateral and posterior aspect of the right lower extremity in L4-S1 distribution to the knee.  Patient reports this morning she did have pain across both sides of the lower back but after a hot shower, pain was only maintained in the right lower back and leg.  She has continued physician directed physical therapy exercises for lower back and leg pain over the last 8 weeks from 06/01/2024 through 08/01/2024 with marginal improvement in her symptoms.    Interval history 06/04/2024  Patient presents for bilateral hip x-ray review.  Today patient again reports pain in the lower back which radiates primarily into the hips, she does report intermittent radiation down the right lower extremity in L4 distribution to the knee.  90%, majority of her pain remains in the lower back and hips.  Pain is particularly exacerbated with positional changes moving from sitting to standing and with prolonged standing.  Patient reports she has had numerous presentations since our last clinic visit which greatly exacerbated her pain with prolonged standing.  She states she was helping to meal prep for a friend the day prior and was unable to complete cooking secondary to her severe pain in her hips.  Pain today is rated a 10/10 and is constant.  Patient has been performing physician directed physical therapy exercises daily over the last 8 weeks from 04/04/2024 through 06/04/2024 for lower back and hip pain with no significant improvement in pain, range of motion.    Patient does report last bilateral L5-S1 transforaminal epidural steroid injection did give her significant relief  however this was very short-lived.    Interval history 10/11/2023  Patient presents status post bilateral L5-S1 transforaminal epidural steroid injection 07/28/2023 with 80 % relief initially.  Today patient reports only approximately 30% relief in lower back and radicular symptoms following her epidural.  Today she is reporting pain in the lower back which radiates into bilateral hips and intermittently down the lateral aspect of the lower extremities in L4-5 distribution to the knee.  Pain is more severe on the right than on the left.  Patient reports secondary to family obligations she had to temporarily discontinue physical therapy but did report her therapy was helping.  She would like to restart her physical therapy.  She reports pain is exacerbated with prolonged standing and with ambulation.  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  She reports pain is improved while sitting but is not completely resolved.    Interval History (5/8/2023): Amelia Mirza presents today for follow-up visit.  she underwent bilateral SIJ + GT bursa injection on 4/5/23.  The patient reports that she is/was better following the procedure.  she reports 75-80% pain relief in her low back, 70-80% relief in her left bursa, and 50-60% relief in her right bursa. Continues to report intermittent pain throughout the day, but overall much improved. Pain is exacerbated by prolonged activity, improved with rest.  The changes lasted 4 weeks so far.  The changes have continued through this visit.  Patient reports pain as 2/10 today.    Interval History (3/20/2023):  Amelia Mirza presents today for 3 month follow-up visit.  Patient was last seen on 12/16/2022. Last injection Bilateral  L5/S1 TF KARISSA on 11/18/22 with 65-70% relief.  She reports persistent and constant lower lumbosacral pain, right  worse than left with radiation into her lateral hips and lateral thighs. She reports only intermittent radiation into her right lower extremity/foot, but her constant pain is axial in nature. Pain is worsened with prolonged sitting, standing, or walking. Pain is alleviated with ibuprofen, biofreeze, and rest. She has completed formal physical therapy without relief. Pain interferes with daily activities. Patient reports pain as 5/10 today and 7/10 with exacerbating factors.    Interval History (12/16/2022): Amelia Mirza presents today for follow-up visit.  she underwent Bilateral  L5/S1 TF KARISSA on 11/18/22.  The patient reports that she is/was better following the procedure.  she reports 65-70% pain relief.  The changes lasted 4 weeks so far.  The changes have continued through this visit.  She reports this injection was equally effective c/t IL KARISSA in April, but neither were as effective as the initial IL KARISSA she had in October of 2021. She recently restarted physical therapy, which does offer improvement and relief in her symptoms. Patient reports pain as 2/10 today. She reports increased mobility since the injection, she is able to stand and walk longer distances.    Interval History (10/28/2022):  Amelia Mirza presents today via telemed for follow-up visit for MRI review.  Patient was last seen on 10/13/2022. She reports continued lumbosacral pain in a band-like distribution with radiation into her bilateral lower extremities left > right. She reports her symptoms previously were worse in her right LE but now she favors the other leg to compensate. She reports pain radiates primarily posteriorly along L5/S1 distribution with occasional radiation into her anterior thighs. She reports improvement in her radicular symptoms with physical therapy. Patient reports pain as 7/10 today and daily which is constant in nature and interferes with daily activity.    Interval history 10/13/2022  Patient presents  "status post L5-S1 interlaminar epidural steroid injection with right paramedian approach 04/22/2022.  Patient reports 65-70% relief in lower back and leg pain following her last epidural steroid injection.  Patient reports pain has been insidiously worsening over the last month.  Pain today is rated an 8/10.  Patient again reports pain in the lower back which radiates down the posterior aspects of bilateral lower extremities and L5-S1 distribution to the feet.  Patient reports pain has now interfered with her sleep.  Patient is interested in pursuing repeat injection.  Patient reports she is actively been participating in traditional as well as aquatic physical therapy which has been helping with strength and range of motion.    Interval history 03/31/2022  Patient presents for 5 month follow-up.  She reports return of lower back pain which radiates into the buttocks and down the posterior aspects of bilateral lower extremities and L5-S1 distribution.  Patient reports the symptoms are identical to prior lumbar epidural steroid injection.  Patient is interested in pursuing repeat intervention.  Patient also reports myofascial pain along thoracic paraspinous musculature.  Patient reports pain is interrupting with her sleep.  She denies new onset lower extremity weakness, bowel or bladder incontinence or saddle anesthesia.    Interval History (10/28/2021):  Amelia Mirza presents today for follow-up visit.  S/p L5/S1 IL KARISSA on 10/4/21 with 90% pain relief.  Pain was basically resolved after, but then she had a fall which Increased knee pain.  She hasnt seen Orthopedics in years.  Patient reports pain as "0/10 today.  Overall, she has greatly improved since procedure.     Initial HPI (7/12/21) - Dr. Adler:  Amelia Mirza is a 61 y.o.   female with past medical history significant for hypoTH, morbid obesity, osteoarthritis (knees), lumbar spondylosis who presents to the clinic for the evaluation " of lower back and left leg pain . The pain started  1 year prior ago  without preceding accident or trauma and symptoms have been worsening.The pain is located in a bandlike distribution at the level of the iliac crest with radiation down the left lower extremity along the lateral and posterior aspects in L4-5 distribution.  The pain is described as constant, aching and sharp and is rated as 7/10. The pain is rated with a score of  7/10 on the BEST day and a score of 10/10 on the WORST day.  Symptoms interfere with daily activity, sleeping and work.   Patient does work from home but currently is off her summer break and is disheartened by her inability to participate in outdoor activities or time with her family secondary to her pain. The pain is exacerbated by Sitting, Standing, Walking and Getting out of bed/chair.    Patient is able to ambulate approximately 3 blocks before requiring rest.The pain is mitigated by laying down and rest.     Pt saw MELI Mar for bilateral knee pain 3/2019 with topical compound cream prescribed and diagnostic genicular procedure discussed but not performed.     Pt saw Dr. Padilla (2017) for lower back pain with radiculopathy with recommendation for PT, NSAID analgesic prescribed.     Patient denies urinary incontinence, bowel incontinence, significant motor weakness and loss of sensations.      Pain Disability Index (PDI) Score Review:      3/6/2019    12:00 PM   Last 3 PDI Scores   Pain Disability Index (PDI) 31       Non-Pharmacologic Treatments:  Physical Therapy/Home Exercise: yes  Ice/Heat:yes  TENS: no  Acupuncture: no  Massage: no  Chiropractic: no    Other: no      Pain Medications:  - Adjuvant Medications: Advil,Motrin ( Ibuprofen), Mobic (Meloxicam), Zanaflex ( Tizanidine) and NSAIDs, Tylenol, Biofreeze, EMLA cream        Pain Procedures:     Orthopedics   Intra-articular steroid and visco-supplementation in bilateral knees - in other dept    Dr. Adler:  -10/04/2021:  L5/S1 IL KARISSA with 90% pain relief   -04/22/2022:  L5-S1 interlaminar epidural steroid injection with right paramedian approach with 65-70% relief  -11/18/2022: Bilateral  L5/S1 TF KAIRSSA with 65-70% relief  -04/05/2023: bilateral SIJ + GT bursa injection with 75-80% pain relief in her low back, 70-80% relief in her left bursa, and 50-60% relief in her right bursa  -07/28/2023: Bilateral L5-S1 transforaminal epidural steroid injection  -06/25/2024: Bilateral intra-articular hip joint injection  -08/23/2024: Right-sided L4-5 and L5-S1 TF KARISSA   -10/18/2024: bilateral SIJ injection with 85-90% pain relief with functional improvement (along with pain relief from previous right lumbar TF KARISSA)          Review of Systems:   GENERAL:  No weight loss, malaise or fevers.  HEENT:   No recent changes in vision or hearing  NECK:  Negative for lumps, no difficulty with swallowing.  RESPIRATORY:  Negative for cough, wheezing or shortness of breath, patient denies any recent URI.  CARDIOVASCULAR:  Negative for chest pain, leg swelling or palpitations.  GI:  Negative for abdominal discomfort, blood in stools or black stools or change in bowel habits.  MUSCULOSKELETAL:  See HPI.  SKIN:  Negative for lesions, rash, and itching.  PSYCH:  No mood disorder or recent psychosocial stressors.  Patients sleep is not disturbed secondary to pain.  HEMATOLOGY/LYMPHOLOGY:  Negative for prolonged bleeding, bruising easily or swollen nodes.  Patient is not currently taking any anti-coagulants  NEURO:   No history of headaches, syncope, paralysis, seizures or tremors.  All other reviewed and negative other than HPI.        OBJECTIVE:  Telemedicine Physical Exam:   Vitals:    11/12/24 1109   Resp: 17     There is no height or weight on file to calculate BMI.   (reviewed on 11/12/2024)     (Physical exam performed virtually with patient guided on specific actions and diagnostic maneuvers)  GENERAL: Well appearing, in no acute distress, alert and oriented  x3.  Cooperative.  PSYCH:  Mood and affect appropriate.  SKIN: Skin color & texture with no obvious abnormalities.    HEAD/FACE:  Normocephalic, atraumatic.    PULM:  No difficulty breathing. No nasal flaring. No obvious wheezing.  EXTREMITIES: No obvious deformities. Moving all extremities well, appears to have symmetric strength throughout.  MUSCULOSKELETAL: No obvious atrophy abnormalities are noted.   NEURO: No obvious neurologic deficit.   GAIT: sitting.     Physical Exam: last in clinic visit:  GENERAL: Well appearing, in no acute distress, alert and oriented x3.  PSYCH:  Mood and affect appropriate.  SKIN: Skin color, texture, turgor normal, no rashes or lesions.  HEAD/FACE:  Normocephalic, atraumatic. Cranial nerves grossly intact.  NECK: No pain to palpation over the cervical paraspinous muscles. Spurling Negative. No pain with neck flexion, extension, or lateral flexion.   PULM: No evidence of respiratory difficulty, symmetric chest rise.  GI:  Soft and non-tender.  BACK: Straight leg raising in the sitting and supine positions is negative to radicular pain. No pain to palpation over the facet joints of the lumbar spine or spinous processes. Normal range of motion without pain reproduction.  SIJ testing:  - TTP over SI joint: Present bilaterally, right > left  - Rick's/ Jean-Pierre's: Positive bilaterally  - Sacroiliac Distraction Test (anterior pressure): Positive  - Sacroiliac Compression Test (lateral pressure): Positive   - SacralThrust Test (posterior pressure): Positive  -TTP along bilateral GT bursa, right > left  EXTREMITIES: Peripheral joint ROM is full and pain free without obvious instability or laxity in all four extremities- with exception of knees.  No deformities, edema, or skin discoloration. Good capillary refill.  MUSCULOSKELETAL: Shoulder, hip, and knee provocative maneuvers are negative.  T   Bilateral upper and lower extremity strength is normal and symmetric.  No atrophy or tone  abnormalities are noted. Decreased sensation LLE: L4-5 distribution to knee.  Pain with extension of knee. TTP diffusely over right knee patella.  NEURO: BUE & BLE coordination and muscle stretch reflexes are physiologic and symmetric.  Plantar response are downgoing. No clonus.   GAIT: normal.          Imaging/ Diagnostic Studies/ Labs (Reviewed on 11/12/2024):    X-ray bilateral hips 10/11/2023  FINDINGS: No fracture, suspicious bone marrow lesion or other acute disease is seen in the pelvis or either hip. Joint alignment is anatomic. There is no radiographic evidence of femoral head osteonecrosis. Mild degenerative changes superior acetabulum.     MRI lumbar spine 06/12/2024  FINDINGS:  Alignment: Approximately 5 mm of grade 1 anterolisthesis at L4-5.  Slight retrolisthesis at L5-S1.     Vertebrae: Discogenic sub endplate marrow signal alteration (Modic changes) at L4-5 and to a lesser degree at L5-S1.     Discs: Progressive loss of disc height at L4-5 and L5-S1.     Cord: Normal.  Conus terminates at L1.     Degenerative findings:     T11-12: Small posterior disc bulge.  No significant neural foraminal narrowing or spinal canal stenosis.     T12-L1: No significant neural foraminal narrowing or spinal canal stenosis.     L1-L2: No significant neural foraminal narrowing or spinal canal stenosis.     L2-L3: No significant neural foraminal narrowing or spinal canal stenosis.     L3-L4: Minimal annular bulge and bilateral facet arthropathy contributing to mild left greater than right inferior neural foraminal narrowing.  No significant spinal canal stenosis.     L4-L5: Prominent posterior disc bulge with slight right foraminal asymmetry and bilateral hypertrophic facet arthropathy.  Resultant moderate to severe bilateral neural foraminal narrowing.  No significant spinal canal stenosis.     L5-S1: Broad-based posterior disc bulge with small central protrusion and bilateral facet arthropathy.  Resultant severe left  and moderate right neural foraminal narrowing.  No significant spinal canal stenosis.     Paraspinal muscles & soft tissues: Small bilateral renal cortical cysts.  Otherwise unremarkable.     Impression:     Multilevel lumbar spondylosis and degenerative disc disease with mild progression since 2022.    Lumbar x-ray   Comparison: None  Findings:  Body habitus limits the study.  For tumor body heights and alignment appear well-maintained.  There is uniform loss of diskal height at the L4 -- 5 L5 -- S1 levels.  Multilevel facet arthropathy.  Pedicles and SI joints appear intact.  No spondylolysis or spondylolisthesis.    X-Ray Cervical Spine AP And Lateral  Comparison: None  Findings: There is straightening of the normal cervical lordosis which can be associated with muscle spasm.  Vertebral body heights are well maintained.  No spondylolisthesis demonstrated.  There is mild loss of disk height from C3-4 and C5-6 with associated degenerative endplate changes and osteophyte production.  Posterior elements appear intact without acute fractures or subluxations demonstrated.  Odontoid process appears intact.  Atlantoaxial articulations appear normal.  Prevertebral soft tissues are within normal limits.        ASSESSMENT: 64 y.o. year old female with lower back and left lower extremity pain, consistent with     1. Sacroiliitis        2. Anterolisthesis of lumbar spine        3. Right lumbar radiculopathy        4. Primary osteoarthritis of both hips        5. Spinal stenosis of lumbar region without neurogenic claudication        6. Greater trochanteric bursitis of both hips              PLAN:   1. Interventional:    - S/p bilateral SIJ injection on 10/18/24 with 85-90% pain relief with functional improvement (along with pain relief from previous right lumbar TF KARISSA).  Of note patient received 80% relief with last sacroiliac joint injection.       - Anticoagulation use: None.     -patient has 50% sustained relief in  "bilateral hip pain following intra-articular hip joint injection.  We have discussed repeating this injection in the future should hip pain exacerbate.  -patient has 65-70% sustained relief following right-sided L4-5 and L5-S1 transforaminal epidural steroid injection.  We have discussed repeating this procedure should radicular pain exacerbate.    2. Pharmacologic:   -Continue Tizanidine 4 mg nightly PRN for myofascial pain.  We have discussed potential deleterious side effects associated with this medication including  dizziness, drowsiness, dry mouth or tingling sensation in the upper or lower extremities.     - Failed medications: Gabapentin 2/2 side effects ("didn't feel like herself")     reviewed and consistent with use.        3. Rehabilitative:  -We discussed continuing at home physician directed physical therapy to help manage the patient/s painful condition. The patient was counseled that muscle strengthening will improve the long term prognosis in regards to pain and may also help increase range of motion and mobility.  Patient has completed conventional land-based physical therapy from 07/21/2021 through 10/28/2021, 14 sessions total.    4. Diagnostic:  Reviewed diagnostic imaging (bilateral hip x-ray, MRI lumbar spine, flex/ex lumbar XR) with the patient and answered any questions.     5. Consult/ Referral:  (PRN)  -Neurosurgery, Dr. Magaña:  Lumbar degenerative disc disease, anterolisthesis and radiculopathy    6. Follow up:  3 months follow-up - discuss repeat KARISSA/ SIJ injection if needed      Future Appointments   Date Time Provider Department Center   2/6/2025  9:20 AM Bernice Bolton PA-C [facility]   2/20/2025  9:40 AM Eve Green MD [facility]       - Patient Questions: Answered all of the patient's questions regarding diagnosis, therapy, and treatment.    - This condition does not require this patient to take time off of work, and the primary goal of " our Pain Management services is to improve the patient's functional capacity.   - I discussed the risks, benefits, and alternatives to potential treatment options. All questions and concerns were fully addressed today in clinic.         Bernice Bolton PA-C  Interventional Pain Management - [facility]    Disclaimer:  This note was prepared using voice recognition system and is likely to have sound alike errors that may have been overlooked even after proof reading.  Please call me with any questions.

## 2024-11-12 ENCOUNTER — OFFICE VISIT (OUTPATIENT)
Dept: PAIN MEDICINE | Facility: CLINIC | Age: 64
End: 2024-11-12
Payer: COMMERCIAL

## 2024-11-12 ENCOUNTER — PATIENT MESSAGE (OUTPATIENT)
Dept: PAIN MEDICINE | Facility: CLINIC | Age: 64
End: 2024-11-12

## 2024-11-12 VITALS — RESPIRATION RATE: 17 BRPM

## 2024-11-12 DIAGNOSIS — M16.0 PRIMARY OSTEOARTHRITIS OF BOTH HIPS: ICD-10-CM

## 2024-11-12 DIAGNOSIS — M70.61 GREATER TROCHANTERIC BURSITIS OF BOTH HIPS: ICD-10-CM

## 2024-11-12 DIAGNOSIS — M70.62 GREATER TROCHANTERIC BURSITIS OF BOTH HIPS: ICD-10-CM

## 2024-11-12 DIAGNOSIS — M46.1 SACROILIITIS: Primary | ICD-10-CM

## 2024-11-12 DIAGNOSIS — M48.061 SPINAL STENOSIS OF LUMBAR REGION WITHOUT NEUROGENIC CLAUDICATION: ICD-10-CM

## 2024-11-12 DIAGNOSIS — M43.16 ANTEROLISTHESIS OF LUMBAR SPINE: ICD-10-CM

## 2024-11-12 DIAGNOSIS — M54.16 RIGHT LUMBAR RADICULOPATHY: ICD-10-CM

## 2024-11-12 PROCEDURE — 1159F MED LIST DOCD IN RCRD: CPT | Mod: CPTII,95,, | Performed by: PHYSICIAN ASSISTANT

## 2024-11-12 PROCEDURE — 1160F RVW MEDS BY RX/DR IN RCRD: CPT | Mod: CPTII,95,, | Performed by: PHYSICIAN ASSISTANT

## 2024-11-12 PROCEDURE — 99214 OFFICE O/P EST MOD 30 MIN: CPT | Mod: 95,,, | Performed by: PHYSICIAN ASSISTANT

## 2024-11-18 ENCOUNTER — TELEPHONE (OUTPATIENT)
Dept: PAIN MEDICINE | Facility: CLINIC | Age: 64
End: 2024-11-18
Payer: COMMERCIAL

## 2024-11-18 NOTE — TELEPHONE ENCOUNTER
Reach out to Shaq Green from [facility].  Shaq was calling in regards of getting a call back to give an update of pt progress. Pt did not answer left jono

## 2024-11-18 NOTE — TELEPHONE ENCOUNTER
----- Message from Federico sent at 11/18/2024  1:53 PM CST -----  Contact: Shaq Green / [facility]  .Type:  Needs Medical Advice    Who Called:  Shaq     Would the patient rather a call back or a response via [facility]?  Call back   Best Call Back Number:  [phone]  Additional Information:  Shaq is calling in regards to getting a call back to give an update pt progress.       Thanks

## 2024-12-26 DIAGNOSIS — G89.29 CHRONIC LOW BACK PAIN WITH SCIATICA, SCIATICA LATERALITY UNSPECIFIED, UNSPECIFIED BACK PAIN LATERALITY: ICD-10-CM

## 2024-12-26 DIAGNOSIS — M54.40 CHRONIC LOW BACK PAIN WITH SCIATICA, SCIATICA LATERALITY UNSPECIFIED, UNSPECIFIED BACK PAIN LATERALITY: ICD-10-CM

## 2024-12-26 DIAGNOSIS — M47.816 LUMBAR SPONDYLOSIS: ICD-10-CM

## 2024-12-26 DIAGNOSIS — M50.30 DDD (DEGENERATIVE DISC DISEASE), CERVICAL: ICD-10-CM

## 2024-12-26 NOTE — TELEPHONE ENCOUNTER
Care Due:                  Date            Visit Type   Department     Provider  --------------------------------------------------------------------------------                                MYCHART                              FOLLOWUP/OF  [facility]  Last Visit: 02-      FICE VISIT   MEDICINE       Eve Green                              EP -                              PRIMARY      [facility]  Next Visit: 02-      CARE ([facility])   MEDICINE       Eve Green                                                            Last  Test          Frequency    Reason                     Performed    Due Date  --------------------------------------------------------------------------------    CBC.........  12 months..  ibuprofen................  02-   02-    CMP.........  12 months..  ergocalciferol, ibuprofen  02-   02-    Vitamin D...  12 months..  ergocalciferol...........  02-   02-    [employer] Embedded Care Due Messages. Reference number: [id number].   12/26/2024 4:14:25 PM CST

## 2024-12-26 NOTE — TELEPHONE ENCOUNTER
Patient called in to check the status of her handicap tag form. She says its been 1 month and she hasn't heard that it was complete as of yet. She is also wanting to know if you can send her IBUPROFEN 800 MG to her local pharmacy. She says her mail order pharmacy isn't accepting the 30 day script anymore. Patient was informed that you are out of the office returning back on 1/2/25. She verbally understood the information given.      Lov: 8/16/2024

## 2024-12-26 NOTE — TELEPHONE ENCOUNTER
----- Message from Nolvia sent at 12/26/2024  4:05 PM CST -----  Contact: lalito  Type:  RX Refill Request    Who Called: mina   Refill or New Rx: refill  RX Name and Strength: ibuprofen (ADVIL,MOTRIN) 800 MG tablet   How is the patient currently taking it? (ex. 1XDay):  Is this a 30 day or 90 day RX:  Preferred Pharmacy with phone number:  Local or Mail Order:  Ordering Provider:  Would the patient rather a call back or a response via [facility]?  Call   Best Call Back Number: [phone]   Additional Information: [facility] script wont fill 30day/can be switched to 90 day or sent to different pharmacy

## 2024-12-27 RX ORDER — IBUPROFEN 800 MG/1
800 TABLET ORAL 2 TIMES DAILY PRN
Qty: 60 TABLET | Refills: 2 | Status: SHIPPED | OUTPATIENT
Start: 2024-12-27

## 2025-01-23 ENCOUNTER — OFFICE VISIT (OUTPATIENT)
Dept: INTERNAL MEDICINE | Facility: CLINIC | Age: 65
End: 2025-01-23
Payer: COMMERCIAL

## 2025-01-23 DIAGNOSIS — J01.10 ACUTE NON-RECURRENT FRONTAL SINUSITIS: Primary | ICD-10-CM

## 2025-01-23 RX ORDER — AMOXICILLIN 875 MG/1
875 TABLET, FILM COATED ORAL EVERY 12 HOURS
Qty: 20 TABLET | Refills: 0 | Status: SHIPPED | OUTPATIENT
Start: 2025-01-23 | End: 2025-02-02

## 2025-01-23 NOTE — PROGRESS NOTES
TELEMEDICINE VIRTUAL VISIT    Subjective:       Patient ID: Amelia Mirza is a 64 y.o. female.    Chief Complaint: Headache    Televisit for HA    3 days right frontal and maxillary HA, sharp throbbing radiating to behind the ear. Has runny nose, eyes and congestion. Has uri. Leaning forward worsens. No ST, fever, loss of hearing. Has mild blurry vision. Has chronic right sided body pain, but no other neurological signs. Has HTN, takes amlodipine 10 mg and compliant. Has not checked B/P at home, has B/P cuff at home. OTC sinus meds have helped.    Headache   Associated symptoms include ear pain, neck pain (chronic), rhinorrhea, sinus pressure and weakness (chronic). Pertinent negatives include no coughing, eye redness, fever, hearing loss, sore throat or vomiting.     Review of Systems   Constitutional:  Negative for activity change, fever and unexpected weight change.   HENT:  Positive for ear pain, rhinorrhea and sinus pressure. Negative for congestion, hearing loss, sore throat and trouble swallowing.    Eyes:  Negative for discharge, redness and visual disturbance.   Respiratory:  Negative for cough, chest tightness and wheezing.    Cardiovascular:  Negative for chest pain and palpitations.   Gastrointestinal:  Negative for blood in stool, constipation, diarrhea and vomiting.   Endocrine: Negative for polydipsia and polyuria.   Genitourinary:  Negative for decreased urine volume, difficulty urinating, dysuria, hematuria and menstrual problem.   Musculoskeletal:  Positive for neck pain (chronic). Negative for arthralgias and joint swelling.   Skin:  Negative for rash and wound.   Neurological:  Positive for weakness (chronic) and headaches. Negative for syncope and light-headedness.   Psychiatric/Behavioral:  Negative for behavioral problems, confusion, dysphoric mood and sleep disturbance.        Objective:      CONSTITUTIONAL: No apparent distress. Does not appear acutely ill or septic. Appears adequately  "hydrated.  PULM: Breathing unlabored.  PSYCHIATRIC: Alert and conversant and grossly oriented. Mood is grossly neutral. Affect appropriate. Judgment and insight grossly intact.      Assessment:       1. Acute non-recurrent frontal sinusitis        Plan:       Acute non-recurrent frontal sinusitis  -     amoxicillin (AMOXIL) 875 MG tablet; Take 1 tablet (875 mg total) by mouth every 12 (twelve) hours. for 10 days  Dispense: 20 tablet; Refill: 0       No red flags on history. She will get B/P cuff and if b/p > 140/90 take extra dose amlodipine. If b/p still remains high or HA worsens/changes, go to ER. Otherwise assume sinusitis and treat with amoixl. F/U with Dr Green tomorrow. Total chart time 13 minutes    Documentation entered by me for this encounter may have been done in part using speech-recognition technology. Although I have made an effort to ensure accuracy, "sound like" errors may exist and should be interpreted in context. SHY Celis MD    Visit Details: This visit was a telemedicine virtual visit with synchronous audio and video. Amelia reported that her location at the time of this visit was in the [facility]. Amelia had the choice to come into office to receive these medical services. Amelia chose and consented to receive these medical services by telemedicine.  "

## 2025-01-27 ENCOUNTER — OFFICE VISIT (OUTPATIENT)
Dept: FAMILY MEDICINE | Facility: CLINIC | Age: 65
End: 2025-01-27
Attending: FAMILY MEDICINE
Payer: COMMERCIAL

## 2025-01-27 VITALS
TEMPERATURE: 98 F | HEIGHT: 62 IN | SYSTOLIC BLOOD PRESSURE: 130 MMHG | OXYGEN SATURATION: 98 % | HEART RATE: 79 BPM | DIASTOLIC BLOOD PRESSURE: 86 MMHG | BODY MASS INDEX: 53.92 KG/M2 | RESPIRATION RATE: 18 BRPM | WEIGHT: 293 LBS

## 2025-01-27 DIAGNOSIS — J01.10 ACUTE NON-RECURRENT FRONTAL SINUSITIS: ICD-10-CM

## 2025-01-27 DIAGNOSIS — I10 HYPERTENSION, UNSPECIFIED TYPE: ICD-10-CM

## 2025-01-27 DIAGNOSIS — E66.01 MORBID OBESITY WITH BMI OF 50.0-59.9, ADULT: ICD-10-CM

## 2025-01-27 PROCEDURE — 3008F BODY MASS INDEX DOCD: CPT | Mod: CPTII,S$GLB,, | Performed by: FAMILY MEDICINE

## 2025-01-27 PROCEDURE — 3079F DIAST BP 80-89 MM HG: CPT | Mod: CPTII,S$GLB,, | Performed by: FAMILY MEDICINE

## 2025-01-27 PROCEDURE — 96372 THER/PROPH/DIAG INJ SC/IM: CPT | Mod: S$GLB,,, | Performed by: FAMILY MEDICINE

## 2025-01-27 PROCEDURE — 99999 PR PBB SHADOW E&M-EST. PATIENT-LVL V: CPT | Mod: PBBFAC,,, | Performed by: FAMILY MEDICINE

## 2025-01-27 PROCEDURE — 3075F SYST BP GE 130 - 139MM HG: CPT | Mod: CPTII,S$GLB,, | Performed by: FAMILY MEDICINE

## 2025-01-27 PROCEDURE — 99214 OFFICE O/P EST MOD 30 MIN: CPT | Mod: 25,S$GLB,, | Performed by: FAMILY MEDICINE

## 2025-01-27 PROCEDURE — 1159F MED LIST DOCD IN RCRD: CPT | Mod: CPTII,S$GLB,, | Performed by: FAMILY MEDICINE

## 2025-01-27 RX ORDER — FLUTICASONE PROPIONATE 50 MCG
2 SPRAY, SUSPENSION (ML) NASAL DAILY
Qty: 16 G | Refills: 1 | Status: SHIPPED | OUTPATIENT
Start: 2025-01-27

## 2025-01-27 RX ORDER — METHYLPREDNISOLONE ACETATE 80 MG/ML
80 INJECTION, SUSPENSION INTRA-ARTICULAR; INTRALESIONAL; INTRAMUSCULAR; SOFT TISSUE ONCE
Status: COMPLETED | OUTPATIENT
Start: 2025-01-27 | End: 2025-01-27

## 2025-01-27 RX ADMIN — METHYLPREDNISOLONE ACETATE 80 MG: 80 INJECTION, SUSPENSION INTRA-ARTICULAR; INTRALESIONAL; INTRAMUSCULAR; SOFT TISSUE at 02:01

## 2025-01-27 NOTE — PROGRESS NOTES
Amelia Mirza    Chief Complaint   Patient presents with    Headache    blood pressure changes       History of Present Illness:   Ms. Mirza, a non-smoker, comes in today with complaint of having headache, fluctuating blood pressure readings and blurred vision since last week.  She states her highest blood pressure reading on last week was 170/90 but states her daughter checked her blood pressure last night with reading of 136/88 noted.  She states she had virtual visit on January 23, 2025 with Dr. Celis and was diagnosed with acute sinus infection and treated with amoxicillin 875 mg twice daily for 10 days which she states she continues to take.  She states she continues to have headache and has nasal/frontal congestion.  She states she continues to take amlodipine 10 mg daily for blood pressure control which she states she took today.  However, she states she has not been using Flonase as she is out of prescription.  She states she monitors her diet.  She states she exercises 2 times a week, 30 minutes each time.  She states she has been spending a lot of time at the computer except for last week during the snow days.    Otherwise, she denies having fever, chills, fatigue, appetite changes; shortness of breath, cough, wheezing; chest pain, palpitations, leg swelling; other sinus or ocular symptoms; abdominal pain, nausea, vomiting, diarrhea, constipation; unusual urinary symptoms; polydipsia, polyphagia, polyuria, hot or cold intolerance; back pain; numbness; anxiety, depression, homicidal or suicidal thoughts.       Labs:                    WBC                      6.83                02/20/2024                 HGB                      12.0                02/20/2024                 HCT                      38.7                02/20/2024                 PLT                      277                 02/20/2024                 CHOL                     198                 02/20/2024                 TRIG                      103                 02/20/2024                 HDL                      73                  02/20/2024                 ALT                      17                  02/20/2024                 AST                      11                  02/20/2024                 NA                       139                 02/20/2024                 K                        4.0                 02/20/2024                 CL                       104                 02/20/2024                 CREATININE               0.8                 02/20/2024                 BUN                      14                  02/20/2024                 CO2                      25                  02/20/2024                 TSH                      4.794 (H)           02/20/2024                 INR                      1.0                 08/30/2016                 HGBA1C                   5.8 (H)             09/15/2023            LDLCALC                  104.4               02/20/2024                  Current Outpatient Medications   Medication Sig    amLODIPine (NORVASC) 10 MG tablet Take 1 tablet (10 mg total) by mouth once daily.    amoxicillin (AMOXIL) 875 MG tablet Take 1 tablet (875 mg total) by mouth every 12 (twelve) hours. for 10 days    ergocalciferol (ERGOCALCIFEROL) 50,000 unit Cap TAKE 1 CAPSULE TWICE A WEEK    ibuprofen (ADVIL,MOTRIN) 800 MG tablet Take 1 tablet (800 mg total) by mouth 2 (two) times daily as needed for Pain (food).    levothyroxine (SYNTHROID) 200 MCG tablet TAKE 1 TABLET EVERY MORNING BEFORE BREAKFAST    levothyroxine (SYNTHROID) 25 MCG tablet Take 1 pill by mouth daily before breakfast on Monday through Friday and Take 2 pills by mouth daily before breakfast on Saturday and Sunday    tiZANidine (ZANAFLEX) 4 MG tablet Take 1 tablet (4 mg total) by mouth 2 (two) times daily.    traZODone (DESYREL) 50 MG tablet TAKE 1 TABLET NIGHTLY AS NEEDED FOR INSOMNIA    fluticasone propionate (FLONASE) 50 mcg/actuation  nasal spray 2 sprays (100 mcg total) by Each Nostril route once daily. (Patient not taking: Reported on 1/27/2025)       Review of Systems   Constitutional:  Negative for appetite change, chills, fatigue and fever.   HENT:  Positive for congestion. Negative for postnasal drip, rhinorrhea, sinus pressure, sinus pain, sneezing and sore throat.    Eyes:  Positive for visual disturbance. Negative for photophobia, pain, discharge, redness and itching.   Respiratory:  Negative for cough, shortness of breath and wheezing.    Cardiovascular:  Negative for chest pain, palpitations and leg swelling.        See history of present illness.   Gastrointestinal:  Negative for abdominal pain, constipation, diarrhea, nausea and vomiting.   Endocrine: Negative for cold intolerance, heat intolerance, polydipsia, polyphagia and polyuria.   Genitourinary:  Negative for difficulty urinating.   Musculoskeletal:  Negative for arthralgias, back pain and myalgias.   Neurological:  Positive for headaches. Negative for numbness.   Psychiatric/Behavioral:  Negative for dysphoric mood and suicidal ideas. The patient is not nervous/anxious.         Negative for homicidal ideas.       Objective:  Physical Exam  Vitals reviewed.   Constitutional:       General: She is not in acute distress.     Appearance: Normal appearance. She is obese. She is not ill-appearing, toxic-appearing or diaphoretic.      Comments: Pleasant.   HENT:      Head: Normocephalic and atraumatic.      Comments: Tender sinuses.     Right Ear: Tympanic membrane, ear canal and external ear normal.      Left Ear: Tympanic membrane, ear canal and external ear normal. There is no impacted cerumen.      Nose: Congestion present. No rhinorrhea.      Mouth/Throat:      Mouth: Mucous membranes are moist.      Pharynx: Oropharynx is clear. No oropharyngeal exudate or posterior oropharyngeal erythema.   Eyes:      General:         Right eye: No discharge.         Left eye: No discharge.       Extraocular Movements: Extraocular movements intact.      Conjunctiva/sclera: Conjunctivae normal.      Pupils: Pupils are equal, round, and reactive to light.   Cardiovascular:      Rate and Rhythm: Normal rate and regular rhythm.      Pulses: Normal pulses.      Heart sounds: No murmur heard.  Pulmonary:      Effort: Pulmonary effort is normal. No respiratory distress.      Breath sounds: Normal breath sounds. No wheezing.   Abdominal:      General: Bowel sounds are normal. There is no distension.      Palpations: Abdomen is soft. There is no mass.      Tenderness: There is no abdominal tenderness. There is no guarding or rebound.   Musculoskeletal:         General: No swelling or tenderness. Normal range of motion.      Cervical back: Normal range of motion and neck supple. No tenderness.      Comments: She is ambulatory without problems.   Lymphadenopathy:      Cervical: No cervical adenopathy.   Skin:     General: Skin is warm.   Neurological:      General: No focal deficit present.      Mental Status: She is alert and oriented to person, place, and time.   Psychiatric:         Mood and Affect: Mood normal.         Behavior: Behavior normal.         Thought Content: Thought content normal.         Judgment: Judgment normal.       ASSESSMENT:  1. Hypertension, unspecified type    2. Acute non-recurrent frontal sinusitis    3. Morbid obesity with BMI of 50.0-59.9, adult        PLAN:  Amelia was seen today for headache and blood pressure changes.    Diagnoses and all orders for this visit:    Hypertension, unspecified type - stable on medication    Acute non-recurrent frontal sinusitis  -     fluticasone propionate (FLONASE) 50 mcg/actuation nasal spray; 2 sprays (100 mcg total) by Each Nostril route once daily.  -     methylPREDNISolone acetate injection 80 mg    Morbid obesity with BMI of 50.0-59.9, adult - on diet and exercise    Continue current medications, follow low sodium, low cholesterol, low carb  diet, daily walks.  Prescription refill as noted above.  Keep follow up with specialists.  Patient declined flu shot today.  Follow up if symptoms worsen or fail to improve. But, keep 2/20/2025 scheduled physical appointment with me.

## 2025-02-17 NOTE — PROGRESS NOTES
Established Patient - TeleHealth Visit    The patient location is: [place]  The chief complaint leading to consultation is: chronic pain     Visit type: Audiovisual telemedicine visit     30 minutes of total time spent on the encounter, which includes face to face time and non-face to face time preparing to see the patient (eg, review of tests), Obtaining and/or reviewing separately obtained history, Documenting clinical information in the electronic or other health record, Independently interpreting results (not separately reported) and communicating results to the patient/family/caregiver, or Care coordination (not separately reported).     Each patient to whom he or she provides medical services by telemedicine is:  (1) informed of the relationship between the physician and patient and the respective role of any other health care provider with respect to management of the patient; and (2) notified that he or she may decline to receive medical services by telemedicine and may withdraw from such care at any time.        Chronic Pain -- Established Patient (Follow-up visit)    Referring Physician: Eve Green MD    PCP: Eve Green MD      Chief complaint:  Follow-up     Low back pain with RLE radicular pain   Sacroiliac joint pain         SUBJECTIVE:    Interval History (2/18/2025):  Patient presents today for follow-up visit.  Patient was last seen on 11/12/2024. At that visit, she was feeling much better after SI joint injection, in addition to previous lumbar KARISSA. Patient reports pain as 6/10 but increases to 8/10 with activity.    Interval History (11/12/2024): Amelia Mirza presents today for follow-up visit.  she underwent bilateral SIJ injection on 10/18/24.  The patient reports that she is/was better following the procedure.  she reports 85-90% pain relief.  The changes lasted 4 weeks so far.  The changes have continued through this visit.  Patient reports pain as 2/10 today.  She feels the  combination of the lumbar epidural along with this SI joint injection has provided exponential pain relief.  Patient reports she is more active since this procedure.  She was able to go with her granddaughter to the [facility] and walk around for 3-4 hourss, which she has not been able to do in a very long time.  She has not been able to do any activities like this in a long time.  She continues physical therapy, along with aquatic therapy with improvement.  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    Interval history 09/26/2024  Patient presents status post right-sided L4-5 and L5-S1 transforaminal epidural steroid injection 08/23/2024.  Patient reports 70+% sustained relief in right-sided radicular symptoms following her epidural steroid injection, along with functional improvement.  Today she reports pain primarily in the lower back which radiates into bilateral buttocks overlying bilateral sacroiliac joint territory.  Pain is intermittent and today is rated a 6/10.  Can can be exacerbated with bending forward, moving from sitting to standing and with ambulation.  Today she denies significant lower extremity radiculopathy, bowel or bladder incontinence or saddle anesthesia.  She has continued physician directed physical therapy exercises for lower back, sacroiliac joint and leg pain over the last 8 weeks from 07/26/2024 through 09/26/2024 with marginal improvement in her symptoms.    Of note patient saw Dr. Magaña 09/05/2024 with the following evaluation:      Patient reports neurosurgery recommended continuing with conservative treatments including physical therapy and intervention.  Patient was given tramadol prescription per Dr. Magaña, but she has been taking this medication judiciously as she read this was an opioid.  Patient also reports she would  not like to pursue surgical intervention in the near future as long as she is benefitting from physical therapy and injections.    Interval history 08/01/2024  Patient presents status post bilateral intra-articular hip joint injection 06/25/2024.  Patient reports at least 50% noticeable relief in bilateral hip pain following her injection compounded by improvement in functionality.  She reports normally when traveling, she was unable to walk for distances in the airport.  She reports after her procedure she was able to reach her gate  in [place] without difficulty.  Today she again reiterates pain in the lower back which radiates into the hip and down the lateral and posterior aspect of the right lower extremity in L4-S1 distribution to the knee.  Patient reports this morning she did have pain across both sides of the lower back but after a hot shower, pain was only maintained in the right lower back and leg.  She has continued physician directed physical therapy exercises for lower back and leg pain over the last 8 weeks from 06/01/2024 through 08/01/2024 with marginal improvement in her symptoms.    Interval history 06/04/2024  Patient presents for bilateral hip x-ray review.  Today patient again reports pain in the lower back which radiates primarily into the hips, she does report intermittent radiation down the right lower extremity in L4 distribution to the knee.  90%, majority of her pain remains in the lower back and hips.  Pain is particularly exacerbated with positional changes moving from sitting to standing and with prolonged standing.  Patient reports she has had numerous presentations since our last clinic visit which greatly exacerbated her pain with prolonged standing.  She states she was helping to meal prep for a friend the day prior and was unable to complete cooking secondary to her severe pain in her hips.  Pain today is rated a 10/10 and is constant.  Patient has been performing physician directed  physical therapy exercises daily over the last 8 weeks from 04/04/2024 through 06/04/2024 for lower back and hip pain with no significant improvement in pain, range of motion.    Patient does report last bilateral L5-S1 transforaminal epidural steroid injection did give her significant relief however this was very short-lived.    Interval history 10/11/2023  Patient presents status post bilateral L5-S1 transforaminal epidural steroid injection 07/28/2023 with 80 % relief initially.  Today patient reports only approximately 30% relief in lower back and radicular symptoms following her epidural.  Today she is reporting pain in the lower back which radiates into bilateral hips and intermittently down the lateral aspect of the lower extremities in L4-5 distribution to the knee.  Pain is more severe on the right than on the left.  Patient reports secondary to family obligations she had to temporarily discontinue physical therapy but did report her therapy was helping.  She would like to restart her physical therapy.  She reports pain is exacerbated with prolonged standing and with ambulation.  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  She reports pain is improved while sitting but is not completely resolved.    Interval History (5/8/2023): Amelia Mirza presents today for follow-up visit.  she underwent bilateral SIJ + GT bursa injection on 4/5/23.  The patient reports that she is/was better following the procedure.  she reports 75-80% pain relief in her low back, 70-80% relief in her left bursa, and 50-60% relief in her right bursa. Continues to report intermittent pain throughout the day, but overall much improved. Pain is exacerbated by prolonged activity, improved with rest.  The changes lasted 4 weeks so far.  The changes have continued through this visit.  Patient reports  pain as 2/10 today.    Interval History (3/20/2023):  Amelia Mirza presents today for 3 month follow-up visit.  Patient was last seen on 12/16/2022. Last injection Bilateral  L5/S1 TF KARISSA on 11/18/22 with 65-70% relief.  She reports persistent and constant lower lumbosacral pain, right worse than left with radiation into her lateral hips and lateral thighs. She reports only intermittent radiation into her right lower extremity/foot, but her constant pain is axial in nature. Pain is worsened with prolonged sitting, standing, or walking. Pain is alleviated with ibuprofen, biofreeze, and rest. She has completed formal physical therapy without relief. Pain interferes with daily activities. Patient reports pain as 5/10 today and 7/10 with exacerbating factors.    Interval History (12/16/2022): Amelia Mirza presents today for follow-up visit.  she underwent Bilateral  L5/S1 TF KARISSA on 11/18/22.  The patient reports that she is/was better following the procedure.  she reports 65-70% pain relief.  The changes lasted 4 weeks so far.  The changes have continued through this visit.  She reports this injection was equally effective c/t IL KARISSA in April, but neither were as effective as the initial IL KARISSA she had in October of 2021. She recently restarted physical therapy, which does offer improvement and relief in her symptoms. Patient reports pain as 2/10 today. She reports increased mobility since the injection, she is able to stand and walk longer distances.    Interval History (10/28/2022):  Amelia Mirza presents today via telemed for follow-up visit for MRI review.  Patient was last seen on 10/13/2022. She reports continued lumbosacral pain in a band-like distribution with radiation into her bilateral lower extremities left > right. She reports her symptoms previously were worse in her right LE but now she favors the other leg to compensate. She reports pain radiates primarily posteriorly along L5/S1  "distribution with occasional radiation into her anterior thighs. She reports improvement in her radicular symptoms with physical therapy. Patient reports pain as 7/10 today and daily which is constant in nature and interferes with daily activity.    Interval history 10/13/2022  Patient presents status post L5-S1 interlaminar epidural steroid injection with right paramedian approach 04/22/2022.  Patient reports 65-70% relief in lower back and leg pain following her last epidural steroid injection.  Patient reports pain has been insidiously worsening over the last month.  Pain today is rated an 8/10.  Patient again reports pain in the lower back which radiates down the posterior aspects of bilateral lower extremities and L5-S1 distribution to the feet.  Patient reports pain has now interfered with her sleep.  Patient is interested in pursuing repeat injection.  Patient reports she is actively been participating in traditional as well as aquatic physical therapy which has been helping with strength and range of motion.    Interval history 03/31/2022  Patient presents for 5 month follow-up.  She reports return of lower back pain which radiates into the buttocks and down the posterior aspects of bilateral lower extremities and L5-S1 distribution.  Patient reports the symptoms are identical to prior lumbar epidural steroid injection.  Patient is interested in pursuing repeat intervention.  Patient also reports myofascial pain along thoracic paraspinous musculature.  Patient reports pain is interrupting with her sleep.  She denies new onset lower extremity weakness, bowel or bladder incontinence or saddle anesthesia.    Interval History (10/28/2021):  Amelia Mirza presents today for follow-up visit.  S/p L5/S1 IL KARISSA on 10/4/21 with 90% pain relief.  Pain was basically resolved after, but then she had a fall which Increased knee pain.  She hasnt seen Orthopedics in years.  Patient reports pain as "0/10 today.  " Overall, she has greatly improved since procedure.     Initial HPI (7/12/21) - Dr. Adler:  Amelia Mirza is a 61 y.o.   female with past medical history significant for hypoTH, morbid obesity, osteoarthritis (knees), lumbar spondylosis who presents to the clinic for the evaluation of lower back and left leg pain . The pain started  1 year prior ago  without preceding accident or trauma and symptoms have been worsening.The pain is located in a bandlike distribution at the level of the iliac crest with radiation down the left lower extremity along the lateral and posterior aspects in L4-5 distribution.  The pain is described as constant, aching and sharp and is rated as 7/10. The pain is rated with a score of  7/10 on the BEST day and a score of 10/10 on the WORST day.  Symptoms interfere with daily activity, sleeping and work.   Patient does work from home but currently is off her summer break and is disheartened by her inability to participate in outdoor activities or time with her family secondary to her pain. The pain is exacerbated by Sitting, Standing, Walking and Getting out of bed/chair.    Patient is able to ambulate approximately 3 blocks before requiring rest.The pain is mitigated by laying down and rest.     Pt saw MELI Mar for bilateral knee pain 3/2019 with topical compound cream prescribed and diagnostic genicular procedure discussed but not performed.     Pt saw Dr. Padilla (2017) for lower back pain with radiculopathy with recommendation for PT, NSAID analgesic prescribed.     Patient denies urinary incontinence, bowel incontinence, significant motor weakness and loss of sensations.      Pain Disability Index (PDI) Score Review:      3/6/2019    12:00 PM   Last 3 PDI Scores   Pain Disability Index (PDI) 31       Non-Pharmacologic Treatments:  Physical Therapy/Home Exercise: yes  Ice/Heat:yes  TENS: no  Acupuncture: no  Massage: no  Chiropractic: no    Other: no      Pain  Medications:  - Adjuvant Medications: Advil,Motrin ( Ibuprofen), Mobic (Meloxicam), Zanaflex ( Tizanidine) and NSAIDs, Tylenol, Biofreeze, EMLA cream        Pain Procedures:     Orthopedics   Intra-articular steroid and visco-supplementation in bilateral knees - in other dept    Dr. Adler:  -10/04/2021: L5/S1 IL KARISSA with 90% pain relief   -04/22/2022:  L5-S1 interlaminar epidural steroid injection with right paramedian approach with 65-70% relief  -11/18/2022: Bilateral  L5/S1 TF KARISSA with 65-70% relief  -04/05/2023: bilateral SIJ + GT bursa injection with 75-80% pain relief in her low back, 70-80% relief in her left bursa, and 50-60% relief in her right bursa  -07/28/2023: Bilateral L5-S1 transforaminal epidural steroid injection  -06/25/2024: Bilateral intra-articular hip joint injection  -08/23/2024: Right-sided L4-5 and L5-S1 TF KARISSA   -10/18/2024: bilateral SIJ injection with 85-90% pain relief with functional improvement (along with pain relief from previous right lumbar TF KARISSA)          Review of Systems:   GENERAL:  No weight loss, malaise or fevers.  HEENT:   No recent changes in vision or hearing  NECK:  Negative for lumps, no difficulty with swallowing.  RESPIRATORY:  Negative for cough, wheezing or shortness of breath, patient denies any recent URI.  CARDIOVASCULAR:  Negative for chest pain, leg swelling or palpitations.  GI:  Negative for abdominal discomfort, blood in stools or black stools or change in bowel habits.  MUSCULOSKELETAL:  See HPI.  SKIN:  Negative for lesions, rash, and itching.  PSYCH:  No mood disorder or recent psychosocial stressors.  Patients sleep is not disturbed secondary to pain.  HEMATOLOGY/LYMPHOLOGY:  Negative for prolonged bleeding, bruising easily or swollen nodes.  Patient is not currently taking any anti-coagulants  NEURO:   No history of headaches, syncope, paralysis, seizures or tremors.  All other reviewed and negative other than HPI.        OBJECTIVE:  Telemedicine  "Physical Exam:   Vitals:    02/18/25 0940   Height: 5' 2" (1.575 m)  Comment: Patient reported       Body mass index is 55.4 kg/m².   (reviewed on 2/18/2025)     GENERAL: Well appearing, in no acute distress, alert and oriented x3.  Cooperative.  PSYCH:  Mood and affect appropriate.  SKIN: Skin color & texture with no obvious abnormalities.    HEAD/FACE:  Normocephalic, atraumatic.    PULM:  No difficulty breathing. No nasal flaring. No obvious wheezing.  EXTREMITIES: No obvious deformities. Moving all extremities well, appears to have symmetric strength throughout.  MUSCULOSKELETAL: No obvious atrophy abnormalities are noted.   GAIT: sitting.     Physical Exam: last in clinic visit:  GENERAL: Well appearing, in no acute distress, alert and oriented x3.  PSYCH:  Mood and affect appropriate.  SKIN: Skin color, texture, turgor normal, no rashes or lesions.  HEAD/FACE:  Normocephalic, atraumatic. Cranial nerves grossly intact.  NECK: No pain to palpation over the cervical paraspinous muscles. Spurling Negative. No pain with neck flexion, extension, or lateral flexion.   PULM: No evidence of respiratory difficulty, symmetric chest rise.  GI:  Soft and non-tender.  BACK: Straight leg raising in the sitting and supine positions is negative to radicular pain. No pain to palpation over the facet joints of the lumbar spine or spinous processes. Normal range of motion without pain reproduction.  SIJ testing:  - TTP over SI joint: Present bilaterally, right > left  - Rick's/ Jean-Pierre's: Positive bilaterally  - Sacroiliac Distraction Test (anterior pressure): Positive  - Sacroiliac Compression Test (lateral pressure): Positive   - SacralThrust Test (posterior pressure): Positive  -TTP along bilateral GT bursa, right > left  EXTREMITIES: Peripheral joint ROM is full and pain free without obvious instability or laxity in all four extremities- with exception of knees.  No deformities, edema, or skin discoloration. Good capillary " refill.  MUSCULOSKELETAL: Shoulder, hip, and knee provocative maneuvers are negative.  T   Bilateral upper and lower extremity strength is normal and symmetric.  No atrophy or tone abnormalities are noted. Decreased sensation LLE: L4-5 distribution to knee.  Pain with extension of knee. TTP diffusely over right knee patella.  NEURO: BUE & BLE coordination and muscle stretch reflexes are physiologic and symmetric.  Plantar response are downgoing. No clonus.   GAIT: normal.          Imaging/ Diagnostic Studies/ Labs (Reviewed on 2/18/2025):    X-ray bilateral hips 10/11/2023  FINDINGS: No fracture, suspicious bone marrow lesion or other acute disease is seen in the pelvis or either hip. Joint alignment is anatomic. There is no radiographic evidence of femoral head osteonecrosis. Mild degenerative changes superior acetabulum.     MRI lumbar spine 06/12/2024  FINDINGS:  Alignment: Approximately 5 mm of grade 1 anterolisthesis at L4-5.  Slight retrolisthesis at L5-S1.     Vertebrae: Discogenic sub endplate marrow signal alteration (Modic changes) at L4-5 and to a lesser degree at L5-S1.     Discs: Progressive loss of disc height at L4-5 and L5-S1.     Cord: Normal.  Conus terminates at L1.     Degenerative findings:     T11-12: Small posterior disc bulge.  No significant neural foraminal narrowing or spinal canal stenosis.     T12-L1: No significant neural foraminal narrowing or spinal canal stenosis.     L1-L2: No significant neural foraminal narrowing or spinal canal stenosis.     L2-L3: No significant neural foraminal narrowing or spinal canal stenosis.     L3-L4: Minimal annular bulge and bilateral facet arthropathy contributing to mild left greater than right inferior neural foraminal narrowing.  No significant spinal canal stenosis.     L4-L5: Prominent posterior disc bulge with slight right foraminal asymmetry and bilateral hypertrophic facet arthropathy.  Resultant moderate to severe bilateral neural foraminal  narrowing.  No significant spinal canal stenosis.     L5-S1: Broad-based posterior disc bulge with small central protrusion and bilateral facet arthropathy.  Resultant severe left and moderate right neural foraminal narrowing.  No significant spinal canal stenosis.     Paraspinal muscles & soft tissues: Small bilateral renal cortical cysts.  Otherwise unremarkable.     Impression:  Multilevel lumbar spondylosis and degenerative disc disease with mild progression since 2022.    Lumbar x-ray   Comparison: None  Findings:  Body habitus limits the study.  For tumor body heights and alignment appear well-maintained.  There is uniform loss of diskal height at the L4 -- 5 L5 -- S1 levels.  Multilevel facet arthropathy.  Pedicles and SI joints appear intact.  No spondylolysis or spondylolisthesis.    X-Ray Cervical Spine AP And Lateral  Comparison: None  Findings: There is straightening of the normal cervical lordosis which can be associated with muscle spasm.  Vertebral body heights are well maintained.  No spondylolisthesis demonstrated.  There is mild loss of disk height from C3-4 and C5-6 with associated degenerative endplate changes and osteophyte production.  Posterior elements appear intact without acute fractures or subluxations demonstrated.  Odontoid process appears intact.  Atlantoaxial articulations appear normal.  Prevertebral soft tissues are within normal limits.        ASSESSMENT: 65 y.o. year old female with lower back and left lower extremity pain, consistent with     1. Bilateral lumbar radiculopathy  Case Request-RAD/Other Procedure Area: Bilateral L4/5 + L5/S1 TF KARISSA      2. Other spondylosis with radiculopathy, lumbosacral region  Case Request-RAD/Other Procedure Area: Bilateral L4/5 + L5/S1 TF KARISSA      3. Lumbar foraminal stenosis  pregabalin (LYRICA) 75 MG capsule      4. Anterolisthesis of lumbar spine        5. Sacroiliitis            PLAN:   1. Interventional:    - Schedule Bilateral L4/5 + L5/S1  "TF KARISSA. Patient is not taking prescription blood thinners or ASA.  *If insurance only approves one level bilaterally: will change to Bilateral L5/S1 TF KARISSA.  Patient received 80% pain relief for greater than 3 months from previous lumbar transforaminal epidural.    - Will consider repeat SIJ injection post KARISSA if warranted.      - Anticoagulation use: None.     -patient received 80+% relief with last sacroiliac joint injection, most recently 10/18/2024 (along with supplemental pain relief from previous right lumbar TF KARISSA in August of 2024).    -patient has 50% sustained relief in bilateral hip pain following intra-articular hip joint injection.  We have discussed repeating this injection in the future should hip pain exacerbate.    2. Pharmacologic:   - Start Lyrica 75mg BID (with titration instructions to take 75mg QHS x 5 days, then increase to BID if tolerated).  Consider further Increase if no improvement after 1-3 weeks.  Patient informed not to stop taking if she does not find significant pain relief. We previously discussed potential side effects of this medication, which may include drowsiness,dizziness, dry mouth, constipation, or peripheral edema.   -Continue Tizanidine 4 mg QHS PRN for myofascial pain.  We have discussed potential deleterious side effects associated with this medication including  dizziness, drowsiness, dry mouth or tingling sensation in the upper or lower extremities.     - Failed medications: Gabapentin (SE:"didn't feel like herself").     reviewed and consistent with use.        3. Rehabilitative:  -We discussed continuing at home physician directed physical therapy to help manage the patient/s painful condition. The patient was counseled that muscle strengthening will improve the long term prognosis in regards to pain and may also help increase range of motion and mobility.  Patient has completed conventional land-based physical therapy from 07/21/2021 through 10/28/2021, 14 " sessions total.    4. Diagnostic:  Reviewed diagnostic imaging (bilateral hip x-ray, MRI lumbar spine, flex/ex lumbar XR) with the patient and answered any questions.     5. Consult/ Referral:    -Follow-up PRN with Neurosurgery, Dr. Magaña:  Lumbar degenerative disc disease, anterolisthesis and radiculopathy    6. Follow up:  4 weeks post-procedure - virtual visit       Future Appointments   Date Time Provider Department Center   2/20/2025  9:40 AM Eve Green MD [facility]       - Patient Questions: Answered all of the patient's questions regarding diagnosis, therapy, and treatment.    - This condition does not require this patient to take time off of work, and the primary goal of our Pain Management services is to improve the patient's functional capacity.   - I discussed the risks, benefits, and alternatives to potential treatment options. All questions and concerns were fully addressed today in clinic.         Bernice Bolton PA-C  Interventional Pain Management - [facility]    Disclaimer:  This note was prepared using voice recognition system and is likely to have sound alike errors that may have been overlooked even after proof reading.  Please call me with any questions.

## 2025-02-18 ENCOUNTER — OFFICE VISIT (OUTPATIENT)
Dept: PAIN MEDICINE | Facility: CLINIC | Age: 65
End: 2025-02-18
Payer: COMMERCIAL

## 2025-02-18 ENCOUNTER — TELEPHONE (OUTPATIENT)
Dept: PAIN MEDICINE | Facility: CLINIC | Age: 65
End: 2025-02-18

## 2025-02-18 VITALS — HEIGHT: 62 IN | BODY MASS INDEX: 55.4 KG/M2

## 2025-02-18 DIAGNOSIS — M46.1 SACROILIITIS: ICD-10-CM

## 2025-02-18 DIAGNOSIS — M47.27 OTHER SPONDYLOSIS WITH RADICULOPATHY, LUMBOSACRAL REGION: ICD-10-CM

## 2025-02-18 DIAGNOSIS — M54.16 BILATERAL LUMBAR RADICULOPATHY: Primary | ICD-10-CM

## 2025-02-18 DIAGNOSIS — M48.061 LUMBAR FORAMINAL STENOSIS: ICD-10-CM

## 2025-02-18 DIAGNOSIS — M43.16 ANTEROLISTHESIS OF LUMBAR SPINE: ICD-10-CM

## 2025-02-18 RX ORDER — PREGABALIN 75 MG/1
CAPSULE ORAL
Qty: 120 CAPSULE | Refills: 0 | Status: SHIPPED | OUTPATIENT
Start: 2025-02-18 | End: 2025-04-19

## 2025-02-20 ENCOUNTER — LAB VISIT (OUTPATIENT)
Dept: LAB | Facility: HOSPITAL | Age: 65
End: 2025-02-20
Attending: FAMILY MEDICINE
Payer: COMMERCIAL

## 2025-02-20 ENCOUNTER — OFFICE VISIT (OUTPATIENT)
Dept: FAMILY MEDICINE | Facility: CLINIC | Age: 65
End: 2025-02-20
Attending: FAMILY MEDICINE
Payer: COMMERCIAL

## 2025-02-20 VITALS
SYSTOLIC BLOOD PRESSURE: 136 MMHG | OXYGEN SATURATION: 98 % | WEIGHT: 293 LBS | BODY MASS INDEX: 53.92 KG/M2 | DIASTOLIC BLOOD PRESSURE: 82 MMHG | RESPIRATION RATE: 18 BRPM | HEIGHT: 62 IN | HEART RATE: 59 BPM | TEMPERATURE: 98 F

## 2025-02-20 DIAGNOSIS — M46.1 SACROILIITIS: ICD-10-CM

## 2025-02-20 DIAGNOSIS — J30.2 SEASONAL ALLERGIC RHINITIS, UNSPECIFIED TRIGGER: ICD-10-CM

## 2025-02-20 DIAGNOSIS — K63.5 BENIGN COLON POLYP: ICD-10-CM

## 2025-02-20 DIAGNOSIS — Z12.31 OTHER SCREENING MAMMOGRAM: ICD-10-CM

## 2025-02-20 DIAGNOSIS — R73.03 PREDIABETES: ICD-10-CM

## 2025-02-20 DIAGNOSIS — E55.9 VITAMIN D DEFICIENCY: ICD-10-CM

## 2025-02-20 DIAGNOSIS — G47.00 INSOMNIA, UNSPECIFIED TYPE: ICD-10-CM

## 2025-02-20 DIAGNOSIS — E66.01 MORBID OBESITY WITH BMI OF 50.0-59.9, ADULT: ICD-10-CM

## 2025-02-20 DIAGNOSIS — E03.9 HYPOTHYROIDISM, UNSPECIFIED TYPE: ICD-10-CM

## 2025-02-20 DIAGNOSIS — I10 HYPERTENSION, UNSPECIFIED TYPE: ICD-10-CM

## 2025-02-20 DIAGNOSIS — Z23 NEED FOR PROPHYLACTIC VACCINATION AGAINST STREPTOCOCCUS PNEUMONIAE (PNEUMOCOCCUS): ICD-10-CM

## 2025-02-20 DIAGNOSIS — M17.0 PRIMARY OSTEOARTHRITIS OF BOTH KNEES: ICD-10-CM

## 2025-02-20 DIAGNOSIS — M47.816 LUMBAR SPONDYLOSIS: ICD-10-CM

## 2025-02-20 DIAGNOSIS — Z00.00 ANNUAL PHYSICAL EXAM: Primary | ICD-10-CM

## 2025-02-20 DIAGNOSIS — Z00.00 ANNUAL PHYSICAL EXAM: ICD-10-CM

## 2025-02-20 DIAGNOSIS — Z78.0 POSTMENOPAUSAL: ICD-10-CM

## 2025-02-20 LAB
25(OH)D3+25(OH)D2 SERPL-MCNC: 36 NG/ML (ref 30–96)
ALBUMIN SERPL BCP-MCNC: 3.9 G/DL (ref 3.5–5.2)
ALP SERPL-CCNC: 88 U/L (ref 40–150)
ALT SERPL W/O P-5'-P-CCNC: 24 U/L (ref 10–44)
ANION GAP SERPL CALC-SCNC: 12 MMOL/L (ref 8–16)
AST SERPL-CCNC: 41 U/L (ref 10–40)
BASOPHILS # BLD AUTO: 0.03 K/UL (ref 0–0.2)
BASOPHILS NFR BLD: 0.4 % (ref 0–1.9)
BILIRUB SERPL-MCNC: 0.5 MG/DL (ref 0.1–1)
BUN SERPL-MCNC: 19 MG/DL (ref 8–23)
CALCIUM SERPL-MCNC: 9.9 MG/DL (ref 8.7–10.5)
CHLORIDE SERPL-SCNC: 104 MMOL/L (ref 95–110)
CHOLEST SERPL-MCNC: 185 MG/DL (ref 120–199)
CHOLEST/HDLC SERPL: 2.3 {RATIO} (ref 2–5)
CO2 SERPL-SCNC: 24 MMOL/L (ref 23–29)
CREAT SERPL-MCNC: 0.8 MG/DL (ref 0.5–1.4)
DIFFERENTIAL METHOD BLD: ABNORMAL
EOSINOPHIL # BLD AUTO: 0 K/UL (ref 0–0.5)
EOSINOPHIL NFR BLD: 0.4 % (ref 0–8)
ERYTHROCYTE [DISTWIDTH] IN BLOOD BY AUTOMATED COUNT: 15.4 % (ref 11.5–14.5)
EST. GFR  (NO RACE VARIABLE): >60 ML/MIN/1.73 M^2
ESTIMATED AVG GLUCOSE: 123 MG/DL (ref 68–131)
GLUCOSE SERPL-MCNC: 91 MG/DL (ref 70–110)
HBA1C MFR BLD: 5.9 % (ref 4–5.6)
HCT VFR BLD AUTO: 40.2 % (ref 37–48.5)
HDLC SERPL-MCNC: 79 MG/DL (ref 40–75)
HDLC SERPL: 42.7 % (ref 20–50)
HGB BLD-MCNC: 12.3 G/DL (ref 12–16)
IMM GRANULOCYTES # BLD AUTO: 0.01 K/UL (ref 0–0.04)
IMM GRANULOCYTES NFR BLD AUTO: 0.1 % (ref 0–0.5)
LDLC SERPL CALC-MCNC: 93.8 MG/DL (ref 63–159)
LYMPHOCYTES # BLD AUTO: 1.8 K/UL (ref 1–4.8)
LYMPHOCYTES NFR BLD: 24.9 % (ref 18–48)
MCH RBC QN AUTO: 25.7 PG (ref 27–31)
MCHC RBC AUTO-ENTMCNC: 30.6 G/DL (ref 32–36)
MCV RBC AUTO: 84 FL (ref 82–98)
MONOCYTES # BLD AUTO: 0.5 K/UL (ref 0.3–1)
MONOCYTES NFR BLD: 6.9 % (ref 4–15)
NEUTROPHILS # BLD AUTO: 4.8 K/UL (ref 1.8–7.7)
NEUTROPHILS NFR BLD: 67.3 % (ref 38–73)
NONHDLC SERPL-MCNC: 106 MG/DL
NRBC BLD-RTO: 0 /100 WBC
PLATELET # BLD AUTO: 287 K/UL (ref 150–450)
PMV BLD AUTO: 10.3 FL (ref 9.2–12.9)
POTASSIUM SERPL-SCNC: 4.4 MMOL/L (ref 3.5–5.1)
PROT SERPL-MCNC: 7.9 G/DL (ref 6–8.4)
RBC # BLD AUTO: 4.78 M/UL (ref 4–5.4)
SODIUM SERPL-SCNC: 140 MMOL/L (ref 136–145)
T4 FREE SERPL-MCNC: 1.81 NG/DL (ref 0.71–1.51)
TRIGL SERPL-MCNC: 61 MG/DL (ref 30–150)
TSH SERPL DL<=0.005 MIU/L-ACNC: 0.05 UIU/ML (ref 0.4–4)
WBC # BLD AUTO: 7.14 K/UL (ref 3.9–12.7)

## 2025-02-20 PROCEDURE — 80061 LIPID PANEL: CPT | Performed by: FAMILY MEDICINE

## 2025-02-20 PROCEDURE — 1159F MED LIST DOCD IN RCRD: CPT | Mod: CPTII,S$GLB,, | Performed by: FAMILY MEDICINE

## 2025-02-20 PROCEDURE — 3079F DIAST BP 80-89 MM HG: CPT | Mod: CPTII,S$GLB,, | Performed by: FAMILY MEDICINE

## 2025-02-20 PROCEDURE — 85025 COMPLETE CBC W/AUTO DIFF WBC: CPT | Performed by: FAMILY MEDICINE

## 2025-02-20 PROCEDURE — 3075F SYST BP GE 130 - 139MM HG: CPT | Mod: CPTII,S$GLB,, | Performed by: FAMILY MEDICINE

## 2025-02-20 PROCEDURE — 83036 HEMOGLOBIN GLYCOSYLATED A1C: CPT | Performed by: FAMILY MEDICINE

## 2025-02-20 PROCEDURE — 82306 VITAMIN D 25 HYDROXY: CPT | Performed by: FAMILY MEDICINE

## 2025-02-20 PROCEDURE — 3044F HG A1C LEVEL LT 7.0%: CPT | Mod: CPTII,S$GLB,, | Performed by: FAMILY MEDICINE

## 2025-02-20 PROCEDURE — 90677 PCV20 VACCINE IM: CPT | Mod: S$GLB,,, | Performed by: FAMILY MEDICINE

## 2025-02-20 PROCEDURE — 84443 ASSAY THYROID STIM HORMONE: CPT | Performed by: FAMILY MEDICINE

## 2025-02-20 PROCEDURE — 99396 PREV VISIT EST AGE 40-64: CPT | Mod: 25,S$GLB,, | Performed by: FAMILY MEDICINE

## 2025-02-20 PROCEDURE — 3008F BODY MASS INDEX DOCD: CPT | Mod: CPTII,S$GLB,, | Performed by: FAMILY MEDICINE

## 2025-02-20 PROCEDURE — 84439 ASSAY OF FREE THYROXINE: CPT | Performed by: FAMILY MEDICINE

## 2025-02-20 PROCEDURE — 90471 IMMUNIZATION ADMIN: CPT | Mod: S$GLB,,, | Performed by: FAMILY MEDICINE

## 2025-02-20 PROCEDURE — 80053 COMPREHEN METABOLIC PANEL: CPT | Performed by: FAMILY MEDICINE

## 2025-02-20 PROCEDURE — 99999 PR PBB SHADOW E&M-EST. PATIENT-LVL V: CPT | Mod: PBBFAC,,, | Performed by: FAMILY MEDICINE

## 2025-02-20 RX ORDER — ERGOCALCIFEROL 1.25 MG/1
CAPSULE ORAL
Qty: 25 CAPSULE | Refills: 3 | Status: SHIPPED | OUTPATIENT
Start: 2025-02-20

## 2025-02-20 RX ORDER — MONTELUKAST SODIUM 10 MG/1
10 TABLET ORAL DAILY
Qty: 90 TABLET | Refills: 3 | Status: SHIPPED | OUTPATIENT
Start: 2025-02-20

## 2025-02-20 RX ORDER — LEVOTHYROXINE SODIUM 200 UG/1
TABLET ORAL
Qty: 90 TABLET | Refills: 3 | Status: SHIPPED | OUTPATIENT
Start: 2025-02-20

## 2025-02-20 RX ORDER — LEVOTHYROXINE SODIUM 25 UG/1
TABLET ORAL
Qty: 114 TABLET | Refills: 1 | Status: SHIPPED | OUTPATIENT
Start: 2025-02-20 | End: 2025-02-26 | Stop reason: DRUGHIGH

## 2025-02-20 RX ORDER — TRAZODONE HYDROCHLORIDE 50 MG/1
50 TABLET ORAL NIGHTLY
Qty: 90 TABLET | Refills: 3 | Status: SHIPPED | OUTPATIENT
Start: 2025-02-20

## 2025-02-20 NOTE — PROGRESS NOTES
CHIEF COMPLAINT: Annual wellness examination.     HISTORY OF PRESENT ILLNESS: Ms. Mirza comes in today fasting and with taking thyroid medication for annual wellness examination.     END OF LIFE DECISION: She has no living will and does not desire life support.    Current Outpatient Medications   Medication Sig    amLODIPine (NORVASC) 10 MG tablet Take 1 tablet (10 mg total) by mouth once daily.    ergocalciferol (ERGOCALCIFEROL) 50,000 unit Cap TAKE 1 CAPSULE TWICE A WEEK    fluticasone propionate (FLONASE) 50 mcg/actuation nasal spray 2 sprays (100 mcg total) by Each Nostril route once daily.    ibuprofen (ADVIL,MOTRIN) 800 MG tablet Take 1 tablet (800 mg total) by mouth 2 (two) times daily as needed for Pain (food).    levothyroxine (SYNTHROID) 200 MCG tablet TAKE 1 TABLET EVERY MORNING BEFORE BREAKFAST    levothyroxine (SYNTHROID) 25 MCG tablet Take 1 pill by mouth daily before breakfast on Monday through Friday and Take 2 pills by mouth daily before breakfast on Saturday and Sunday    pregabalin (LYRICA) 75 MG capsule Take 1 capsule (75 mg total) by mouth every evening for 5 days, THEN 1 capsule (75 mg total) 2 (two) times daily.    tiZANidine (ZANAFLEX) 4 MG tablet Take 1 tablet (4 mg total) by mouth 2 (two) times daily.    traZODone (DESYREL) 50 MG tablet TAKE 1 TABLET NIGHTLY AS NEEDED FOR INSOMNIA      SCREENINGS:  Cholesterol: February 20, 2024.  FFS/Colonoscopy: August 26, 2022 - benign colon polyp, internal hemorrhoids, diverticulosis; repeat in 10 years.  Mammogram: October 10, 2024 - okay.   GYN Exam: February 20, 2024 (breasts only) - okay.  Dexa Scan:  September 25, 2023 - okay; repeat in 5 years.   Eye Exam: August 2024 with Dr. Hill.  She wears glasses.    PPD: Negative in the past.  Immunizations: Tdap - May 30, 2012. Td - January 13, 2023.  Gardasil - N./A.  Zostavax - N./A.  Shingrix - August 17, 2020, February 17, 2021.  Pneumovax - Never.  Prevnar - Never. She desires.  Seasonal Flu - N./A.  Discussed. She declines.  RSV - Never. Advised patient available at local pharmacy.  Covid-19 (Moderna) vaccine series - February 27, 2021, March 26, 2021, November 13, 2021.     ROS:  GENERAL: Denies fever, chills or unusual weight change. Appetite normal. Weight 137.4 kg (302 lb 14.6 oz) at January 27, 2025 visit. Reports not exercises and not monitors diet. Reports chronic fatigue.  SKIN: Denies rashes, itching, changes in mole, color or texture of skin or easy bruising.  HEAD: Denies recent head trauma but reports daily headaches especially over sinuses. Reports takes Tylenol and applies cool compresses with occasionally help and uses Flonase prn. Reports treated with Amoxicillin earlier this year with help/relief until return of headaches this month.  EYES: Denies change in vision, pain, diplopia, redness, drainage. Wears glasses.  EARS: Denies ear pain, discharge, vertigo or decreased hearing.  NOSE: Denies loss of smell, epistaxis or rhinitis.  MOUTH & THROAT: Denies hoarseness or change in voice. Denies excessive gum bleeding or mouth sores. Denies sore throat.  NODES: Denies swollen glands.  CHEST: Denies IYER, wheezing, cough, or sputum production.   CARDIOVASCULAR: Denies chest pain, PND, orthopnea or reduced exercise tolerance. Denies palpitations. Reports performs home blood pressure checks 2 times per week with levels ranging 138/84.  ABDOMEN: Denies diarrhea, constipation, nausea, vomiting, abdominal pain, or blood in stool.   URINARY: Denies flank pain, dysuria or hematuria. Reports chronic urine frequency.  GENITOURINARY: Denies flank pain, dysuria, frequency or hematuria. Performs monthly breast self exams.  ENDOCRINE: Denies diabetes, cholesterol problems. Reports takes Vitamin D 50,000 units twice weekly for vitamin D deficiency and continues Synthroid 200 + 25 mcg daily for thyroid replacement.  HEME/LYMPH: Denies bleeding problems.  PERIPHERAL VASCULAR:Denies claudication or  "cyanosis.  MUSCULOSKELETAL: Denies joint stiffness, pain or swelling except reports chronic arthritic knee and back pain. Reports in December 2024 completed intermittent physical therapy twice a week for back (sciatica) pain over 3 years with help. Saw MELI Bolton with pain management on February 18, 2025 for bilateral lumbar radiculopathy, other spondylosis with radiculopathy, lumbosacral region, lumbar foraminal stenosis, anterolisthesis of lumbar spine, sacroiliitis at which time she was prescribed Lyrica. Denies edema.    NEUROLOGIC: Denies history of seizures, tremors, paralysis, alteration of gait or coordination.   PSYCHIATRIC: Denies mood swings, depression, anxiety, homicidal or suicidal thoughts. Reports chronic insomnia and takes Trazodone with help.     PE:   VS: /82   Pulse (!) 59   Temp 98.2 °F (36.8 °C) (Tympanic)   Resp 18   Ht 5' 2" (1.575 m)   Wt (!) 137.6 kg (303 lb 5.7 oz)   SpO2 98%   BMI 55.48 kg/m²   APPEARANCE: Well nourished, well developed female, obese and pleasant, alert and oriented in no acute distress.  HEAD: Nontender. Full range of motion.  EYES: PERRL, conjunctiva pink, lids no edema. She wears glasses.  EARS: External canal patent, no swelling or redness. TM's shiny and clear.  NOSE: Mucosa and turbinates pink, swollen. No discharge. Tender sinuses.  THROAT: No pharyngeal erythema or exudate. No stridor.  NECK: Supple, no mass, thyroid not enlarged.  NODES: No cervical, axillary or inguinal lymph node enlargement.  CHEST: Normal respiratory effort. Lungs clear to auscultation.  CARDIOVASCULAR: Normal S1, S2. No rubs, murmurs or gallops. PMI not displaced. No carotid bruit. Pedal pulses palpable bilaterally. No edema.  ABDOMEN: Bowel sounds present. Not distended. Soft. No tenderness, masses or organomegaly.  BREAST EXAM: Symmetrical, no external lesions, no discharge, no masses palpated.  PELVIC EXAM: No external lesions noted, no discharge, absent cervix and " uterus, bimanual exam showed no adnexal masses or tenderness noted. Urethra and bladder intact.  RECTAL EXAM: No external hemorrhoids or anal fissures. Heme-negative stool in the rectal vault.  MUSCULOSKELETAL: No joint deformities, stiffness or tenderness noted today. She is ambulatory without problems.  SKIN: No rashes or suspicious lesions, normal color and turgor.  NEUROLOGIC: Cranial Nerves: II-XII grossly intact. DTR's: Knees, Ankles 2+ and equal bilaterally. Gait & Posture: Normal gait and fine motion.  PSYCHIATRIC: Patient alert, oriented x 3. Mood/Affect normal without acute anxiety or depression noted. Judgment/insight good as she makes appropriate decisions on today's examination.     Protective Sensation (w/ 10 gram monofilament):  Right: Intact  Left: Intact    Visual Inspection:  Normal -  Bilateral    Pedal Pulses:   Right: Present  Left: Present    Posterior Tibialis Pulses:   Right:Present  Left: Present         ASSESSMENT:    ICD-10-CM ICD-9-CM    1. Annual physical exam  Z00.00 V70.0 Vitamin D      Lipid Panel      Comprehensive Metabolic Panel      CBC Auto Differential      TSH      Hemoglobin A1C      2. Hypertension, unspecified type  I10 401.9       3. Hypothyroidism, unspecified type  E03.9 244.9 levothyroxine (SYNTHROID) 25 MCG tablet      levothyroxine (SYNTHROID) 200 MCG tablet      4. Prediabetes  R73.03 790.29       5. Vitamin D deficiency  E55.9 268.9 ergocalciferol (ERGOCALCIFEROL) 50,000 unit Cap      6. Sacroiliitis  M46.1 720.2       7. Lumbar spondylosis  M47.816 721.3       8. Primary osteoarthritis of both knees  M17.0 715.16       9. Benign colon polyp  K63.5 211.3       10. Insomnia, unspecified type  G47.00 780.52 traZODone (DESYREL) 50 MG tablet      11. Seasonal allergic rhinitis, unspecified trigger  J30.2 477.9 montelukast (SINGULAIR) 10 mg tablet      12. Postmenopausal  Z78.0 V49.81       13. Other screening mammogram  Z12.31 V76.12 Mammo Digital Screening Bilat w/ Keyur  (XPD)      14. Need for prophylactic vaccination against Streptococcus pneumoniae (pneumococcus)  Z23 V03.82 pneumoc 20-jerman conj-dip cr(PF) (PREVNAR-20 (PF)) injection Syrg 0.5 mL      15. Morbid obesity with BMI of 50.0-59.9, adult - currently not managed with diet or exercise E66.01 278.01     Z68.43 V85.43         PLAN:  1. Age-appropriate counseling-appropriate low-sodium, low-cholesterol, low carbohydrate diet and exercise daily, monthly breast self exam, annual wellness examination.   2. Patient advised to call for results.  3. Continue current medications (including use Flonase daily).  4. Prescription refill as noted above.  5. Add Singulair 10 mg daily; medication precautions discussed with patient.  6. Keep follow up with specialists.  7. Mobility impairment form given for temporary use per patient request.  8. Follow up in about 6 months (around 8/20/2025) for hypertension and prediabetes follow up.

## 2025-02-26 ENCOUNTER — TELEPHONE (OUTPATIENT)
Dept: PAIN MEDICINE | Facility: CLINIC | Age: 65
End: 2025-02-26
Payer: COMMERCIAL

## 2025-02-26 ENCOUNTER — RESULTS FOLLOW-UP (OUTPATIENT)
Dept: FAMILY MEDICINE | Facility: CLINIC | Age: 65
End: 2025-02-26

## 2025-02-26 NOTE — TELEPHONE ENCOUNTER
----- Message from Eddie sent at 2/26/2025  3:25 PM CST -----  Contact: self  .Type:  Patient Returning CallWho Called:.Amelia Blake Left Message for Patient: Mercedes the patient know what this is regarding?:yesWould the patient rather a call back or a response via [facility]? Call [facility] Call Back Number:.[phone] Information: Pt states she is returning a call to this office and would like a call back.

## 2025-02-27 ENCOUNTER — TELEPHONE (OUTPATIENT)
Dept: FAMILY MEDICINE | Facility: CLINIC | Age: 65
End: 2025-02-27
Payer: COMMERCIAL

## 2025-03-14 NOTE — PRE-PROCEDURE INSTRUCTIONS
Spoke with patient regarding procedure scheduled on 3.21     Arrival time 0730     Has patient been sick with fever or on antibiotics within the last 7 days? No     Does the patient have any open wounds, sores or rashes? No     Does the patient have any recent fractures? no     Has patient received a vaccination within the last 7 days? No     Received the COVID vaccination? yes     Has the patient stopped all medications as directed? na     Does patient have a pacemaker, defibrillator, or implantable stimulator? No     Does the patient have a ride to and from procedure and someone reliable to remain with patient?  APRIL     Is the patient diabetic? no      Does the patient have sleep apnea? Or use O2 at home? no     Is the patient receiving sedation? LOCAL      Is the patient instructed to remain NPO beginning at midnight the night before their procedure? yes     Procedure location confirmed with patient? Yes     Covid- Denies signs/symptoms. Instructed to notify PAT/MD if any changes.

## 2025-03-21 ENCOUNTER — HOSPITAL ENCOUNTER (OUTPATIENT)
Facility: HOSPITAL | Age: 65
Discharge: HOME | End: 2025-03-21
Attending: ANESTHESIOLOGY | Admitting: ANESTHESIOLOGY
Payer: COMMERCIAL

## 2025-03-21 VITALS
BODY MASS INDEX: 53.92 KG/M2 | OXYGEN SATURATION: 100 % | TEMPERATURE: 97 F | RESPIRATION RATE: 16 BRPM | DIASTOLIC BLOOD PRESSURE: 62 MMHG | HEIGHT: 62 IN | WEIGHT: 293 LBS | SYSTOLIC BLOOD PRESSURE: 134 MMHG | HEART RATE: 78 BPM

## 2025-03-21 DIAGNOSIS — M54.16 LUMBAR RADICULOPATHY: ICD-10-CM

## 2025-03-21 PROCEDURE — 25000003 PHARM REV CODE 250: Performed by: ANESTHESIOLOGY

## 2025-03-21 PROCEDURE — 64484 NJX AA&/STRD TFRM EPI L/S EA: CPT | Mod: 50 | Performed by: ANESTHESIOLOGY

## 2025-03-21 PROCEDURE — 64483 NJX AA&/STRD TFRM EPI L/S 1: CPT | Mod: 50,,, | Performed by: ANESTHESIOLOGY

## 2025-03-21 PROCEDURE — 63600175 PHARM REV CODE 636 W HCPCS: Performed by: ANESTHESIOLOGY

## 2025-03-21 PROCEDURE — 64483 NJX AA&/STRD TFRM EPI L/S 1: CPT | Mod: 50 | Performed by: ANESTHESIOLOGY

## 2025-03-21 PROCEDURE — 64484 NJX AA&/STRD TFRM EPI L/S EA: CPT | Mod: 50,,, | Performed by: ANESTHESIOLOGY

## 2025-03-21 RX ORDER — INDOMETHACIN 25 MG/1
CAPSULE ORAL
Status: DISCONTINUED | OUTPATIENT
Start: 2025-03-21 | End: 2025-03-21 | Stop reason: HOSPADM

## 2025-03-21 RX ORDER — DEXAMETHASONE SODIUM PHOSPHATE 10 MG/ML
INJECTION, SOLUTION INTRA-ARTICULAR; INTRALESIONAL; INTRAMUSCULAR; INTRAVENOUS; SOFT TISSUE
Status: DISCONTINUED | OUTPATIENT
Start: 2025-03-21 | End: 2025-03-21 | Stop reason: HOSPADM

## 2025-03-21 RX ORDER — MIDAZOLAM HYDROCHLORIDE 1 MG/ML
INJECTION, SOLUTION INTRAMUSCULAR; INTRAVENOUS
Status: DISCONTINUED | OUTPATIENT
Start: 2025-03-21 | End: 2025-03-21 | Stop reason: HOSPADM

## 2025-03-21 RX ORDER — BUPIVACAINE HYDROCHLORIDE 2.5 MG/ML
INJECTION, SOLUTION EPIDURAL; INFILTRATION; INTRACAUDAL; PERINEURAL
Status: DISCONTINUED | OUTPATIENT
Start: 2025-03-21 | End: 2025-03-21 | Stop reason: HOSPADM

## 2025-03-21 RX ORDER — FENTANYL CITRATE 50 UG/ML
INJECTION, SOLUTION INTRAMUSCULAR; INTRAVENOUS
Status: DISCONTINUED | OUTPATIENT
Start: 2025-03-21 | End: 2025-03-21 | Stop reason: HOSPADM

## 2025-03-21 NOTE — DISCHARGE SUMMARY
Discharge Note  Short Stay      SUMMARY     Admit Date: 3/21/2025    Attending Physician: Josemanuel Adler MD        Discharge Physician: Josemanuel Adler MD        Discharge Date: 3/21/2025 9:25 AM    Procedure(s) (LRB):  Bilateral L4/5 + L5/S1 TF KARISSA (Bilateral)    Final Diagnosis: Bilateral lumbar radiculopathy [M54.16]  Other spondylosis with radiculopathy, lumbosacral region [M47.27]    Disposition: Home or self care    Patient Instructions:   Current Discharge Medication List        CONTINUE these medications which have NOT CHANGED    Details   amLODIPine (NORVASC) 10 MG tablet Take 1 tablet (10 mg total) by mouth once daily.  Qty: 90 tablet, Refills: 3    Comments: .  Associated Diagnoses: Hypertension, unspecified type      ergocalciferol (ERGOCALCIFEROL) 50,000 unit Cap TAKE 1 CAPSULE TWICE A WEEK  Qty: 25 capsule, Refills: 3    Associated Diagnoses: Vitamin D deficiency      fluticasone propionate (FLONASE) 50 mcg/actuation nasal spray 2 sprays (100 mcg total) by Each Nostril route once daily.  Qty: 16 g, Refills: 1    Associated Diagnoses: Acute non-recurrent frontal sinusitis      ibuprofen (ADVIL,MOTRIN) 800 MG tablet Take 1 tablet (800 mg total) by mouth 2 (two) times daily as needed for Pain (food).  Qty: 60 tablet, Refills: 2    Associated Diagnoses: Chronic low back pain with sciatica, sciatica laterality unspecified, unspecified back pain laterality; Lumbar spondylosis; DDD (degenerative disc disease), cervical      levothyroxine (SYNTHROID) 200 MCG tablet TAKE 1 TABLET EVERY MORNING BEFORE BREAKFAST  Qty: 90 tablet, Refills: 3    Associated Diagnoses: Hypothyroidism, unspecified type      montelukast (SINGULAIR) 10 mg tablet Take 1 tablet (10 mg total) by mouth once daily.  Qty: 90 tablet, Refills: 3    Associated Diagnoses: Seasonal allergic rhinitis, unspecified trigger      pregabalin (LYRICA) 75 MG capsule Take 1 capsule (75 mg total) by mouth every evening for 5 days, THEN 1 capsule (75 mg total) 2  (two) times daily.  Qty: 120 capsule, Refills: 0    Associated Diagnoses: Lumbar foraminal stenosis      tiZANidine (ZANAFLEX) 4 MG tablet Take 1 tablet (4 mg total) by mouth 2 (two) times daily.  Qty: 180 tablet, Refills: 0    Associated Diagnoses: Sacroiliitis; Greater trochanteric bursitis of both hips      traZODone (DESYREL) 50 MG tablet Take 1 tablet (50 mg total) by mouth every evening.  Qty: 90 tablet, Refills: 3    Associated Diagnoses: Insomnia, unspecified type                 Discharge Diagnosis: Bilateral lumbar radiculopathy [M54.16]  Other spondylosis with radiculopathy, lumbosacral region [M47.27]  Condition on Discharge: Stable with no complications to procedure   Diet on Discharge: Same as before.  Activity: as per instruction sheet.  Discharge to: Home with a responsible adult.  Follow up: 2-4 weeks       Please call the office at [phone] if you experience any weakness or loss of sensation, fever > 101.5, pain uncontrolled with oral medications, persistent nausea/vomiting/or diarrhea, redness or drainage from the incisions, or any other worrisome concerns. If physician on call was not reached or could not communicate with our office for any reason please go to the nearest emergency department

## 2025-03-21 NOTE — DISCHARGE INSTRUCTIONS

## 2025-03-21 NOTE — OP NOTE
Amelia Mirza  65 y.o. female      Vitals:    03/21/25 0756   BP: (!) 149/75   Pulse: 68   Resp: 18   Temp: 97.3 °F (36.3 °C)     Procedure Date:03/21/2025        INFORMED CONSENT: The procedure, risks, benefits and options were discussed with patient. There are no contraindications to the procedure. The patient expressed understanding and agreed to proceed. The personnel performing the procedure was discussed. I verify that I personally obtained consent prior to the start of the procedure and the signed consent can be found on the patient's chart.       Anesthesia:   Conscious sedation provided by M.D    The patient was monitored with continuous pulse oximetry, EKG, and intermittent blood pressure monitors.  The patient was hemodynamically stable throughout the entire process was responsive to voice, and breathing spontaneously.  Supplemental O2 was provided at 2L/min via nasal cannula.  Patient was comfortable for the duration of the procedure. (See nurse documentation and case log for sedation time)    There was a total of 2mg IV Midazolam and 50mcg Fentanyl titrated for the procedure    Pre Procedure diagnosis: Bilateral lumbar radiculopathy [M54.16]  Other spondylosis with radiculopathy, lumbosacral region [M47.27]  Post-Procedure diagnosis: SAME     Complications: None    Specimens: None      DESCRIPTION OF PROCEDURE: The patient was brought to the procedure room. IV access was obtained prior to the procedure. The patient was positioned prone on the fluoroscopy table. Continuous hemodynamic monitoring was initiated including blood pressure, EKG, and pulse oximetry. . The skin was prepped with chlorhexidine and draped in a sterile fashion.      The  L4/5 and  L5/S1 transforaminal spaces were identified with fluoroscopy in the  AP, oblique, and lateral views.  A 22 gauge spinal quinke needle was then advanced into the area of the trans foraminal spaces bilateral with confirmation of proper needle position  using AP, oblique, and lateral fluoroscopic views. Once the needle tip was in the area of the transforaminal space, and there was no blood, CSF or paraesthesias,  1.5 mL of Omnipaque 300mg/ml was injected on bilateral for a total of 3mL.  Fluoroscopic imaging in the AP and lateral views revealed a clear outline of the spinal nerve with proximal spread of agent through the neural foramen into the epidural space. A total combination of 2mL of Bupivacaine and 10 mg dexamethasone was injected on each side and at each level with displacement of the contrast dye confirming that the medication went into the area of the transforaminal spaces bilateral. A sterile bandaid was applied.   Patient tolerated the procedure well.    Patient was taken back to the recovery room for further observation.     The patient was discharged to home in stable condition

## 2025-03-21 NOTE — H&P
HPI  Patient presenting for Procedure(s) (LRB):  Bilateral L4/5 + L5/S1 TF KARISSA (Bilateral)     Patient on Anti-coagulation No    No health changes since previous encounter    Past Medical History:   Diagnosis Date    Arthritis     Benign colon polyp     DDD (degenerative disc disease), lumbar     Hypertension 1/15/2023    Hypothyroidism     Mild anemia     Obesity     Postmenopausal     No history of abnormal pap smear    Vitamin D deficiency      Past Surgical History:   Procedure Laterality Date    bilateral tubal ligation      COLONOSCOPY N/A 8/26/2022    Procedure: COLONOSCOPY;  Surgeon: Taj Marshall MD;  Location: [facility];  Service: General;  Laterality: N/A;    EPIDURAL STEROID INJECTION N/A 10/4/2021    Procedure: Lumbar L5/S1 IL KARISSA WOULD LIKE LATEST POSSIBLE TIME;  Surgeon: Josemanuel Adler MD;  [facility];  Service: Pain Management;  Laterality: N/A;    EPIDURAL STEROID INJECTION N/A 4/22/2022    Procedure: Lumbar L5/S1 IL KARISSA with RN IV sedation;  Surgeon: Josemanuel Adler MD;  [facility];  Service: Pain Management;  Laterality: N/A;    INJECTION OF ANESTHETIC AGENT INTO SACROILIAC JOINT Bilateral 4/5/2023    Procedure: Bilateral GT bursa + bilateral SIJ injection;  Surgeon: Josemanuel Adler MD;  Location: [facility];  Service: Pain Management;  Laterality: Bilateral;    INJECTION OF ANESTHETIC AGENT INTO SACROILIAC JOINT Bilateral 10/18/2024    Procedure: Bilateral SIJ Injection;  Surgeon: Josemanuel Adler MD;  Location: [facility];  Service: Pain Management;  Laterality: Bilateral;    INJECTION OF JOINT Bilateral 4/5/2023    Procedure: Bilateral GT bursa + bilateral SIJ injection;  Surgeon: Josemanuel Adler MD;  Location: [facility];  Service: Pain Management;  Laterality: Bilateral;    INJECTION OF JOINT Bilateral 6/25/2024    Procedure: Bilateral Hip Joint injection;  Surgeon: Josemanuel Adler MD;  Location: [facility];  Service: Pain Management;  Laterality:  Bilateral;    OOPHORECTOMY      Partial hysterectomy with USO      for DUB     SELECTIVE INJECTION OF ANESTHETIC AGENT AROUND LUMBAR SPINAL NERVE ROOT BY TRANSFORAMINAL APPROACH Bilateral 11/18/2022    Procedure: Bilateral L5/S1 TF KARISSA RN IV Sedation;  Surgeon: Josemanuel Adler MD;  Location: [facility];  Service: Pain Management;  Laterality: Bilateral;    SELECTIVE INJECTION OF ANESTHETIC AGENT AROUND LUMBAR SPINAL NERVE ROOT BY TRANSFORAMINAL APPROACH Bilateral 7/28/2023    Procedure: Bilateral L5/S1 TF KARISSA RN IV Sedation;  Surgeon: Josemanuel Adler MD;  Location: [facility];  Service: Pain Management;  Laterality: Bilateral;    THYROIDECTOMY, PARTIAL      TRANSFORAMINAL EPIDURAL INJECTION OF STEROID Right 8/23/2024    Procedure: R sided L4/5 and L5/S1 transforaminal epidural steroid injection;  Surgeon: Josemanuel Adler MD;  Location: [facility];  Service: Pain Management;  Laterality: Right;     Review of patient's allergies indicates:  No Known Allergies     Medications Ordered Prior to Encounter[1]     PMHx, PSHx, Allergies, Medications reviewed in epic    ROS negative except pain complaints in HPI    OBJECTIVE:    There were no vitals taken for this visit.    PHYSICAL EXAMINATION:    GENERAL: Well appearing, in no acute distress, alert and oriented x3.  PSYCH:  Mood and affect appropriate.  SKIN: Skin color, texture, turgor normal, no rashes or lesions which will impact the procedure.  CV: RRR with palpation of the radial artery.  PULM: No evidence of respiratory difficulty, symmetric chest rise. Clear to auscultation.  NEURO: Cranial nerves grossly intact.    Plan:    Proceed with procedure as planned Procedure(s) (LRB):  Bilateral L4/5 + L5/S1 TF KARISSA (Bilateral)    Josemanuel Adler MD  03/21/2025                 [1]   No current facility-administered medications on file prior to encounter.     Current Outpatient Medications on File Prior to Encounter   Medication Sig Dispense Refill    amLODIPine  (NORVASC) 10 MG tablet Take 1 tablet (10 mg total) by mouth once daily. 90 tablet 3    fluticasone propionate (FLONASE) 50 mcg/actuation nasal spray 2 sprays (100 mcg total) by Each Nostril route once daily. 16 g 1    ibuprofen (ADVIL,MOTRIN) 800 MG tablet Take 1 tablet (800 mg total) by mouth 2 (two) times daily as needed for Pain (food). 60 tablet 2    pregabalin (LYRICA) 75 MG capsule Take 1 capsule (75 mg total) by mouth every evening for 5 days, THEN 1 capsule (75 mg total) 2 (two) times daily. 120 capsule 0    tiZANidine (ZANAFLEX) 4 MG tablet Take 1 tablet (4 mg total) by mouth 2 (two) times daily. 180 tablet 0

## 2025-04-15 ENCOUNTER — OFFICE VISIT (OUTPATIENT)
Dept: PAIN MEDICINE | Facility: CLINIC | Age: 65
End: 2025-04-15
Payer: COMMERCIAL

## 2025-04-15 VITALS — HEIGHT: 62 IN | BODY MASS INDEX: 55.99 KG/M2

## 2025-04-15 DIAGNOSIS — M54.16 BILATERAL LUMBAR RADICULOPATHY: Primary | ICD-10-CM

## 2025-04-15 DIAGNOSIS — M43.16 ANTEROLISTHESIS OF LUMBAR SPINE: ICD-10-CM

## 2025-04-15 DIAGNOSIS — M47.27 OTHER SPONDYLOSIS WITH RADICULOPATHY, LUMBOSACRAL REGION: ICD-10-CM

## 2025-04-15 DIAGNOSIS — M46.1 SACROILIITIS: ICD-10-CM

## 2025-04-15 DIAGNOSIS — M48.061 SPINAL STENOSIS OF LUMBAR REGION WITHOUT NEUROGENIC CLAUDICATION: ICD-10-CM

## 2025-04-15 DIAGNOSIS — M48.061 LUMBAR FORAMINAL STENOSIS: ICD-10-CM

## 2025-04-15 PROCEDURE — 1159F MED LIST DOCD IN RCRD: CPT | Mod: CPTII,95,, | Performed by: PHYSICIAN ASSISTANT

## 2025-04-15 PROCEDURE — 3044F HG A1C LEVEL LT 7.0%: CPT | Mod: CPTII,95,, | Performed by: PHYSICIAN ASSISTANT

## 2025-04-15 PROCEDURE — 98006 SYNCH AUDIO-VIDEO EST MOD 30: CPT | Mod: 95,,, | Performed by: PHYSICIAN ASSISTANT

## 2025-04-15 PROCEDURE — G2211 COMPLEX E/M VISIT ADD ON: HCPCS | Mod: 95,,, | Performed by: PHYSICIAN ASSISTANT

## 2025-04-15 PROCEDURE — 1160F RVW MEDS BY RX/DR IN RCRD: CPT | Mod: CPTII,95,, | Performed by: PHYSICIAN ASSISTANT

## 2025-04-15 RX ORDER — PREGABALIN 75 MG/1
75 CAPSULE ORAL 2 TIMES DAILY
Qty: 60 CAPSULE | Refills: 2 | Status: SHIPPED | OUTPATIENT
Start: 2025-04-15

## 2025-04-15 NOTE — PROGRESS NOTES
Established Patient - TeleHealth Visit    The patient location is: [place]  The chief complaint leading to consultation is: chronic pain     Visit type: Audiovisual telemedicine visit     Total time spent on the encounter includes Face-to-face time and non-face to face time preparing to see the patient (eg, review of tests), Obtaining and/or reviewing separately obtained history, Documenting clinical information in the electronic or other health record, Independently interpreting results (not separately reported) and communicating results to the patient/family/caregiver, and/or Care coordination (not separately reported).     Each patient to whom he or she provides medical services by telemedicine is:  (1) informed of the relationship between the physician and patient and the respective role of any other health care provider with respect to management of the patient; and (2) notified that he or she may decline to receive medical services by telemedicine and may withdraw from such care at any time.        Chronic Pain -- Established Patient (Follow-up visit)    Referring Physician: Eve Green MD    PCP: Eve Green MD      Chief complaint:  Follow-up     Low back pain with RLE radicular pain   Sacroiliac joint pain         SUBJECTIVE:    Interval History (4/15/2025): Amelia Mirza presents today for follow-up visit.  she underwent Bilateral L4/5 + L5/S1 TF KARISSA on 3/21/25.  The patient reports that she is/was better following the procedure.  she reports 60-65% pain relief with functional improvement.  The changes lasted 3 weeks so far.  The changes have continued through this visit.  She also has completed physical therapy in no longer has access to aquatic therapy, which she thinks may contribute to the pain as well.  At the last visit, she was started on Lyrica, which is helping.    Interval History (2/18/2025):  Patient presents today for follow-up visit.  Patient was last seen on 11/12/2024. At that  visit, she was feeling much better after SI joint injection, in addition to previous lumbar KARISSA. Patient reports pain as 6/10 but increases to 8/10 with activity.    Interval History (11/12/2024): Amelia Mirza presents today for follow-up visit.  she underwent bilateral SIJ injection on 10/18/24.  The patient reports that she is/was better following the procedure.  she reports 85-90% pain relief.  The changes lasted 4 weeks so far.  The changes have continued through this visit.  Patient reports pain as 2/10 today.  She feels the combination of the lumbar epidural along with this SI joint injection has provided exponential pain relief.  Patient reports she is more active since this procedure.  She was able to go with her granddaughter to [place] [facility] [place] [place] and walk around for 3-4 hourss, which she has not been able to do in a very long time.  She has not been able to do any activities like this in a long time.  She continues physical therapy, along with aquatic therapy with improvement.  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    Interval history 09/26/2024  Patient presents status post right-sided L4-5 and L5-S1 transforaminal epidural steroid injection 08/23/2024.  Patient reports 70+% sustained relief in right-sided radicular symptoms following her epidural steroid injection, along with functional improvement.  Today she reports pain primarily in the lower back which radiates into bilateral buttocks overlying bilateral sacroiliac joint territory.  Pain is intermittent and today is rated a 6/10.  Can can be exacerbated with bending forward, moving from sitting to standing and with ambulation.  Today she denies significant lower extremity radiculopathy, bowel or bladder incontinence or saddle anesthesia.  She has continued physician directed physical  therapy exercises for lower back, sacroiliac joint and leg pain over the last 8 weeks from 07/26/2024 through 09/26/2024 with marginal improvement in her symptoms.    Of note patient saw Dr. Magaña 09/05/2024 with the following evaluation:      Patient reports neurosurgery recommended continuing with conservative treatments including physical therapy and intervention.  Patient was given tramadol prescription per Dr. Magaña, but she has been taking this medication judiciously as she read this was an opioid.  Patient also reports she would not like to pursue surgical intervention in the near future as long as she is benefitting from physical therapy and injections.    Interval history 08/01/2024  Patient presents status post bilateral intra-articular hip joint injection 06/25/2024.  Patient reports at least 50% noticeable relief in bilateral hip pain following her injection compounded by improvement in functionality.  She reports normally when traveling, she was unable to walk for distances in the airport.  She reports after her procedure she was able to reach her gate  in [facility] without difficulty.  Today she again reiterates pain in the lower back which radiates into the hip and down the lateral and posterior aspect of the right lower extremity in L4-S1 distribution to the knee.  Patient reports this morning she did have pain across both sides of the lower back but after a hot shower, pain was only maintained in the right lower back and leg.  She has continued physician directed physical therapy exercises for lower back and leg pain over the last 8 weeks from 06/01/2024 through 08/01/2024 with marginal improvement in her symptoms.    Interval history 06/04/2024  Patient presents for bilateral hip x-ray review.  Today patient again reports pain in the lower back which radiates primarily into the hips, she does report intermittent radiation down the right lower extremity in L4 distribution to the knee.  90%, majority of  her pain remains in the lower back and hips.  Pain is particularly exacerbated with positional changes moving from sitting to standing and with prolonged standing.  Patient reports she has had numerous presentations since our last clinic visit which greatly exacerbated her pain with prolonged standing.  She states she was helping to meal prep for a friend the day prior and was unable to complete cooking secondary to her severe pain in her hips.  Pain today is rated a 10/10 and is constant.  Patient has been performing physician directed physical therapy exercises daily over the last 8 weeks from 04/04/2024 through 06/04/2024 for lower back and hip pain with no significant improvement in pain, range of motion.    Patient does report last bilateral L5-S1 transforaminal epidural steroid injection did give her significant relief however this was very short-lived.    Interval history 10/11/2023  Patient presents status post bilateral L5-S1 transforaminal epidural steroid injection 07/28/2023 with 80 % relief initially.  Today patient reports only approximately 30% relief in lower back and radicular symptoms following her epidural.  Today she is reporting pain in the lower back which radiates into bilateral hips and intermittently down the lateral aspect of the lower extremities in L4-5 distribution to the knee.  Pain is more severe on the right than on the left.  Patient reports secondary to family obligations she had to temporarily discontinue physical therapy but did report her therapy was helping.  She would like to restart her physical therapy.  She reports pain is exacerbated with prolonged standing and with ambulation.  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  She reports pain is improved while sitting but is not completely resolved.    Interval History (5/8/2023): Amelia STILES  Hermes presents today for follow-up visit.  she underwent bilateral SIJ + GT bursa injection on 4/5/23.  The patient reports that she is/was better following the procedure.  she reports 75-80% pain relief in her low back, 70-80% relief in her left bursa, and 50-60% relief in her right bursa. Continues to report intermittent pain throughout the day, but overall much improved. Pain is exacerbated by prolonged activity, improved with rest.  The changes lasted 4 weeks so far.  The changes have continued through this visit.  Patient reports pain as 2/10 today.    Interval History (3/20/2023):  Amelia Mirza presents today for 3 month follow-up visit.  Patient was last seen on 12/16/2022. Last injection Bilateral  L5/S1 TF KARISSA on 11/18/22 with 65-70% relief.  She reports persistent and constant lower lumbosacral pain, right worse than left with radiation into her lateral hips and lateral thighs. She reports only intermittent radiation into her right lower extremity/foot, but her constant pain is axial in nature. Pain is worsened with prolonged sitting, standing, or walking. Pain is alleviated with ibuprofen, biofreeze, and rest. She has completed formal physical therapy without relief. Pain interferes with daily activities. Patient reports pain as 5/10 today and 7/10 with exacerbating factors.    Interval History (12/16/2022): Amelia Mirza presents today for follow-up visit.  she underwent Bilateral  L5/S1 TF KARISSA on 11/18/22.  The patient reports that she is/was better following the procedure.  she reports 65-70% pain relief.  The changes lasted 4 weeks so far.  The changes have continued through this visit.  She reports this injection was equally effective c/t IL KARISSA in April, but neither were as effective as the initial IL KARISSA she had in October of 2021. She recently restarted physical therapy, which does offer improvement and relief in her symptoms. Patient reports pain as 2/10 today. She reports increased  mobility since the injection, she is able to stand and walk longer distances.    Interval History (10/28/2022):  Amleia Mirza presents today via telemed for follow-up visit for MRI review.  Patient was last seen on 10/13/2022. She reports continued lumbosacral pain in a band-like distribution with radiation into her bilateral lower extremities left > right. She reports her symptoms previously were worse in her right LE but now she favors the other leg to compensate. She reports pain radiates primarily posteriorly along L5/S1 distribution with occasional radiation into her anterior thighs. She reports improvement in her radicular symptoms with physical therapy. Patient reports pain as 7/10 today and daily which is constant in nature and interferes with daily activity.    Interval history 10/13/2022  Patient presents status post L5-S1 interlaminar epidural steroid injection with right paramedian approach 04/22/2022.  Patient reports 65-70% relief in lower back and leg pain following her last epidural steroid injection.  Patient reports pain has been insidiously worsening over the last month.  Pain today is rated an 8/10.  Patient again reports pain in the lower back which radiates down the posterior aspects of bilateral lower extremities and L5-S1 distribution to the feet.  Patient reports pain has now interfered with her sleep.  Patient is interested in pursuing repeat injection.  Patient reports she is actively been participating in traditional as well as aquatic physical therapy which has been helping with strength and range of motion.    Interval history 03/31/2022  Patient presents for 5 month follow-up.  She reports return of lower back pain which radiates into the buttocks and down the posterior aspects of bilateral lower extremities and L5-S1 distribution.  Patient reports the symptoms are identical to prior lumbar epidural steroid injection.  Patient is interested in pursuing repeat intervention.   "Patient also reports myofascial pain along thoracic paraspinous musculature.  Patient reports pain is interrupting with her sleep.  She denies new onset lower extremity weakness, bowel or bladder incontinence or saddle anesthesia.    Interval History (10/28/2021):  Amelia Mirza presents today for follow-up visit.  S/p L5/S1 IL KARISSA on 10/4/21 with 90% pain relief.  Pain was basically resolved after, but then she had a fall which Increased knee pain.  She hasnt seen Orthopedics in years.  Patient reports pain as "0/10 today.  Overall, she has greatly improved since procedure.     Initial HPI (7/12/21) - Dr. Adler:  Amelia Mirza is a 61 y.o.   female with past medical history significant for hypoTH, morbid obesity, osteoarthritis (knees), lumbar spondylosis who presents to the clinic for the evaluation of lower back and left leg pain . The pain started  1 year prior ago  without preceding accident or trauma and symptoms have been worsening.The pain is located in a bandlike distribution at the level of the iliac crest with radiation down the left lower extremity along the lateral and posterior aspects in L4-5 distribution.  The pain is described as constant, aching and sharp and is rated as 7/10. The pain is rated with a score of  7/10 on the BEST day and a score of 10/10 on the WORST day.  Symptoms interfere with daily activity, sleeping and work.   Patient does work from home but currently is off her summer break and is disheartened by her inability to participate in outdoor activities or time with her family secondary to her pain. The pain is exacerbated by Sitting, Standing, Walking and Getting out of bed/chair.    Patient is able to ambulate approximately 3 blocks before requiring rest.The pain is mitigated by laying down and rest.     Pt saw MELI Mar for bilateral knee pain 3/2019 with topical compound cream prescribed and diagnostic genicular procedure discussed but not performed. "     Pt saw Dr. Padilla (2017) for lower back pain with radiculopathy with recommendation for PT, NSAID analgesic prescribed.     Patient denies urinary incontinence, bowel incontinence, significant motor weakness and loss of sensations.      Pain Disability Index (PDI) Score Review:      3/6/2019    12:00 PM   Last 3 PDI Scores   Pain Disability Index (PDI) 31       Non-Pharmacologic Treatments:  Physical Therapy/Home Exercise: yes  Ice/Heat:yes  TENS: no  Acupuncture: no  Massage: no  Chiropractic: no    Other: no      Pain Medications:  - Adjuvant Medications: Advil,Motrin ( Ibuprofen), Mobic (Meloxicam), Zanaflex ( Tizanidine) and NSAIDs, Tylenol, Biofreeze, EMLA cream        Pain Procedures:     Orthopedics   Intra-articular steroid and visco-supplementation in bilateral knees - in other dept    Dr. Adler:  -10/04/2021: L5/S1 IL KARISSA with 90% pain relief   -04/22/2022:  L5-S1 interlaminar epidural steroid injection with right paramedian approach with 65-70% relief  -11/18/2022: Bilateral  L5/S1 TF KARISSA with 65-70% relief  -04/05/2023: bilateral SIJ + GT bursa injection with 75-80% pain relief in her low back, 70-80% relief in her left bursa, and 50-60% relief in her right bursa  -07/28/2023: Bilateral L5-S1 transforaminal epidural steroid injection  -06/25/2024: Bilateral intra-articular hip joint injection  -08/23/2024: Right-sided L4-5 + L5-S1 TF KARISSA   -10/18/2024: bilateral SIJ injection with 85-90% pain relief with functional improvement (along with pain relief from previous right lumbar TF KARISSA)  -3/21/2025: Bilateral L4/5 + L5/S1 TF KARISSA             Review of Systems:   GENERAL:  No weight loss, malaise or fevers.  HEENT:   No recent changes in vision or hearing  NECK:  Negative for lumps, no difficulty with swallowing.  RESPIRATORY:  Negative for cough, wheezing or shortness of breath, patient denies any recent URI.  CARDIOVASCULAR:  Negative for chest pain, leg swelling or palpitations.  GI:  Negative for  "abdominal discomfort, blood in stools or black stools or change in bowel habits.  MUSCULOSKELETAL:  See HPI.  SKIN:  Negative for lesions, rash, and itching.  PSYCH:  No mood disorder or recent psychosocial stressors.  Patients sleep is not disturbed secondary to pain.  HEMATOLOGY/LYMPHOLOGY:  Negative for prolonged bleeding, bruising easily or swollen nodes.  Patient is not currently taking any anti-coagulants  NEURO:   No history of headaches, syncope, paralysis, seizures or tremors.  All other reviewed and negative other than HPI.        OBJECTIVE:  Telemedicine Physical Exam:   Vitals:    04/15/25 0854   Height: 5' 2" (1.575 m)     Body mass index is 55.99 kg/m².   (reviewed on 4/15/2025)     (Physical exam performed virtually with patient guided on specific actions and diagnostic maneuvers)  GENERAL: Well appearing, in no acute distress, alert and oriented x3.  Cooperative.  PSYCH:  Mood and affect appropriate.  SKIN: Skin color & texture with no obvious abnormalities.    HEAD/FACE:  Normocephalic, atraumatic.    PULM:  No difficulty breathing. No nasal flaring. No obvious wheezing.  EXTREMITIES: No obvious deformities. Moving all extremities well, appears to have symmetric strength throughout.  MUSCULOSKELETAL: No obvious atrophy abnormalities are noted.   NEURO: No obvious neurologic deficit.   GAIT: sitting.     Physical Exam: last in clinic visit:  GENERAL: Well appearing, in no acute distress, alert and oriented x3.  PSYCH:  Mood and affect appropriate.  SKIN: Skin color, texture, turgor normal, no rashes or lesions.  HEAD/FACE:  Normocephalic, atraumatic. Cranial nerves grossly intact.  NECK: No pain to palpation over the cervical paraspinous muscles. Spurling Negative. No pain with neck flexion, extension, or lateral flexion.   PULM: No evidence of respiratory difficulty, symmetric chest rise.  GI:  Soft and non-tender.  BACK: Straight leg raising in the sitting and supine positions is negative to " radicular pain. No pain to palpation over the facet joints of the lumbar spine or spinous processes. Normal range of motion without pain reproduction.  SIJ testing:  - TTP over SI joint: Present bilaterally, right > left  - Rick's/ Jean-Pierre's: Positive bilaterally  - Sacroiliac Distraction Test (anterior pressure): Positive  - Sacroiliac Compression Test (lateral pressure): Positive   - SacralThrust Test (posterior pressure): Positive  -TTP along bilateral GT bursa, right > left  EXTREMITIES: Peripheral joint ROM is full and pain free without obvious instability or laxity in all four extremities- with exception of knees.  No deformities, edema, or skin discoloration. Good capillary refill.  MUSCULOSKELETAL: Shoulder, hip, and knee provocative maneuvers are negative.  T   Bilateral upper and lower extremity strength is normal and symmetric.  No atrophy or tone abnormalities are noted. Decreased sensation LLE: L4-5 distribution to knee.  Pain with extension of knee. TTP diffusely over right knee patella.  NEURO: BUE & BLE coordination and muscle stretch reflexes are physiologic and symmetric.  Plantar response are downgoing. No clonus.   GAIT: normal.          Imaging/ Diagnostic Studies/ Labs (Reviewed on 4/15/2025):    X-ray bilateral hips 10/11/2023  FINDINGS: No fracture, suspicious bone marrow lesion or other acute disease is seen in the pelvis or either hip. Joint alignment is anatomic. There is no radiographic evidence of femoral head osteonecrosis. Mild degenerative changes superior acetabulum.     MRI lumbar spine 06/12/2024  FINDINGS:  Alignment: Approximately 5 mm of grade 1 anterolisthesis at L4-5.  Slight retrolisthesis at L5-S1.     Vertebrae: Discogenic sub endplate marrow signal alteration (Modic changes) at L4-5 and to a lesser degree at L5-S1.     Discs: Progressive loss of disc height at L4-5 and L5-S1.     Cord: Normal.  Conus terminates at L1.     Degenerative findings:     T11-12: Small posterior  disc bulge.  No significant neural foraminal narrowing or spinal canal stenosis.     T12-L1: No significant neural foraminal narrowing or spinal canal stenosis.     L1-L2: No significant neural foraminal narrowing or spinal canal stenosis.     L2-L3: No significant neural foraminal narrowing or spinal canal stenosis.     L3-L4: Minimal annular bulge and bilateral facet arthropathy contributing to mild left greater than right inferior neural foraminal narrowing.  No significant spinal canal stenosis.     L4-L5: Prominent posterior disc bulge with slight right foraminal asymmetry and bilateral hypertrophic facet arthropathy.  Resultant moderate to severe bilateral neural foraminal narrowing.  No significant spinal canal stenosis.     L5-S1: Broad-based posterior disc bulge with small central protrusion and bilateral facet arthropathy.  Resultant severe left and moderate right neural foraminal narrowing.  No significant spinal canal stenosis.     Paraspinal muscles & soft tissues: Small bilateral renal cortical cysts.  Otherwise unremarkable.     Impression:  Multilevel lumbar spondylosis and degenerative disc disease with mild progression since 2022.    Lumbar x-ray   Comparison: None  Findings:  Body habitus limits the study.  For tumor body heights and alignment appear well-maintained.  There is uniform loss of diskal height at the L4 -- 5 L5 -- S1 levels.  Multilevel facet arthropathy.  Pedicles and SI joints appear intact.  No spondylolysis or spondylolisthesis.    X-Ray Cervical Spine AP And Lateral  Comparison: None  Findings: There is straightening of the normal cervical lordosis which can be associated with muscle spasm.  Vertebral body heights are well maintained.  No spondylolisthesis demonstrated.  There is mild loss of disk height from C3-4 and C5-6 with associated degenerative endplate changes and osteophyte production.  Posterior elements appear intact without acute fractures or subluxations  demonstrated.  Odontoid process appears intact.  Atlantoaxial articulations appear normal.  Prevertebral soft tissues are within normal limits.        ASSESSMENT: 65 y.o. year old female with lower back and left lower extremity pain, consistent with     1. Bilateral lumbar radiculopathy        2. Other spondylosis with radiculopathy, lumbosacral region        3. Anterolisthesis of lumbar spine        4. Sacroiliitis  Case Request-RAD/Other Procedure Area: Bilateral SIJ Injection      5. Spinal stenosis of lumbar region without neurogenic claudication        6. Lumbar foraminal stenosis  pregabalin (LYRICA) 75 MG capsule            PLAN:   1. Interventional:    - S/p Bilateral L4/5 + L5/S1 TF KARISSA on 3/21/25 with 60-65% pain relief with functional improvement.  Patient received 80% pain relief for greater than 3 months from previous lumbar transforaminal epidural in conjunction with SIJ injection.    - Patient can call to schedule: repeat bilateral SIJ injection. Patient is not taking prescription blood thinners or ASA.    Patient received 80+% relief with last sacroiliac joint injection, most recently 10/18/2024 (along with supplemental pain relief from previous right lumbar TF KARISSA in August of 2024).      - Anticoagulation use: None.     -patient has 50% sustained relief in bilateral hip pain following intra-articular hip joint injection.  We have discussed repeating this injection in the future should hip pain exacerbate.    2. Pharmacologic:   - Refill Lyrica 75mg BID.  Consider Increase if warranted.  We previously discussed potential side effects of this medication, which may include drowsiness,dizziness, dry mouth, constipation, or peripheral edema.   - Continue Tizanidine 4 mg QHS PRN for myofascial pain.  We have discussed potential deleterious side effects associated with this medication including  dizziness, drowsiness, dry mouth or tingling sensation in the upper or lower extremities.     - Failed  "medications: Gabapentin (SE:"didn't feel like herself").    - [place]  reviewed and appropriate.       3. Rehabilitative:  -We discussed continuing at home physician directed physical therapy to help manage the patient/s painful condition. The patient was counseled that muscle strengthening will improve the long term prognosis in regards to pain and may also help increase range of motion and mobility.  Patient has completed conventional land-based physical therapy from 07/21/2021 through 10/28/2021, 14 sessions total; also completed 3 months land/aquatic therapy in December 2024.     4. Diagnostic:  Reviewed diagnostic imaging (bilateral hip x-ray, MRI lumbar spine, flex/ex lumbar XR) with the patient and answered any questions.     5. Consult/ Referral:    -Follow-up PRN with Neurosurgery, Dr. Magaña:  Lumbar degenerative disc disease, anterolisthesis and radiculopathy    6. Follow up:  3 months follow-up  - virtual visit       Future Appointments   Date Time Provider Department Center   6/26/2025  2:20 PM [facility]   7/11/2025  9:20 AM [facility]-C [facility]   8/20/2025  1:40 PM [facility] [address]   10/13/2025  1:20 PM [facility] MAMMO1-SCR [facility] [place]   2/20/2026  1:00 PM [facility]       Visit today included increased complexity associated with the care of the episodic problem of chronic pain which was addressed and continue to manage the longitudinal care of the patient due to the serious and/or complex managed problem(s) listed above.    - Patient Questions: Answered all of the patient's questions regarding diagnosis, therapy, and treatment.    - This condition does not require this patient to take time off of work, and the primary goal of our Pain Management services is to improve the patient's functional capacity.   - I discussed the risks, benefits, and alternatives to " potential treatment options. All questions and concerns were fully addressed today in clinic.         Bernice Bolton PA-C  Interventional Pain Management - [facility]    Disclaimer:  This note was prepared using voice recognition system and is likely to have sound alike errors that may have been overlooked even after proof reading.  Please call me with any questions.

## 2025-04-18 DIAGNOSIS — J01.10 ACUTE NON-RECURRENT FRONTAL SINUSITIS: ICD-10-CM

## 2025-04-18 NOTE — TELEPHONE ENCOUNTER
No care due was identified.  [facility] Embedded Care Due Messages. Reference number: [id number].   4/18/2025 8:03:16 AM CDT   normal (ped)...

## 2025-04-19 RX ORDER — FLUTICASONE PROPIONATE 50 MCG
SPRAY, SUSPENSION (ML) NASAL
Qty: 48 G | Refills: 3 | Status: SHIPPED | OUTPATIENT
Start: 2025-04-19

## 2025-06-03 ENCOUNTER — OFFICE VISIT (OUTPATIENT)
Dept: FAMILY MEDICINE | Facility: CLINIC | Age: 65
End: 2025-06-03
Attending: FAMILY MEDICINE
Payer: COMMERCIAL

## 2025-06-03 ENCOUNTER — LAB VISIT (OUTPATIENT)
Dept: LAB | Facility: HOSPITAL | Age: 65
End: 2025-06-03
Attending: FAMILY MEDICINE
Payer: COMMERCIAL

## 2025-06-03 VITALS
DIASTOLIC BLOOD PRESSURE: 80 MMHG | WEIGHT: 293 LBS | HEIGHT: 62 IN | TEMPERATURE: 98 F | OXYGEN SATURATION: 97 % | SYSTOLIC BLOOD PRESSURE: 138 MMHG | RESPIRATION RATE: 18 BRPM | BODY MASS INDEX: 53.92 KG/M2 | HEART RATE: 55 BPM

## 2025-06-03 DIAGNOSIS — R74.01 ELEVATED AST (SGOT): ICD-10-CM

## 2025-06-03 DIAGNOSIS — E03.9 HYPOTHYROIDISM, UNSPECIFIED TYPE: ICD-10-CM

## 2025-06-03 DIAGNOSIS — Z78.0 POSTMENOPAUSAL: ICD-10-CM

## 2025-06-03 DIAGNOSIS — M46.1 SACROILIITIS: ICD-10-CM

## 2025-06-03 DIAGNOSIS — E03.9 HYPOTHYROIDISM, UNSPECIFIED TYPE: Primary | ICD-10-CM

## 2025-06-03 DIAGNOSIS — E66.01 MORBID OBESITY WITH BMI OF 50.0-59.9, ADULT: ICD-10-CM

## 2025-06-03 LAB
AST SERPL-CCNC: 11 UNIT/L (ref 11–45)
TSH SERPL-ACNC: 1.27 UIU/ML (ref 0.4–4)

## 2025-06-03 PROCEDURE — 1159F MED LIST DOCD IN RCRD: CPT | Mod: CPTII,S$GLB,, | Performed by: FAMILY MEDICINE

## 2025-06-03 PROCEDURE — 84443 ASSAY THYROID STIM HORMONE: CPT

## 2025-06-03 PROCEDURE — 84450 TRANSFERASE (AST) (SGOT): CPT

## 2025-06-03 PROCEDURE — 36415 COLL VENOUS BLD VENIPUNCTURE: CPT | Mod: PO

## 2025-06-03 PROCEDURE — 99214 OFFICE O/P EST MOD 30 MIN: CPT | Mod: S$GLB,,, | Performed by: FAMILY MEDICINE

## 2025-06-03 PROCEDURE — 3008F BODY MASS INDEX DOCD: CPT | Mod: CPTII,S$GLB,, | Performed by: FAMILY MEDICINE

## 2025-06-03 PROCEDURE — 99999 PR PBB SHADOW E&M-EST. PATIENT-LVL V: CPT | Mod: PBBFAC,,, | Performed by: FAMILY MEDICINE

## 2025-06-03 PROCEDURE — 3044F HG A1C LEVEL LT 7.0%: CPT | Mod: CPTII,S$GLB,, | Performed by: FAMILY MEDICINE

## 2025-06-03 PROCEDURE — 1160F RVW MEDS BY RX/DR IN RCRD: CPT | Mod: CPTII,S$GLB,, | Performed by: FAMILY MEDICINE

## 2025-06-03 PROCEDURE — G2211 COMPLEX E/M VISIT ADD ON: HCPCS | Mod: S$GLB,,, | Performed by: FAMILY MEDICINE

## 2025-06-03 PROCEDURE — 3079F DIAST BP 80-89 MM HG: CPT | Mod: CPTII,S$GLB,, | Performed by: FAMILY MEDICINE

## 2025-06-03 PROCEDURE — 3075F SYST BP GE 130 - 139MM HG: CPT | Mod: CPTII,S$GLB,, | Performed by: FAMILY MEDICINE

## 2025-06-03 RX ORDER — TIZANIDINE 4 MG/1
4 TABLET ORAL 2 TIMES DAILY
Qty: 180 TABLET | Refills: 1 | Status: SHIPPED | OUTPATIENT
Start: 2025-06-03

## 2025-07-11 ENCOUNTER — PATIENT MESSAGE (OUTPATIENT)
Dept: PAIN MEDICINE | Facility: CLINIC | Age: 65
End: 2025-07-11
Payer: COMMERCIAL

## 2025-07-11 ENCOUNTER — OFFICE VISIT (OUTPATIENT)
Dept: PAIN MEDICINE | Facility: CLINIC | Age: 65
End: 2025-07-11
Payer: COMMERCIAL

## 2025-07-11 VITALS — HEIGHT: 62 IN | BODY MASS INDEX: 55.77 KG/M2

## 2025-07-11 DIAGNOSIS — M70.62 GREATER TROCHANTERIC BURSITIS OF BOTH HIPS: ICD-10-CM

## 2025-07-11 DIAGNOSIS — M16.0 PRIMARY OSTEOARTHRITIS OF BOTH HIPS: ICD-10-CM

## 2025-07-11 DIAGNOSIS — M70.61 GREATER TROCHANTERIC BURSITIS OF BOTH HIPS: ICD-10-CM

## 2025-07-11 DIAGNOSIS — M48.061 SPINAL STENOSIS OF LUMBAR REGION WITHOUT NEUROGENIC CLAUDICATION: ICD-10-CM

## 2025-07-11 DIAGNOSIS — M46.1 SACROILIITIS: Primary | ICD-10-CM

## 2025-07-11 DIAGNOSIS — M48.061 LUMBAR FORAMINAL STENOSIS: ICD-10-CM

## 2025-07-11 NOTE — H&P (VIEW-ONLY)
Established Patient - TeleHealth Visit    The patient location is: [place]  The chief complaint leading to consultation is: chronic pain     Visit type: Audiovisual telemedicine visit     Total time spent on the encounter includes Face-to-face time and non-face to face time preparing to see the patient (eg, review of tests), Obtaining and/or reviewing separately obtained history, Documenting clinical information in the electronic or other health record, Independently interpreting results (not separately reported) and communicating results to the patient/family/caregiver, and/or Care coordination (not separately reported).     Each patient to whom he or she provides medical services by telemedicine is:  (1) informed of the relationship between the physician and patient and the respective role of any other health care provider with respect to management of the patient; and (2) notified that he or she may decline to receive medical services by telemedicine and may withdraw from such care at any time.        Chronic Pain -- Established Patient (Follow-up visit)    Referring Physician: Eve Green MD    PCP: Eve Green MD      Chief complaint:  Follow-up     Low back pain with RLE radicular pain   Sacroiliac joint pain         SUBJECTIVE:    Interval History (7/11/2025):  Patient presents for follow-up of chronic pain management. She reports low back pain radiating down her buttocks and leg, with more pain in her right hip and sides. She rates her current pain level as 5-6/10. She denies groin pain. She had bilateral lumbar transforaminal epidural injections in March, which provided about 65% relief. She has also had sacroiliac joint injections, with the most recent one in October of last year, and hip injections in the past.     She is currently taking Lyrica 75 mg twice daily. She reports it helps occasionally but causes drowsiness, which she dislikes. She expresses a preference for maintaining alertness while  awake. Due to the side effects, she only takes the medication when she feels it is necessary, describing the drowsy feeling as constant and making her feel cognitively impaired and lethargic. She is not interested in switching right now to another medication.    Interval History (4/15/2025): Amelia Mirza presents today for follow-up visit.  she underwent Bilateral L4/5 + L5/S1 TF KARISSA on 3/21/25.  The patient reports that she is/was better following the procedure.  she reports 60-65% pain relief with functional improvement.  The changes lasted 3 weeks so far.  The changes have continued through this visit.  She also has completed physical therapy in no longer has access to aquatic therapy, which she thinks may contribute to the pain as well.  At the last visit, she was started on Lyrica, which is helping.    Interval History (2/18/2025):  Patient presents today for follow-up visit.  Patient was last seen on 11/12/2024. At that visit, she was feeling much better after SI joint injection, in addition to previous lumbar KARISSA. Patient reports pain as 6/10 but increases to 8/10 with activity.    Interval History (11/12/2024): Amelia Mirza presents today for follow-up visit.  she underwent bilateral SIJ injection on 10/18/24.  The patient reports that she is/was better following the procedure.  she reports 85-90% pain relief.  The changes lasted 4 weeks so far.  The changes have continued through this visit.  Patient reports pain as 2/10 today.  She feels the combination of the lumbar epidural along with this SI joint injection has provided exponential pain relief.  Patient reports she is more active since this procedure.  She was able to go with her granddaughter to the [facility] [place] and walk around for 3-4 hourss, which she has not been able to do in a very long time.  She has not been able to do any activities like this in a long time.  She continues physical therapy, along with aquatic  therapy with improvement.  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    Interval history 09/26/2024  Patient presents status post right-sided L4-5 and L5-S1 transforaminal epidural steroid injection 08/23/2024.  Patient reports 70+% sustained relief in right-sided radicular symptoms following her epidural steroid injection, along with functional improvement.  Today she reports pain primarily in the lower back which radiates into bilateral buttocks overlying bilateral sacroiliac joint territory.  Pain is intermittent and today is rated a 6/10.  Can can be exacerbated with bending forward, moving from sitting to standing and with ambulation.  Today she denies significant lower extremity radiculopathy, bowel or bladder incontinence or saddle anesthesia.  She has continued physician directed physical therapy exercises for lower back, sacroiliac joint and leg pain over the last 8 weeks from 07/26/2024 through 09/26/2024 with marginal improvement in her symptoms.    Of note patient saw Dr. Magaña 09/05/2024 with the following evaluation:      Patient reports neurosurgery recommended continuing with conservative treatments including physical therapy and intervention.  Patient was given tramadol prescription per Dr. Magaña, but she has been taking this medication judiciously as she read this was an opioid.  Patient also reports she would not like to pursue surgical intervention in the near future as long as she is benefitting from physical therapy and injections.    Interval history 08/01/2024  Patient presents status post bilateral intra-articular hip joint injection 06/25/2024.  Patient reports at least 50% noticeable relief in bilateral hip pain following her injection compounded by improvement in functionality.  She reports normally when traveling, she was unable to walk for  distances in the airport.  She reports after her procedure she was able to reach her gate  in [facility] without difficulty.  Today she again reiterates pain in the lower back which radiates into the hip and down the lateral and posterior aspect of the right lower extremity in L4-S1 distribution to the knee.  Patient reports this morning she did have pain across both sides of the lower back but after a hot shower, pain was only maintained in the right lower back and leg.  She has continued physician directed physical therapy exercises for lower back and leg pain over the last 8 weeks from 06/01/2024 through 08/01/2024 with marginal improvement in her symptoms.    Interval history 06/04/2024  Patient presents for bilateral hip x-ray review.  Today patient again reports pain in the lower back which radiates primarily into the hips, she does report intermittent radiation down the right lower extremity in L4 distribution to the knee.  90%, majority of her pain remains in the lower back and hips.  Pain is particularly exacerbated with positional changes moving from sitting to standing and with prolonged standing.  Patient reports she has had numerous presentations since our last clinic visit which greatly exacerbated her pain with prolonged standing.  She states she was helping to meal prep for a friend the day prior and was unable to complete cooking secondary to her severe pain in her hips.  Pain today is rated a 10/10 and is constant.  Patient has been performing physician directed physical therapy exercises daily over the last 8 weeks from 04/04/2024 through 06/04/2024 for lower back and hip pain with no significant improvement in pain, range of motion.    Patient does report last bilateral L5-S1 transforaminal epidural steroid injection did give her significant relief however this was very short-lived.    Interval history 10/11/2023  Patient presents status post bilateral L5-S1 transforaminal epidural steroid injection  07/28/2023 with 80 % relief initially.  Today patient reports only approximately 30% relief in lower back and radicular symptoms following her epidural.  Today she is reporting pain in the lower back which radiates into bilateral hips and intermittently down the lateral aspect of the lower extremities in L4-5 distribution to the knee.  Pain is more severe on the right than on the left.  Patient reports secondary to family obligations she had to temporarily discontinue physical therapy but did report her therapy was helping.  She would like to restart her physical therapy.  She reports pain is exacerbated with prolonged standing and with ambulation.  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  She reports pain is improved while sitting but is not completely resolved.    Interval History (5/8/2023): Amelia Mirza presents today for follow-up visit.  she underwent bilateral SIJ + GT bursa injection on 4/5/23.  The patient reports that she is/was better following the procedure.  she reports 75-80% pain relief in her low back, 70-80% relief in her left bursa, and 50-60% relief in her right bursa. Continues to report intermittent pain throughout the day, but overall much improved. Pain is exacerbated by prolonged activity, improved with rest.  The changes lasted 4 weeks so far.  The changes have continued through this visit.  Patient reports pain as 2/10 today.    Interval History (3/20/2023):  Amelia Mirza presents today for 3 month follow-up visit.  Patient was last seen on 12/16/2022. Last injection Bilateral  L5/S1 TF KARISSA on 11/18/22 with 65-70% relief.  She reports persistent and constant lower lumbosacral pain, right worse than left with radiation into her lateral hips and lateral thighs. She reports only intermittent radiation into her right lower extremity/foot, but her  constant pain is axial in nature. Pain is worsened with prolonged sitting, standing, or walking. Pain is alleviated with ibuprofen, biofreeze, and rest. She has completed formal physical therapy without relief. Pain interferes with daily activities. Patient reports pain as 5/10 today and 7/10 with exacerbating factors.    Interval History (12/16/2022): Amelia Mirza presents today for follow-up visit.  she underwent Bilateral  L5/S1 TF KARISSA on 11/18/22.  The patient reports that she is/was better following the procedure.  she reports 65-70% pain relief.  The changes lasted 4 weeks so far.  The changes have continued through this visit.  She reports this injection was equally effective c/t IL KARISSA in April, but neither were as effective as the initial IL KARISSA she had in October of 2021. She recently restarted physical therapy, which does offer improvement and relief in her symptoms. Patient reports pain as 2/10 today. She reports increased mobility since the injection, she is able to stand and walk longer distances.    Interval History (10/28/2022):  Amelia Mirza presents today via telemed for follow-up visit for MRI review.  Patient was last seen on 10/13/2022. She reports continued lumbosacral pain in a band-like distribution with radiation into her bilateral lower extremities left > right. She reports her symptoms previously were worse in her right LE but now she favors the other leg to compensate. She reports pain radiates primarily posteriorly along L5/S1 distribution with occasional radiation into her anterior thighs. She reports improvement in her radicular symptoms with physical therapy. Patient reports pain as 7/10 today and daily which is constant in nature and interferes with daily activity.    Interval history 10/13/2022  Patient presents status post L5-S1 interlaminar epidural steroid injection with right paramedian approach 04/22/2022.  Patient reports 65-70% relief in lower back and leg pain  "following her last epidural steroid injection.  Patient reports pain has been insidiously worsening over the last month.  Pain today is rated an 8/10.  Patient again reports pain in the lower back which radiates down the posterior aspects of bilateral lower extremities and L5-S1 distribution to the feet.  Patient reports pain has now interfered with her sleep.  Patient is interested in pursuing repeat injection.  Patient reports she is actively been participating in traditional as well as aquatic physical therapy which has been helping with strength and range of motion.    Interval history 03/31/2022  Patient presents for 5 month follow-up.  She reports return of lower back pain which radiates into the buttocks and down the posterior aspects of bilateral lower extremities and L5-S1 distribution.  Patient reports the symptoms are identical to prior lumbar epidural steroid injection.  Patient is interested in pursuing repeat intervention.  Patient also reports myofascial pain along thoracic paraspinous musculature.  Patient reports pain is interrupting with her sleep.  She denies new onset lower extremity weakness, bowel or bladder incontinence or saddle anesthesia.    Interval History (10/28/2021):  Amelia Mirza presents today for follow-up visit.  S/p L5/S1 IL KARISSA on 10/4/21 with 90% pain relief.  Pain was basically resolved after, but then she had a fall which Increased knee pain.  She hasnt seen Orthopedics in years.  Patient reports pain as "0/10 today.  Overall, she has greatly improved since procedure.     Initial HPI (7/12/21) - Dr. Adler:  Amelia Mirza is a 61 y.o.   female with past medical history significant for hypoTH, morbid obesity, osteoarthritis (knees), lumbar spondylosis who presents to the clinic for the evaluation of lower back and left leg pain . The pain started  1 year prior ago  without preceding accident or trauma and symptoms have been worsening.The pain is located " in a bandlike distribution at the level of the iliac crest with radiation down the left lower extremity along the lateral and posterior aspects in L4-5 distribution.  The pain is described as constant, aching and sharp and is rated as 7/10. The pain is rated with a score of  7/10 on the BEST day and a score of 10/10 on the WORST day.  Symptoms interfere with daily activity, sleeping and work.   Patient does work from home but currently is off her summer break and is disheartened by her inability to participate in outdoor activities or time with her family secondary to her pain. The pain is exacerbated by Sitting, Standing, Walking and Getting out of bed/chair.    Patient is able to ambulate approximately 3 blocks before requiring rest.The pain is mitigated by laying down and rest.     Pt saw MELI Mar for bilateral knee pain 3/2019 with topical compound cream prescribed and diagnostic genicular procedure discussed but not performed.     Pt saw Dr. Padilla ([id number]) for lower back pain with radiculopathy with recommendation for PT, NSAID analgesic prescribed.     Patient denies urinary incontinence, bowel incontinence, significant motor weakness and loss of sensations.      Pain Disability Index (PDI) Score Review:      3/6/2019    12:00 PM   Last 3 PDI Scores   Pain Disability Index (PDI) 31       Non-Pharmacologic Treatments:  Physical Therapy/Home Exercise: yes  Ice/Heat:yes  TENS: no  Acupuncture: no  Massage: no  Chiropractic: no    Other: no      Pain Medications:  - Adjuvant Medications: Advil,Motrin ( Ibuprofen), Mobic (Meloxicam), Zanaflex ( Tizanidine) and NSAIDs, Tylenol, Biofreeze, EMLA cream        Pain Procedures:     Orthopedics   Intra-articular steroid and visco-supplementation in bilateral knees - in other dept    Dr. Adler:  -10/04/2021: L5/S1 IL KARISSA with 90% pain relief   -04/22/2022:  L5-S1 interlaminar epidural steroid injection with right paramedian approach with 65-70% relief  -11/18/2022:  "Bilateral  L5/S1 TF KARISSA with 65-70% relief  -04/05/2023: bilateral SIJ + GT bursa injection with 75-80% pain relief in her low back, 70-80% relief in her left bursa, and 50-60% relief in her right bursa  -07/28/2023: Bilateral L5-S1 transforaminal epidural steroid injection  -06/25/2024: Bilateral intra-articular hip joint injection  -08/23/2024: Right-sided L4-5 + L5-S1 TF KARISSA   -10/18/2024: bilateral SIJ injection with 85-90% pain relief with functional improvement (along with pain relief from previous right lumbar TF KARISSA)  -3/21/2025: Bilateral L4/5 + L5/S1 TF KARISSA             Review of Systems:   GENERAL:  No weight loss, malaise or fevers.  HEENT:   No recent changes in vision or hearing  NECK:  Negative for lumps, no difficulty with swallowing.  RESPIRATORY:  Negative for cough, wheezing or shortness of breath, patient denies any recent URI.  CARDIOVASCULAR:  Negative for chest pain, leg swelling or palpitations.  GI:  Negative for abdominal discomfort, blood in stools or black stools or change in bowel habits.  MUSCULOSKELETAL:  See HPI.  SKIN:  Negative for lesions, rash, and itching.  PSYCH:  No mood disorder or recent psychosocial stressors.  Patients sleep is not disturbed secondary to pain.  HEMATOLOGY/LYMPHOLOGY:  Negative for prolonged bleeding, bruising easily or swollen nodes.  Patient is not currently taking any anti-coagulants  NEURO:   No history of headaches, syncope, paralysis, seizures or tremors.  All other reviewed and negative other than HPI.        OBJECTIVE:  Telemedicine Physical Exam:   Vitals:    07/11/25 0924   Height: 5' 2" (1.575 m)       Body mass index is 55.77 kg/m².   (reviewed on 7/11/2025)     (Physical exam performed virtually with patient guided on specific actions and diagnostic maneuvers)  GENERAL: Well appearing, in no acute distress, alert and oriented x3.  Cooperative.  PSYCH:  Mood and affect appropriate.  SKIN: Skin color & texture with no obvious abnormalities.  "   HEAD/FACE:  Normocephalic, atraumatic.    PULM:  No difficulty breathing. No nasal flaring. No obvious wheezing.  EXTREMITIES: No obvious deformities. Moving all extremities well, appears to have symmetric strength throughout.  MUSCULOSKELETAL: No obvious atrophy abnormalities are noted.   NEURO: No obvious neurologic deficit.   GAIT: sitting.     Physical Exam: last in clinic visit:  GENERAL: Well appearing, in no acute distress, alert and oriented x3.  PSYCH:  Mood and affect appropriate.  SKIN: Skin color, texture, turgor normal, no rashes or lesions.  HEAD/FACE:  Normocephalic, atraumatic. Cranial nerves grossly intact.  NECK: No pain to palpation over the cervical paraspinous muscles. Spurling Negative. No pain with neck flexion, extension, or lateral flexion.   PULM: No evidence of respiratory difficulty, symmetric chest rise.  GI:  Soft and non-tender.  BACK: Straight leg raising in the sitting and supine positions is negative to radicular pain. No pain to palpation over the facet joints of the lumbar spine or spinous processes. Normal range of motion without pain reproduction.  SIJ testing:  - TTP over SI joint: Present bilaterally, right > left  - Rick's/ Jean-Pierre's: Positive bilaterally  - Sacroiliac Distraction Test (anterior pressure): Positive  - Sacroiliac Compression Test (lateral pressure): Positive   - SacralThrust Test (posterior pressure): Positive  -TTP along bilateral GT bursa, right > left  EXTREMITIES: Peripheral joint ROM is full and pain free without obvious instability or laxity in all four extremities- with exception of knees.  No deformities, edema, or skin discoloration. Good capillary refill.  MUSCULOSKELETAL: Shoulder, hip, and knee provocative maneuvers are negative.  T   Bilateral upper and lower extremity strength is normal and symmetric.  No atrophy or tone abnormalities are noted. Decreased sensation LLE: L4-5 distribution to knee.  Pain with extension of knee. TTP diffusely  over right knee patella.  NEURO: BUE & BLE coordination and muscle stretch reflexes are physiologic and symmetric.  Plantar response are downgoing. No clonus.   GAIT: normal.          Imaging/ Diagnostic Studies/ Labs (Reviewed on 7/11/2025):    X-ray bilateral hips 10/11/2023  FINDINGS: No fracture, suspicious bone marrow lesion or other acute disease is seen in the pelvis or either hip. Joint alignment is anatomic. There is no radiographic evidence of femoral head osteonecrosis. Mild degenerative changes superior acetabulum.     MRI lumbar spine 06/12/2024  FINDINGS:  Alignment: Approximately 5 mm of grade 1 anterolisthesis at L4-5.  Slight retrolisthesis at L5-S1.     Vertebrae: Discogenic sub endplate marrow signal alteration (Modic changes) at L4-5 and to a lesser degree at L5-S1.     Discs: Progressive loss of disc height at L4-5 and L5-S1.     Cord: Normal.  Conus terminates at L1.     Degenerative findings:     T11-12: Small posterior disc bulge.  No significant neural foraminal narrowing or spinal canal stenosis.     T12-L1: No significant neural foraminal narrowing or spinal canal stenosis.     L1-L2: No significant neural foraminal narrowing or spinal canal stenosis.     L2-L3: No significant neural foraminal narrowing or spinal canal stenosis.     L3-L4: Minimal annular bulge and bilateral facet arthropathy contributing to mild left greater than right inferior neural foraminal narrowing.  No significant spinal canal stenosis.     L4-L5: Prominent posterior disc bulge with slight right foraminal asymmetry and bilateral hypertrophic facet arthropathy.  Resultant moderate to severe bilateral neural foraminal narrowing.  No significant spinal canal stenosis.     L5-S1: Broad-based posterior disc bulge with small central protrusion and bilateral facet arthropathy.  Resultant severe left and moderate right neural foraminal narrowing.  No significant spinal canal stenosis.     Paraspinal muscles & soft tissues:  Small bilateral renal cortical cysts.  Otherwise unremarkable.     Impression:  Multilevel lumbar spondylosis and degenerative disc disease with mild progression since 2022.    Lumbar x-ray   Comparison: None  Findings:  Body habitus limits the study.  For tumor body heights and alignment appear well-maintained.  There is uniform loss of diskal height at the L4 -- 5 L5 -- S1 levels.  Multilevel facet arthropathy.  Pedicles and SI joints appear intact.  No spondylolysis or spondylolisthesis.    X-Ray Cervical Spine AP And Lateral  Comparison: None  Findings: There is straightening of the normal cervical lordosis which can be associated with muscle spasm.  Vertebral body heights are well maintained.  No spondylolisthesis demonstrated.  There is mild loss of disk height from C3-4 and C5-6 with associated degenerative endplate changes and osteophyte production.  Posterior elements appear intact without acute fractures or subluxations demonstrated.  Odontoid process appears intact.  Atlantoaxial articulations appear normal.  Prevertebral soft tissues are within normal limits.        ASSESSMENT: 65 y.o. year old female with lower back and left lower extremity pain, consistent with     1. Sacroiliitis  Case Request-RAD/Other Procedure Area: bilateral SIJ + bilateral GT bursa injection      2. Greater trochanteric bursitis of both hips  Case Request-RAD/Other Procedure Area: bilateral SIJ + bilateral GT bursa injection      3. Primary osteoarthritis of both hips        4. Lumbar foraminal stenosis        5. Spinal stenosis of lumbar region without neurogenic claudication                PLAN:   1. Interventional:    - Schedule bilateral SIJ + bilateral GT bursa injection. Patient is not taking prescription blood thinners or ASA.  Patient received 80+% relief with last sacroiliac joint injection, most recently 10/18/2024 (along with supplemental pain relief from previous right lumbar TF KARISSA in August of 2024).      - S/p  "Bilateral L4/5 + L5/S1 TF KARISSA on 3/21/25 with 60-65% pain relief with functional improvement.  Patient received 80% pain relief for greater than 3 months from prior lumbar transforaminal epidural in conjunction with SIJ injection.  Can repeat when needed.     - Patient has 50% sustained relief in bilateral hip pain following intra-articular hip joint injection.  We have discussed repeating this injection in the future should hip pain exacerbate. Hold off with no groin pain at this time.     - Anticoagulation use: None.       2. Pharmacologic:   - Refill Lyrica 75mg BID, taking PRN.  Consider Increase if warranted.  We previously discussed potential side effects of this medication, which may include drowsiness,dizziness, dry mouth, constipation, or peripheral edema.   She does report severe drowsiness, but she does not want to switch medications at this time.    - Continue Tizanidine 4 mg QHS PRN for myofascial pain.  We have previously discussed potential deleterious side effects associated with this medication including  dizziness, drowsiness, dry mouth or tingling sensation in the upper or lower extremities.     - Failed medications: Gabapentin (SE:"didn't feel like herself").    - LA  reviewed and appropriate.       3. Rehabilitative:  -We discussed continuing at home physician directed physical therapy to help manage the patient/s painful condition. The patient was counseled that muscle strengthening will improve the long term prognosis in regards to pain and may also help increase range of motion and mobility.  Patient has completed conventional land-based physical therapy from 07/21/2021 through 10/28/2021, 14 sessions total; also completed 3 months land/aquatic therapy in December 2024.     4. Diagnostic:  Reviewed diagnostic imaging (bilateral hip x-ray, MRI lumbar spine, flex/ex lumbar XR) with the patient and answered any questions.     5. Consult/ Referral:    -Follow-up PRN with Neurosurgery,  " [place]:  Lumbar degenerative disc disease, anterolisthesis and radiculopathy    6. Follow up:  4 weeks post-procedure   - virtual visit       Future Appointments   Date Time Provider Department Center   8/20/2025  1:40 PM Vani Marrufo FNP-C [facility] [address]   9/12/2025  7:40 AM Bernice Bolton PA-C [facility] [place]   10/13/2025  1:20 PM [facility] MAMMO1-SCR [facility] M[facility] [place]   2/20/2026  1:00 PM Eve Green MD [facility] [address]         - Patient Questions: Answered all of the patient's questions regarding diagnosis, therapy, and treatment.    - This condition does not require this patient to take time off of work, and the primary goal of our Pain Management services is to improve the patient's functional capacity.   - I discussed the risks, benefits, and alternatives to potential treatment options. All questions and concerns were fully addressed today in clinic.         Bernice Bolton PA-C  Interventional Pain Management - [facility]    Disclaimer:  This note was prepared using voice recognition system and is likely to have sound alike errors that may have been overlooked even after proof reading.  Please call me with any questions.     This note was generated with the assistance of ambient listening technology to support clinical documentation. The content has been reviewed and edited for accuracy by the author, though minor syntax or spelling errors may remain. Please contact the author of this note for any clarification.

## 2025-07-11 NOTE — PROGRESS NOTES
Established Patient - TeleHealth Visit    The patient location is: [place]  The chief complaint leading to consultation is: chronic pain     Visit type: Audiovisual telemedicine visit     Total time spent on the encounter includes Face-to-face time and non-face to face time preparing to see the patient (eg, review of tests), Obtaining and/or reviewing separately obtained history, Documenting clinical information in the electronic or other health record, Independently interpreting results (not separately reported) and communicating results to the patient/family/caregiver, and/or Care coordination (not separately reported).     Each patient to whom he or she provides medical services by telemedicine is:  (1) informed of the relationship between the physician and patient and the respective role of any other health care provider with respect to management of the patient; and (2) notified that he or she may decline to receive medical services by telemedicine and may withdraw from such care at any time.        Chronic Pain -- Established Patient (Follow-up visit)    Referring Physician: Eve Green MD    PCP: Eve Green MD      Chief complaint:  Follow-up     Low back pain with RLE radicular pain   Sacroiliac joint pain         SUBJECTIVE:    Interval History (7/11/2025):  Patient presents for follow-up of chronic pain management. She reports low back pain radiating down her buttocks and leg, with more pain in her right hip and sides. She rates her current pain level as 5-6/10. She denies groin pain. She had bilateral lumbar transforaminal epidural injections in March, which provided about 65% relief. She has also had sacroiliac joint injections, with the most recent one in October of last year, and hip injections in the past.     She is currently taking Lyrica 75 mg twice daily. She reports it helps occasionally but causes drowsiness, which she dislikes. She expresses a preference for maintaining alertness while  awake. Due to the side effects, she only takes the medication when she feels it is necessary, describing the drowsy feeling as constant and making her feel cognitively impaired and lethargic. She is not interested in switching right now to another medication.    Interval History (4/15/2025): Amelia Mirza presents today for follow-up visit.  she underwent Bilateral L4/5 + L5/S1 TF KARISSA on 3/21/25.  The patient reports that she is/was better following the procedure.  she reports 60-65% pain relief with functional improvement.  The changes lasted 3 weeks so far.  The changes have continued through this visit.  She also has completed physical therapy in no longer has access to aquatic therapy, which she thinks may contribute to the pain as well.  At the last visit, she was started on Lyrica, which is helping.    Interval History (2/18/2025):  Patient presents today for follow-up visit.  Patient was last seen on 11/12/2024. At that visit, she was feeling much better after SI joint injection, in addition to previous lumbar KARISSA. Patient reports pain as 6/10 but increases to 8/10 with activity.    Interval History (11/12/2024): Amelia Mirza presents today for follow-up visit.  she underwent bilateral SIJ injection on 10/18/24.  The patient reports that she is/was better following the procedure.  she reports 85-90% pain relief.  The changes lasted 4 weeks so far.  The changes have continued through this visit.  Patient reports pain as 2/10 today.  She feels the combination of the lumbar epidural along with this SI joint injection has provided exponential pain relief.  Patient reports she is more active since this procedure.  She was able to go with her granddaughter to the [facility] and walk around for 3-4 hourss, which she has not been able to do in a very long time.  She has not been able to do any activities like this in a long time.  She continues physical therapy, along with aquatic  therapy with improvement.  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    Interval history 09/26/2024  Patient presents status post right-sided L4-5 and L5-S1 transforaminal epidural steroid injection 08/23/2024.  Patient reports 70+% sustained relief in right-sided radicular symptoms following her epidural steroid injection, along with functional improvement.  Today she reports pain primarily in the lower back which radiates into bilateral buttocks overlying bilateral sacroiliac joint territory.  Pain is intermittent and today is rated a 6/10.  Can can be exacerbated with bending forward, moving from sitting to standing and with ambulation.  Today she denies significant lower extremity radiculopathy, bowel or bladder incontinence or saddle anesthesia.  She has continued physician directed physical therapy exercises for lower back, sacroiliac joint and leg pain over the last 8 weeks from 07/26/2024 through 09/26/2024 with marginal improvement in her symptoms.    Of note patient saw Dr. Magaña 09/05/2024 with the following evaluation:      Patient reports neurosurgery recommended continuing with conservative treatments including physical therapy and intervention.  Patient was given tramadol prescription per Dr. Magaña, but she has been taking this medication judiciously as she read this was an opioid.  Patient also reports she would not like to pursue surgical intervention in the near future as long as she is benefitting from physical therapy and injections.    Interval history 08/01/2024  Patient presents status post bilateral intra-articular hip joint injection 06/25/2024.  Patient reports at least 50% noticeable relief in bilateral hip pain following her injection compounded by improvement in functionality.  She reports normally when traveling, she was unable to walk for  distances in the airport.  She reports after her procedure she was able to reach her gate  in [facility] without difficulty.  Today she again reiterates pain in the lower back which radiates into the hip and down the lateral and posterior aspect of the right lower extremity in L4-S1 distribution to the knee.  Patient reports this morning she did have pain across both sides of the lower back but after a hot shower, pain was only maintained in the right lower back and leg.  She has continued physician directed physical therapy exercises for lower back and leg pain over the last 8 weeks from 06/01/2024 through 08/01/2024 with marginal improvement in her symptoms.    Interval history 06/04/2024  Patient presents for bilateral hip x-ray review.  Today patient again reports pain in the lower back which radiates primarily into the hips, she does report intermittent radiation down the right lower extremity in L4 distribution to the knee.  90%, majority of her pain remains in the lower back and hips.  Pain is particularly exacerbated with positional changes moving from sitting to standing and with prolonged standing.  Patient reports she has had numerous presentations since our last clinic visit which greatly exacerbated her pain with prolonged standing.  She states she was helping to meal prep for a friend the day prior and was unable to complete cooking secondary to her severe pain in her hips.  Pain today is rated a 10/10 and is constant.  Patient has been performing physician directed physical therapy exercises daily over the last 8 weeks from 04/04/2024 through 06/04/2024 for lower back and hip pain with no significant improvement in pain, range of motion.    Patient does report last bilateral L5-S1 transforaminal epidural steroid injection did give her significant relief however this was very short-lived.    Interval history 10/11/2023  Patient presents status post bilateral L5-S1 transforaminal epidural steroid injection  07/28/2023 with 80 % relief initially.  Today patient reports only approximately 30% relief in lower back and radicular symptoms following her epidural.  Today she is reporting pain in the lower back which radiates into bilateral hips and intermittently down the lateral aspect of the lower extremities in L4-5 distribution to the knee.  Pain is more severe on the right than on the left.  Patient reports secondary to family obligations she had to temporarily discontinue physical therapy but did report her therapy was helping.  She would like to restart her physical therapy.  She reports pain is exacerbated with prolonged standing and with ambulation.  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  She reports pain is improved while sitting but is not completely resolved.    Interval History (5/8/2023): Amelia Mirza presents today for follow-up visit.  she underwent bilateral SIJ + GT bursa injection on 4/5/23.  The patient reports that she is/was better following the procedure.  she reports 75-80% pain relief in her low back, 70-80% relief in her left bursa, and 50-60% relief in her right bursa. Continues to report intermittent pain throughout the day, but overall much improved. Pain is exacerbated by prolonged activity, improved with rest.  The changes lasted 4 weeks so far.  The changes have continued through this visit.  Patient reports pain as 2/10 today.    Interval History (3/20/2023):  Amelia Mirza presents today for 3 month follow-up visit.  Patient was last seen on 12/16/2022. Last injection Bilateral  L5/S1 TF KARISSA on 11/18/22 with 65-70% relief.  She reports persistent and constant lower lumbosacral pain, right worse than left with radiation into her lateral hips and lateral thighs. She reports only intermittent radiation into her right lower extremity/foot, but her  constant pain is axial in nature. Pain is worsened with prolonged sitting, standing, or walking. Pain is alleviated with ibuprofen, biofreeze, and rest. She has completed formal physical therapy without relief. Pain interferes with daily activities. Patient reports pain as 5/10 today and 7/10 with exacerbating factors.    Interval History (12/16/2022): Amelia Mirza presents today for follow-up visit.  she underwent Bilateral  L5/S1 TF KARISSA on 11/18/22.  The patient reports that she is/was better following the procedure.  she reports 65-70% pain relief.  The changes lasted 4 weeks so far.  The changes have continued through this visit.  She reports this injection was equally effective c/t IL KARISSA in April, but neither were as effective as the initial IL KARISSA she had in October of 2021. She recently restarted physical therapy, which does offer improvement and relief in her symptoms. Patient reports pain as 2/10 today. She reports increased mobility since the injection, she is able to stand and walk longer distances.    Interval History (10/28/2022):  Amelia Mirza presents today via telemed for follow-up visit for MRI review.  Patient was last seen on 10/13/2022. She reports continued lumbosacral pain in a band-like distribution with radiation into her bilateral lower extremities left > right. She reports her symptoms previously were worse in her right LE but now she favors the other leg to compensate. She reports pain radiates primarily posteriorly along L5/S1 distribution with occasional radiation into her anterior thighs. She reports improvement in her radicular symptoms with physical therapy. Patient reports pain as 7/10 today and daily which is constant in nature and interferes with daily activity.    Interval history 10/13/2022  Patient presents status post L5-S1 interlaminar epidural steroid injection with right paramedian approach 04/22/2022.  Patient reports 65-70% relief in lower back and leg pain  "following her last epidural steroid injection.  Patient reports pain has been insidiously worsening over the last month.  Pain today is rated an 8/10.  Patient again reports pain in the lower back which radiates down the posterior aspects of bilateral lower extremities and L5-S1 distribution to the feet.  Patient reports pain has now interfered with her sleep.  Patient is interested in pursuing repeat injection.  Patient reports she is actively been participating in traditional as well as aquatic physical therapy which has been helping with strength and range of motion.    Interval history 03/31/2022  Patient presents for 5 month follow-up.  She reports return of lower back pain which radiates into the buttocks and down the posterior aspects of bilateral lower extremities and L5-S1 distribution.  Patient reports the symptoms are identical to prior lumbar epidural steroid injection.  Patient is interested in pursuing repeat intervention.  Patient also reports myofascial pain along thoracic paraspinous musculature.  Patient reports pain is interrupting with her sleep.  She denies new onset lower extremity weakness, bowel or bladder incontinence or saddle anesthesia.    Interval History (10/28/2021):  Amelia Mirza presents today for follow-up visit.  S/p L5/S1 IL KARISSA on 10/4/21 with 90% pain relief.  Pain was basically resolved after, but then she had a fall which Increased knee pain.  She hasnt seen Orthopedics in years.  Patient reports pain as "0/10 today.  Overall, she has greatly improved since procedure.     Initial HPI (7/12/21) - Dr. Adler:  Amelia Mirza is a 61 y.o.   female with past medical history significant for hypoTH, morbid obesity, osteoarthritis (knees), lumbar spondylosis who presents to the clinic for the evaluation of lower back and left leg pain . The pain started  1 year prior ago  without preceding accident or trauma and symptoms have been worsening.The pain is located " in a bandlike distribution at the level of the iliac crest with radiation down the left lower extremity along the lateral and posterior aspects in L4-5 distribution.  The pain is described as constant, aching and sharp and is rated as 7/10. The pain is rated with a score of  7/10 on the BEST day and a score of 10/10 on the WORST day.  Symptoms interfere with daily activity, sleeping and work.   Patient does work from home but currently is off her summer break and is disheartened by her inability to participate in outdoor activities or time with her family secondary to her pain. The pain is exacerbated by Sitting, Standing, Walking and Getting out of bed/chair.    Patient is able to ambulate approximately 3 blocks before requiring rest.The pain is mitigated by laying down and rest.     Pt saw MELI Mar for bilateral knee pain 3/2019 with topical compound cream prescribed and diagnostic genicular procedure discussed but not performed.     Pt saw Dr. Padilla ([id number]) for lower back pain with radiculopathy with recommendation for PT, NSAID analgesic prescribed.     Patient denies urinary incontinence, bowel incontinence, significant motor weakness and loss of sensations.      Pain Disability Index (PDI) Score Review:      3/6/2019    12:00 PM   Last 3 PDI Scores   Pain Disability Index (PDI) 31       Non-Pharmacologic Treatments:  Physical Therapy/Home Exercise: yes  Ice/Heat:yes  TENS: no  Acupuncture: no  Massage: no  Chiropractic: no    Other: no      Pain Medications:  - Adjuvant Medications: Advil,Motrin ( Ibuprofen), Mobic (Meloxicam), Zanaflex ( Tizanidine) and NSAIDs, Tylenol, Biofreeze, EMLA cream        Pain Procedures:     Orthopedics   Intra-articular steroid and visco-supplementation in bilateral knees - in other dept    Dr. Adler:  -10/04/2021: L5/S1 IL KARISSA with 90% pain relief   -04/22/2022:  L5-S1 interlaminar epidural steroid injection with right paramedian approach with 65-70% relief  -11/18/2022:  "Bilateral  L5/S1 TF KARISSA with 65-70% relief  -04/05/2023: bilateral SIJ + GT bursa injection with 75-80% pain relief in her low back, 70-80% relief in her left bursa, and 50-60% relief in her right bursa  -07/28/2023: Bilateral L5-S1 transforaminal epidural steroid injection  -06/25/2024: Bilateral intra-articular hip joint injection  -08/23/2024: Right-sided L4-5 + L5-S1 TF KARISSA   -10/18/2024: bilateral SIJ injection with 85-90% pain relief with functional improvement (along with pain relief from previous right lumbar TF KARISSA)  -3/21/2025: Bilateral L4/5 + L5/S1 TF KARISSA             Review of Systems:   GENERAL:  No weight loss, malaise or fevers.  HEENT:   No recent changes in vision or hearing  NECK:  Negative for lumps, no difficulty with swallowing.  RESPIRATORY:  Negative for cough, wheezing or shortness of breath, patient denies any recent URI.  CARDIOVASCULAR:  Negative for chest pain, leg swelling or palpitations.  GI:  Negative for abdominal discomfort, blood in stools or black stools or change in bowel habits.  MUSCULOSKELETAL:  See HPI.  SKIN:  Negative for lesions, rash, and itching.  PSYCH:  No mood disorder or recent psychosocial stressors.  Patients sleep is not disturbed secondary to pain.  HEMATOLOGY/LYMPHOLOGY:  Negative for prolonged bleeding, bruising easily or swollen nodes.  Patient is not currently taking any anti-coagulants  NEURO:   No history of headaches, syncope, paralysis, seizures or tremors.  All other reviewed and negative other than HPI.        OBJECTIVE:  Telemedicine Physical Exam:   Vitals:    07/11/25 0924   Height: 5' 2" (1.575 m)       Body mass index is 55.77 kg/m².   (reviewed on 7/11/2025)     (Physical exam performed virtually with patient guided on specific actions and diagnostic maneuvers)  GENERAL: Well appearing, in no acute distress, alert and oriented x3.  Cooperative.  PSYCH:  Mood and affect appropriate.  SKIN: Skin color & texture with no obvious abnormalities.  "   HEAD/FACE:  Normocephalic, atraumatic.    PULM:  No difficulty breathing. No nasal flaring. No obvious wheezing.  EXTREMITIES: No obvious deformities. Moving all extremities well, appears to have symmetric strength throughout.  MUSCULOSKELETAL: No obvious atrophy abnormalities are noted.   NEURO: No obvious neurologic deficit.   GAIT: sitting.     Physical Exam: last in clinic visit:  GENERAL: Well appearing, in no acute distress, alert and oriented x3.  PSYCH:  Mood and affect appropriate.  SKIN: Skin color, texture, turgor normal, no rashes or lesions.  HEAD/FACE:  Normocephalic, atraumatic. Cranial nerves grossly intact.  NECK: No pain to palpation over the cervical paraspinous muscles. Spurling Negative. No pain with neck flexion, extension, or lateral flexion.   PULM: No evidence of respiratory difficulty, symmetric chest rise.  GI:  Soft and non-tender.  BACK: Straight leg raising in the sitting and supine positions is negative to radicular pain. No pain to palpation over the facet joints of the lumbar spine or spinous processes. Normal range of motion without pain reproduction.  SIJ testing:  - TTP over SI joint: Present bilaterally, right > left  - Rick's/ Jean-Pierre's: Positive bilaterally  - Sacroiliac Distraction Test (anterior pressure): Positive  - Sacroiliac Compression Test (lateral pressure): Positive   - SacralThrust Test (posterior pressure): Positive  -TTP along bilateral GT bursa, right > left  EXTREMITIES: Peripheral joint ROM is full and pain free without obvious instability or laxity in all four extremities- with exception of knees.  No deformities, edema, or skin discoloration. Good capillary refill.  MUSCULOSKELETAL: Shoulder, hip, and knee provocative maneuvers are negative.  T   Bilateral upper and lower extremity strength is normal and symmetric.  No atrophy or tone abnormalities are noted. Decreased sensation LLE: L4-5 distribution to knee.  Pain with extension of knee. TTP diffusely  over right knee patella.  NEURO: BUE & BLE coordination and muscle stretch reflexes are physiologic and symmetric.  Plantar response are downgoing. No clonus.   GAIT: normal.          Imaging/ Diagnostic Studies/ Labs (Reviewed on 7/11/2025):    X-ray bilateral hips 10/11/2023  FINDINGS: No fracture, suspicious bone marrow lesion or other acute disease is seen in the pelvis or either hip. Joint alignment is anatomic. There is no radiographic evidence of femoral head osteonecrosis. Mild degenerative changes superior acetabulum.     MRI lumbar spine 06/12/2024  FINDINGS:  Alignment: Approximately 5 mm of grade 1 anterolisthesis at L4-5.  Slight retrolisthesis at L5-S1.     Vertebrae: Discogenic sub endplate marrow signal alteration (Modic changes) at L4-5 and to a lesser degree at L5-S1.     Discs: Progressive loss of disc height at L4-5 and L5-S1.     Cord: Normal.  Conus terminates at L1.     Degenerative findings:     T11-12: Small posterior disc bulge.  No significant neural foraminal narrowing or spinal canal stenosis.     T12-L1: No significant neural foraminal narrowing or spinal canal stenosis.     L1-L2: No significant neural foraminal narrowing or spinal canal stenosis.     L2-L3: No significant neural foraminal narrowing or spinal canal stenosis.     L3-L4: Minimal annular bulge and bilateral facet arthropathy contributing to mild left greater than right inferior neural foraminal narrowing.  No significant spinal canal stenosis.     L4-L5: Prominent posterior disc bulge with slight right foraminal asymmetry and bilateral hypertrophic facet arthropathy.  Resultant moderate to severe bilateral neural foraminal narrowing.  No significant spinal canal stenosis.     L5-S1: Broad-based posterior disc bulge with small central protrusion and bilateral facet arthropathy.  Resultant severe left and moderate right neural foraminal narrowing.  No significant spinal canal stenosis.     Paraspinal muscles & soft tissues:  Small bilateral renal cortical cysts.  Otherwise unremarkable.     Impression:  Multilevel lumbar spondylosis and degenerative disc disease with mild progression since 2022.    Lumbar x-ray   Comparison: None  Findings:  Body habitus limits the study.  For tumor body heights and alignment appear well-maintained.  There is uniform loss of diskal height at the L4 -- 5 L5 -- S1 levels.  Multilevel facet arthropathy.  Pedicles and SI joints appear intact.  No spondylolysis or spondylolisthesis.    X-Ray Cervical Spine AP And Lateral  Comparison: None  Findings: There is straightening of the normal cervical lordosis which can be associated with muscle spasm.  Vertebral body heights are well maintained.  No spondylolisthesis demonstrated.  There is mild loss of disk height from C3-4 and C5-6 with associated degenerative endplate changes and osteophyte production.  Posterior elements appear intact without acute fractures or subluxations demonstrated.  Odontoid process appears intact.  Atlantoaxial articulations appear normal.  Prevertebral soft tissues are within normal limits.        ASSESSMENT: 65 y.o. year old female with lower back and left lower extremity pain, consistent with     1. Sacroiliitis  Case Request-RAD/Other Procedure Area: bilateral SIJ + bilateral GT bursa injection      2. Greater trochanteric bursitis of both hips  Case Request-RAD/Other Procedure Area: bilateral SIJ + bilateral GT bursa injection      3. Primary osteoarthritis of both hips        4. Lumbar foraminal stenosis        5. Spinal stenosis of lumbar region without neurogenic claudication                PLAN:   1. Interventional:    - Schedule bilateral SIJ + bilateral GT bursa injection. Patient is not taking prescription blood thinners or ASA.  Patient received 80+% relief with last sacroiliac joint injection, most recently 10/18/2024 (along with supplemental pain relief from previous right lumbar TF KARISSA in August of 2024).      - S/p  "Bilateral L4/5 + L5/S1 TF KARISSA on 3/21/25 with 60-65% pain relief with functional improvement.  Patient received 80% pain relief for greater than 3 months from prior lumbar transforaminal epidural in conjunction with SIJ injection.  Can repeat when needed.     - Patient has 50% sustained relief in bilateral hip pain following intra-articular hip joint injection.  We have discussed repeating this injection in the future should hip pain exacerbate. Hold off with no groin pain at this time.     - Anticoagulation use: None.       2. Pharmacologic:   - Refill Lyrica 75mg BID, taking PRN.  Consider Increase if warranted.  We previously discussed potential side effects of this medication, which may include drowsiness,dizziness, dry mouth, constipation, or peripheral edema.   She does report severe drowsiness, but she does not want to switch medications at this time.    - Continue Tizanidine 4 mg QHS PRN for myofascial pain.  We have previously discussed potential deleterious side effects associated with this medication including  dizziness, drowsiness, dry mouth or tingling sensation in the upper or lower extremities.     - Failed medications: Gabapentin (SE:"didn't feel like herself").    - LA  reviewed and appropriate.       3. Rehabilitative:  -We discussed continuing at home physician directed physical therapy to help manage the patient/s painful condition. The patient was counseled that muscle strengthening will improve the long term prognosis in regards to pain and may also help increase range of motion and mobility.  Patient has completed conventional land-based physical therapy from 07/21/2021 through 10/28/2021, 14 sessions total; also completed 3 months land/aquatic therapy in December 2024.     4. Diagnostic:  Reviewed diagnostic imaging (bilateral hip x-ray, MRI lumbar spine, flex/ex lumbar XR) with the patient and answered any questions.     5. Consult/ Referral:    -Follow-up PRN with Neurosurgery,  " [place]:  Lumbar degenerative disc disease, anterolisthesis and radiculopathy    6. Follow up:  4 weeks post-procedure   - virtual visit       Future Appointments   Date Time Provider Department Center   8/20/2025  1:40 PM Vani Marrufo FNP-C [facility] [address]   9/12/2025  7:40 AM Bernice Bolton PA-C [facility] [place]   10/13/2025  1:20 PM [facility] MAMMO1-SCR [facility] MAMMO [place]   2/20/2026  1:00 PM Eve Green MD [facility] [address]         - Patient Questions: Answered all of the patient's questions regarding diagnosis, therapy, and treatment.    - This condition does not require this patient to take time off of work, and the primary goal of our Pain Management services is to improve the patient's functional capacity.   - I discussed the risks, benefits, and alternatives to potential treatment options. All questions and concerns were fully addressed today in clinic.         Bernice Bolton PA-C  Interventional Pain Management - [facility]    Disclaimer:  This note was prepared using voice recognition system and is likely to have sound alike errors that may have been overlooked even after proof reading.  Please call me with any questions.     This note was generated with the assistance of ambient listening technology to support clinical documentation. The content has been reviewed and edited for accuracy by the author, though minor syntax or spelling errors may remain. Please contact the author of this note for any clarification.

## 2025-07-25 ENCOUNTER — PATIENT MESSAGE (OUTPATIENT)
Dept: PAIN MEDICINE | Facility: HOSPITAL | Age: 65
End: 2025-07-25
Payer: COMMERCIAL

## 2025-07-25 NOTE — PRE-PROCEDURE INSTRUCTIONS
Spoke with patient regarding procedure scheduled on 7.29     Arrival time 0930     Has patient been sick with fever or on antibiotics within the last 7 days? No     Does the patient have any open wounds, sores or rashes? No     Does the patient have any recent fractures? no     Has patient received a vaccination within the last 7 days? No     Received the COVID vaccination? yes     Has the patient stopped all medications as directed? na     Does patient have a pacemaker, defibrillator, or implantable stimulator? No     Does the patient have a ride to and from procedure and someone reliable to remain with patient?       Is the patient diabetic? no      Does the patient have sleep apnea? Or use O2 at home? no     Is the patient receiving sedation? Local per pt request       Is the patient instructed to remain NPO beginning at midnight the night before their procedure? 4 hours     Procedure location confirmed with patient? Yes     Covid- Denies signs/symptoms. Instructed to notify PAT/MD if any changes.

## 2025-07-29 ENCOUNTER — HOSPITAL ENCOUNTER (OUTPATIENT)
Facility: HOSPITAL | Age: 65
Discharge: HOME | End: 2025-07-29
Attending: ANESTHESIOLOGY | Admitting: ANESTHESIOLOGY
Payer: COMMERCIAL

## 2025-07-29 DIAGNOSIS — M46.1 SACROILIITIS: ICD-10-CM

## 2025-07-29 PROCEDURE — 25000003 PHARM REV CODE 250: Performed by: ANESTHESIOLOGY

## 2025-07-29 PROCEDURE — 27096 INJECT SACROILIAC JOINT: CPT | Mod: 50,,, | Performed by: ANESTHESIOLOGY

## 2025-07-29 PROCEDURE — 27096 INJECT SACROILIAC JOINT: CPT | Mod: 50 | Performed by: ANESTHESIOLOGY

## 2025-07-29 PROCEDURE — 63600175 PHARM REV CODE 636 W HCPCS: Performed by: ANESTHESIOLOGY

## 2025-07-29 PROCEDURE — 20610 DRAIN/INJ JOINT/BURSA W/O US: CPT | Mod: 50,XS,, | Performed by: ANESTHESIOLOGY

## 2025-07-29 PROCEDURE — 20610 DRAIN/INJ JOINT/BURSA W/O US: CPT | Mod: 50,XS | Performed by: ANESTHESIOLOGY

## 2025-07-29 PROCEDURE — 25500020 PHARM REV CODE 255: Performed by: ANESTHESIOLOGY

## 2025-07-29 RX ORDER — SODIUM BICARBONATE 1 MEQ/ML
SYRINGE (ML) INTRAVENOUS
Status: DISCONTINUED | OUTPATIENT
Start: 2025-07-29 | End: 2025-07-29 | Stop reason: HOSPADM

## 2025-07-29 RX ORDER — TRIAMCINOLONE ACETONIDE 40 MG/ML
INJECTION, SUSPENSION INTRA-ARTICULAR; INTRAMUSCULAR
Status: DISCONTINUED | OUTPATIENT
Start: 2025-07-29 | End: 2025-07-29 | Stop reason: HOSPADM

## 2025-07-29 RX ORDER — BUPIVACAINE HYDROCHLORIDE 2.5 MG/ML
INJECTION, SOLUTION EPIDURAL; INFILTRATION; INTRACAUDAL; PERINEURAL
Status: DISCONTINUED | OUTPATIENT
Start: 2025-07-29 | End: 2025-07-29 | Stop reason: HOSPADM

## 2025-07-29 NOTE — DISCHARGE SUMMARY
Discharge Note  Short Stay      SUMMARY     Admit Date: 7/29/2025    Attending Physician: Josemanuel Adler MD        Discharge Physician: Josemanuel Adler MD        Discharge Date: 7/29/2025 11:05 AM    Procedure(s) (LRB):  bilateral SIJ + bilateral GT bursa injection (Bilateral)    Final Diagnosis: Sacroiliitis [M46.1]  Greater trochanteric bursitis of both hips [M70.61, M70.62]    Disposition: Home or self care    Patient Instructions:   Current Discharge Medication List        CONTINUE these medications which have NOT CHANGED    Details   amLODIPine (NORVASC) 10 MG tablet Take 1 tablet (10 mg total) by mouth once daily.  Qty: 90 tablet, Refills: 3    Comments: .  Associated Diagnoses: Hypertension, unspecified type      ergocalciferol (ERGOCALCIFEROL) 50,000 unit Cap TAKE 1 CAPSULE TWICE A WEEK  Qty: 25 capsule, Refills: 3    Associated Diagnoses: Vitamin D deficiency      fluticasone propionate (FLONASE) 50 mcg/actuation nasal spray SHAKE LIQUID AND USE 2 SPRAYS(100 MCG) IN EACH NOSTRIL DAILY  Qty: 48 g, Refills: 3    Comments: ZERO refills remain on this prescription. Your patient is requesting advance approval of refills for this medication to PREVENT ANY MISSED DOSES  Associated Diagnoses: Acute non-recurrent frontal sinusitis      ibuprofen (ADVIL,MOTRIN) 800 MG tablet Take 1 tablet (800 mg total) by mouth 2 (two) times daily as needed for Pain (food).  Qty: 60 tablet, Refills: 2    Associated Diagnoses: Chronic low back pain with sciatica, sciatica laterality unspecified, unspecified back pain laterality; Lumbar spondylosis; DDD (degenerative disc disease), cervical      levothyroxine (SYNTHROID) 200 MCG tablet TAKE 1 TABLET EVERY MORNING BEFORE BREAKFAST  Qty: 90 tablet, Refills: 3    Associated Diagnoses: Hypothyroidism, unspecified type      pregabalin (LYRICA) 75 MG capsule Take 1 capsule (75 mg total) by mouth 2 (two) times daily.  Qty: 60 capsule, Refills: 2    Associated Diagnoses: Lumbar foraminal  stenosis      tiZANidine (ZANAFLEX) 4 MG tablet Take 1 tablet (4 mg total) by mouth 2 (two) times daily.  Qty: 180 tablet, Refills: 1    Associated Diagnoses: Sacroiliitis      traZODone (DESYREL) 50 MG tablet Take 1 tablet (50 mg total) by mouth every evening.  Qty: 90 tablet, Refills: 3    Associated Diagnoses: Insomnia, unspecified type                 Discharge Diagnosis: Sacroiliitis [M46.1]  Greater trochanteric bursitis of both hips [M70.61, M70.62]  Condition on Discharge: Stable with no complications to procedure   Diet on Discharge: Same as before.  Activity: as per instruction sheet.  Discharge to: Home with a responsible adult.  Follow up: 2-4 weeks       Please call the office at [phone] if you experience any weakness or loss of sensation, fever > 101.5, pain uncontrolled with oral medications, persistent nausea/vomiting/or diarrhea, redness or drainage from the incisions, or any other worrisome concerns. If physician on call was not reached or could not communicate with our office for any reason please go to the nearest emergency department

## 2025-07-29 NOTE — DISCHARGE INSTRUCTIONS

## 2025-07-29 NOTE — OP NOTE
Tazdarrion STILES Mirza  65 y.o. female      Vitals:    07/29/25 1055   BP: 133/75   Pulse: 63   Resp: 18   Temp:        Procedure Date:07/29/2025        INFORMED CONSENT: The procedure, risks, benefits and options were discussed with patient. There are no contraindications to the procedure. The patient expressed understanding and agreed to proceed. The personnel performing the procedure was discussed. I verify that I personally obtained consent prior to the start of the procedure and the signed consent can be found on the patient's chart.       Anesthesia:   Local provided by M.D    The patient was monitored with continuous pulse oximetry, EKG, and intermittent blood pressure monitors.  The patient was hemodynamically stable throughout the entire process was responsive to voice, and breathing spontaneously.  Supplemental O2 was provided at 2L/min via nasal cannula.  Patient was comfortable for the duration of the procedure. (See nurse documentation and case log for sedation time)      Pre Procedure diagnosis: Sacroiliitis [M46.1]  Greater trochanteric bursitis of both hips [M70.61, M70.62]  Post-Procedure diagnosis: SAME      PROCEDURE:  1) Bilateral greater trochanteric bursa injection    2) Bilateral sacroiliac joint injection                            REASON FOR PROCEDURE:   Sacroiliitis [M46.1]  Greater trochanteric bursitis[M70.61]      MEDICATIONS INJECTED: 1mL 40mg/ml Kenalog and 4mL Bupivacaine 0.25% into each site    LOCAL ANESTHETIC USED: Xylocaine 1% 6ml     ESTIMATED BLOOD LOSS: None.   COMPLICATIONS: None.     TECHNIQUE:   Greater trochanteric bursa injection:  The area overlying the greater trochanteric bursa was identified using fluoroscopy, and the area overlying the skin was prepped and draped in usual sterile fashion. Local Xylocaine was injected by raising a wheel and going down to the periosteum using a 27-gauge hypodermic needle. A 5 inch 22-gauge spinal needle was introduce into the Bilateral  greater trochanteric bursa. Negative pressure applied to confirm no intravascular placement. Omnipaque was injected to confirm placement and to confirm that there was no vascular runoff. The medication was then injected slowly.  Displacement of the contrast after injection of the medication confirmed that the medication went into the area of the greater trochanteric bursa    Sacroiliac joint injection:   Laying in the prone position, the patient was prepped and draped in the usual sterile fashion using ChloraPrep and fenestrated drape.  The area was determined under fluoroscopy.  Local Xylocaine was injected by raising a wheel and going down to the periosteum using a 27-gauge hypodermic needle.  The 3.5 inch 22-gauge spinal needle was introduce into the Bilateral sacroiliac joint.  Negative pressure applied to confirm no intravascular placement.  Omnipaque was injected to confirm placement and to confirm that there was no vascular runoff.  The medication was then injected slowly.  The patient tolerated the procedure well.                       The patient was monitored for approximately 30 minutes after the procedure. Patient was given post procedure and discharge instructions to follow at home. We will see the patient back in two weeks or the patient may call to inform of status. The patient was discharged in a stable condition

## 2025-07-30 VITALS
SYSTOLIC BLOOD PRESSURE: 126 MMHG | DIASTOLIC BLOOD PRESSURE: 78 MMHG | TEMPERATURE: 98 F | WEIGHT: 293 LBS | HEART RATE: 56 BPM | HEIGHT: 62 IN | BODY MASS INDEX: 53.92 KG/M2 | RESPIRATION RATE: 17 BRPM | OXYGEN SATURATION: 100 %

## 2025-08-20 ENCOUNTER — LAB VISIT (OUTPATIENT)
Dept: LAB | Facility: HOSPITAL | Age: 65
End: 2025-08-20
Payer: COMMERCIAL

## 2025-08-20 ENCOUNTER — OFFICE VISIT (OUTPATIENT)
Dept: FAMILY MEDICINE | Facility: CLINIC | Age: 65
End: 2025-08-20
Payer: COMMERCIAL

## 2025-08-20 VITALS
BODY MASS INDEX: 53.92 KG/M2 | HEART RATE: 65 BPM | SYSTOLIC BLOOD PRESSURE: 126 MMHG | HEIGHT: 62 IN | WEIGHT: 293 LBS | DIASTOLIC BLOOD PRESSURE: 82 MMHG | OXYGEN SATURATION: 97 % | TEMPERATURE: 97 F

## 2025-08-20 DIAGNOSIS — E66.01 MORBID OBESITY WITH BMI OF 50.0-59.9, ADULT: ICD-10-CM

## 2025-08-20 DIAGNOSIS — R73.03 PREDIABETES: ICD-10-CM

## 2025-08-20 DIAGNOSIS — E03.9 HYPOTHYROIDISM, UNSPECIFIED TYPE: ICD-10-CM

## 2025-08-20 DIAGNOSIS — I10 HYPERTENSION, UNSPECIFIED TYPE: Primary | ICD-10-CM

## 2025-08-20 LAB
EAG (OHS): 123 MG/DL (ref 68–131)
HBA1C MFR BLD: 5.9 % (ref 4–5.6)

## 2025-08-20 PROCEDURE — 99213 OFFICE O/P EST LOW 20 MIN: CPT | Mod: S$GLB,,,

## 2025-08-20 PROCEDURE — 99999 PR PBB SHADOW E&M-EST. PATIENT-LVL IV: CPT | Mod: PBBFAC,,,

## 2025-08-20 PROCEDURE — 83036 HEMOGLOBIN GLYCOSYLATED A1C: CPT

## 2025-08-20 PROCEDURE — 3074F SYST BP LT 130 MM HG: CPT | Mod: CPTII,S$GLB,,

## 2025-08-20 PROCEDURE — 3044F HG A1C LEVEL LT 7.0%: CPT | Mod: CPTII,S$GLB,,

## 2025-08-20 PROCEDURE — 3079F DIAST BP 80-89 MM HG: CPT | Mod: CPTII,S$GLB,,

## 2025-08-20 PROCEDURE — 1159F MED LIST DOCD IN RCRD: CPT | Mod: CPTII,S$GLB,,

## 2025-08-20 PROCEDURE — 36415 COLL VENOUS BLD VENIPUNCTURE: CPT | Mod: PO

## 2025-08-20 PROCEDURE — 1160F RVW MEDS BY RX/DR IN RCRD: CPT | Mod: CPTII,S$GLB,,

## 2025-08-20 PROCEDURE — 3008F BODY MASS INDEX DOCD: CPT | Mod: CPTII,S$GLB,,
